# Patient Record
Sex: MALE | Race: WHITE | NOT HISPANIC OR LATINO | Employment: OTHER | URBAN - METROPOLITAN AREA
[De-identification: names, ages, dates, MRNs, and addresses within clinical notes are randomized per-mention and may not be internally consistent; named-entity substitution may affect disease eponyms.]

---

## 2017-01-13 ENCOUNTER — ALLSCRIPTS OFFICE VISIT (OUTPATIENT)
Dept: OTHER | Facility: OTHER | Age: 77
End: 2017-01-13

## 2017-02-16 ENCOUNTER — ALLSCRIPTS OFFICE VISIT (OUTPATIENT)
Dept: OTHER | Facility: OTHER | Age: 77
End: 2017-02-16

## 2017-02-17 ENCOUNTER — GENERIC CONVERSION - ENCOUNTER (OUTPATIENT)
Dept: OTHER | Facility: OTHER | Age: 77
End: 2017-02-17

## 2017-02-17 LAB
CREATININE, URINE (HISTORICAL): 213.3 MG/DL
HBA1C MFR BLD HPLC: 6.6 % (ref 4.8–5.6)
MICROALBUM.,U,RANDOM (HISTORICAL): 7.2 UG/ML
MICROALBUMIN/CREATININE RATIO (HISTORICAL): 3.4 MG/G CREAT (ref 0–30)

## 2017-02-18 LAB
A/G RATIO (HISTORICAL): 1.6 (ref 1.1–2.5)
ALBUMIN SERPL BCP-MCNC: 4 G/DL (ref 3.5–4.8)
ALP SERPL-CCNC: 56 IU/L (ref 39–117)
ALT SERPL W P-5'-P-CCNC: 28 IU/L (ref 0–44)
AST SERPL W P-5'-P-CCNC: 22 IU/L (ref 0–40)
BILIRUB SERPL-MCNC: 0.4 MG/DL (ref 0–1.2)
BUN SERPL-MCNC: 20 MG/DL (ref 8–27)
BUN/CREA RATIO (HISTORICAL): 17 (ref 10–22)
CALCIUM SERPL-MCNC: 9 MG/DL (ref 8.6–10.2)
CHLORIDE SERPL-SCNC: 101 MMOL/L (ref 96–106)
CHOLEST SERPL-MCNC: 173 MG/DL (ref 100–199)
CO2 SERPL-SCNC: 25 MMOL/L (ref 18–29)
CREAT SERPL-MCNC: 1.15 MG/DL (ref 0.76–1.27)
EGFR AFRICAN AMERICAN (HISTORICAL): 72 ML/MIN/1.73
EGFR-AMERICAN CALC (HISTORICAL): 62 ML/MIN/1.73
GLUCOSE SERPL-MCNC: 125 MG/DL (ref 65–99)
HDLC SERPL-MCNC: 54 MG/DL
INTERPRETATION (HISTORICAL): NORMAL
LDLC SERPL CALC-MCNC: 100 MG/DL (ref 0–99)
POTASSIUM SERPL-SCNC: 4.6 MMOL/L (ref 3.5–5.2)
SODIUM SERPL-SCNC: 141 MMOL/L (ref 134–144)
TOT. GLOBULIN, SERUM (HISTORICAL): 2.5 G/DL (ref 1.5–4.5)
TOTAL PROTEIN (HISTORICAL): 6.5 G/DL (ref 6–8.5)
TRIGL SERPL-MCNC: 94 MG/DL (ref 0–149)

## 2017-02-21 ENCOUNTER — GENERIC CONVERSION - ENCOUNTER (OUTPATIENT)
Dept: OTHER | Facility: OTHER | Age: 77
End: 2017-02-21

## 2017-07-18 ENCOUNTER — ALLSCRIPTS OFFICE VISIT (OUTPATIENT)
Dept: OTHER | Facility: OTHER | Age: 77
End: 2017-07-18

## 2017-09-20 ENCOUNTER — GENERIC CONVERSION - ENCOUNTER (OUTPATIENT)
Dept: OTHER | Facility: OTHER | Age: 77
End: 2017-09-20

## 2017-09-25 ENCOUNTER — GENERIC CONVERSION - ENCOUNTER (OUTPATIENT)
Dept: OTHER | Facility: OTHER | Age: 77
End: 2017-09-25

## 2018-01-11 NOTE — RESULT NOTES
Verified Results  (1) COMPREHENSIVE METABOLIC PANEL [id number] 40:22TC [email or web address]     Test Name Result Flag Reference   Glucose, Serum 125 mg/dL H 65-99   BUN 20 mg/dL  8-27   Creatinine, Serum 1 15 mg/dL  0 76-1 27   eGFR If NonAfricn Am 62 mL/min/1 73  >59   eGFR If Africn Am 72 mL/min/1 73  >59   BUN/Creatinine Ratio 17  10-22   Sodium, Serum 141 mmol/L  134-144   Potassium, Serum 4 6 mmol/L  3 5-5 2   Chloride, Serum 101 mmol/L     Carbon Dioxide, Total 25 mmol/L  18-29   Calcium, Serum 9 0 mg/dL  8 6-10 2   Protein, Total, Serum 6 5 g/dL  6 0-8 5   Albumin, Serum 4 0 g/dL  3 5-4 8   Globulin, Total 2 5 g/dL  1 5-4 5   A/G Ratio 1 6  1 1-2 5   **Effective March 13, 2017 the reference interval**                   for A/G Ratio will be changing to:                               Age                Male          Female                           0 -  7 days       1 1 - 2 3       1 1 - 2 3                           8 - 30 days       1 2 - 2 8       1 2 - 2 8                           1 -  6 months     1 3 - 3 6       1 3 - 3 6                    7 months -  5 years      1 5 - 2 6       1 5 - 2 6                              > 5 years      1 2 - 2 2       1 2 - 2 2   Bilirubin, Total 0 4 mg/dL  0 0-1 2   Alkaline Phosphatase, S 56 IU/L     AST (SGOT) 22 IU/L  0-40   ALT (SGPT) 28 IU/L  0-44     (1) HEMOGLOBIN A1C [id number] 09:10AM [email or web address]     Test Name Result Flag Reference   Hemoglobin A1c 6 6 % H 4 8-5 6   Pre-diabetes: 5 7 - 6 4           Diabetes: >6 4           Glycemic control for adults with diabetes: <7 0     (1) LIPID PANEL FASTING W DIRECT LDL REFLEX [id number] 09:10AM [email or web address]     Test Name Result Flag Reference   Cholesterol, Total 173 mg/dL  100-199   Triglycerides 94 mg/dL  0-149   HDL Cholesterol 54 mg/dL  >39   LDL Cholesterol Calc 100 mg/dL H 0-99     (1) MICROALBUMIN CREATININE RATIO, RANDOM URINE [id number] 09:10AM [email or web address]     Test Name Result Flag Reference Creatinine, Urine 213 3 mg/dL  Not Estab  Microalbumin, Urine 7 2 ug/mL  Not Estab  Microalb/Creat Ratio 3 4 mg/g creat  0 0-30 0     [facility]) Cardiovascular Risk Assessment [id number] 09:10AM [facility]     Test Name Result Flag Reference   Interpretation Note     Supplement report is available  PDF Image   Discussion/Summary   Your labs are stable  Please follow up as we discussed at your last appointment    Dr Nikolai Marcelo

## 2018-01-12 VITALS
DIASTOLIC BLOOD PRESSURE: 64 MMHG | HEART RATE: 63 BPM | SYSTOLIC BLOOD PRESSURE: 118 MMHG | HEIGHT: 69 IN | OXYGEN SATURATION: 96 % | WEIGHT: 227.13 LBS | BODY MASS INDEX: 33.64 KG/M2

## 2018-01-12 NOTE — RESULT NOTES
Message   please inform throat cx negative     Verified Results  (1) THROAT CULTURE (CULTURE, UPPER RESPIRATORY) [id number] 09:12AM Sherice Iverson    Order Number: [id number]     Test Name Result Flag Reference   CLINICAL REPORT (Report)     Test:        Throat culture  Specimen Source:  Throat  Specimen Type:   Throat  Specimen Date:   12/6/2016 9:12 AM  Result Date:    12/8/2016 1:23 PM  Result Status:   Final result  Resulting Lab:   [place] [address]            Tel: [phone]      CULTURE                                       ------------------                                   Negative for beta-hemolytic Streptococcus

## 2018-01-13 NOTE — RESULT NOTES
Verified Results  (1) CBC/ PLT (NO DIFF) [id number] 09:28AM [facility]     Test Name Result Flag Reference   WBC 4 7 x10E3/uL  3 4-10 8   RBC 5 17 x10E6/uL  4 14-5 80   Hemoglobin 15 8 g/dL  12 6-17 7   Hematocrit 48 0 %  37 5-51 0   MCV 93 fL  79-97   MCH 30 6 pg  26 6-33 0   MCHC 32 9 g/dL  31 5-35 7   RDW 13 3 %  12 3-15 4   Platelets 837 A32J6/PH  150-379     (1) COMPREHENSIVE METABOLIC PANEL [id number] 23:88RM [facility]     Test Name Result Flag Reference   Glucose, Serum 137 mg/dL H 65-99   BUN 19 mg/dL  8-27   Creatinine, Serum 1 14 mg/dL  0 76-1 27   eGFR If NonAfricn Am 63 mL/min/1 73  >59   eGFR If Africn Am 73 mL/min/1 73  >59   BUN/Creatinine Ratio 17  10-22   Sodium, Serum 140 mmol/L  134-144   Potassium, Serum 4 6 mmol/L  3 5-5 2   Chloride, Serum 101 mmol/L     Carbon Dioxide, Total 22 mmol/L  18-29   Calcium, Serum 9 2 mg/dL  8 6-10 2   Protein, Total, Serum 6 3 g/dL  6 0-8 5   Albumin, Serum 3 9 g/dL  3 5-4 8   Globulin, Total 2 4 g/dL  1 5-4 5   A/G Ratio 1 6  1 1-2 5   Bilirubin, Total 0 6 mg/dL  0 0-1 2   Alkaline Phosphatase, S 63 IU/L     AST (SGOT) 24 IU/L  0-40   ALT (SGPT) 22 IU/L  0-44     (1) HEMOGLOBIN A1C 02Jun2016 09:28AM [facility]     Test Name Result Flag Reference   Hemoglobin A1c 6 9 % H 4 8-5 6   Pre-diabetes: 5 7 - 6 4           Diabetes: >6 4           Glycemic control for adults with diabetes: <7 0     (1) LIPID PANEL FASTING W DIRECT LDL REFLEX [id number] 09:28AM [facility]     Test Name Result Flag Reference   Cholesterol, Total 170 mg/dL  100-199   Triglycerides 104 mg/dL  0-149   HDL Cholesterol 49 mg/dL  >39   According to ATP-III Guidelines, HDL-C >59 mg/dL is considered a  negative risk factor for CHD     LDL Cholesterol Calc 100 mg/dL H 0-99     (1) TSH [id number] 09:28AM [facility]     Test Name Result Flag Reference   TSH 3 060 uIU/mL  0 450-4 500     (1) VITAMIN B12 [id number] 09:28AM      Test Name Result Flag Reference   Vitamin B12 699 pg/mL  211-946       Discussion/Summary   labs stable , continue current medications due for follow up

## 2018-01-14 VITALS
WEIGHT: 227 LBS | SYSTOLIC BLOOD PRESSURE: 124 MMHG | HEART RATE: 68 BPM | DIASTOLIC BLOOD PRESSURE: 80 MMHG | BODY MASS INDEX: 33.62 KG/M2 | HEIGHT: 69 IN

## 2018-01-15 VITALS
DIASTOLIC BLOOD PRESSURE: 70 MMHG | SYSTOLIC BLOOD PRESSURE: 118 MMHG | TEMPERATURE: 97.5 F | BODY MASS INDEX: 33.62 KG/M2 | WEIGHT: 227 LBS | RESPIRATION RATE: 16 BRPM | HEIGHT: 69 IN | HEART RATE: 68 BPM

## 2018-01-16 NOTE — RESULT NOTES
Verified Results  (1) COMPREHENSIVE METABOLIC PANEL [id number] 90:15YT [employer]     Test Name Result Flag Reference   Glucose, Serum 133 mg/dL H 65-99   BUN 20 mg/dL  8-27   Creatinine, Serum 1 17 mg/dL  0 76-1 27   eGFR If NonAfricn Am 61 mL/min/1 73  >59   eGFR If Africn Am 71 mL/min/1 73  >59   BUN/Creatinine Ratio 17  10-22   Sodium, Serum 142 mmol/L  136-144   Potassium, Serum 4 6 mmol/L  3 5-5 2   Chloride, Serum 100 mmol/L     Carbon Dioxide, Total 24 mmol/L  18-29   Calcium, Serum 9 3 mg/dL  8 6-10 2   Protein, Total, Serum 6 7 g/dL  6 0-8 5   Albumin, Serum 4 2 g/dL  3 5-4 8   Globulin, Total 2 5 g/dL  1 5-4 5   A/G Ratio 1 7  1 1-2 5   Bilirubin, Total 0 3 mg/dL  0 0-1 2   Alkaline Phosphatase, S 71 IU/L     AST (SGOT) 26 IU/L  0-40   ALT (SGPT) 23 IU/L  0-44     (1) HEMOGLOBIN A1C [id number] 11:54AM [facility]     Test Name Result Flag Reference   Hemoglobin A1c 7 0 % H 4 8-5 6   Pre-diabetes: 5 7 - 6 4           Diabetes: >6 4           Glycemic control for adults with diabetes: <7 0     (1) LIPID PANEL FASTING W DIRECT LDL REFLEX [id number] 11:54AM [facility]     Test Name Result Flag Reference   Cholesterol, Total 151 mg/dL  100-199   Triglycerides 78 mg/dL  0-149   HDL Cholesterol 46 mg/dL  >39   LDL Cholesterol Calc 89 mg/dL  0-99     (1) MICROALBUMIN CREATININE RATIO, RANDOM URINE 21Nov2016 11:54AM [facility]     Test Name Result Flag Reference   Creatinine, Urine 187 4 mg/dL  Not Estab  Microalbumin, Urine 6 0 ug/mL  Not Estab     Microalb/Creat Ratio 3 2 mg/g creat  0 0-30 0     (1) CBC/PLT/DIFF [id number] 11:54AM [facility]     Test Name Result Flag Reference   WBC 5 5 x10E3/uL  3 4-10 8   RBC 5 28 x10E6/uL  4 14-5 80   Hemoglobin 16 1 g/dL  12 6-17 7   Hematocrit 48 0 %  37 5-51 0   MCV 91 fL  79-97   MCH 30 5 pg  26 6-33 0   MCHC 33 5 g/dL  31 5-35 7   RDW 13 5 %  12 3-15 4   Platelets 334 W79S6/UV  150-379   Neutrophils 54 %     Lymphs 31 %     Monocytes 7 %     Eos 7 %     Basos 1 %     Neutrophils (Absolute) 3 0 x10E3/uL  1 4-7 0   Lymphs (Absolute) 1 7 x10E3/uL  0 7-3 1   Monocytes(Absolute) 0 4 x10E3/uL  0 1-0 9   Eos (Absolute) 0 4 x10E3/uL  0 0-0 4   Baso (Absolute) 0 0 x10E3/uL  0 0-0 2   Immature Granulocytes 0 %     Immature Grans (Abs) 0 0 x10E3/uL  0 0-0 1     (1) TSH [id number] 11:54AM [facility]     Test Name Result Flag Reference   TSH 3 030 uIU/mL  0 450-4 500     [facility]) Cardiovascular Risk Assessment 21Nov2016 11:54AM [facility]     Test Name Result Flag Reference   Interpretation Note     Supplement report is available  PDF Image   [facility] Center[facility] Ibar[facility] ITO58 Default [id number] 11:54AM [facility]     Test Name Result Flag Reference   [place] CMP14 Default Comment     A hand-written panel/profile was received from your office  In  accordance with the [facility] Ambiguous Test Code Policy dated July 6089, we have completed your order by using the closest currently  or formerly recognized AMA panel  We have assigned Comprehensive  Metabolic Panel (14), Test Code #681189 to this request   If this  is not the testing you wished to receive on this specimen, please  contact the [address] [place] [address] Client Inquiry/Technical Services Department  to clarify the test order  We appreciate your business  Discussion/Summary   Your labs are stable  Please follow up as we discussed at your last appointment    Dr Stacy Medrano

## 2018-01-18 LAB
LEFT EYE DIABETIC RETINOPATHY: NORMAL
RIGHT EYE DIABETIC RETINOPATHY: NORMAL
SEVERITY (EYE EXAM): NORMAL

## 2018-01-22 VITALS
HEART RATE: 76 BPM | HEIGHT: 69 IN | DIASTOLIC BLOOD PRESSURE: 80 MMHG | WEIGHT: 225 LBS | RESPIRATION RATE: 16 BRPM | SYSTOLIC BLOOD PRESSURE: 136 MMHG | BODY MASS INDEX: 33.33 KG/M2 | TEMPERATURE: 97.8 F

## 2018-01-22 VITALS
HEIGHT: 69 IN | SYSTOLIC BLOOD PRESSURE: 120 MMHG | BODY MASS INDEX: 33.18 KG/M2 | RESPIRATION RATE: 16 BRPM | TEMPERATURE: 96.9 F | WEIGHT: 224 LBS | DIASTOLIC BLOOD PRESSURE: 84 MMHG | HEART RATE: 76 BPM

## 2018-02-06 RX ORDER — OMEPRAZOLE 40 MG/1
1 CAPSULE, DELAYED RELEASE ORAL DAILY
COMMUNITY
Start: 2017-09-25 | End: 2018-06-27 | Stop reason: ALTCHOICE

## 2018-02-06 RX ORDER — TRANDOLAPRIL TABLETS 1 MG/1
1 TABLET ORAL DAILY
COMMUNITY
Start: 2017-01-13 | End: 2018-02-07 | Stop reason: SDUPTHER

## 2018-02-06 RX ORDER — SIMVASTATIN 20 MG
TABLET ORAL
COMMUNITY
Start: 2013-12-23 | End: 2018-02-12 | Stop reason: SDUPTHER

## 2018-02-07 DIAGNOSIS — I10 BENIGN ESSENTIAL HTN: Primary | ICD-10-CM

## 2018-02-07 RX ORDER — TRANDOLAPRIL TABLETS 1 MG/1
1 TABLET ORAL DAILY
Qty: 90 TABLET | Refills: 0 | Status: SHIPPED | OUTPATIENT
Start: 2018-02-07 | End: 2018-07-19 | Stop reason: SDUPTHER

## 2018-02-07 RX ORDER — TRANDOLAPRIL TABLETS 1 MG/1
TABLET ORAL
Qty: 8100 TABLET | Refills: 1 | OUTPATIENT
Start: 2018-02-07

## 2018-02-11 PROBLEM — I25.10 2-VESSEL CORONARY ARTERY DISEASE: Status: ACTIVE | Noted: 2017-01-13

## 2018-02-11 PROBLEM — E66.8 MODERATE OBESITY: Status: ACTIVE | Noted: 2017-01-13

## 2018-02-11 PROBLEM — E66.9 MODERATE OBESITY: Status: ACTIVE | Noted: 2017-01-13

## 2018-02-12 ENCOUNTER — OFFICE VISIT (OUTPATIENT)
Dept: CARDIOLOGY CLINIC | Facility: CLINIC | Age: 78
End: 2018-02-12
Payer: MEDICARE

## 2018-02-12 VITALS
SYSTOLIC BLOOD PRESSURE: 130 MMHG | WEIGHT: 228.4 LBS | HEIGHT: 69 IN | DIASTOLIC BLOOD PRESSURE: 72 MMHG | HEART RATE: 82 BPM | BODY MASS INDEX: 33.83 KG/M2 | OXYGEN SATURATION: 96 %

## 2018-02-12 DIAGNOSIS — E78.5 DYSLIPIDEMIA: ICD-10-CM

## 2018-02-12 DIAGNOSIS — I25.10 2-VESSEL CORONARY ARTERY DISEASE: Primary | ICD-10-CM

## 2018-02-12 DIAGNOSIS — E66.8 MODERATE OBESITY: ICD-10-CM

## 2018-02-12 DIAGNOSIS — I10 BENIGN HYPERTENSION: ICD-10-CM

## 2018-02-12 DIAGNOSIS — E11.9 TYPE 2 DIABETES MELLITUS WITHOUT COMPLICATION, WITHOUT LONG-TERM CURRENT USE OF INSULIN (HCC): ICD-10-CM

## 2018-02-12 PROCEDURE — 99214 OFFICE O/P EST MOD 30 MIN: CPT | Performed by: INTERNAL MEDICINE

## 2018-02-12 RX ORDER — ATORVASTATIN CALCIUM 40 MG/1
40 TABLET, FILM COATED ORAL DAILY
Qty: 90 TABLET | Refills: 2 | Status: SHIPPED | OUTPATIENT
Start: 2018-02-12 | End: 2018-06-27 | Stop reason: ALTCHOICE

## 2018-02-12 NOTE — PROGRESS NOTES
Progress Note - Cardiology Office  Gonzales Maldonado 70 y o  male MRN: [id number]   Encounter: [id number]    Assessment/Plan   1  Coronary artery disease with a remote history of angioplasty of LAD with a drug-eluting stent and known RPDA 100% with grade 3 collaterals  Continue medical Rx  No symptoms of angina  continue conservative Rx  2  Hypertension  Patient's blood pressure is very well controlled with current therapy  Check electrolytes  3  Dyslipidemia  Patient cholesterol has creeped up  His LDL is close to 100  He has history of diabetes mellitus coronary artery disease and known 100% occluded RCA  Need to be on high intensity statin  After extensive discussion patient was started on Lipitor 40 mg and will check repeat blood test   Check CMP fasting lipid TSH ordered  4  Diabetes mellitus  Patient is on metformin and Januvia follow-up by PMD   Last HbA1c 6 6  5  Obesity  He is advised to lose weightPatient was educated about pathophysiology of coronary artery disease  Currently he has no symptom concur with current Rx  Follow-up in 6 months    Counseling :  A description of the counseling  Spoke to patient but high intensity statins  Lipitor side effects discussed  He is willing to try it  Goals and Barriers  Patient want to lose weight which he gain during winter months  Little under stress due to wife's condition  Patient's ability to self care: Yes  Medication side effect reviewed with patient in detail and all their questions answered to their satisfaction  HPI :     John Michel is a 70y o  year old male who came for follow up  Patient has a past medical history significant for coronary artery disease status post angioplasty in 2008 off large size LAD with a known RPDA, hypertension, diabetes mellitus, dyslipidemia and moderate obesity who came for regular follow-up  Patient's last nuclear was in 2014 which was nonischemic   He denies any chest pain or any shortness of breath  No fever no chills no other significant complaint  7/18/2017Patient came for follow-up for coronary artery disease  A status post angioplasty of his large LAD and has known RPDA occlusion  His sugar is well controlled  He is trying to lose weight  Blood pressures acceptable  Denies any chest pain  No PND no orthopnea no nausea no vomiting no other significant complaint  02/11/2018  Above reviewed  Patient came for follow-up  He is under a little stress as wife is having lot of health issues  Denies any chest pain or any shortness of breath  His cholesterol has slowly gone up  He is only on Zocor 20 mg  He is trying his best to lose weight  No fever no chills no nausea no vomiting no PND no orthopnea no other significant complaint  Review of Systems   Constitutional: Negative for activity change, chills, diaphoresis, fever and unexpected weight change  HENT: Negative for congestion  Eyes: Negative for discharge and redness  Respiratory: Positive for shortness of breath  Negative for cough, chest tightness and wheezing  Chronic exertion shortness of breath not changed   Cardiovascular: Negative  Negative for chest pain, palpitations and leg swelling  Gastrointestinal: Negative for abdominal pain, diarrhea and nausea  Endocrine: Negative  Genitourinary: Negative for decreased urine volume and urgency  Musculoskeletal: Positive for arthralgias  Negative for back pain and gait problem  Skin: Negative for rash and wound  Allergic/Immunologic: Negative  Neurological: Negative for dizziness, seizures, syncope, weakness, light-headedness and headaches  Hematological: Negative  Psychiatric/Behavioral: Negative for agitation and confusion  The patient is not nervous/anxious          Historical Information   Past Medical History:   Diagnosis Date    CAD (coronary artery disease)     DM2 (diabetes mellitus, type 2) ([facility] [address] )     Dyslipidemia     HTN (hypertension)      Past Surgical History:   Procedure Laterality Date    CORONARY ANGIOPLASTY       History   Alcohol Use    Yes     History   Drug Use No     History   Smoking Status    Never Smoker   Smokeless Tobacco    Never Used     Family History:   Family History   Problem Relation Age of Onset    Cancer Mother     Hypertension Sister        Meds/Allergies     No Known Allergies    Current Outpatient Prescriptions:     metFORMIN (GLUCOPHAGE) 500 mg tablet, Take by mouth, Disp: , Rfl:     Multiple Vitamins-Minerals (CENTRUM SILVER PO), Take by mouth, Disp: , Rfl:     omeprazole (PriLOSEC) 40 MG capsule, Take 1 capsule by mouth daily, Disp: , Rfl:     sitaGLIPtin (JANUVIA) 50 mg tablet, Take by mouth, Disp: , Rfl:     trandolapril (MAVIK) 1 MG tablet, Take 1 tablet (1 mg total) by mouth daily, Disp: 90 tablet, Rfl: 0    atorvastatin (LIPITOR) 40 mg tablet, Take 1 tablet (40 mg total) by mouth daily, Disp: 90 tablet, Rfl: 2    Vitals: Blood pressure 130/72, pulse 82, height 5' 9" (1 753 m), weight 104 kg (228 lb 6 4 oz), SpO2 96 %  Body mass index is 33 73 kg/m²  BP Readings from Last 3 Encounters:   02/12/18 130/72   09/25/17 136/80   09/20/17 120/84       Physical Exam:  Physical Exam    Neurologic:  Alert & oriented x 3, no new focal deficits, Not in any acute distress,  Constitutional:  Well developed, well nourished, non-toxic appearance   Eyes:  Pupil equal and reacting to light, conjunctiva normal, No JVP, No LNP   HENT:  Atraumatic, oropharynx moist, Neck- normal range of motion, no tenderness,  Neck supple   Respiratory:  Bilateral air entry, mostly clear to auscultation  Cardiovascular: S1-S2 regular with a 2/6 ejection systolic murmur and S4 is present  GI:  Soft, nondistended, normal bowel sounds, nontender, no hepatosplenomegaly appreciated  Musculoskeletal:  No edema, no tenderness, no deformities     Skin:  Well hydrated, no rash   Lymphatic:  No lymphadenopathy noted   Extremities:  No edema and distal pulses are present      Patient Active Problem List    Diagnosis Date Noted    2-vessel coronary artery disease 01/13/2017    Moderate obesity 01/13/2017    Benign hypertension 11/09/2016    Dyslipidemia 12/23/2013    DMII (diabetes mellitus, type 2) ([facility] [address] ) 11/13/2013     Diagnostic Studies Review Cardio:  Stress Test: Nuclear stress test in October 2014 shows no ischemia EF 70%  Echocardiogram/ROCK: Echo Doppler done in March 2015 shows borderline concentric left hypertrophy EF 55-60%, after reminding dilated, trace MR, trace TR PA pressure 35 mmHg  Catheterization: His cardiac catheter patient done in 2008 shows critical stenosis of LAD which was successfully stented with a drug-eluting stent  He had nonobstructive disease in the circumflex and right coronary artery and RPDA is 100% occluded with grade 2 collaterals  Vascular imaging: In October of 2014 abdominal aortogram study shows no aneurysm  ECG Report:   Comparison to prior ECGs: no interval change  7/18/2017  Normal sinus rhythm 1  with no significant ST changes  Heart rate was 68 bpm     Blood test from February 2017 reviewed with the patient    Lab Review     Lab Results   Component Value Date     02/17/2017    K 4 6 02/17/2017     02/17/2017    CO2 25 02/17/2017    BUN 20 02/17/2017    CREATININE 1 15 02/17/2017    GLUCOSE 125 (H) 02/17/2017    CALCIUM 9 0 02/17/2017    AST 22 02/17/2017    ALT 28 02/17/2017    ALKPHOS 56 02/17/2017    PROT 6 5 02/17/2017    BILITOT 0 4 02/17/2017     Lab Results   Component Value Date    GLUCOSE 125 (H) 02/17/2017    CALCIUM 9 0 02/17/2017     02/17/2017    K 4 6 02/17/2017    CO2 25 02/17/2017     02/17/2017    BUN 20 02/17/2017    CREATININE 1 15 02/17/2017       Lab Results   Component Value Date    HGBA1C 6 6 (H) 02/17/2017     Lipid Profile:   Lab Results   Component Value Date    CHOL 173 02/17/2017    HDL 54 02/17/2017    LDLCALC 100 (H) 02/17/2017    TRIG 94 02/17/2017 Lab Results   Component Value Date    CHOL 173 02/17/2017    CHOL 170 06/02/2016       Dr Jayshree Phelps MD [facility]      "This note has been constructed using a voice recognition system  Therefore there may be syntax, spelling, and/or grammatical errors   Please call if you have any questions  "

## 2018-02-19 ENCOUNTER — TRANSCRIBE ORDERS (OUTPATIENT)
Dept: LAB | Facility: CLINIC | Age: 78
End: 2018-02-19

## 2018-02-19 ENCOUNTER — APPOINTMENT (OUTPATIENT)
Dept: LAB | Facility: CLINIC | Age: 78
End: 2018-02-19
Payer: MEDICARE

## 2018-02-19 DIAGNOSIS — E78.5 DYSLIPIDEMIA: ICD-10-CM

## 2018-02-19 DIAGNOSIS — I10 BENIGN HYPERTENSION: ICD-10-CM

## 2018-02-19 DIAGNOSIS — I25.10 2-VESSEL CORONARY ARTERY DISEASE: ICD-10-CM

## 2018-02-19 LAB
ALBUMIN SERPL BCP-MCNC: 3.7 G/DL (ref 3.5–5)
ALP SERPL-CCNC: 58 U/L (ref 46–116)
ALT SERPL W P-5'-P-CCNC: 35 U/L (ref 12–78)
ANION GAP SERPL CALCULATED.3IONS-SCNC: 5 MMOL/L (ref 4–13)
AST SERPL W P-5'-P-CCNC: 22 U/L (ref 5–45)
BASOPHILS # BLD AUTO: 0.06 THOUSANDS/ΜL (ref 0–0.1)
BASOPHILS NFR BLD AUTO: 1 % (ref 0–1)
BILIRUB SERPL-MCNC: 0.54 MG/DL (ref 0.2–1)
BUN SERPL-MCNC: 19 MG/DL (ref 5–25)
CALCIUM SERPL-MCNC: 9 MG/DL (ref 8.3–10.1)
CHLORIDE SERPL-SCNC: 105 MMOL/L (ref 100–108)
CHOLEST SERPL-MCNC: 172 MG/DL (ref 50–200)
CO2 SERPL-SCNC: 29 MMOL/L (ref 21–32)
CREAT SERPL-MCNC: 1.18 MG/DL (ref 0.6–1.3)
EOSINOPHIL # BLD AUTO: 0.63 THOUSAND/ΜL (ref 0–0.61)
EOSINOPHIL NFR BLD AUTO: 9 % (ref 0–6)
ERYTHROCYTE [DISTWIDTH] IN BLOOD BY AUTOMATED COUNT: 13.9 % (ref 11.6–15.1)
GFR SERPL CREATININE-BSD FRML MDRD: 60 ML/MIN/1.73SQ M
GLUCOSE P FAST SERPL-MCNC: 114 MG/DL (ref 65–99)
HCT VFR BLD AUTO: 47.6 % (ref 36.5–49.3)
HDLC SERPL-MCNC: 54 MG/DL (ref 40–60)
HGB BLD-MCNC: 15.6 G/DL (ref 12–17)
LDLC SERPL CALC-MCNC: 95 MG/DL (ref 0–100)
LYMPHOCYTES # BLD AUTO: 1.59 THOUSANDS/ΜL (ref 0.6–4.47)
LYMPHOCYTES NFR BLD AUTO: 22 % (ref 14–44)
MCH RBC QN AUTO: 30.9 PG (ref 26.8–34.3)
MCHC RBC AUTO-ENTMCNC: 32.8 G/DL (ref 31.4–37.4)
MCV RBC AUTO: 94 FL (ref 82–98)
MONOCYTES # BLD AUTO: 0.74 THOUSAND/ΜL (ref 0.17–1.22)
MONOCYTES NFR BLD AUTO: 10 % (ref 4–12)
NEUTROPHILS # BLD AUTO: 4.12 THOUSANDS/ΜL (ref 1.85–7.62)
NEUTS SEG NFR BLD AUTO: 58 % (ref 43–75)
NRBC BLD AUTO-RTO: 0 /100 WBCS
PLATELET # BLD AUTO: 198 THOUSANDS/UL (ref 149–390)
PMV BLD AUTO: 11.1 FL (ref 8.9–12.7)
POTASSIUM SERPL-SCNC: 4.4 MMOL/L (ref 3.5–5.3)
PROT SERPL-MCNC: 7.5 G/DL (ref 6.4–8.2)
RBC # BLD AUTO: 5.05 MILLION/UL (ref 3.88–5.62)
SODIUM SERPL-SCNC: 139 MMOL/L (ref 136–145)
TRIGL SERPL-MCNC: 115 MG/DL
TSH SERPL DL<=0.05 MIU/L-ACNC: 3 UIU/ML (ref 0.36–3.74)
WBC # BLD AUTO: 7.15 THOUSAND/UL (ref 4.31–10.16)

## 2018-02-19 PROCEDURE — 84443 ASSAY THYROID STIM HORMONE: CPT

## 2018-02-19 PROCEDURE — 85025 COMPLETE CBC W/AUTO DIFF WBC: CPT

## 2018-02-19 PROCEDURE — 80053 COMPREHEN METABOLIC PANEL: CPT

## 2018-02-19 PROCEDURE — 80061 LIPID PANEL: CPT

## 2018-02-19 PROCEDURE — 36415 COLL VENOUS BLD VENIPUNCTURE: CPT

## 2018-03-05 ENCOUNTER — OFFICE VISIT (OUTPATIENT)
Dept: FAMILY MEDICINE CLINIC | Facility: CLINIC | Age: 78
End: 2018-03-05
Payer: MEDICARE

## 2018-03-05 VITALS
DIASTOLIC BLOOD PRESSURE: 82 MMHG | WEIGHT: 232 LBS | TEMPERATURE: 97.7 F | SYSTOLIC BLOOD PRESSURE: 128 MMHG | HEART RATE: 76 BPM | BODY MASS INDEX: 34.36 KG/M2 | HEIGHT: 69 IN

## 2018-03-05 DIAGNOSIS — R13.12 OROPHARYNGEAL DYSPHAGIA: ICD-10-CM

## 2018-03-05 DIAGNOSIS — R05.8 POST-VIRAL COUGH SYNDROME: ICD-10-CM

## 2018-03-05 DIAGNOSIS — R09.82 POST-NASAL DRIP: Primary | ICD-10-CM

## 2018-03-05 PROCEDURE — 99214 OFFICE O/P EST MOD 30 MIN: CPT | Performed by: NURSE PRACTITIONER

## 2018-03-05 RX ORDER — SIMVASTATIN 40 MG
40 TABLET ORAL
COMMUNITY
End: 2018-08-14

## 2018-03-05 RX ORDER — DEXTROMETHORPHAN HYDROBROMIDE AND PROMETHAZINE HYDROCHLORIDE 15; 6.25 MG/5ML; MG/5ML
5 SYRUP ORAL
Qty: 140 ML | Refills: 0 | Status: SHIPPED | OUTPATIENT
Start: 2018-03-05 | End: 2018-06-27 | Stop reason: ALTCHOICE

## 2018-03-05 RX ORDER — FLUTICASONE PROPIONATE 50 MCG
2 SPRAY, SUSPENSION (ML) NASAL DAILY
Qty: 16 G | Refills: 0 | Status: SHIPPED | OUTPATIENT
Start: 2018-03-05 | End: 2018-06-27 | Stop reason: ALTCHOICE

## 2018-03-05 RX ORDER — BENZONATATE 200 MG/1
200 CAPSULE ORAL 3 TIMES DAILY PRN
Qty: 20 CAPSULE | Refills: 0 | Status: SHIPPED | OUTPATIENT
Start: 2018-03-05 | End: 2018-08-14

## 2018-03-05 NOTE — PATIENT INSTRUCTIONS
Acute Cough   AMBULATORY CARE:   An acute cough  can last up to 3 weeks  Common causes of an acute cough include a cold, allergies, or a lung infection  Seek care immediately if:   · You have trouble breathing or feel short of breath  · You cough up blood, or you see blood in your mucus  · You faint or feel weak or dizzy  · You have chest pain when you cough or take a deep breath  · You have new wheezing  Contact your healthcare provider if:   · You have a fever  · Your cough lasts longer than 4 weeks  · Your symptoms do not improve with treatment  · You have questions or concerns about your condition or care  Treatment:  An acute cough usually goes away on its own  Ask your healthcare provider about medicines you can take to decrease your cough  You may need medicine to stop the cough, decrease swelling in your airways, or help open your airways  Medicine may also be given to help you cough up mucus  If you have an infection caused by bacteria, you may need antibiotics  Manage your symptoms:   · Do not smoke and stay away from others who smoke  Nicotine and other chemicals in cigarettes and cigars can cause lung damage and make your cough worse  Ask your healthcare provider for information if you currently smoke and need help to quit  E-cigarettes or smokeless tobacco still contain nicotine  Talk to your healthcare provider before you use these products  · Drink extra liquids as directed  Liquids will help thin and loosen mucus so you can cough it up  Liquids will also help prevent dehydration  Examples of good liquids to drink include water, fruit juice, and broth  Do not drink liquids that contain caffeine  Caffeine can increase your risk for dehydration  Ask your healthcare provider how much liquid to drink each day  · Rest as directed  Do not do activities that make your cough worse, such as exercise  · Use a humidifier or vaporizer    Use a cool mist humidifier or a vaporizer to increase air moisture in your home  This may make it easier for you to breathe and help decrease your cough  · Eat 2 to 5 mL of honey 2 times each day  Honey can help thin mucus and decrease your cough  · Use cough drops or lozenges  These can help decrease throat irritation and your cough  Follow up with your healthcare provider as directed:  Write down your questions so you remember to ask them during your visits  © 2017 [facility] Information is for End User's use only and may not be sold, redistributed or otherwise used for commercial purposes  All illustrations and images included in CareNotes® are the copyrighted property of A D A M [employer] Inc  or Armando Victor  The above information is an  only  It is not intended as medical advice for individual conditions or treatments  Talk to your doctor, nurse or pharmacist before following any medical regimen to see if it is safe and effective for you

## 2018-03-05 NOTE — PROGRESS NOTES
Assessment:      Cough    No evidence of bacterial infection on exam  Plan:      Antitussives per medication orders  Avoid exposure to tobacco smoke and fumes  Call if shortness of breath worsens, blood in sputum, change in character of cough, development of fever or chills, inability to maintain nutrition and hydration  Avoid exposure to tobacco smoke and fumes  GI consult  Trial of proton pump inhibitor  Trial of steroid nasal spray  F/U if sxs do not progress as discussed     Subjective:       Gonzales Hagen is a 70 y o  male here for evaluation of a cough  The cough is non-productive, without wheezing, chest pain, orthopnea, dyspnea or hemoptysis and is aggravated by nothing  Onset of symptoms was 2 days ago, unchanged since that time  Associated symptoms include postnasal drip  Reports good sxs relief with tessalon pearles  Patient does not have a history of asthma  Patient has not had recent travel  Patient does not have a history of smoking  Patient  has had a previous chest x-ray  Was seen for similar problem last year  At that time, his sxs improved with PPI, flonase  He is not currently taking him omeprazole  The following portions of the patient's history were reviewed and updated as appropriate: allergies, current medications, past family history, past medical history, past social history, past surgical history and problem list     Review of Systems  Pertinent items are noted in HPI         Objective:      Oxygen saturation 99% on room air  /82 (BP Location: Left arm, Patient Position: Sitting, Cuff Size: Adult)   Pulse 76   Temp 97 7 °F (36 5 °C) (Tympanic)   Ht 5' 9" (1 753 m)   Wt 105 kg (232 lb)   BMI 34 26 kg/m²   General appearance: alert and oriented, in no acute distress  Head: Normocephalic, without obvious abnormality, sinuses nontender to percussion  Ears: normal TM's and external ear canals both ears  Nose: no discharge, turbinates pale  Throat: abnormal findings: mild oropharyngeal erythema  Lungs: clear to auscultation bilaterally  Heart: regular rate and rhythm, S1, S2 normal, no murmur, click, rub or gallop  Abdomen: soft, non-tender; bowel sounds normal; no masses,  no organomegaly  Pulses: 2+ and symmetric  Lymph nodes: Cervical, supraclavicular, and axillary nodes normal   Neurologic: Grossly normal

## 2018-03-27 DIAGNOSIS — E11.9 TYPE 2 DIABETES MELLITUS WITHOUT COMPLICATION, WITHOUT LONG-TERM CURRENT USE OF INSULIN (HCC): Primary | ICD-10-CM

## 2018-03-27 RX ORDER — SITAGLIPTIN 50 MG/1
TABLET, FILM COATED ORAL
Qty: 90 TABLET | Refills: 0 | Status: SHIPPED | OUTPATIENT
Start: 2018-03-27 | End: 2018-04-13 | Stop reason: SDUPTHER

## 2018-04-13 DIAGNOSIS — E11.9 TYPE 2 DIABETES MELLITUS WITHOUT COMPLICATION, WITHOUT LONG-TERM CURRENT USE OF INSULIN (HCC): ICD-10-CM

## 2018-06-06 DIAGNOSIS — E11.9 TYPE 2 DIABETES MELLITUS WITHOUT COMPLICATION, WITHOUT LONG-TERM CURRENT USE OF INSULIN (HCC): ICD-10-CM

## 2018-06-06 RX ORDER — SITAGLIPTIN 50 MG/1
TABLET, FILM COATED ORAL
Qty: 90 TABLET | Refills: 0 | Status: SHIPPED | OUTPATIENT
Start: 2018-06-06 | End: 2018-06-27 | Stop reason: SDUPTHER

## 2018-06-27 ENCOUNTER — OFFICE VISIT (OUTPATIENT)
Dept: FAMILY MEDICINE CLINIC | Facility: CLINIC | Age: 78
End: 2018-06-27
Payer: MEDICARE

## 2018-06-27 VITALS
RESPIRATION RATE: 14 BRPM | BODY MASS INDEX: 35.94 KG/M2 | TEMPERATURE: 97.1 F | DIASTOLIC BLOOD PRESSURE: 80 MMHG | SYSTOLIC BLOOD PRESSURE: 140 MMHG | WEIGHT: 229 LBS | HEART RATE: 72 BPM | HEIGHT: 67 IN

## 2018-06-27 DIAGNOSIS — I10 BENIGN HYPERTENSION: ICD-10-CM

## 2018-06-27 DIAGNOSIS — R13.10 DYSPHAGIA, UNSPECIFIED TYPE: Primary | ICD-10-CM

## 2018-06-27 DIAGNOSIS — E78.5 DYSLIPIDEMIA: ICD-10-CM

## 2018-06-27 DIAGNOSIS — E11.9 TYPE 2 DIABETES MELLITUS WITHOUT COMPLICATION, WITHOUT LONG-TERM CURRENT USE OF INSULIN (HCC): ICD-10-CM

## 2018-06-27 PROCEDURE — 99214 OFFICE O/P EST MOD 30 MIN: CPT | Performed by: NURSE PRACTITIONER

## 2018-06-27 RX ORDER — ASPIRIN 81 MG/1
81 TABLET, CHEWABLE ORAL DAILY
COMMUNITY

## 2018-06-27 NOTE — PATIENT INSTRUCTIONS
Swallow study as discussed  Will check labs  Continue current meds  Follow up with cardiology as scheduled

## 2018-06-27 NOTE — PROGRESS NOTES
Assessment/Plan:  1  Dysphagia, unspecified type  - FL barium swallow video w speech; Future    2  Type 2 diabetes mellitus without complication, without long-term current use of insulin (HCC)  Stable  Continue current meds  Check labs  - CBC and differential; Future  - Comprehensive metabolic panel; Future  - HEMOGLOBIN A1C W/ EAG ESTIMATION; Future  - sitaGLIPtin (JANUVIA) 50 mg tablet; Take 1 tablet (50 mg total) by mouth daily  Dispense: 90 tablet; Refill: 1  3  Benign hypertension  Stable  - CBC and differential; Future  - Comprehensive metabolic panel; Future  - 4  Dyslipidemia  Follow up with Dr Cassie Hamilton regarding statin as discussed  - Lipid panel; Future    Subjective:      Patient ID: Caro Abarca is a 70 y o  male who presents with difficulty swallowing    Difficulty swallowing for over a year  Getting worse  Chokes on some foods  Happens intermittently  ENT looked in past and said everything was fine  Never saw gastro  No cough except when having swallowing issue  Pt not currently taking statin  Was on simvastatin but Dr Cassie Hamilton wanted him to try lipitor  Had bad side effects, muscle pain  Stopped lipitor  Not currently taking either but has appt with Dr Cassie Hamilton this week  The following portions of the patient's history were reviewed and updated as appropriate: allergies, current medications, past family history, past medical history, past social history, past surgical history and problem list     Review of Systems   Constitutional: Negative for fatigue and fever  HENT: Positive for trouble swallowing  Respiratory: Negative for cough, shortness of breath and wheezing  Cardiovascular: Negative for chest pain, palpitations and leg swelling  Gastrointestinal: Negative for diarrhea, nausea and vomiting  Musculoskeletal: Positive for arthralgias  Left shoulder/left arm pain intermittently  History of rtc repair   Skin: Negative for rash     Neurological: Negative for dizziness and headaches  Objective:      /80 (BP Location: Left arm, Patient Position: Sitting, Cuff Size: Adult)   Pulse 72   Temp (!) 97 1 °F (36 2 °C) (Temporal)   Resp 14   Ht 5' 7" (1 702 m)   Wt 104 kg (229 lb)   BMI 35 87 kg/m²          Physical Exam   Constitutional: He appears well-developed and well-nourished  No distress  Neck: No JVD present  No audible bruit   Cardiovascular: Normal rate, regular rhythm and normal heart sounds  No murmur heard  Pulmonary/Chest: Effort normal and breath sounds normal  No respiratory distress  He has no wheezes  Skin: Skin is warm and dry  No rash noted  No erythema  Psychiatric: He has a normal mood and affect  His behavior is normal  Judgment and thought content normal    Vitals reviewed

## 2018-06-28 LAB
ALBUMIN SERPL-MCNC: 4.2 G/DL (ref 3.5–4.8)
ALBUMIN/GLOB SERPL: 1.6 {RATIO} (ref 1.2–2.2)
ALP SERPL-CCNC: 63 IU/L (ref 39–117)
ALT SERPL-CCNC: 27 IU/L (ref 0–44)
AST SERPL-CCNC: 24 IU/L (ref 0–40)
BASOPHILS # BLD AUTO: 0 X10E3/UL (ref 0–0.2)
BASOPHILS NFR BLD AUTO: 1 %
BILIRUB SERPL-MCNC: 0.5 MG/DL (ref 0–1.2)
BUN SERPL-MCNC: 17 MG/DL (ref 8–27)
BUN/CREAT SERPL: 15 (ref 10–24)
CALCIUM SERPL-MCNC: 9.5 MG/DL (ref 8.6–10.2)
CHLORIDE SERPL-SCNC: 100 MMOL/L (ref 96–106)
CHOLEST SERPL-MCNC: 229 MG/DL (ref 100–199)
CHOLEST/HDLC SERPL: 4.3 RATIO (ref 0–5)
CO2 SERPL-SCNC: 22 MMOL/L (ref 20–29)
CREAT SERPL-MCNC: 1.13 MG/DL (ref 0.76–1.27)
EOSINOPHIL # BLD AUTO: 0.3 X10E3/UL (ref 0–0.4)
EOSINOPHIL NFR BLD AUTO: 6 %
ERYTHROCYTE [DISTWIDTH] IN BLOOD BY AUTOMATED COUNT: 13.8 % (ref 12.3–15.4)
EST. AVERAGE GLUCOSE BLD GHB EST-MCNC: 154 MG/DL
GLOBULIN SER-MCNC: 2.6 G/DL (ref 1.5–4.5)
GLUCOSE SERPL-MCNC: 131 MG/DL (ref 65–99)
HBA1C MFR BLD: 7 % (ref 4.8–5.6)
HCT VFR BLD AUTO: 45.8 % (ref 37.5–51)
HDLC SERPL-MCNC: 53 MG/DL
HGB BLD-MCNC: 15.4 G/DL (ref 13–17.7)
IMM GRANULOCYTES # BLD: 0 X10E3/UL (ref 0–0.1)
IMM GRANULOCYTES NFR BLD: 0 %
LDLC SERPL CALC-MCNC: 154 MG/DL (ref 0–99)
LYMPHOCYTES # BLD AUTO: 1.3 X10E3/UL (ref 0.7–3.1)
LYMPHOCYTES NFR BLD AUTO: 27 %
MCH RBC QN AUTO: 30.6 PG (ref 26.6–33)
MCHC RBC AUTO-ENTMCNC: 33.6 G/DL (ref 31.5–35.7)
MCV RBC AUTO: 91 FL (ref 79–97)
MICRODELETION SYND BLD/T FISH: NORMAL
MONOCYTES # BLD AUTO: 0.5 X10E3/UL (ref 0.1–0.9)
MONOCYTES NFR BLD AUTO: 10 %
NEUTROPHILS # BLD AUTO: 2.7 X10E3/UL (ref 1.4–7)
NEUTROPHILS NFR BLD AUTO: 56 %
PLATELET # BLD AUTO: 204 X10E3/UL (ref 150–379)
POTASSIUM SERPL-SCNC: 5 MMOL/L (ref 3.5–5.2)
PROT SERPL-MCNC: 6.8 G/DL (ref 6–8.5)
RBC # BLD AUTO: 5.03 X10E6/UL (ref 4.14–5.8)
SL AMB EGFR AFRICAN AMERICAN: 73 ML/MIN/1.73
SL AMB EGFR NON AFRICAN AMERICAN: 63 ML/MIN/1.73
SL AMB VLDL CHOLESTEROL CALC: 22 MG/DL (ref 5–40)
SODIUM SERPL-SCNC: 139 MMOL/L (ref 134–144)
TRIGL SERPL-MCNC: 108 MG/DL (ref 0–149)
WBC # BLD AUTO: 4.8 X10E3/UL (ref 3.4–10.8)

## 2018-06-29 ENCOUNTER — TELEPHONE (OUTPATIENT)
Dept: FAMILY MEDICINE CLINIC | Facility: CLINIC | Age: 78
End: 2018-06-29

## 2018-06-29 NOTE — TELEPHONE ENCOUNTER
----- Message from [employer] sent at 6/29/2018  8:31 AM EDT -----  Please call pt to advise that labs were reviewed  HgbA1c 7 0 (about where has been running)  Chol 229, ldl 154- he was supposed to discuss statin with Dr Jaun Acosta at appt this week  So please see what he was told to do  Otherwise labs okay

## 2018-07-03 ENCOUNTER — TELEPHONE (OUTPATIENT)
Dept: FAMILY MEDICINE CLINIC | Facility: CLINIC | Age: 78
End: 2018-07-03

## 2018-07-03 NOTE — TELEPHONE ENCOUNTER
Patient called advising that his appointment with Dr Pa Zuniga isn't until August 14th  Please call back to discuss further

## 2018-07-03 NOTE — TELEPHONE ENCOUNTER
Left message for patient to return call- not sure if patient was advised of message when he called back- phone room- when patient returns call- please advise of message and document the same    Also, Jhonny Castaneda, patient has pending appt 7 12 with Dr Virgilio Albert

## 2018-07-03 NOTE — TELEPHONE ENCOUNTER
----- Message from [employer] sent at 6/29/2018  8:31 AM EDT -----  Please call pt to advise that labs were reviewed  HgbA1c 7 0 (about where has been running)  Chol 229, ldl 154- he was supposed to discuss statin with Dr Sims Senior at appt this week  So please see what he was told to do  Otherwise labs okay

## 2018-07-19 DIAGNOSIS — I10 BENIGN ESSENTIAL HTN: ICD-10-CM

## 2018-07-19 RX ORDER — TRANDOLAPRIL TABLETS 1 MG/1
1 TABLET ORAL DAILY
Qty: 90 TABLET | Refills: 0 | Status: SHIPPED | OUTPATIENT
Start: 2018-07-19 | End: 2018-09-11 | Stop reason: SDUPTHER

## 2018-08-14 ENCOUNTER — OFFICE VISIT (OUTPATIENT)
Dept: CARDIOLOGY CLINIC | Facility: CLINIC | Age: 78
End: 2018-08-14
Payer: MEDICARE

## 2018-08-14 VITALS
HEART RATE: 73 BPM | HEIGHT: 69 IN | DIASTOLIC BLOOD PRESSURE: 70 MMHG | WEIGHT: 231 LBS | BODY MASS INDEX: 34.21 KG/M2 | SYSTOLIC BLOOD PRESSURE: 138 MMHG

## 2018-08-14 DIAGNOSIS — E78.5 DYSLIPIDEMIA: ICD-10-CM

## 2018-08-14 DIAGNOSIS — E66.8 MODERATE OBESITY: ICD-10-CM

## 2018-08-14 DIAGNOSIS — I10 ESSENTIAL HYPERTENSION: ICD-10-CM

## 2018-08-14 DIAGNOSIS — I25.10 2-VESSEL CORONARY ARTERY DISEASE: ICD-10-CM

## 2018-08-14 DIAGNOSIS — E11.9 TYPE 2 DIABETES MELLITUS WITHOUT COMPLICATION, WITHOUT LONG-TERM CURRENT USE OF INSULIN (HCC): ICD-10-CM

## 2018-08-14 PROBLEM — R13.10 DYSPHAGIA: Status: ACTIVE | Noted: 2017-09-25

## 2018-08-14 PROCEDURE — 99214 OFFICE O/P EST MOD 30 MIN: CPT | Performed by: INTERNAL MEDICINE

## 2018-08-14 PROCEDURE — 93000 ELECTROCARDIOGRAM COMPLETE: CPT | Performed by: INTERNAL MEDICINE

## 2018-08-14 RX ORDER — SIMVASTATIN 40 MG
40 TABLET ORAL
Qty: 30 TABLET | Refills: 5 | Status: SHIPPED | OUTPATIENT
Start: 2018-08-14 | End: 2019-02-14 | Stop reason: SDUPTHER

## 2018-08-14 NOTE — PROGRESS NOTES
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% with grade 3 collaterals  Continue medical Rx  No symptoms of angina  continue conservative Rx    2  Hypertension  Patient's blood pressure is very well controlled with current therapy  Repeat labs reviewed with the patient  3  Dyslipidemia  Patient could not tolerate Lipitor  He would prefer to go back on Zocor  Will start the patient back on simvastatin 40 mg q h s     Will check labs in 4-6 weeks  4  Diabetes mellitus  Patient is on metformin and Januvia follow-up by PMD   Last HbA1c 6 6    5  Obesity  He is advised to lose weightPatient was educated about pathophysiology of coronary artery disease  Currently he has no symptom concur with current Rx  Follow-up in 6 months    Counseling :  A description of the counseling  Spoke to patient but high intensity statins  Lipitor side effects discussed  He is willing to try it  Goals and Barriers  Patient want to lose weight which he gain during winter months  Little under stress due to wife's condition  Patient's ability to self care: Yes  Medication side effect reviewed with patient in detail and all their questions answered to their satisfaction  Hypertension    Pertinent negatives include no chest pain, headaches or palpitations     :     Gonzales Robbins is a 70y o  year old male who came for follow up   Patient has a past medical history significant for coronary artery disease status post angioplasty in 2008 off large size LAD with a known RPDA, hypertension, diabetes mellitus, dyslipidemia and moderate obesity who came for regular follow-up  Patient's last nuclear was in 2014 which was nonischemic  He denies any chest pain or any shortness of breath  No fever no chills no other significant complaint  7/18/2017Patient came for follow-up for coronary artery disease  A status post angioplasty of his large LAD and has known RPDA occlusion  His sugar is well controlled  He is trying to lose weight  Blood pressures acceptable  Denies any chest pain  No PND no orthopnea no nausea no vomiting no other significant complaint  02/11/2018  Above reviewed  Patient came for follow-up  He is under a little stress as wife is having lot of health issues  Denies any chest pain or any shortness of breath  His cholesterol has slowly gone up  He is only on Zocor 20 mg  He is trying his best to lose weight  No fever no chills no nausea no vomiting no PND no orthopnea no other significant complaint  08/14/2018  Above reviewed  Patient is under little stress as wife had stroke  He could not tolerate Lipitor  He was taking Zocor 20 mg  He felt muscle pain and soreness  No fever no chills no nausea no vomiting  He got injection for dupuytren  Injections  No nausea no vomiting  Patient has history of diabetes mellitus, previous history of coronary artery disease  He need to be on statins  EKG shows no significant change  No other significant complaint  Review of Systems   Constitutional: Negative for activity change, chills, diaphoresis, fever and unexpected weight change  HENT: Negative for congestion  Eyes: Negative for discharge and redness  Respiratory: Negative for cough, chest tightness and wheezing  Chronic exertion shortness of breath not changed   Cardiovascular: Negative  Negative for chest pain, palpitations and leg swelling  Gastrointestinal: Negative for abdominal pain, diarrhea and nausea  Endocrine: Negative      Genitourinary: Negative for decreased urine volume and urgency  Musculoskeletal: Positive for arthralgias  Negative for back pain and gait problem  Right hand pain  Skin: Negative for rash and wound  Allergic/Immunologic: Negative  Neurological: Negative for dizziness, seizures, syncope, weakness, light-headedness and headaches  Hematological: Negative  Psychiatric/Behavioral: Negative for agitation and confusion  The patient is nervous/anxious          Historical Information   Past Medical History:   Diagnosis Date    Abnormal blood chemistry     resolved: 11/9/16    Abnormal glucose     last assessed: 9/24/14    CAD (coronary artery disease)     Coronary atherosclerosis     DM2 (diabetes mellitus, type 2) (HCC)     Dyslipidemia     Facial nerve disorder     HTN (hypertension)     Hyperlipidemia     last assessed: 1/13/17    Kidney stones     Lumbosacral radiculopathy     Nerve root and plexus disorder     Obesity     Osteoarthritis     Prostate asymmetry     Pure hypercholesterolemia      Past Surgical History:   Procedure Laterality Date    CORONARY ANGIOPLASTY       History   Alcohol Use    Yes     Comment: occasional     History   Drug Use No     History   Smoking Status    Never Smoker   Smokeless Tobacco    Never Used     Family History:   Family History   Problem Relation Age of Onset    Cancer Mother     Ovarian cancer Mother     Hypertension Sister     Lung disease Father         black       Meds/Allergies     No Known Allergies    Current Outpatient Prescriptions:     aspirin 81 mg chewable tablet, Chew 81 mg daily, Disp: , Rfl:     metFORMIN (GLUCOPHAGE) 500 mg tablet, Take by mouth, Disp: , Rfl:     Multiple Vitamins-Minerals (CENTRUM SILVER PO), Take by mouth, Disp: , Rfl:     sitaGLIPtin (JANUVIA) 50 mg tablet, Take 1 tablet (50 mg total) by mouth daily, Disp: 90 tablet, Rfl: 1    trandolapril (MAVIK) 1 MG tablet, Take 1 tablet (1 mg total) by mouth daily, Disp: 90 tablet, Rfl: 0    Vitals: Blood pressure 138/70, pulse 73, height 5' 9" (1 753 m), weight 105 kg (231 lb)  Body mass index is 34 11 kg/m²  BP Readings from Last 3 Encounters:   08/14/18 138/70   06/27/18 140/80   03/05/18 128/82       Physical Exam:  Physical Exam    Neurologic:  Alert & oriented x 3, no new focal deficits, Not in any acute distress,  Constitutional:  Well developed, well nourished, non-toxic appearance   Eyes:  Pupil equal and reacting to light, conjunctiva normal, No JVP, No LNP   HENT:  Atraumatic, oropharynx moist, Neck- normal range of motion, no tenderness,  Neck supple   Respiratory:  Bilateral air entry, mostly clear to auscultation  Cardiovascular: S1-S2 regular with a 2/6 ejection systolic murmur and S4 is present  GI:  Soft, nondistended, normal bowel sounds, nontender, no hepatosplenomegaly appreciated  Musculoskeletal:  No edema, no tenderness, no deformities  Skin:  Well hydrated, no rash   Lymphatic:  No lymphadenopathy noted   Extremities:  No edema and distal pulses are present      Diagnostic Studies Review Cardio:  Stress Test: Nuclear stress test in October 2014 shows no ischemia EF 70%  Echocardiogram/ROCK: Echo Doppler done in March 2015 shows borderline concentric left hypertrophy EF 55-60%, after reminding dilated, trace MR, trace TR PA pressure 35 mmHg  Catheterization: His cardiac catheter patient done in 2008 shows critical stenosis of LAD which was successfully stented with a drug-eluting stent  He had nonobstructive disease in the circumflex and right coronary artery and RPDA is 100% occluded with grade 2 collaterals  Vascular imaging: In October of 2014 abdominal aortogram study shows no aneurysm  ECG Report:   Comparison to prior ECGs: no interval change  7/18/2017  Normal sinus rhythm 1  with no significant ST changes  Heart rate was 68 bpm     Repeat EKG done 08/14/2018 shows normal sinus rhythm heart rate 73 beats per minute  No change from old EKG      Blood test from February 2018 reviewed with the patient  Lab Review     Lab Results   Component Value Date     02/19/2018    K 4 4 02/19/2018     02/19/2018    CO2 29 02/19/2018    ANIONGAP 5 02/19/2018    BUN 17 06/28/2018    CREATININE 1 13 06/28/2018    GLUCOSE 125 (H) 02/17/2017    GLUF 114 (H) 02/19/2018    CALCIUM 9 0 02/19/2018    AST 22 02/19/2018    ALT 35 02/19/2018    ALKPHOS 58 02/19/2018    PROT 7 5 02/19/2018    BILITOT 0 54 02/19/2018    EGFR 60 02/19/2018     Lab Results   Component Value Date    GLUCOSE 125 (H) 02/17/2017    CALCIUM 9 0 02/19/2018     02/19/2018    K 4 4 02/19/2018    CO2 29 02/19/2018     02/19/2018    BUN 17 06/28/2018    CREATININE 1 13 06/28/2018       Lab Results   Component Value Date    HGBA1C 7 0 (H) 06/28/2018     Lipid Profile:   Lab Results   Component Value Date    CHOL 172 02/19/2018    HDL 53 06/28/2018    LDLCALC 95 02/19/2018    TRIG 108 06/28/2018     Lab Results   Component Value Date    CHOL 172 02/19/2018    CHOL 173 02/17/2017       Dr Charlotte Leigh MD [facility]      "This note has been constructed using a voice recognition system  Therefore there may be syntax, spelling, and/or grammatical errors   Please call if you have any questions  "

## 2018-09-11 DIAGNOSIS — I10 BENIGN ESSENTIAL HTN: ICD-10-CM

## 2018-09-11 RX ORDER — TRANDOLAPRIL TABLETS 1 MG/1
TABLET ORAL
Qty: 90 TABLET | Refills: 1 | Status: SHIPPED | OUTPATIENT
Start: 2018-09-11 | End: 2019-02-18 | Stop reason: SDUPTHER

## 2018-09-12 ENCOUNTER — OFFICE VISIT (OUTPATIENT)
Dept: CARDIOLOGY CLINIC | Facility: CLINIC | Age: 78
End: 2018-09-12
Payer: MEDICARE

## 2018-09-12 VITALS
SYSTOLIC BLOOD PRESSURE: 130 MMHG | DIASTOLIC BLOOD PRESSURE: 92 MMHG | OXYGEN SATURATION: 98 % | BODY MASS INDEX: 34.14 KG/M2 | WEIGHT: 231.2 LBS | HEART RATE: 71 BPM

## 2018-09-12 DIAGNOSIS — R06.02 SHORTNESS OF BREATH: ICD-10-CM

## 2018-09-12 DIAGNOSIS — R05.9 COUGH: ICD-10-CM

## 2018-09-12 DIAGNOSIS — I10 BENIGN HYPERTENSION: ICD-10-CM

## 2018-09-12 DIAGNOSIS — E78.5 DYSLIPIDEMIA: ICD-10-CM

## 2018-09-12 DIAGNOSIS — R13.12 OROPHARYNGEAL DYSPHAGIA: ICD-10-CM

## 2018-09-12 DIAGNOSIS — I25.10 2-VESSEL CORONARY ARTERY DISEASE: ICD-10-CM

## 2018-09-12 DIAGNOSIS — E66.8 MODERATE OBESITY: ICD-10-CM

## 2018-09-12 PROCEDURE — 99215 OFFICE O/P EST HI 40 MIN: CPT | Performed by: INTERNAL MEDICINE

## 2018-09-12 NOTE — PROGRESS NOTES
Progress Note - Cardiology Office  Gonzales Estrada 70 y o  male MRN: [id number]   Encounter: [id number]     1  2-vessel coronary artery disease    2  Benign hypertension    3  Dyslipidemia    4  Moderate obesity    5  Oropharyngeal dysphagia    6  Cough    7  Shortness of breath  With exertion        Assessment/Plan      1  Coronary artery disease with a remote history of angioplasty of LAD with a drug-eluting stent and known RPDA 100% with grade 3 collaterals  Patient now complaining about exertional shortness of breath and chronic cough  Will check echo Doppler and Cardiolite stress test     2  Hypertension  Patient's blood pressure is very well controlled with current therapy  Repeat labs reviewed with the patient  3  Dyslipidemia  Patient is back on simvastatin 40 milligram daily  4  Diabetes mellitus  Patient is on metformin and Januvia follow-up by PMD   Last HbA1c 6 6    5  Obesity  He is advised to lose weightPatient was educated about pathophysiology of coronary artery disease  Currently he has no symptom concur with current Rx  6   Dyspnea on exertion  Etiology may be multifactorial   Patient also recently have upper respiratory tract infection  Echo and nuclear stress test ordered as he has multiple cardiac risk factors  7   Oropharyngeal dysphagia with chronic cough  Patient is scheduled to see GI had an EGD done  No clear etiology  May need further GI workup  Will also check chest x-ray due to persistent cough  Follow-up in 6 months  All these issues discussed with patient's wife at length  Counseling :  A description of the counseling  Spoke to patient but high intensity statins  Lipitor side effects discussed  He is willing to try it  Goals and Barriers  Patient want to lose weight which he gain during winter months  Little under stress due to wife's condition    Patient's ability to self care: Yes  Medication side effect reviewed with patient in detail and all their questions answered to their satisfaction  Hypertension    Associated symptoms include shortness of breath  Pertinent negatives include no chest pain, headaches or palpitations     :     Gonzales Rose is a 70y o  year old male who came for follow up  Patient has a past medical history significant for coronary artery disease status post angioplasty in 2008 off large size LAD with a known RPDA, hypertension, diabetes mellitus, dyslipidemia and moderate obesity who came for regular follow-up  Patient's last nuclear was in 2014 which was nonischemic  He denies any chest pain or any shortness of breath  No fever no chills no other significant complaint  7/18/2017Patient came for follow-up for coronary artery disease  A status post angioplasty of his large LAD and has known RPDA occlusion  His sugar is well controlled  He is trying to lose weight  Blood pressures acceptable  Denies any chest pain  No PND no orthopnea no nausea no vomiting no other significant complaint  02/11/2018  Above reviewed  Patient came for follow-up  He is under a little stress as wife is having lot of health issues  Denies any chest pain or any shortness of breath  His cholesterol has slowly gone up  He is only on Zocor 20 mg  He is trying his best to lose weight  No fever no chills no nausea no vomiting no PND no orthopnea no other significant complaint  08/14/2018  Above reviewed  Patient is under little stress as wife had stroke  He could not tolerate Lipitor  He was taking Zocor 20 mg  He felt muscle pain and soreness  No fever no chills no nausea no vomiting  He got injection for dupuytren  Injections  No nausea no vomiting  Patient has history of diabetes mellitus, previous history of coronary artery disease  He need to be on statins  EKG shows no significant change  No other significant complaint  09/12/2018  Above reviewed    Patient came for follow-up  He had an episode of upper respiratory tract infection  Since then he has been coughing a lot  He feels cough is worse when he lies down  He also find difficulty to swallow and sometime can taste same food which he ate in the morning in the evening  He is following up with stomach doctor  He has some exertional shortness of breath  He does snore a lot at night  Denies any fever or any chills  No nausea no vomiting no leg edema  He does have history of coronary artery disease with residual stenosis he had a stenting done of his large LAD in 2008 and had a known RPDA 100% occlusion with collaterals  No fever no chills at this time  Review of Systems   Constitutional: Negative for activity change, chills, diaphoresis, fever and unexpected weight change  HENT: Negative for congestion  Eyes: Negative for discharge and redness  Respiratory: Positive for cough and shortness of breath  Negative for chest tightness and wheezing  Exertional   Cardiovascular: Negative  Negative for chest pain, palpitations and leg swelling  Gastrointestinal: Negative for abdominal pain, diarrhea and nausea  GERD like since symptoms and difficulty in swallowing   Endocrine: Negative  Genitourinary: Negative for decreased urine volume and urgency  Musculoskeletal: Positive for arthralgias  Negative for back pain and gait problem  Right hand pain  Skin: Negative for rash and wound  Allergic/Immunologic: Negative  Neurological: Negative for dizziness, seizures, syncope, weakness, light-headedness and headaches  Hematological: Negative  Psychiatric/Behavioral: Positive for sleep disturbance  Negative for agitation and confusion  The patient is nervous/anxious          Historical Information   Past Medical History:   Diagnosis Date    Abnormal blood chemistry     resolved: 11/9/16    Abnormal glucose     last assessed: 9/24/14    CAD (coronary artery disease)     Coronary atherosclerosis     DM2 (diabetes mellitus, type 2) ([facility] [address] )     Dyslipidemia     Facial nerve disorder     HTN (hypertension)     Hyperlipidemia     last assessed: 1/13/17    Kidney stones     Lumbosacral radiculopathy     Nerve root and plexus disorder     Obesity     Osteoarthritis     Prostate asymmetry     Pure hypercholesterolemia      Past Surgical History:   Procedure Laterality Date    CORONARY ANGIOPLASTY       History   Alcohol Use    Yes     Comment: occasional     History   Drug Use No     History   Smoking Status    Never Smoker   Smokeless Tobacco    Never Used     Family History:   Family History   Problem Relation Age of Onset    Cancer Mother     Ovarian cancer Mother     Hypertension Sister     Lung disease Father         black       Meds/Allergies     No Known Allergies    Current Outpatient Prescriptions:     aspirin 81 mg chewable tablet, Chew 81 mg daily, Disp: , Rfl:     metFORMIN (GLUCOPHAGE) 500 mg tablet, Take by mouth, Disp: , Rfl:     Multiple Vitamins-Minerals (CENTRUM SILVER PO), Take by mouth, Disp: , Rfl:     simvastatin (ZOCOR) 40 mg tablet, Take 1 tablet (40 mg total) by mouth daily at bedtime, Disp: 30 tablet, Rfl: 5    sitaGLIPtin (JANUVIA) 50 mg tablet, Take 1 tablet (50 mg total) by mouth daily, Disp: 90 tablet, Rfl: 1    trandolapril (MAVIK) 1 MG tablet, TAKE 1 TABLET BY MOUTH  DAILY, Disp: 90 tablet, Rfl: 1    Vitals: Blood pressure 130/92, pulse 71, weight 105 kg (231 lb 3 2 oz), SpO2 98 %  Body mass index is 34 14 kg/m²  BP Readings from Last 3 Encounters:   09/12/18 130/92   08/14/18 138/70   06/27/18 140/80       Physical Exam:  Physical Exam   Constitutional: He is oriented to person, place, and time  He appears well-developed  No distress  HENT:   Head: Normocephalic and atraumatic  Eyes: Pupils are equal, round, and reactive to light  Neck: Normal range of motion  Neck supple  No JVD present  No thyromegaly present     Cardiovascular: Normal rate and intact distal pulses  Murmur heard  S1-S2 regular with 3/6 ejection systolic murmur   Pulmonary/Chest: Effort normal  No respiratory distress  He has no wheezes  He has no rales  Bilateral air entry mostly clear decreased at bases rare rhonchi   Abdominal: Soft  Bowel sounds are normal  He exhibits no distension  There is no tenderness  There is no rebound  Musculoskeletal: Normal range of motion  He exhibits no edema or tenderness  Neurological: He is alert and oriented to person, place, and time  Skin: Skin is warm and dry  He is not diaphoretic  Psychiatric: He has a normal mood and affect  His behavior is normal  Judgment normal            Diagnostic Studies Review Cardio:  Stress Test: Nuclear stress test in October 2014 shows no ischemia EF 70%  Echocardiogram/ROCK: Echo Doppler done in March 2015 shows borderline concentric left hypertrophy EF 55-60%, after reminding dilated, trace MR, trace TR PA pressure 35 mmHg  Catheterization: His cardiac catheter patient done in 2008 shows critical stenosis of LAD which was successfully stented with a drug-eluting stent  He had nonobstructive disease in the circumflex and right coronary artery and RPDA is 100% occluded with grade 2 collaterals  Vascular imaging: In October of 2014 abdominal aortogram study shows no aneurysm  ECG Report:   Comparison to prior ECGs: no interval change  7/18/2017  Normal sinus rhythm 1  with no significant ST changes  Heart rate was 68 bpm     Repeat EKG done 08/14/2018 shows normal sinus rhythm heart rate 73 beats per minute  No change from old EKG  Blood test from February 2018 reviewed with the patient    Lab Review     Lab Results   Component Value Date     02/19/2018    K 4 4 02/19/2018     06/28/2018    CO2 22 06/28/2018    BUN 17 06/28/2018    CREATININE 1 13 06/28/2018    GLUCOSE 125 (H) 02/17/2017    GLUF 114 (H) 02/19/2018    CALCIUM 9 0 02/19/2018    AST 22 02/19/2018 ALT 35 02/19/2018    ALKPHOS 58 02/19/2018    PROT 6 5 02/17/2017    BILITOT 0 4 02/17/2017    EGFR 60 02/19/2018     Lab Results   Component Value Date    GLUCOSE 125 (H) 02/17/2017    CALCIUM 9 0 02/19/2018     02/19/2018    K 4 4 02/19/2018    CO2 22 06/28/2018     06/28/2018    BUN 17 06/28/2018    CREATININE 1 13 06/28/2018       Lab Results   Component Value Date    HGBA1C 7 0 (H) 06/28/2018     Lipid Profile:   Lab Results   Component Value Date    CHOL 173 02/17/2017    HDL 53 06/28/2018    LDLCALC 95 02/19/2018    TRIG 108 06/28/2018     Lab Results   Component Value Date    CHOL 173 02/17/2017    CHOL 151 11/21/2016       Dr Aparna Jackson MD [facility]      "This note has been constructed using a voice recognition system  Therefore there may be syntax, spelling, and/or grammatical errors   Please call if you have any questions  "

## 2018-09-26 ENCOUNTER — HOSPITAL ENCOUNTER (OUTPATIENT)
Dept: RADIOLOGY | Facility: HOSPITAL | Age: 78
Discharge: HOME | End: 2018-09-26
Attending: INTERNAL MEDICINE
Payer: MEDICARE

## 2018-09-26 ENCOUNTER — HOSPITAL ENCOUNTER (OUTPATIENT)
Dept: NON INVASIVE DIAGNOSTICS | Facility: HOSPITAL | Age: 78
Discharge: HOME | End: 2018-09-26
Attending: INTERNAL MEDICINE
Payer: MEDICARE

## 2018-09-26 ENCOUNTER — TRANSCRIBE ORDERS (OUTPATIENT)
Dept: ADMINISTRATIVE | Facility: HOSPITAL | Age: 78
End: 2018-09-26

## 2018-09-26 ENCOUNTER — TELEPHONE (OUTPATIENT)
Dept: CARDIOLOGY CLINIC | Facility: CLINIC | Age: 78
End: 2018-09-26

## 2018-09-26 DIAGNOSIS — R05.9 COUGH: ICD-10-CM

## 2018-09-26 DIAGNOSIS — R06.02 SHORTNESS OF BREATH: ICD-10-CM

## 2018-09-26 DIAGNOSIS — I10 BENIGN HYPERTENSION: ICD-10-CM

## 2018-09-26 DIAGNOSIS — I25.10 2-VESSEL CORONARY ARTERY DISEASE: ICD-10-CM

## 2018-09-26 LAB
CHEST PAIN STATEMENT: NORMAL
MAX DIASTOLIC BP: 80 MMHG
MAX HEART RATE: 146 BPM
MAX PREDICTED HEART RATE: 144 BPM
MAX. SYSTOLIC BP: 174 MMHG
PROTOCOL NAME: NORMAL
TARGET HR FORMULA: NORMAL
TEST INDICATION: NORMAL
TIME IN EXERCISE PHASE: NORMAL

## 2018-09-26 PROCEDURE — 93306 TTE W/DOPPLER COMPLETE: CPT

## 2018-09-26 PROCEDURE — 93306 TTE W/DOPPLER COMPLETE: CPT | Performed by: INTERNAL MEDICINE

## 2018-09-26 PROCEDURE — A9502 TC99M TETROFOSMIN: HCPCS

## 2018-09-26 PROCEDURE — 71046 X-RAY EXAM CHEST 2 VIEWS: CPT

## 2018-09-26 PROCEDURE — 78452 HT MUSCLE IMAGE SPECT MULT: CPT

## 2018-09-26 PROCEDURE — 78452 HT MUSCLE IMAGE SPECT MULT: CPT | Performed by: INTERNAL MEDICINE

## 2018-09-26 PROCEDURE — 93016 CV STRESS TEST SUPVJ ONLY: CPT | Performed by: INTERNAL MEDICINE

## 2018-09-26 PROCEDURE — 93017 CV STRESS TEST TRACING ONLY: CPT

## 2018-09-26 PROCEDURE — 93018 CV STRESS TEST I&R ONLY: CPT | Performed by: INTERNAL MEDICINE

## 2018-09-26 NOTE — PROGRESS NOTES
Pt's Patient's stress test is normal    Patient can keep regular appointment  Please call patient with the result

## 2018-09-26 NOTE — TELEPHONE ENCOUNTER
----- Message from Tucker Burgess MD sent at 9/26/2018  2:42 PM EDT -----  Patient's echo shows normal LV systolic function  No significant valvular disease  Patient can keep an appointment  Please call patient about echo report

## 2018-09-27 ENCOUNTER — TELEPHONE (OUTPATIENT)
Dept: CARDIOLOGY CLINIC | Facility: CLINIC | Age: 78
End: 2018-09-27

## 2018-09-27 NOTE — TELEPHONE ENCOUNTER
----- Message from Hardik Esparza MD sent at 9/26/2018  5:27 PM EDT -----  Pt's Patient's stress test is normal    Patient can keep regular appointment  Please call patient with the result

## 2018-09-28 ENCOUNTER — TELEPHONE (OUTPATIENT)
Dept: CARDIOLOGY CLINIC | Facility: CLINIC | Age: 78
End: 2018-09-28

## 2018-10-05 ENCOUNTER — CLINICAL SUPPORT (OUTPATIENT)
Dept: FAMILY MEDICINE CLINIC | Facility: CLINIC | Age: 78
End: 2018-10-05
Payer: MEDICARE

## 2018-10-05 DIAGNOSIS — Z23 NEED FOR INFLUENZA VACCINATION: Primary | ICD-10-CM

## 2018-10-05 PROCEDURE — 90662 IIV NO PRSV INCREASED AG IM: CPT

## 2018-10-05 PROCEDURE — G0008 ADMIN INFLUENZA VIRUS VAC: HCPCS

## 2018-10-08 ENCOUNTER — TRANSCRIBE ORDERS (OUTPATIENT)
Dept: ADMINISTRATIVE | Facility: HOSPITAL | Age: 78
End: 2018-10-08

## 2018-10-08 DIAGNOSIS — K31.7 GASTRIC POLYPS: ICD-10-CM

## 2018-10-08 DIAGNOSIS — H61.893: Primary | ICD-10-CM

## 2018-10-09 ENCOUNTER — TRANSCRIBE ORDERS (OUTPATIENT)
Dept: RADIOLOGY | Facility: CLINIC | Age: 78
End: 2018-10-09

## 2018-10-09 ENCOUNTER — APPOINTMENT (OUTPATIENT)
Dept: RADIOLOGY | Facility: CLINIC | Age: 78
End: 2018-10-09
Payer: MEDICARE

## 2018-10-09 DIAGNOSIS — R31.0 GROSS HEMATURIA: ICD-10-CM

## 2018-10-09 DIAGNOSIS — Z87.442 PERSONAL HISTORY OF URINARY CALCULI: Primary | ICD-10-CM

## 2018-10-09 DIAGNOSIS — Z87.442 PERSONAL HISTORY OF URINARY CALCULI: ICD-10-CM

## 2018-10-09 PROCEDURE — 74018 RADEX ABDOMEN 1 VIEW: CPT

## 2018-10-10 ENCOUNTER — TRANSCRIBE ORDERS (OUTPATIENT)
Dept: LAB | Facility: CLINIC | Age: 78
End: 2018-10-10

## 2018-10-10 ENCOUNTER — HOSPITAL ENCOUNTER (OUTPATIENT)
Dept: RADIOLOGY | Facility: HOSPITAL | Age: 78
Discharge: HOME | End: 2018-10-10
Attending: UROLOGY
Payer: MEDICARE

## 2018-10-10 ENCOUNTER — APPOINTMENT (OUTPATIENT)
Dept: LAB | Facility: CLINIC | Age: 78
End: 2018-10-10
Payer: MEDICARE

## 2018-10-10 DIAGNOSIS — Z87.442 PERSONAL HISTORY OF URINARY CALCULI: ICD-10-CM

## 2018-10-10 DIAGNOSIS — R31.0 GROSS HEMATURIA: Primary | ICD-10-CM

## 2018-10-10 DIAGNOSIS — K31.7 GASTRIC POLYPS: ICD-10-CM

## 2018-10-10 DIAGNOSIS — R31.0 GROSS HEMATURIA: ICD-10-CM

## 2018-10-10 PROCEDURE — 76770 US EXAM ABDO BACK WALL COMP: CPT

## 2018-10-10 PROCEDURE — 88112 CYTOPATH CELL ENHANCE TECH: CPT | Performed by: PATHOLOGY

## 2018-10-11 ENCOUNTER — APPOINTMENT (OUTPATIENT)
Dept: LAB | Facility: CLINIC | Age: 78
End: 2018-10-11
Payer: MEDICARE

## 2018-10-11 ENCOUNTER — TRANSCRIBE ORDERS (OUTPATIENT)
Dept: LAB | Facility: CLINIC | Age: 78
End: 2018-10-11

## 2018-10-11 DIAGNOSIS — E11.65 UNCONTROLLED TYPE 2 DIABETES MELLITUS WITH HYPERGLYCEMIA (HCC): Primary | ICD-10-CM

## 2018-10-11 DIAGNOSIS — Z87.442 PERSONAL HISTORY OF URINARY CALCULI: ICD-10-CM

## 2018-10-11 DIAGNOSIS — E78.5 HYPERLIPIDEMIA, UNSPECIFIED HYPERLIPIDEMIA TYPE: ICD-10-CM

## 2018-10-11 DIAGNOSIS — R31.0 GROSS HEMATURIA: Primary | ICD-10-CM

## 2018-10-11 DIAGNOSIS — R31.0 GROSS HEMATURIA: ICD-10-CM

## 2018-10-11 LAB
ANION GAP SERPL CALCULATED.3IONS-SCNC: 5 MMOL/L (ref 4–13)
BUN SERPL-MCNC: 24 MG/DL (ref 5–25)
CALCIUM SERPL-MCNC: 9.3 MG/DL (ref 8.3–10.1)
CHLORIDE SERPL-SCNC: 102 MMOL/L (ref 100–108)
CO2 SERPL-SCNC: 28 MMOL/L (ref 21–32)
CREAT SERPL-MCNC: 1.35 MG/DL (ref 0.6–1.3)
GFR SERPL CREATININE-BSD FRML MDRD: 51 ML/MIN/1.73SQ M
GLUCOSE P FAST SERPL-MCNC: 136 MG/DL (ref 65–99)
POTASSIUM SERPL-SCNC: 4.5 MMOL/L (ref 3.5–5.3)
SODIUM SERPL-SCNC: 135 MMOL/L (ref 136–145)

## 2018-10-11 PROCEDURE — 80048 BASIC METABOLIC PNL TOTAL CA: CPT | Performed by: INTERNAL MEDICINE

## 2018-10-11 PROCEDURE — 36415 COLL VENOUS BLD VENIPUNCTURE: CPT | Performed by: INTERNAL MEDICINE

## 2018-10-11 PROCEDURE — 88112 CYTOPATH CELL ENHANCE TECH: CPT | Performed by: PATHOLOGY

## 2018-10-12 ENCOUNTER — TRANSCRIBE ORDERS (OUTPATIENT)
Dept: LAB | Facility: CLINIC | Age: 78
End: 2018-10-12

## 2018-10-12 ENCOUNTER — APPOINTMENT (OUTPATIENT)
Dept: LAB | Facility: CLINIC | Age: 78
End: 2018-10-12
Payer: MEDICARE

## 2018-10-12 DIAGNOSIS — R31.0 GROSS HEMATURIA: Primary | ICD-10-CM

## 2018-10-12 DIAGNOSIS — Z87.442 PERSONAL HISTORY OF URINARY CALCULI: ICD-10-CM

## 2018-10-12 DIAGNOSIS — R31.0 GROSS HEMATURIA: ICD-10-CM

## 2018-10-12 PROCEDURE — 88112 CYTOPATH CELL ENHANCE TECH: CPT | Performed by: PATHOLOGY

## 2018-10-16 ENCOUNTER — APPOINTMENT (OUTPATIENT)
Dept: LAB | Facility: CLINIC | Age: 78
End: 2018-10-16
Payer: MEDICARE

## 2018-10-16 ENCOUNTER — TRANSCRIBE ORDERS (OUTPATIENT)
Dept: LAB | Facility: CLINIC | Age: 78
End: 2018-10-16

## 2018-10-16 DIAGNOSIS — Z12.5 SPECIAL SCREENING FOR MALIGNANT NEOPLASM OF PROSTATE: Primary | ICD-10-CM

## 2018-10-16 DIAGNOSIS — Z12.5 SPECIAL SCREENING FOR MALIGNANT NEOPLASM OF PROSTATE: ICD-10-CM

## 2018-10-16 LAB — PSA SERPL-MCNC: 3.1 NG/ML (ref 0–4)

## 2018-10-16 PROCEDURE — 36415 COLL VENOUS BLD VENIPUNCTURE: CPT

## 2018-10-16 PROCEDURE — G0103 PSA SCREENING: HCPCS

## 2018-12-18 ENCOUNTER — OFFICE VISIT (OUTPATIENT)
Dept: FAMILY MEDICINE CLINIC | Facility: CLINIC | Age: 78
End: 2018-12-18
Payer: MEDICARE

## 2018-12-18 VITALS
TEMPERATURE: 97.7 F | SYSTOLIC BLOOD PRESSURE: 124 MMHG | DIASTOLIC BLOOD PRESSURE: 64 MMHG | RESPIRATION RATE: 12 BRPM | BODY MASS INDEX: 33.52 KG/M2 | WEIGHT: 227 LBS | OXYGEN SATURATION: 98 % | HEART RATE: 76 BPM

## 2018-12-18 DIAGNOSIS — E11.9 TYPE 2 DIABETES MELLITUS WITHOUT COMPLICATION, WITHOUT LONG-TERM CURRENT USE OF INSULIN (HCC): ICD-10-CM

## 2018-12-18 DIAGNOSIS — E55.9 VITAMIN D DEFICIENCY: ICD-10-CM

## 2018-12-18 DIAGNOSIS — Z00.00 MEDICARE ANNUAL WELLNESS VISIT, SUBSEQUENT: Primary | ICD-10-CM

## 2018-12-18 DIAGNOSIS — I10 ESSENTIAL HYPERTENSION: Primary | ICD-10-CM

## 2018-12-18 DIAGNOSIS — E78.2 MIXED HYPERLIPIDEMIA: ICD-10-CM

## 2018-12-18 PROCEDURE — G0439 PPPS, SUBSEQ VISIT: HCPCS | Performed by: NURSE PRACTITIONER

## 2018-12-18 NOTE — PROGRESS NOTES
Assessment and Plan:    Problem List Items Addressed This Visit     None        Health Maintenance Due   Topic Date Due    Medicare Annual Wellness Visit (AWV)  1940    DTaP,Tdap,and Td Vaccines (1 - Tdap) 01/24/1963    DM Eye Exam  01/01/2012    Diabetic Foot Exam  02/16/2018    URINE MICROALBUMIN  02/17/2018         HPI:  Oniel Gifford is a 70 y o  male here for his Subsequent Wellness Visit      Patient Active Problem List   Diagnosis    2-vessel coronary artery disease    Benign hypertension    DMII (diabetes mellitus, type 2) ([address] )    Dyslipidemia    Moderate obesity    Dysphagia    Cough    Shortness of breath     Past Medical History:   Diagnosis Date    Abnormal blood chemistry     resolved: 11/9/16    Abnormal glucose     last assessed: 9/24/14    CAD (coronary artery disease)     Coronary atherosclerosis     DM2 [facility]diabetes mellitus, type 2) ([facility])     Dyslipidemia     Facial nerve disorder     HTN (hypertension)     Hyperlipidemia     last assessed: 1/13/17    Kidney stones     Lumbosacral radiculopathy     Nerve root and plexus disorder     Obesity     Osteoarthritis     Prostate asymmetry     Pure hypercholesterolemia      Past Surgical History:   Procedure Laterality Date    CORONARY ANGIOPLASTY       Family History   Problem Relation Age of Onset    Cancer Mother     Ovarian cancer Mother     Hypertension Sister     Lung disease Father         black    Mental illness Neg Hx     Substance Abuse Neg Hx      History   Smoking Status    Never Smoker   Smokeless Tobacco    Never Used     History   Alcohol Use    Yes     Comment: occasional      History   Drug Use No       Current Outpatient Prescriptions   Medication Sig Dispense Refill    aspirin 81 mg chewable tablet Chew 81 mg daily      metFORMIN (GLUCOPHAGE) 500 mg tablet Take by mouth      Multiple Vitamins-Minerals (CENTRUM SILVER PO) Take by mouth      simvastatin (ZOCOR) 40 mg tablet Take 1 tablet (40 mg total) by mouth daily at bedtime 30 tablet 5    sitaGLIPtin (JANUVIA) 50 mg tablet Take 1 tablet (50 mg total) by mouth daily 90 tablet 1    trandolapril (MAVIK) 1 MG tablet TAKE 1 TABLET BY MOUTH  DAILY 90 tablet 1     No current facility-administered medications for this visit  No Known Allergies  Immunization History   Administered Date(s) Administered    Influenza Split High Dose Preservative Free IM 10/10/2013, 09/24/2014, 09/14/2015, 10/25/2016, 10/18/2017    Influenza TIV (IM) 10/05/2011, 10/12/2012, 11/01/2016    Influenza, high dose seasonal 0 5 mL 10/05/2018    Pneumococcal Conjugate 13-Valent 09/14/2015    Pneumococcal Polysaccharide PPV23 10/05/2011       Patient Care Team:  Zebedee Bumpers as PCP - General (Nurse Practitioner)  Valerie Canchola MD    Medicare Screening Tests and Risk Assessments:  Gonzales is here for his Subsequent Wellness visit  Health Risk Assessment:  Patient rates overall health as very good  Patient feels that their physical health rating is Same  Eyesight was rated as Same  Hearing was rated as Same  Patient feels that their emotional and mental health rating is Same  Pain experienced by patient in the last 7 days has been None  Patient states that he has experienced no weight loss or gain in last 6 months  Emotional/Mental Health:  Patient has been feeling nervous/anxious  PHQ-9 Depression Screening:    Frequency of the following problems over the past two weeks:      1  Little interest or pleasure in doing things: 0 - not at all      2  Feeling down, depressed, or hopeless: 0 - not at all  PHQ-2 Score: 0          Broken Bones/Falls: Fall Risk Assessment:    In the past year, patient has experienced: No history of falling in past year          Bladder/Bowel:  Patient has not leaked urine accidently in the last six months  Patient reports no loss of bowel control      Immunizations:  Patient has had a flu vaccination within the last year  Patient has received a pneumonia shot  Patient has not received a shingles shot  Home Safety:  Patient does not have trouble with stairs inside or outside of their home  Patient currently reports that there are no safety hazards present in home, working smoke alarms, working carbon monoxide detectors  Preventative Screenings:   prostate cancer screen performed, no colon cancer screen completed, cholesterol screen completed, glaucoma eye exam completed,     Nutrition:  Current diet: Regular and Limited junk food with servings of the following:    Medications:  Patient is currently taking over-the-counter supplements  List of OTC medications includes: Multi vitamin  Patient is able to manage medications  Lifestyle Choices:  Patient reports no tobacco use  Patient has not smoked or used tobacco in the past   Patient reports no alcohol use  Patient drives a vehicle  Patient wears seat belt  Activities of Daily Living:  Can get out of bed by his or her self, able to dress self, able to make own meals, able to do own shopping, able to bathe self, can do own laundry/housekeeping, can manage own money, pay bills and track expenses    Previous Hospitalizations:  No hospitalization or ED visit in past 12 months        Advanced Directives:  Patient has decided on a power of   Patient has spoken to designated power of   Patient has completed advanced directive  Preventative Screening/Counseling:      Cardiovascular:      General: Risks and Benefits Discussed and Screening Not Indicated     Due for Labs/Analytes/Optional EKG: Lipid Panel          Diabetes:      General: Risks and Benefits Discussed and Screening Current      Due for labs: Blood Glucose          Colorectal Cancer:      General: Risks and Benefits Discussed and Patient Declines      Comments: Counseled   Refuses colon screening        Prostate Cancer:      General: Risks and Benefits Discussed and Screening Current          Osteoporosis:      General: Risks and Benefits Discussed and Patient Declines          AAA:  Male patient with age over [de-identified] years  General: Risks and Benefits Discussed and Screening Current          Glaucoma:      General: Risks and Benefits Discussed and Screening Current          HIV:      General: Screening Not Indicated          Hepatitis C:      General: Screening Not Indicated        Advanced Directives:   Patient has living will for healthcare, has durable POA for healthcare, patient has an advanced directive  Information on ACP and/or AD provided  End of life assessment reviewed with patient  Provider agrees with end of life decisions   Additional Comments: Has living will and will bring copy to office to scan in to chart       Immunizations:      Influenza: Risks & Benefits Discussed and Influenza UTD This Year      Pneumococcal: Risks & Benefits Discussed and Lifetime Vaccine Completed      Shingrix: Risks & Benefits Discussed

## 2019-01-02 ENCOUNTER — APPOINTMENT (OUTPATIENT)
Dept: LAB | Facility: CLINIC | Age: 79
End: 2019-01-02
Payer: COMMERCIAL

## 2019-01-02 ENCOUNTER — TRANSCRIBE ORDERS (OUTPATIENT)
Dept: LAB | Facility: CLINIC | Age: 79
End: 2019-01-02

## 2019-01-02 DIAGNOSIS — E55.9 VITAMIN D DEFICIENCY: ICD-10-CM

## 2019-01-02 DIAGNOSIS — E78.2 MIXED HYPERLIPIDEMIA: ICD-10-CM

## 2019-01-02 DIAGNOSIS — E11.9 TYPE 2 DIABETES MELLITUS WITHOUT COMPLICATION, WITHOUT LONG-TERM CURRENT USE OF INSULIN (HCC): ICD-10-CM

## 2019-01-02 DIAGNOSIS — I10 ESSENTIAL HYPERTENSION: ICD-10-CM

## 2019-01-02 LAB
25(OH)D3 SERPL-MCNC: 29.8 NG/ML (ref 30–100)
ALBUMIN SERPL BCP-MCNC: 3.8 G/DL (ref 3.5–5)
ALP SERPL-CCNC: 67 U/L (ref 46–116)
ALT SERPL W P-5'-P-CCNC: 36 U/L (ref 12–78)
ANION GAP SERPL CALCULATED.3IONS-SCNC: 7 MMOL/L (ref 4–13)
AST SERPL W P-5'-P-CCNC: 18 U/L (ref 5–45)
BASOPHILS # BLD AUTO: 0.08 THOUSANDS/ΜL (ref 0–0.1)
BASOPHILS NFR BLD AUTO: 1 % (ref 0–1)
BILIRUB SERPL-MCNC: 0.67 MG/DL (ref 0.2–1)
BUN SERPL-MCNC: 22 MG/DL (ref 5–25)
CALCIUM SERPL-MCNC: 9.3 MG/DL (ref 8.3–10.1)
CHLORIDE SERPL-SCNC: 104 MMOL/L (ref 100–108)
CHOLEST SERPL-MCNC: 189 MG/DL (ref 50–200)
CO2 SERPL-SCNC: 28 MMOL/L (ref 21–32)
CREAT SERPL-MCNC: 1.27 MG/DL (ref 0.6–1.3)
EOSINOPHIL # BLD AUTO: 0.4 THOUSAND/ΜL (ref 0–0.61)
EOSINOPHIL NFR BLD AUTO: 5 % (ref 0–6)
ERYTHROCYTE [DISTWIDTH] IN BLOOD BY AUTOMATED COUNT: 13.1 % (ref 11.6–15.1)
EST. AVERAGE GLUCOSE BLD GHB EST-MCNC: 157 MG/DL
GFR SERPL CREATININE-BSD FRML MDRD: 55 ML/MIN/1.73SQ M
GLUCOSE P FAST SERPL-MCNC: 120 MG/DL (ref 65–99)
HBA1C MFR BLD: 7.1 % (ref 4.2–6.3)
HCT VFR BLD AUTO: 51.1 % (ref 36.5–49.3)
HDLC SERPL-MCNC: 53 MG/DL (ref 40–60)
HGB BLD-MCNC: 16.5 G/DL (ref 12–17)
IMM GRANULOCYTES # BLD AUTO: 0.01 THOUSAND/UL (ref 0–0.2)
IMM GRANULOCYTES NFR BLD AUTO: 0 % (ref 0–2)
LDLC SERPL CALC-MCNC: 108 MG/DL (ref 0–100)
LYMPHOCYTES # BLD AUTO: 1.3 THOUSANDS/ΜL (ref 0.6–4.47)
LYMPHOCYTES NFR BLD AUTO: 17 % (ref 14–44)
MCH RBC QN AUTO: 30.7 PG (ref 26.8–34.3)
MCHC RBC AUTO-ENTMCNC: 32.3 G/DL (ref 31.4–37.4)
MCV RBC AUTO: 95 FL (ref 82–98)
MONOCYTES # BLD AUTO: 0.66 THOUSAND/ΜL (ref 0.17–1.22)
MONOCYTES NFR BLD AUTO: 9 % (ref 4–12)
NEUTROPHILS # BLD AUTO: 5.22 THOUSANDS/ΜL (ref 1.85–7.62)
NEUTS SEG NFR BLD AUTO: 68 % (ref 43–75)
NONHDLC SERPL-MCNC: 136 MG/DL
NRBC BLD AUTO-RTO: 0 /100 WBCS
PLATELET # BLD AUTO: 208 THOUSANDS/UL (ref 149–390)
PMV BLD AUTO: 11.6 FL (ref 8.9–12.7)
POTASSIUM SERPL-SCNC: 4.5 MMOL/L (ref 3.5–5.3)
PROT SERPL-MCNC: 7.5 G/DL (ref 6.4–8.2)
RBC # BLD AUTO: 5.38 MILLION/UL (ref 3.88–5.62)
SODIUM SERPL-SCNC: 139 MMOL/L (ref 136–145)
TRIGL SERPL-MCNC: 139 MG/DL
WBC # BLD AUTO: 7.67 THOUSAND/UL (ref 4.31–10.16)

## 2019-01-02 PROCEDURE — 80053 COMPREHEN METABOLIC PANEL: CPT

## 2019-01-02 PROCEDURE — 82306 VITAMIN D 25 HYDROXY: CPT

## 2019-01-02 PROCEDURE — 83036 HEMOGLOBIN GLYCOSYLATED A1C: CPT

## 2019-01-02 PROCEDURE — 36415 COLL VENOUS BLD VENIPUNCTURE: CPT

## 2019-01-02 PROCEDURE — 80061 LIPID PANEL: CPT

## 2019-01-02 PROCEDURE — 85025 COMPLETE CBC W/AUTO DIFF WBC: CPT

## 2019-02-04 ENCOUNTER — CLINICAL SUPPORT (OUTPATIENT)
Dept: FAMILY MEDICINE CLINIC | Facility: CLINIC | Age: 79
End: 2019-02-04
Payer: COMMERCIAL

## 2019-02-04 DIAGNOSIS — Z23 NEED FOR ZOSTER VACCINATION: Primary | ICD-10-CM

## 2019-02-04 PROCEDURE — 90750 HZV VACC RECOMBINANT IM: CPT

## 2019-02-04 PROCEDURE — 90471 IMMUNIZATION ADMIN: CPT

## 2019-02-12 NOTE — PROGRESS NOTES
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  No symptoms  Patient has a nonischemic nuclear in September of 2018  He is under lipid stress as wife has lot of issues  2  Hypertension  Patient blood pressure is pretty well controlled  Repeat labs reviewed  3  Dyslipidemia  Patient is back on simvastatin 40 milligram daily  4  Diabetes mellitus  Patient is on metformin and Januvia follow-up by PMD   Last HbA1c 6 6  Follow-up with primary care doctor    5  Obesity  He is advised to lose weight  Patient is trying hard  Find it hard to lose as he has been overweight for prolonged period of time  6  Premature atrial contractions  Newly noted premature atrial contractions  Will check Holter monitor to rule out any occult atrial fibrillation  Discussed with patient at length about pathophysiology of coronary artery disease, irregular heartbeat, need to compliant with medications and losing weight at length  Follow-up in 6 months  All these issues discussed with patient's wife at length  Counseling :  A description of the counseling  Spoke to patient but high intensity statins  Lipitor side effects discussed  He is willing to try it  Goals and Barriers  Patient want to lose weight which he gain during winter months  Little under stress due to wife's condition    Patient's ability to self care: Yes  Medication side effect reviewed with patient in detail and all their questions answered to their satisfaction  Odalis Dasilva is a 70y o  year old male who came for follow up  Patient has a past medical history significant for coronary artery disease status post angioplasty in 2008 off large size LAD with a known RPDA, hypertension, diabetes mellitus, dyslipidemia and moderate obesity who came for regular follow-up  Patient's last nuclear was in 2014 which was nonischemic  He denies any chest pain or any shortness of breath  No fever no chills no other significant complaint  7/18/2017Patient came for follow-up for coronary artery disease  A status post angioplasty of his large LAD and has known RPDA occlusion  His sugar is well controlled  He is trying to lose weight  Blood pressures acceptable  Denies any chest pain  No PND no orthopnea no nausea no vomiting no other significant complaint  02/14/2019  Above reviewed  Patient came for follow-up  He is little bit under stress as wife is not doing very well  She is having lot of back issue and arthritis problem along with vision issues  He had a stress test and echo done in September 2018 of which was normal   He has history of coronary artery disease with stenting of his large LAD in 2008 and known to have 100% RPDA occlusion with collaterals  Nuclear now shows no ischemia  He is pretty active no chest pain no shortness of breath no dizziness no lightheadedness no palpitations no other significant complaint  Review of Systems   Constitutional: Negative for activity change, chills, diaphoresis, fever and unexpected weight change  HENT: Negative for congestion  Eyes: Negative for discharge and redness  Respiratory: Negative for cough, chest tightness, shortness of breath and wheezing  Cardiovascular: Negative  Negative for chest pain, palpitations and leg swelling  Gastrointestinal: Negative for abdominal pain, diarrhea and nausea  Endocrine: Negative      Genitourinary: Negative for decreased urine volume and urgency  Musculoskeletal: Negative  Negative for arthralgias, back pain and gait problem  Skin: Negative for rash and wound  Allergic/Immunologic: Negative  Neurological: Negative for dizziness, seizures, syncope, weakness, light-headedness and headaches  Hematological: Negative  Psychiatric/Behavioral: Negative for agitation and confusion  The patient is nervous/anxious          Historical Information   Past Medical History:   Diagnosis Date    Abnormal blood chemistry     resolved: 11/9/16    Abnormal glucose     last assessed: 9/24/14    CAD (coronary artery disease)     Coronary atherosclerosis     DM2 (diabetes mellitus, type 2) ([facility])     Dyslipidemia     Facial nerve disorder     HTN (hypertension)     Hyperlipidemia     last assessed: 1/13/17    Kidney stones     Lumbosacral radiculopathy     Nerve root and plexus disorder     Obesity     Osteoarthritis     Prostate asymmetry     Pure hypercholesterolemia      Past Surgical History:   Procedure Laterality Date    CORONARY ANGIOPLASTY       Social History     Substance and Sexual Activity   Alcohol Use Yes    Comment: occasional     Social History     Substance and Sexual Activity   Drug Use No     Social History     Tobacco Use   Smoking Status Never Smoker   Smokeless Tobacco Never Used     Family History:   Family History   Problem Relation Age of Onset    Cancer Mother     Ovarian cancer Mother     Hypertension Sister     Lung disease Father         black    Mental illness Neg Hx     Substance Abuse Neg Hx        Meds/Allergies     Allergies   Allergen Reactions    Ciprofloxacin Hives       Current Outpatient Medications:     aspirin 81 mg chewable tablet, Chew 81 mg daily, Disp: , Rfl:     cholecalciferol (VITAMIN D3) 1,000 units tablet, Take 1,000 Units by mouth daily, Disp: , Rfl:     metFORMIN (GLUCOPHAGE) 500 mg tablet, Take 500 mg by mouth 2 (two) times a day with meals , Disp: , Rfl:     Multiple Vitamins-Minerals (CENTRUM SILVER PO), Take by mouth, Disp: , Rfl:     simvastatin (ZOCOR) 40 mg tablet, Take 1 tablet (40 mg total) by mouth daily at bedtime, Disp: 30 tablet, Rfl: 5    sitaGLIPtin (JANUVIA) 50 mg tablet, Take 1 tablet (50 mg total) by mouth daily, Disp: 90 tablet, Rfl: 1    trandolapril (MAVIK) 1 MG tablet, TAKE 1 TABLET BY MOUTH  DAILY, Disp: 90 tablet, Rfl: 1    Vitals: Blood pressure 130/86, pulse 74, height 5' 9" (1 753 m), weight 103 kg (227 lb), SpO2 96 %  Body mass index is 33 52 kg/m²  BP Readings from Last 3 Encounters:   02/14/19 130/86   12/18/18 124/64   09/12/18 130/92       Physical Exam:  Physical Exam   Constitutional: He is oriented to person, place, and time  He appears well-developed  No distress  HENT:   Head: Normocephalic and atraumatic  Eyes: Pupils are equal, round, and reactive to light  Neck: Normal range of motion  Neck supple  No JVD present  No thyromegaly present  No carotid bruit   Cardiovascular: Normal rate and intact distal pulses  Murmur heard  Pulmonary/Chest: Effort normal and breath sounds normal  No respiratory distress  He has no wheezes  He has no rales  Abdominal: Soft  Bowel sounds are normal  He exhibits no distension  There is no tenderness  There is no rebound  Musculoskeletal: Normal range of motion  He exhibits no edema or tenderness  Neurological: He is alert and oriented to person, place, and time  Skin: Skin is warm and dry  He is not diaphoretic  Psychiatric: He has a normal mood and affect  His behavior is normal  Judgment normal        Diagnostic Studies Review Cardio:  Stress Test: Nuclear stress test in October 2014 shows no ischemia EF 70%  Nuclear stress test done on 09/26/2018 was normal with EF around 70%  Echocardiogram/ROCK: Echo Doppler done in March 2015 shows borderline concentric left hypertrophy EF 55-60%, after reminding dilated, trace MR, trace TR PA pressure 35 mmHg       Echo Doppler done 09/26/2018  Echo Doppler shows normal LV systolic function grade 1 diastolic dysfunction, right ventricle mildly dilated, left atrium mild-to-moderate dilated, mild MR, mild TR PA pressure 30 mm of mercury  No change from old echo  Catheterization: His cardiac catheter patient done in 2008 shows critical stenosis of LAD which was successfully stented with a drug-eluting stent  He had nonobstructive disease in the circumflex and right coronary artery and RPDA is 100% occluded with grade 2 collaterals  Vascular imaging: In October of 2014 abdominal aortogram study shows no aneurysm  ECG Report:   Comparison to prior ECGs: no interval change  7/18/2017  Normal sinus rhythm 1  with no significant ST changes  Heart rate was 68 bpm     Repeat EKG done 08/14/2018 shows normal sinus rhythm heart rate 73 beats per minute  No change from old EKG  Twelve lead EKG in our office done on 02/14/2018 shows normal sinus rhythm heart rate 72 beats per minute few PACs were noted  As compared to old EKG PACs are newly reported      Blood test from January 2019 reviewed  Lab Review     Lab Results   Component Value Date     02/17/2017    K 4 5 01/02/2019     01/02/2019    CO2 28 01/02/2019    BUN 22 01/02/2019    CREATININE 1 27 01/02/2019    GLUCOSE 125 (H) 02/17/2017    GLUF 120 (H) 01/02/2019    CALCIUM 9 3 01/02/2019    AST 18 01/02/2019    ALT 36 01/02/2019    ALKPHOS 67 01/02/2019    PROT 6 5 02/17/2017    BILITOT 0 4 02/17/2017    EGFR 55 01/02/2019     Lab Results   Component Value Date    GLUCOSE 125 (H) 02/17/2017    CALCIUM 9 3 01/02/2019     02/17/2017    K 4 5 01/02/2019    CO2 28 01/02/2019     01/02/2019    BUN 22 01/02/2019    CREATININE 1 27 01/02/2019       Lab Results   Component Value Date    HGBA1C 7 1 (H) 01/02/2019     Lipid Profile:   Lab Results   Component Value Date    CHOL 173 02/17/2017    HDL 53 01/02/2019    LDLCALC 108 (H) 01/02/2019    TRIG 139 01/02/2019     Lab Results   Component Value Date    CHOL 173 02/17/2017    CHOL 151 11/21/2016       Dr Bong Knowles MD [facility] [place]      "This note has been constructed using a voice recognition system  Therefore there may be syntax, spelling, and/or grammatical errors   Please call if you have any questions  "

## 2019-02-14 ENCOUNTER — OFFICE VISIT (OUTPATIENT)
Dept: CARDIOLOGY CLINIC | Facility: CLINIC | Age: 79
End: 2019-02-14
Payer: COMMERCIAL

## 2019-02-14 VITALS
WEIGHT: 227 LBS | OXYGEN SATURATION: 96 % | HEART RATE: 74 BPM | SYSTOLIC BLOOD PRESSURE: 130 MMHG | HEIGHT: 69 IN | BODY MASS INDEX: 33.62 KG/M2 | DIASTOLIC BLOOD PRESSURE: 86 MMHG

## 2019-02-14 DIAGNOSIS — I25.10 2-VESSEL CORONARY ARTERY DISEASE: ICD-10-CM

## 2019-02-14 DIAGNOSIS — E66.8 MODERATE OBESITY: ICD-10-CM

## 2019-02-14 DIAGNOSIS — E78.5 DYSLIPIDEMIA: ICD-10-CM

## 2019-02-14 DIAGNOSIS — I49.1 PREMATURE ATRIAL CONTRACTION: ICD-10-CM

## 2019-02-14 DIAGNOSIS — I10 BENIGN HYPERTENSION: ICD-10-CM

## 2019-02-14 DIAGNOSIS — E11.9 TYPE 2 DIABETES MELLITUS WITHOUT COMPLICATION, WITHOUT LONG-TERM CURRENT USE OF INSULIN (HCC): ICD-10-CM

## 2019-02-14 PROCEDURE — 99214 OFFICE O/P EST MOD 30 MIN: CPT | Performed by: INTERNAL MEDICINE

## 2019-02-14 PROCEDURE — 93000 ELECTROCARDIOGRAM COMPLETE: CPT | Performed by: INTERNAL MEDICINE

## 2019-02-14 RX ORDER — SIMVASTATIN 40 MG
40 TABLET ORAL
Qty: 30 TABLET | Refills: 11 | Status: SHIPPED | OUTPATIENT
Start: 2019-02-14 | End: 2019-10-14 | Stop reason: SDUPTHER

## 2019-02-14 RX ORDER — MELATONIN
1000 DAILY
COMMUNITY
End: 2021-10-01 | Stop reason: HOSPADM

## 2019-02-18 DIAGNOSIS — I10 BENIGN ESSENTIAL HTN: ICD-10-CM

## 2019-02-18 RX ORDER — TRANDOLAPRIL TABLETS 1 MG/1
TABLET ORAL
Qty: 90 TABLET | Refills: 1 | Status: SHIPPED | OUTPATIENT
Start: 2019-02-18 | End: 2019-05-10 | Stop reason: SDUPTHER

## 2019-03-07 DIAGNOSIS — E11.9 TYPE 2 DIABETES MELLITUS WITHOUT COMPLICATION, WITHOUT LONG-TERM CURRENT USE OF INSULIN (HCC): ICD-10-CM

## 2019-03-19 ENCOUNTER — TRANSCRIBE ORDERS (OUTPATIENT)
Dept: ADMINISTRATIVE | Facility: HOSPITAL | Age: 79
End: 2019-03-19

## 2019-03-19 ENCOUNTER — HOSPITAL ENCOUNTER (OUTPATIENT)
Dept: NON INVASIVE DIAGNOSTICS | Facility: HOSPITAL | Age: 79
Discharge: HOME | End: 2019-03-19
Attending: INTERNAL MEDICINE
Payer: COMMERCIAL

## 2019-03-19 DIAGNOSIS — I49.1 PREMATURE ATRIAL CONTRACTION: ICD-10-CM

## 2019-03-19 DIAGNOSIS — I25.10 2-VESSEL CORONARY ARTERY DISEASE: ICD-10-CM

## 2019-03-19 PROCEDURE — 93225 XTRNL ECG REC<48 HRS REC: CPT

## 2019-03-19 PROCEDURE — 93226 XTRNL ECG REC<48 HR SCAN A/R: CPT

## 2019-03-29 ENCOUNTER — TELEPHONE (OUTPATIENT)
Dept: CARDIOLOGY CLINIC | Facility: CLINIC | Age: 79
End: 2019-03-29

## 2019-03-29 PROCEDURE — 93227 XTRNL ECG REC<48 HR R&I: CPT | Performed by: INTERNAL MEDICINE

## 2019-03-29 NOTE — TELEPHONE ENCOUNTER
----- Message from Saulo Gardner MD sent at 3/29/2019 10:26 AM EDT -----  Patient's Holter monitor shows extra beats  We need to start him on Toprol XL 25 mg daily and then have him see me in the next 6-8 weeks

## 2019-05-10 DIAGNOSIS — I10 BENIGN ESSENTIAL HTN: ICD-10-CM

## 2019-05-10 RX ORDER — TRANDOLAPRIL TABLETS 1 MG/1
1 TABLET ORAL DAILY
Qty: 90 TABLET | Refills: 1 | Status: SHIPPED | OUTPATIENT
Start: 2019-05-10 | End: 2019-11-29 | Stop reason: SDUPTHER

## 2019-05-16 ENCOUNTER — TRANSCRIBE ORDERS (OUTPATIENT)
Dept: LAB | Facility: CLINIC | Age: 79
End: 2019-05-16

## 2019-05-16 ENCOUNTER — APPOINTMENT (OUTPATIENT)
Dept: LAB | Facility: CLINIC | Age: 79
End: 2019-05-16
Payer: COMMERCIAL

## 2019-05-16 DIAGNOSIS — E78.5 HYPERLIPIDEMIA, UNSPECIFIED HYPERLIPIDEMIA TYPE: ICD-10-CM

## 2019-05-16 DIAGNOSIS — E11.65 TYPE II DIABETES MELLITUS WITH HYPEROSMOLARITY, UNCONTROLLED (HCC): ICD-10-CM

## 2019-05-16 DIAGNOSIS — E11.65 TYPE II DIABETES MELLITUS WITH HYPEROSMOLARITY, UNCONTROLLED (HCC): Primary | ICD-10-CM

## 2019-05-16 DIAGNOSIS — E11.00 TYPE II DIABETES MELLITUS WITH HYPEROSMOLARITY, UNCONTROLLED (HCC): ICD-10-CM

## 2019-05-16 DIAGNOSIS — E11.00 TYPE II DIABETES MELLITUS WITH HYPEROSMOLARITY, UNCONTROLLED (HCC): Primary | ICD-10-CM

## 2019-05-16 LAB
ANION GAP SERPL CALCULATED.3IONS-SCNC: 5 MMOL/L (ref 4–13)
BUN SERPL-MCNC: 22 MG/DL (ref 5–25)
CALCIUM SERPL-MCNC: 8.7 MG/DL (ref 8.3–10.1)
CHLORIDE SERPL-SCNC: 106 MMOL/L (ref 100–108)
CHOLEST SERPL-MCNC: 161 MG/DL (ref 50–200)
CO2 SERPL-SCNC: 26 MMOL/L (ref 21–32)
CREAT SERPL-MCNC: 1.29 MG/DL (ref 0.6–1.3)
CREAT UR-MCNC: 235 MG/DL
EST. AVERAGE GLUCOSE BLD GHB EST-MCNC: 148 MG/DL
GFR SERPL CREATININE-BSD FRML MDRD: 53 ML/MIN/1.73SQ M
GLUCOSE P FAST SERPL-MCNC: 129 MG/DL (ref 65–99)
HBA1C MFR BLD: 6.8 % (ref 4.2–6.3)
HDLC SERPL-MCNC: 51 MG/DL (ref 40–60)
LDLC SERPL CALC-MCNC: 92 MG/DL (ref 0–100)
MICROALBUMIN UR-MCNC: 37 MG/L (ref 0–20)
MICROALBUMIN/CREAT 24H UR: 16 MG/G CREATININE (ref 0–30)
NONHDLC SERPL-MCNC: 110 MG/DL
POTASSIUM SERPL-SCNC: 4.6 MMOL/L (ref 3.5–5.3)
SODIUM SERPL-SCNC: 137 MMOL/L (ref 136–145)
T4 FREE SERPL-MCNC: 1.14 NG/DL (ref 0.76–1.46)
TRIGL SERPL-MCNC: 88 MG/DL
TSH SERPL DL<=0.05 MIU/L-ACNC: 3.84 UIU/ML (ref 0.36–3.74)

## 2019-05-16 PROCEDURE — 36415 COLL VENOUS BLD VENIPUNCTURE: CPT | Performed by: INTERNAL MEDICINE

## 2019-05-16 PROCEDURE — 82570 ASSAY OF URINE CREATININE: CPT | Performed by: INTERNAL MEDICINE

## 2019-05-16 PROCEDURE — 82043 UR ALBUMIN QUANTITATIVE: CPT | Performed by: INTERNAL MEDICINE

## 2019-05-16 PROCEDURE — 84439 ASSAY OF FREE THYROXINE: CPT | Performed by: INTERNAL MEDICINE

## 2019-05-16 PROCEDURE — 80048 BASIC METABOLIC PNL TOTAL CA: CPT | Performed by: INTERNAL MEDICINE

## 2019-05-16 PROCEDURE — 80061 LIPID PANEL: CPT

## 2019-05-16 PROCEDURE — 84443 ASSAY THYROID STIM HORMONE: CPT | Performed by: INTERNAL MEDICINE

## 2019-05-16 PROCEDURE — 83036 HEMOGLOBIN GLYCOSYLATED A1C: CPT | Performed by: INTERNAL MEDICINE

## 2019-05-17 ENCOUNTER — TELEPHONE (OUTPATIENT)
Dept: FAMILY MEDICINE CLINIC | Facility: CLINIC | Age: 79
End: 2019-05-17

## 2019-06-05 ENCOUNTER — OFFICE VISIT (OUTPATIENT)
Dept: FAMILY MEDICINE CLINIC | Facility: CLINIC | Age: 79
End: 2019-06-05
Payer: COMMERCIAL

## 2019-06-05 VITALS
RESPIRATION RATE: 16 BRPM | DIASTOLIC BLOOD PRESSURE: 80 MMHG | HEIGHT: 69 IN | SYSTOLIC BLOOD PRESSURE: 126 MMHG | HEART RATE: 76 BPM | WEIGHT: 227 LBS | TEMPERATURE: 98.8 F | BODY MASS INDEX: 33.62 KG/M2
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
## 2019-06-05 DIAGNOSIS — M25.552 LEFT HIP PAIN: ICD-10-CM

## 2019-06-05 DIAGNOSIS — E11.9 TYPE 2 DIABETES MELLITUS WITHOUT COMPLICATION, WITHOUT LONG-TERM CURRENT USE OF INSULIN (HCC): ICD-10-CM

## 2019-06-05 DIAGNOSIS — S70.212A ABRASION OF LEFT HIP, INITIAL ENCOUNTER: ICD-10-CM

## 2019-06-05 DIAGNOSIS — L03.116 CELLULITIS OF LEFT LOWER EXTREMITY: ICD-10-CM

## 2019-06-05 PROCEDURE — 99213 OFFICE O/P EST LOW 20 MIN: CPT | Performed by: FAMILY MEDICINE

## 2019-06-05 RX ORDER — SULFAMETHOXAZOLE AND TRIMETHOPRIM 800; 160 MG/1; MG/1
1 TABLET ORAL EVERY 12 HOURS SCHEDULED
Qty: 14 TABLET | Refills: 0 | Status: SHIPPED | OUTPATIENT
Start: 2019-06-05 | End: 2019-06-05

## 2019-06-05 RX ORDER — DOXYCYCLINE HYCLATE 100 MG/1
100 CAPSULE ORAL EVERY 12 HOURS SCHEDULED
Qty: 14 CAPSULE | Refills: 0 | Status: SHIPPED | OUTPATIENT
Start: 2019-06-05 | End: 2019-06-12 | Stop reason: SDUPTHER

## 2019-06-12 ENCOUNTER — TRANSCRIBE ORDERS (OUTPATIENT)
Dept: RADIOLOGY | Facility: CLINIC | Age: 79
End: 2019-06-12

## 2019-06-12 ENCOUNTER — OFFICE VISIT (OUTPATIENT)
Dept: FAMILY MEDICINE CLINIC | Facility: CLINIC | Age: 79
End: 2019-06-12
Payer: COMMERCIAL

## 2019-06-12 ENCOUNTER — APPOINTMENT (OUTPATIENT)
Dept: RADIOLOGY | Facility: CLINIC | Age: 79
End: 2019-06-12
Payer: COMMERCIAL

## 2019-06-12 VITALS
HEIGHT: 69 IN | RESPIRATION RATE: 16 BRPM | SYSTOLIC BLOOD PRESSURE: 130 MMHG | TEMPERATURE: 98.4 F | WEIGHT: 227 LBS | DIASTOLIC BLOOD PRESSURE: 70 MMHG | BODY MASS INDEX: 33.62 KG/M2 | HEART RATE: 78 BPM

## 2019-06-12 DIAGNOSIS — L03.116 CELLULITIS OF LEFT LOWER EXTREMITY: Primary | ICD-10-CM

## 2019-06-12 DIAGNOSIS — T14.90XA TRAUMA: ICD-10-CM

## 2019-06-12 PROCEDURE — 73610 X-RAY EXAM OF ANKLE: CPT

## 2019-06-12 PROCEDURE — 99213 OFFICE O/P EST LOW 20 MIN: CPT | Performed by: FAMILY MEDICINE

## 2019-06-12 PROCEDURE — 73080 X-RAY EXAM OF ELBOW: CPT

## 2019-06-12 RX ORDER — DOXYCYCLINE HYCLATE 100 MG/1
100 CAPSULE ORAL EVERY 12 HOURS SCHEDULED
Qty: 14 CAPSULE | Refills: 0 | Status: SHIPPED | OUTPATIENT
Start: 2019-06-12 | End: 2019-06-19

## 2019-06-17 ENCOUNTER — OFFICE VISIT (OUTPATIENT)
Dept: FAMILY MEDICINE CLINIC | Facility: CLINIC | Age: 79
End: 2019-06-17
Payer: COMMERCIAL

## 2019-06-17 VITALS
DIASTOLIC BLOOD PRESSURE: 78 MMHG | BODY MASS INDEX: 33.15 KG/M2 | WEIGHT: 223.8 LBS | HEART RATE: 80 BPM | RESPIRATION RATE: 16 BRPM | HEIGHT: 69 IN | TEMPERATURE: 98.7 F | SYSTOLIC BLOOD PRESSURE: 130 MMHG

## 2019-06-17 DIAGNOSIS — E11.9 TYPE 2 DIABETES MELLITUS WITHOUT COMPLICATION, WITHOUT LONG-TERM CURRENT USE OF INSULIN (HCC): ICD-10-CM

## 2019-06-17 DIAGNOSIS — L03.90 CELLULITIS, UNSPECIFIED CELLULITIS SITE: Primary | ICD-10-CM

## 2019-06-17 PROCEDURE — 99213 OFFICE O/P EST LOW 20 MIN: CPT | Performed by: FAMILY MEDICINE

## 2019-06-17 PROCEDURE — 1036F TOBACCO NON-USER: CPT | Performed by: FAMILY MEDICINE

## 2019-06-17 PROCEDURE — 1160F RVW MEDS BY RX/DR IN RCRD: CPT | Performed by: FAMILY MEDICINE

## 2019-08-08 ENCOUNTER — OFFICE VISIT (OUTPATIENT)
Dept: FAMILY MEDICINE CLINIC | Facility: CLINIC | Age: 79
End: 2019-08-08
Payer: COMMERCIAL

## 2019-08-08 VITALS
WEIGHT: 224 LBS | HEART RATE: 74 BPM | DIASTOLIC BLOOD PRESSURE: 60 MMHG | RESPIRATION RATE: 20 BRPM | BODY MASS INDEX: 35.16 KG/M2 | OXYGEN SATURATION: 94 % | HEIGHT: 67 IN | TEMPERATURE: 97 F | SYSTOLIC BLOOD PRESSURE: 128 MMHG

## 2019-08-08 DIAGNOSIS — R13.10 DYSPHAGIA, UNSPECIFIED TYPE: Primary | ICD-10-CM

## 2019-08-08 DIAGNOSIS — K21.9 GASTROESOPHAGEAL REFLUX DISEASE WITHOUT ESOPHAGITIS: ICD-10-CM

## 2019-08-08 DIAGNOSIS — R05.9 COUGH: ICD-10-CM

## 2019-08-08 PROCEDURE — 1036F TOBACCO NON-USER: CPT | Performed by: NURSE PRACTITIONER

## 2019-08-08 PROCEDURE — 99214 OFFICE O/P EST MOD 30 MIN: CPT | Performed by: NURSE PRACTITIONER

## 2019-08-08 PROCEDURE — 1160F RVW MEDS BY RX/DR IN RCRD: CPT | Performed by: NURSE PRACTITIONER

## 2019-08-08 RX ORDER — OMEPRAZOLE 20 MG/1
20 CAPSULE, DELAYED RELEASE ORAL DAILY
Qty: 30 CAPSULE | Refills: 0 | Status: SHIPPED | OUTPATIENT
Start: 2019-08-08 | End: 2020-06-26

## 2019-08-08 RX ORDER — FOLIC ACID/MULTIVIT,IRON,MINER .4-18-35
1 TABLET,CHEWABLE ORAL DAILY
COMMUNITY
End: 2021-10-01 | Stop reason: HOSPADM

## 2019-08-08 NOTE — PROGRESS NOTES
Assessment/Plan:  1  Dysphagia, unspecified type  - FL barium swallow; Future  - omeprazole (PriLOSEC) 20 mg delayed release capsule; Take 1 capsule (20 mg total) by mouth daily  Dispense: 30 capsule; Refill: 0  2  Cough  - FL barium swallow; Future  - omeprazole (PriLOSEC) 20 mg delayed release capsule; Take 1 capsule (20 mg total) by mouth daily  Dispense: 30 capsule; Refill: 0  3  Gastroesophageal reflux disease without esophagitis  - omeprazole (PriLOSEC) 20 mg delayed release capsule; Take 1 capsule (20 mg total) by mouth daily  Dispense: 30 capsule; Refill: 0  Take daily and monitor symptoms  Subjective:      Patient ID: Farrukh Zamarripa is a 70 y o  male who presents with cough and swallowing issue    Here for cough and issues with swallowing  Has acid reflux  Worse at night  Especially if eats late  Last night, had milk and chocolate donut and developed increased heartburn and cough  Also has had increased "noise" and nasal congestion  Nose was runny for quite a while last night  Took zegerid and it helped  History of mild seasonal allergies, intermittent  Not on any allergy meds  Feels like sometimes food gets stuck and "pockets" and makes him cough more  Does not take daily reflux meds  Does not have nasal congestion  Certain foods are very hard to swallow and feels like cause increased cough  Has to clear throat frequently  Has never had swallow study  The following portions of the patient's history were reviewed and updated as appropriate: allergies, current medications, past family history, past medical history, past social history, past surgical history and problem list     Review of Systems   Constitutional: Negative for fever  HENT: Positive for rhinorrhea (intermittent) and trouble swallowing  Negative for congestion and sore throat  Respiratory: Positive for cough  Negative for choking and shortness of breath      Gastrointestinal: Negative for abdominal pain, diarrhea, nausea and vomiting  Allergic/Immunologic: Positive for environmental allergies (mild)  Neurological: Negative for dizziness and headaches  Objective:      /60   Pulse 74   Temp (!) 97 °F (36 1 °C) (Temporal)   Resp 20   Ht 5' 7" (1 702 m)   Wt 102 kg (224 lb)   SpO2 94%   BMI 35 08 kg/m²          Physical Exam   Constitutional: He is oriented to person, place, and time  He appears well-developed and well-nourished  No distress  HENT:   Nose: Nose normal    Mouth/Throat: Oropharynx is clear and moist  No oropharyngeal exudate  Cardiovascular: Normal rate and regular rhythm  No JVD  No audible carotid bruit  No peripheral edema  Pulmonary/Chest: Effort normal and breath sounds normal  No respiratory distress  He has no wheezes  Abdominal: Soft  Bowel sounds are normal    Lymphadenopathy:     He has no cervical adenopathy  Neurological: He is alert and oriented to person, place, and time  No cranial nerve deficit  Coordination normal    Skin: Skin is warm and dry  No rash noted  He is not diaphoretic  No erythema  No pallor  Psychiatric: He has a normal mood and affect  His behavior is normal  Judgment and thought content normal    Vitals reviewed

## 2019-08-12 ENCOUNTER — HOSPITAL ENCOUNTER (OUTPATIENT)
Dept: RADIOLOGY | Facility: HOSPITAL | Age: 79
Discharge: HOME | End: 2019-08-12
Payer: COMMERCIAL

## 2019-08-12 DIAGNOSIS — R05.9 COUGH: ICD-10-CM

## 2019-08-12 DIAGNOSIS — R13.14 PHARYNGOESOPHAGEAL DYSPHAGIA: Primary | ICD-10-CM

## 2019-08-12 DIAGNOSIS — R13.10 DYSPHAGIA, UNSPECIFIED TYPE: ICD-10-CM

## 2019-08-12 PROCEDURE — 74220 X-RAY XM ESOPHAGUS 1CNTRST: CPT

## 2019-08-20 ENCOUNTER — APPOINTMENT (OUTPATIENT)
Dept: LAB | Facility: CLINIC | Age: 79
End: 2019-08-20
Payer: COMMERCIAL

## 2019-08-20 ENCOUNTER — TRANSCRIBE ORDERS (OUTPATIENT)
Dept: LAB | Facility: CLINIC | Age: 79
End: 2019-08-20

## 2019-08-20 DIAGNOSIS — E78.5 HYPERLIPIDEMIA, UNSPECIFIED HYPERLIPIDEMIA TYPE: ICD-10-CM

## 2019-08-20 DIAGNOSIS — E55.9 AVITAMINOSIS D: ICD-10-CM

## 2019-08-20 DIAGNOSIS — E13.649 UNCONTROLLED OTHER SPECIFIED DIABETES MELLITUS WITH HYPOGLYCEMIA WITHOUT COMA (HCC): Primary | ICD-10-CM

## 2019-08-20 DIAGNOSIS — E13.649 UNCONTROLLED OTHER SPECIFIED DIABETES MELLITUS WITH HYPOGLYCEMIA WITHOUT COMA (HCC): ICD-10-CM

## 2019-08-20 LAB
25(OH)D3 SERPL-MCNC: 43.9 NG/ML (ref 30–100)
ANION GAP SERPL CALCULATED.3IONS-SCNC: 8 MMOL/L (ref 4–13)
BUN SERPL-MCNC: 29 MG/DL (ref 5–25)
CALCIUM SERPL-MCNC: 9.2 MG/DL (ref 8.3–10.1)
CHLORIDE SERPL-SCNC: 110 MMOL/L (ref 100–108)
CHOLEST SERPL-MCNC: 152 MG/DL (ref 50–200)
CO2 SERPL-SCNC: 25 MMOL/L (ref 21–32)
CREAT SERPL-MCNC: 1.34 MG/DL (ref 0.6–1.3)
EST. AVERAGE GLUCOSE BLD GHB EST-MCNC: 140 MG/DL
GFR SERPL CREATININE-BSD FRML MDRD: 51 ML/MIN/1.73SQ M
GLUCOSE P FAST SERPL-MCNC: 123 MG/DL (ref 65–99)
HBA1C MFR BLD: 6.5 % (ref 4.2–6.3)
HDLC SERPL-MCNC: 50 MG/DL (ref 40–60)
LDLC SERPL CALC-MCNC: 86 MG/DL (ref 0–100)
NONHDLC SERPL-MCNC: 102 MG/DL
POTASSIUM SERPL-SCNC: 4.5 MMOL/L (ref 3.5–5.3)
SODIUM SERPL-SCNC: 143 MMOL/L (ref 136–145)
T3FREE SERPL-MCNC: 2.37 PG/ML (ref 2.3–4.2)
TRIGL SERPL-MCNC: 81 MG/DL
TSH SERPL DL<=0.05 MIU/L-ACNC: 2.65 UIU/ML (ref 0.36–3.74)
VIT B12 SERPL-MCNC: 688 PG/ML (ref 100–900)

## 2019-08-20 PROCEDURE — 82306 VITAMIN D 25 HYDROXY: CPT | Performed by: INTERNAL MEDICINE

## 2019-08-20 PROCEDURE — 36415 COLL VENOUS BLD VENIPUNCTURE: CPT | Performed by: INTERNAL MEDICINE

## 2019-08-20 PROCEDURE — 84481 FREE ASSAY (FT-3): CPT

## 2019-08-20 PROCEDURE — 83036 HEMOGLOBIN GLYCOSYLATED A1C: CPT | Performed by: INTERNAL MEDICINE

## 2019-08-20 PROCEDURE — 84443 ASSAY THYROID STIM HORMONE: CPT

## 2019-08-20 PROCEDURE — 82607 VITAMIN B-12: CPT

## 2019-08-20 PROCEDURE — 80061 LIPID PANEL: CPT | Performed by: INTERNAL MEDICINE

## 2019-08-20 PROCEDURE — 80048 BASIC METABOLIC PNL TOTAL CA: CPT | Performed by: INTERNAL MEDICINE

## 2019-09-18 ENCOUNTER — OFFICE VISIT (OUTPATIENT)
Dept: CARDIOLOGY CLINIC | Facility: CLINIC | Age: 79
End: 2019-09-18
Payer: COMMERCIAL

## 2019-09-18 VITALS
OXYGEN SATURATION: 98 % | WEIGHT: 226 LBS | DIASTOLIC BLOOD PRESSURE: 80 MMHG | SYSTOLIC BLOOD PRESSURE: 130 MMHG | BODY MASS INDEX: 35.47 KG/M2 | HEIGHT: 67 IN | HEART RATE: 61 BPM

## 2019-09-18 DIAGNOSIS — E11.9 TYPE 2 DIABETES MELLITUS WITHOUT COMPLICATION, WITHOUT LONG-TERM CURRENT USE OF INSULIN (HCC): ICD-10-CM

## 2019-09-18 DIAGNOSIS — I10 BENIGN HYPERTENSION: ICD-10-CM

## 2019-09-18 DIAGNOSIS — E78.5 DYSLIPIDEMIA: ICD-10-CM

## 2019-09-18 DIAGNOSIS — E66.8 MODERATE OBESITY: ICD-10-CM

## 2019-09-18 DIAGNOSIS — I25.10 2-VESSEL CORONARY ARTERY DISEASE: ICD-10-CM

## 2019-09-18 PROCEDURE — 3079F DIAST BP 80-89 MM HG: CPT | Performed by: INTERNAL MEDICINE

## 2019-09-18 PROCEDURE — 99214 OFFICE O/P EST MOD 30 MIN: CPT | Performed by: INTERNAL MEDICINE

## 2019-09-18 PROCEDURE — 93000 ELECTROCARDIOGRAM COMPLETE: CPT | Performed by: INTERNAL MEDICINE

## 2019-09-18 PROCEDURE — 3075F SYST BP GE 130 - 139MM HG: CPT | Performed by: INTERNAL MEDICINE

## 2019-09-20 ENCOUNTER — IMMUNIZATIONS (OUTPATIENT)
Dept: FAMILY MEDICINE CLINIC | Facility: CLINIC | Age: 79
End: 2019-09-20
Payer: COMMERCIAL

## 2019-09-20 DIAGNOSIS — Z23 NEED FOR IMMUNIZATION AGAINST INFLUENZA: Primary | ICD-10-CM

## 2019-09-20 PROCEDURE — 90662 IIV NO PRSV INCREASED AG IM: CPT

## 2019-09-20 PROCEDURE — G0008 ADMIN INFLUENZA VIRUS VAC: HCPCS

## 2019-10-14 DIAGNOSIS — E11.9 TYPE 2 DIABETES MELLITUS WITHOUT COMPLICATION, WITHOUT LONG-TERM CURRENT USE OF INSULIN (HCC): ICD-10-CM

## 2019-10-14 DIAGNOSIS — I25.10 2-VESSEL CORONARY ARTERY DISEASE: ICD-10-CM

## 2019-10-14 DIAGNOSIS — E78.5 DYSLIPIDEMIA: ICD-10-CM

## 2019-10-14 RX ORDER — SIMVASTATIN 40 MG
40 TABLET ORAL
Qty: 90 TABLET | Refills: 3 | Status: SHIPPED | OUTPATIENT
Start: 2019-10-14 | End: 2020-09-23

## 2019-11-19 ENCOUNTER — TRANSCRIBE ORDERS (OUTPATIENT)
Dept: LAB | Facility: CLINIC | Age: 79
End: 2019-11-19

## 2019-11-19 ENCOUNTER — APPOINTMENT (OUTPATIENT)
Dept: LAB | Facility: CLINIC | Age: 79
End: 2019-11-19
Payer: COMMERCIAL

## 2019-11-19 DIAGNOSIS — E11.649 UNCONTROLLED TYPE 2 DIABETES MELLITUS WITH HYPOGLYCEMIA WITHOUT COMA (HCC): Primary | ICD-10-CM

## 2019-11-19 DIAGNOSIS — E78.5 HYPERLIPIDEMIA, UNSPECIFIED HYPERLIPIDEMIA TYPE: ICD-10-CM

## 2019-11-19 DIAGNOSIS — E55.9 AVITAMINOSIS D: ICD-10-CM

## 2019-11-19 DIAGNOSIS — E11.649 UNCONTROLLED TYPE 2 DIABETES MELLITUS WITH HYPOGLYCEMIA WITHOUT COMA (HCC): ICD-10-CM

## 2019-11-19 LAB
25(OH)D3 SERPL-MCNC: 56.6 NG/ML (ref 30–100)
ANION GAP SERPL CALCULATED.3IONS-SCNC: 9 MMOL/L (ref 4–13)
BUN SERPL-MCNC: 21 MG/DL (ref 5–25)
CALCIUM SERPL-MCNC: 9.2 MG/DL (ref 8.3–10.1)
CHLORIDE SERPL-SCNC: 108 MMOL/L (ref 100–108)
CO2 SERPL-SCNC: 24 MMOL/L (ref 21–32)
CREAT SERPL-MCNC: 1.27 MG/DL (ref 0.6–1.3)
EST. AVERAGE GLUCOSE BLD GHB EST-MCNC: 146 MG/DL
GFR SERPL CREATININE-BSD FRML MDRD: 54 ML/MIN/1.73SQ M
GLUCOSE P FAST SERPL-MCNC: 132 MG/DL (ref 65–99)
HBA1C MFR BLD: 6.7 % (ref 4.2–6.3)
POTASSIUM SERPL-SCNC: 4.4 MMOL/L (ref 3.5–5.3)
SODIUM SERPL-SCNC: 141 MMOL/L (ref 136–145)
VIT B12 SERPL-MCNC: 706 PG/ML (ref 100–900)

## 2019-11-19 PROCEDURE — 82306 VITAMIN D 25 HYDROXY: CPT | Performed by: INTERNAL MEDICINE

## 2019-11-19 PROCEDURE — 36415 COLL VENOUS BLD VENIPUNCTURE: CPT | Performed by: INTERNAL MEDICINE

## 2019-11-19 PROCEDURE — 83036 HEMOGLOBIN GLYCOSYLATED A1C: CPT | Performed by: INTERNAL MEDICINE

## 2019-11-19 PROCEDURE — 82607 VITAMIN B-12: CPT

## 2019-11-19 PROCEDURE — 80048 BASIC METABOLIC PNL TOTAL CA: CPT | Performed by: INTERNAL MEDICINE

## 2019-11-29 DIAGNOSIS — I10 BENIGN ESSENTIAL HTN: ICD-10-CM

## 2019-11-29 RX ORDER — TRANDOLAPRIL TABLETS 1 MG/1
1 TABLET ORAL DAILY
Qty: 90 TABLET | Refills: 1 | Status: SHIPPED | OUTPATIENT
Start: 2019-11-29 | End: 2019-12-07 | Stop reason: SDUPTHER

## 2019-12-07 DIAGNOSIS — I10 BENIGN ESSENTIAL HTN: ICD-10-CM

## 2019-12-07 RX ORDER — TRANDOLAPRIL TABLETS 1 MG/1
1 TABLET ORAL DAILY
Qty: 90 TABLET | Refills: 1 | Status: SHIPPED | OUTPATIENT
Start: 2019-12-07 | End: 2020-05-05 | Stop reason: SDUPTHER

## 2019-12-07 NOTE — TELEPHONE ENCOUNTER
Dr Rolando Couch:    Patient needs a refill of trandolapril sent to [facility] in [place]    Patient has enough medication for 1 more week,

## 2020-01-09 ENCOUNTER — OFFICE VISIT (OUTPATIENT)
Dept: FAMILY MEDICINE CLINIC | Facility: CLINIC | Age: 80
End: 2020-01-09
Payer: COMMERCIAL

## 2020-01-09 VITALS
HEART RATE: 72 BPM | WEIGHT: 221 LBS | BODY MASS INDEX: 34.69 KG/M2 | HEIGHT: 67 IN | DIASTOLIC BLOOD PRESSURE: 78 MMHG | SYSTOLIC BLOOD PRESSURE: 110 MMHG | RESPIRATION RATE: 14 BRPM | TEMPERATURE: 97.5 F

## 2020-01-09 DIAGNOSIS — I10 BENIGN HYPERTENSION: ICD-10-CM

## 2020-01-09 DIAGNOSIS — J02.9 PHARYNGITIS, UNSPECIFIED ETIOLOGY: Primary | ICD-10-CM

## 2020-01-09 DIAGNOSIS — E11.9 TYPE 2 DIABETES MELLITUS WITHOUT COMPLICATION, WITHOUT LONG-TERM CURRENT USE OF INSULIN (HCC): ICD-10-CM

## 2020-01-09 DIAGNOSIS — M25.512 ACUTE PAIN OF LEFT SHOULDER: ICD-10-CM

## 2020-01-09 PROCEDURE — 3288F FALL RISK ASSESSMENT DOCD: CPT | Performed by: FAMILY MEDICINE

## 2020-01-09 PROCEDURE — 3074F SYST BP LT 130 MM HG: CPT | Performed by: FAMILY MEDICINE

## 2020-01-09 PROCEDURE — 3078F DIAST BP <80 MM HG: CPT | Performed by: FAMILY MEDICINE

## 2020-01-09 PROCEDURE — 3725F SCREEN DEPRESSION PERFORMED: CPT | Performed by: FAMILY MEDICINE

## 2020-01-09 PROCEDURE — 99213 OFFICE O/P EST LOW 20 MIN: CPT | Performed by: FAMILY MEDICINE

## 2020-01-09 PROCEDURE — 1160F RVW MEDS BY RX/DR IN RCRD: CPT | Performed by: FAMILY MEDICINE

## 2020-01-09 PROCEDURE — 1036F TOBACCO NON-USER: CPT | Performed by: FAMILY MEDICINE

## 2020-01-09 RX ORDER — DEXTROMETHORPHAN HYDROBROMIDE AND PROMETHAZINE HYDROCHLORIDE 15; 6.25 MG/5ML; MG/5ML
5 SOLUTION ORAL
Qty: 180 ML | Refills: 0 | Status: SHIPPED | OUTPATIENT
Start: 2020-01-09 | End: 2021-09-21

## 2020-01-09 RX ORDER — AZITHROMYCIN 250 MG/1
TABLET, FILM COATED ORAL
Qty: 6 TABLET | Refills: 0 | Status: SHIPPED | OUTPATIENT
Start: 2020-01-09 | End: 2020-01-13

## 2020-01-09 NOTE — PROGRESS NOTES
Raj Bales 1940 male MRN: [id number]    FAMILY PRACTICE OFFICE VISIT  [facility] - [address]      ASSESSMENT/PLAN  Raj Bales is a 70 y o  male presents to the office for    Diagnoses and all orders for this visit:    Pharyngitis, unspecified etiology  -     Promethazine-DM (PHENERGAN-DM) 6 25-15 mg/5 mL oral syrup; Take 5 mL by mouth daily at bedtime  -     azithromycin (ZITHROMAX) 250 mg tablet; Take 2 tablets today then 1 tablet daily x 4 days    Type 2 diabetes mellitus without complication, without long-term current use of insulin (HCC)    Benign hypertension    Acute pain of left shoulder       Patient states that his A1c is very well controlled with his endocrinologist   Just recently had a foot exam 3 months ago  Due for his eye exam and scheduled in the future  Will obtain all records for records  Hypertension very well controlled on medications  Acute pharyngitis at this time will place the patient on supportive care with promethazine DM  And prescribed the patient a pocket prescription if his symptoms do not improve  I did advise him that this is likely viral and will resolve within 7-14 days  However given his history of diabetes if patient's symptoms worsen within the next 2 days to please start taking prescription  Patient is to report back after being evaluated by the Orthopedic  Return to the office for annual wellness visit    BMI Counseling: Body mass index is 34 61 kg/m²  The BMI is above normal  Nutrition recommendations include consuming healthier snacks  No future appointments  SUBJECTIVE  CC: Sore Throat (pt  states he woke up this morning and his throat was dry and he couldn't talk)      HPI:  Raj Bales is a 70 y o  male who presents for an acute appointment  Trouble swallowing, and couldn't talk this morning  Two throat lozengers which help bring his voice back   Last 2 days of worsening symptoms  " I can't get enough air"      Of note patient will be seeing his doctor [facility] and discuss with us further once established with them  Of note made on chart  Patient states that he has multiple orthopedic concerns  Concerned that he has a right knee meniscus tear  Has no orthopedic primary  States that he has been using a brace and ice pack which takes away 99% of hisPain  Patient also expressing right lower back pain worse with excessive movement  Patient states the back hurts when he is lying down on his right hand side  Right shoulder Dr Arielle Bhakta and [place] repair   Worse with rest, only movement relieves pain of the left shoulder  Concerned he might knee repair but does not want to go back to his [address]  Review of Systems   Constitutional: Positive for chills and fatigue  Negative for activity change, appetite change and fever  HENT: Positive for congestion and sore throat  Respiratory: Positive for cough  Negative for chest tightness and shortness of breath  Cardiovascular: Negative for chest pain and leg swelling  Gastrointestinal: Negative for abdominal distention, abdominal pain, constipation, diarrhea, nausea and vomiting  Musculoskeletal: Positive for arthralgias ( please see HPI)  Back pain: Please see HPI  All other systems reviewed and are negative        Historical Information   The patient history was reviewed as follows:  Past Medical History:   Diagnosis Date    Abnormal blood chemistry     resolved: 11/9/16    Abnormal glucose     last assessed: 9/24/14    CAD (coronary artery disease)     Coronary atherosclerosis     DM2 (diabetes mellitus, type 2) (HCC)     Dyslipidemia     Facial nerve disorder     HTN (hypertension)     Hyperlipidemia     last assessed: 1/13/17    Kidney stones     Lumbosacral radiculopathy     Nerve root and plexus disorder     Obesity     Osteoarthritis     Prostate asymmetry     Pure hypercholesterolemia Medications:     Current Outpatient Medications:     aspirin 81 mg chewable tablet, Chew 81 mg daily, Disp: , Rfl:     cholecalciferol (VITAMIN D3) 1,000 units tablet, Take 1,000 Units by mouth daily, Disp: , Rfl:     metFORMIN (GLUCOPHAGE) 500 mg tablet, Take 500 mg by mouth 2 (two) times a day with meals , Disp: , Rfl:     multivitamin-iron-minerals-folic acid (CENTRUM) chewable tablet, Chew 1 tablet daily, Disp: , Rfl:     omeprazole (PriLOSEC) 20 mg delayed release capsule, Take 1 capsule (20 mg total) by mouth daily, Disp: 30 capsule, Rfl: 0    simvastatin (ZOCOR) 40 mg tablet, Take 1 tablet (40 mg total) by mouth daily at bedtime, Disp: 90 tablet, Rfl: 3    sitaGLIPtin (JANUVIA) 50 mg tablet, Take 1 tablet (50 mg total) by mouth daily, Disp: 90 tablet, Rfl: 1    trandolapril (MAVIK) 1 MG tablet, Take 1 tablet (1 mg total) by mouth daily, Disp: 90 tablet, Rfl: 1    azithromycin (ZITHROMAX) 250 mg tablet, Take 2 tablets today then 1 tablet daily x 4 days, Disp: 6 tablet, Rfl: 0    Promethazine-DM (PHENERGAN-DM) 6 25-15 mg/5 mL oral syrup, Take 5 mL by mouth daily at bedtime, Disp: 180 mL, Rfl: 0    Allergies   Allergen Reactions    Ciprofloxacin Hives       OBJECTIVE  Vitals:   Vitals:    01/09/20 1345   BP: 110/78   BP Location: Left arm   Patient Position: Sitting   Cuff Size: Adult   Pulse: 72   Resp: 14   Temp: 97 5 °F (36 4 °C)   TempSrc: Tympanic   Weight: 100 kg (221 lb)   Height: 5' 7" (1 702 m)         Physical Exam   Constitutional: He is oriented to person, place, and time  Vital signs are normal  He appears well-developed and well-nourished  HENT:   Head: Normocephalic and atraumatic  Right Ear: Tympanic membrane and ear canal normal  No middle ear effusion  Left Ear: Tympanic membrane and ear canal normal   No middle ear effusion  Mouth/Throat: Posterior oropharyngeal erythema present  Tonsils are 2+ on the right  Tonsils are 2+ on the left  No tonsillar exudate     Eyes: Pupils are equal, round, and reactive to light  Conjunctivae and EOM are normal    Neck: Normal range of motion  Neck supple  Cardiovascular: Normal rate, regular rhythm, S1 normal, S2 normal, normal heart sounds and intact distal pulses  No murmur heard  Pulmonary/Chest: Effort normal and breath sounds normal  No respiratory distress  He has no wheezes  Musculoskeletal: Normal range of motion  He exhibits no edema  Arms:  Neurological: He is alert and oriented to person, place, and time  He has normal strength  Skin: Skin is warm  Psychiatric: He has a normal mood and affect  His speech is normal and behavior is normal  Judgment and thought content normal    Vitals reviewed                   Nacho Logan MD,   [facility]  1/9/2020

## 2020-01-15 ENCOUNTER — VBI (OUTPATIENT)
Dept: FAMILY MEDICINE CLINIC | Facility: CLINIC | Age: 80
End: 2020-01-15

## 2020-01-15 LAB — HBA1C MFR BLD HPLC: 6.7 %

## 2020-01-16 ENCOUNTER — TELEPHONE (OUTPATIENT)
Dept: FAMILY MEDICINE CLINIC | Facility: CLINIC | Age: 80
End: 2020-01-16

## 2020-01-16 NOTE — TELEPHONE ENCOUNTER
CALLED AND SPK W/ WIFE PT WAS @ THE STORE, ADVISED HER TO TELL HIM NO ANTIBX WILL DO ANY GOOD, HE NEEDS TO REST AND PUSH FLUIDS

## 2020-01-16 NOTE — TELEPHONE ENCOUNTER
Dr Sofia Turner    Patient still has cough clear phleghm  He finished antibiotic, and cough med is almost gone  Please call in new rx to [facility]  I advised that Dr Sofia Turner is out of the office

## 2020-03-17 NOTE — PROGRESS NOTES
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  He has no symptoms of angina  He has decently active  His blood test from August 2019 reviewed  He had a nonischemic nuclear in September of 2018 no symptoms of angina  2  Hypertension  Longstanding history of essential hypertension  Continue same medication    3  Dyslipidemia  Cholesterol profile reviewed from August 2019 acceptable  Continue same medication    4  Diabetes mellitus  Patient is on metformin and Januvia follow-up by PMD   Last HbA1c 6 5  He [facility] with [facility]ocr[facility]  Currently stable    5  Obesity  Patient's BMI is around 35  He is trying hard to lose weight    6  Premature atrial contractions  Holter monitor shows no evidence of occult atrial fibrillation  Few PACs noted  Episode of probably SVT with aberration noted he has normal LV systolic function  No symptoms currently    Discussed with patient at length about pathophysiology of coronary artery disease, irregular heartbeat, need to compliant with medications and losing weight at length  Follow-up in 6 months  All these issues discussed with patient's wife at length  Counseling :  A description of the counseling  Spoke to patient but high intensity statins  Lipitor side effects discussed  He is willing to try it  Goals and Barriers  Patient want to lose weight which he gain during winter months    Little under stress due to wife's condition  Patient's ability to self care: Yes  Medication side effect reviewed with patient in detail and all their questions answered to their satisfaction  Mukesh Garcia is a 68y o  year old male who came for follow up  Patient has a past medical history significant for coronary artery disease status post angioplasty in 2008 off large size LAD with a known RPDA, hypertension, diabetes mellitus, dyslipidemia and moderate obesity who came for regular follow-up  Patient's last nuclear was in 2014 which was nonischemic  He denies any chest pain or any shortness of breath  No fever no chills no other significant complaint  7/18/2017Patient came for follow-up for coronary artery disease  A status post angioplasty of his large LAD and has known RPDA occlusion  His sugar is well controlled  He is trying to lose weight  Blood pressures acceptable  Denies any chest pain  No PND no orthopnea no nausea no vomiting no other significant complaint  03/18/2020  Above reviewed  Patient came for follow-up  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  He has been busy with his wife hence on conservative Rx  He is doing well from cardiac point of view  He had history of coronary artery disease status post angioplasty of LAD in 2008 and known to have occluded RPDA with good collaterals  He is also under lot of stress because of his wife's back problem  His Holter monitor last time showed few PACs and short atrial runs  He has no other significant complaint today heart rate 67 beats per minute  No chest pain no shortness of breath no dizziness no nausea no vomiting no other issues  Holter monitor shows few PACs and short atrial run could be SVT with abrreation versus NSVT but he was asymptomatic          Review of Systems   Constitutional: Negative for activity change, chills, diaphoresis, fever and unexpected weight change     HENT: Negative for congestion  Eyes: Negative for discharge and redness  Respiratory: Negative for cough, chest tightness, shortness of breath and wheezing  Cardiovascular: Negative  Negative for chest pain, palpitations and leg swelling  Gastrointestinal: Negative for abdominal pain, diarrhea and nausea  Endocrine: Negative  Genitourinary: Negative for decreased urine volume and urgency  Musculoskeletal: Positive for arthralgias and back pain  Negative for gait problem  Skin: Negative for rash and wound  Allergic/Immunologic: Negative  Neurological: Negative for dizziness, seizures, syncope, weakness, light-headedness and headaches  Hematological: Negative  Psychiatric/Behavioral: Positive for sleep disturbance  Negative for agitation and confusion  The patient is nervous/anxious          Historical Information   Past Medical History:   Diagnosis Date    Abnormal blood chemistry     resolved: 11/9/16    Abnormal glucose     last assessed: 9/24/14    CAD (coronary artery disease)     Coronary atherosclerosis     DM2 (diabetes mellitus, type 2) (HCC)     Dyslipidemia     Facial nerve disorder     HTN (hypertension)     Hyperlipidemia     last assessed: 1/13/17    Kidney stones     Lumbosacral radiculopathy     Nerve root and plexus disorder     Obesity     Osteoarthritis     Prostate asymmetry     Pure hypercholesterolemia      Past Surgical History:   Procedure Laterality Date    CORONARY ANGIOPLASTY       Social History     Substance and Sexual Activity   Alcohol Use Yes    Comment: occasional     Social History     Substance and Sexual Activity   Drug Use No     Social History     Tobacco Use   Smoking Status Never Smoker   Smokeless Tobacco Never Used     Family History:   Family History   Problem Relation Age of Onset    Cancer Mother     Ovarian cancer Mother     Hypertension Sister     Lung disease Father         black    Mental illness Neg Hx     Substance Abuse Neg Hx        Meds/Allergies     Allergies   Allergen Reactions    Ciprofloxacin Hives       Current Outpatient Medications:     aspirin 81 mg chewable tablet, Chew 81 mg daily, Disp: , Rfl:     cholecalciferol (VITAMIN D3) 1,000 units tablet, Take 1,000 Units by mouth daily, Disp: , Rfl:     metFORMIN (GLUCOPHAGE) 500 mg tablet, Take 500 mg by mouth 2 (two) times a day with meals , Disp: , Rfl:     multivitamin-iron-minerals-folic acid (CENTRUM) chewable tablet, Chew 1 tablet daily, Disp: , Rfl:     omeprazole (PriLOSEC) 20 mg delayed release capsule, Take 1 capsule (20 mg total) by mouth daily, Disp: 30 capsule, Rfl: 0    simvastatin (ZOCOR) 40 mg tablet, Take 1 tablet (40 mg total) by mouth daily at bedtime, Disp: 90 tablet, Rfl: 3    sitaGLIPtin (JANUVIA) 50 mg tablet, Take 1 tablet (50 mg total) by mouth daily, Disp: 90 tablet, Rfl: 1    trandolapril (MAVIK) 1 MG tablet, Take 1 tablet (1 mg total) by mouth daily, Disp: 90 tablet, Rfl: 1    Promethazine-DM (PHENERGAN-DM) 6 25-15 mg/5 mL oral syrup, Take 5 mL by mouth daily at bedtime (Patient not taking: Reported on 3/18/2020), Disp: 180 mL, Rfl: 0    Vitals: Blood pressure 120/78, pulse 64, height 5' 7" (1 702 m), weight 100 kg (221 lb), SpO2 97 %  Body mass index is 34 61 kg/m²  BP Readings from Last 3 Encounters:   03/18/20 120/78   01/09/20 110/78   09/18/19 130/80       Physical Exam:  Physical Exam       Diagnostic Studies Review Cardio:  Stress Test: Nuclear stress test in October 2014 shows no ischemia EF 70%  Nuclear stress test done on 09/26/2018 was normal with EF around 70%  Echocardiogram/ROCK: Echo Doppler done in March 2015 shows borderline concentric left hypertrophy EF 55-60%, after reminding dilated, trace MR, trace TR PA pressure 35 mmHg  Echo Doppler done 09/26/2018    Echo Doppler shows normal LV systolic function grade 1 diastolic dysfunction, right ventricle mildly dilated, left atrium mild-to-moderate dilated, mild MR, mild TR PA pressure 30 mm of mercury  No change from old echo  Catheterization: His cardiac catheter patient done in 2008 shows critical stenosis of LAD which was successfully stented with a drug-eluting stent  He had nonobstructive disease in the circumflex and right coronary artery and RPDA is 100% occluded with grade 2 collaterals  Vascular imaging: In October of 2014 abdominal aortogram study shows no aneurysm  Holter monitor  Holter monitor done in March 2019 shows few PACs and PVCs  Most of the PVCs were single  There were 12 beat NSVT though it was irregular so we could not rule out SVT with aberration    ECG Report:   Comparison to prior ECGs: no interval change  7/18/2017  Normal sinus rhythm 1  with no significant ST changes  Heart rate was 68 bpm     Repeat EKG done 08/14/2018 shows normal sinus rhythm heart rate 73 beats per minute  No change from old EKG  Twelve lead EKG in our office done on 02/14/2018 shows normal sinus rhythm heart rate 72 beats per minute few PACs were noted  As compared to old EKG PACs are newly reported  Twelve lead EKG 09/18/2019 shows normal sinus rhythm rare PACs heart rate 71 beats per minute no other significant ST changes  Twelve lead EKG 03/18/2020 shows normal sinus rhythm rare PACs heart rate 67 beats per minute no change from old EKG  Blood test from   November 2019 reviewed    Lab Review     Lab Results   Component Value Date     02/17/2017    K 4 4 11/19/2019     11/19/2019    CO2 24 11/19/2019    BUN 21 11/19/2019    CREATININE 1 27 11/19/2019    GLUCOSE 125 (H) 02/17/2017    GLUF 132 (H) 11/19/2019    CALCIUM 9 2 11/19/2019    AST 18 01/02/2019    ALT 36 01/02/2019    ALKPHOS 67 01/02/2019    PROT 6 5 02/17/2017    BILITOT 0 4 02/17/2017    EGFR 54 11/19/2019     Lab Results   Component Value Date    GLUCOSE 125 (H) 02/17/2017    CALCIUM 9 2 11/19/2019     02/17/2017    K 4 4 11/19/2019    CO2 24 11/19/2019  11/19/2019    BUN 21 11/19/2019    CREATININE 1 27 11/19/2019       Lab Results   Component Value Date    HGBA1C 6 7 (H) 11/19/2019     Lipid Profile:   Lab Results   Component Value Date    CHOL 173 02/17/2017    HDL 50 08/20/2019    LDLCALC 86 08/20/2019    TRIG 81 08/20/2019     Lab Results   Component Value Date    CHOL 173 02/17/2017    CHOL 151 11/21/2016       Dr Diann Bauer MD [facility]      "This note has been constructed using a voice recognition system  Therefore there may be syntax, spelling, and/or grammatical errors   Please call if you have any questions  "

## 2020-03-18 ENCOUNTER — OFFICE VISIT (OUTPATIENT)
Dept: CARDIOLOGY CLINIC | Facility: CLINIC | Age: 80
End: 2020-03-18
Payer: COMMERCIAL

## 2020-03-18 VITALS
HEART RATE: 64 BPM | HEIGHT: 67 IN | BODY MASS INDEX: 34.69 KG/M2 | DIASTOLIC BLOOD PRESSURE: 78 MMHG | SYSTOLIC BLOOD PRESSURE: 120 MMHG | OXYGEN SATURATION: 97 % | WEIGHT: 221 LBS

## 2020-03-18 DIAGNOSIS — E11.9 TYPE 2 DIABETES MELLITUS WITHOUT COMPLICATION, WITHOUT LONG-TERM CURRENT USE OF INSULIN (HCC): ICD-10-CM

## 2020-03-18 DIAGNOSIS — E66.8 MODERATE OBESITY: ICD-10-CM

## 2020-03-18 DIAGNOSIS — E78.5 DYSLIPIDEMIA: ICD-10-CM

## 2020-03-18 DIAGNOSIS — I10 BENIGN HYPERTENSION: ICD-10-CM

## 2020-03-18 DIAGNOSIS — R06.02 SHORTNESS OF BREATH: ICD-10-CM

## 2020-03-18 DIAGNOSIS — I25.10 2-VESSEL CORONARY ARTERY DISEASE: ICD-10-CM

## 2020-03-18 PROCEDURE — 93000 ELECTROCARDIOGRAM COMPLETE: CPT | Performed by: INTERNAL MEDICINE

## 2020-03-18 PROCEDURE — 3074F SYST BP LT 130 MM HG: CPT | Performed by: INTERNAL MEDICINE

## 2020-03-18 PROCEDURE — 3078F DIAST BP <80 MM HG: CPT | Performed by: INTERNAL MEDICINE

## 2020-03-18 PROCEDURE — 99214 OFFICE O/P EST MOD 30 MIN: CPT | Performed by: INTERNAL MEDICINE

## 2020-03-18 PROCEDURE — 1160F RVW MEDS BY RX/DR IN RCRD: CPT | Performed by: INTERNAL MEDICINE

## 2020-03-18 PROCEDURE — 3008F BODY MASS INDEX DOCD: CPT | Performed by: INTERNAL MEDICINE

## 2020-03-18 PROCEDURE — 1036F TOBACCO NON-USER: CPT | Performed by: INTERNAL MEDICINE

## 2020-03-18 PROCEDURE — 4040F PNEUMOC VAC/ADMIN/RCVD: CPT | Performed by: INTERNAL MEDICINE

## 2020-05-05 DIAGNOSIS — I10 BENIGN ESSENTIAL HTN: ICD-10-CM

## 2020-05-05 DIAGNOSIS — E11.9 TYPE 2 DIABETES MELLITUS WITHOUT COMPLICATION, WITHOUT LONG-TERM CURRENT USE OF INSULIN (HCC): ICD-10-CM

## 2020-05-05 RX ORDER — TRANDOLAPRIL TABLETS 1 MG/1
1 TABLET ORAL DAILY
Qty: 90 TABLET | Refills: 1 | Status: SHIPPED | OUTPATIENT
Start: 2020-05-05 | End: 2020-11-17

## 2020-06-15 ENCOUNTER — TELEPHONE (OUTPATIENT)
Dept: FAMILY MEDICINE CLINIC | Facility: CLINIC | Age: 80
End: 2020-06-15

## 2020-06-26 ENCOUNTER — OFFICE VISIT (OUTPATIENT)
Dept: FAMILY MEDICINE CLINIC | Facility: CLINIC | Age: 80
End: 2020-06-26
Payer: COMMERCIAL

## 2020-06-26 VITALS
RESPIRATION RATE: 16 BRPM | SYSTOLIC BLOOD PRESSURE: 138 MMHG | HEIGHT: 67 IN | DIASTOLIC BLOOD PRESSURE: 78 MMHG | HEART RATE: 76 BPM | WEIGHT: 221.4 LBS | BODY MASS INDEX: 34.75 KG/M2 | TEMPERATURE: 97.2 F | OXYGEN SATURATION: 97 %

## 2020-06-26 DIAGNOSIS — Z12.11 SCREENING FOR MALIGNANT NEOPLASM OF COLON: ICD-10-CM

## 2020-06-26 DIAGNOSIS — H91.93 DECREASED HEARING OF BOTH EARS: ICD-10-CM

## 2020-06-26 DIAGNOSIS — R13.10 DYSPHAGIA, UNSPECIFIED TYPE: ICD-10-CM

## 2020-06-26 DIAGNOSIS — E78.5 DYSLIPIDEMIA: ICD-10-CM

## 2020-06-26 DIAGNOSIS — M25.512 ACUTE PAIN OF LEFT SHOULDER: Primary | ICD-10-CM

## 2020-06-26 DIAGNOSIS — Z12.5 SCREENING PSA (PROSTATE SPECIFIC ANTIGEN): ICD-10-CM

## 2020-06-26 DIAGNOSIS — E11.9 TYPE 2 DIABETES MELLITUS WITHOUT COMPLICATION, WITHOUT LONG-TERM CURRENT USE OF INSULIN (HCC): ICD-10-CM

## 2020-06-26 LAB — SL AMB POCT HEMOGLOBIN AIC: 6.6 (ref ?–6.5)

## 2020-06-26 PROCEDURE — 83036 HEMOGLOBIN GLYCOSYLATED A1C: CPT | Performed by: FAMILY MEDICINE

## 2020-06-26 PROCEDURE — 1036F TOBACCO NON-USER: CPT | Performed by: FAMILY MEDICINE

## 2020-06-26 PROCEDURE — 3078F DIAST BP <80 MM HG: CPT | Performed by: FAMILY MEDICINE

## 2020-06-26 PROCEDURE — 1170F FXNL STATUS ASSESSED: CPT | Performed by: FAMILY MEDICINE

## 2020-06-26 PROCEDURE — 1125F AMNT PAIN NOTED PAIN PRSNT: CPT | Performed by: FAMILY MEDICINE

## 2020-06-26 PROCEDURE — 4040F PNEUMOC VAC/ADMIN/RCVD: CPT | Performed by: FAMILY MEDICINE

## 2020-06-26 PROCEDURE — 99214 OFFICE O/P EST MOD 30 MIN: CPT | Performed by: FAMILY MEDICINE

## 2020-06-26 PROCEDURE — G0439 PPPS, SUBSEQ VISIT: HCPCS | Performed by: FAMILY MEDICINE

## 2020-06-26 PROCEDURE — 1160F RVW MEDS BY RX/DR IN RCRD: CPT | Performed by: FAMILY MEDICINE

## 2020-06-26 PROCEDURE — 3075F SYST BP GE 130 - 139MM HG: CPT | Performed by: FAMILY MEDICINE

## 2020-06-26 PROCEDURE — 3044F HG A1C LEVEL LT 7.0%: CPT | Performed by: FAMILY MEDICINE

## 2020-06-26 RX ORDER — PANTOPRAZOLE SODIUM 40 MG/1
40 TABLET, DELAYED RELEASE ORAL
Qty: 90 TABLET | Refills: 3 | Status: SHIPPED | OUTPATIENT
Start: 2020-06-26 | End: 2020-07-14 | Stop reason: SDUPTHER

## 2020-06-29 ENCOUNTER — TELEPHONE (OUTPATIENT)
Dept: FAMILY MEDICINE CLINIC | Facility: CLINIC | Age: 80
End: 2020-06-29

## 2020-06-30 LAB
ALBUMIN SERPL-MCNC: 4.2 G/DL (ref 3.7–4.7)
ALBUMIN/CREAT UR: 9 MG/G CREAT (ref 0–29)
ALBUMIN/GLOB SERPL: 1.9 {RATIO} (ref 1.2–2.2)
ALP SERPL-CCNC: 62 IU/L (ref 39–117)
ALT SERPL-CCNC: 23 IU/L (ref 0–44)
AST SERPL-CCNC: 23 IU/L (ref 0–40)
BASOPHILS # BLD AUTO: 0.1 X10E3/UL (ref 0–0.2)
BASOPHILS NFR BLD AUTO: 2 %
BILIRUB SERPL-MCNC: 0.5 MG/DL (ref 0–1.2)
BUN SERPL-MCNC: 22 MG/DL (ref 8–27)
BUN/CREAT SERPL: 16 (ref 10–24)
CALCIUM SERPL-MCNC: 9.3 MG/DL (ref 8.6–10.2)
CHLORIDE SERPL-SCNC: 101 MMOL/L (ref 96–106)
CHOLEST SERPL-MCNC: 169 MG/DL (ref 100–199)
CO2 SERPL-SCNC: 26 MMOL/L (ref 20–29)
CREAT SERPL-MCNC: 1.34 MG/DL (ref 0.76–1.27)
CREAT UR-MCNC: 170.9 MG/DL
EOSINOPHIL # BLD AUTO: 0.6 X10E3/UL (ref 0–0.4)
EOSINOPHIL NFR BLD AUTO: 10 %
ERYTHROCYTE [DISTWIDTH] IN BLOOD BY AUTOMATED COUNT: 12.6 % (ref 11.6–15.4)
GLOBULIN SER-MCNC: 2.2 G/DL (ref 1.5–4.5)
GLUCOSE SERPL-MCNC: 123 MG/DL (ref 65–99)
HCT VFR BLD AUTO: 46.6 % (ref 37.5–51)
HDLC SERPL-MCNC: 53 MG/DL
HGB BLD-MCNC: 15.6 G/DL (ref 13–17.7)
IMM GRANULOCYTES # BLD: 0 X10E3/UL (ref 0–0.1)
IMM GRANULOCYTES NFR BLD: 0 %
LDLC SERPL CALC-MCNC: 93 MG/DL (ref 0–99)
LYMPHOCYTES # BLD AUTO: 1.5 X10E3/UL (ref 0.7–3.1)
LYMPHOCYTES NFR BLD AUTO: 24 %
MCH RBC QN AUTO: 30.8 PG (ref 26.6–33)
MCHC RBC AUTO-ENTMCNC: 33.5 G/DL (ref 31.5–35.7)
MCV RBC AUTO: 92 FL (ref 79–97)
MICROALBUMIN UR-MCNC: 15.9 UG/ML
MICRODELETION SYND BLD/T FISH: NORMAL
MICRODELETION SYND BLD/T FISH: NORMAL
MONOCYTES # BLD AUTO: 0.6 X10E3/UL (ref 0.1–0.9)
MONOCYTES NFR BLD AUTO: 11 %
NEUTROPHILS # BLD AUTO: 3.2 X10E3/UL (ref 1.4–7)
NEUTROPHILS NFR BLD AUTO: 53 %
PLATELET # BLD AUTO: 215 X10E3/UL (ref 150–450)
POTASSIUM SERPL-SCNC: 4.6 MMOL/L (ref 3.5–5.2)
PROT SERPL-MCNC: 6.4 G/DL (ref 6–8.5)
PSA SERPL-MCNC: 3.3 NG/ML (ref 0–4)
RBC # BLD AUTO: 5.07 X10E6/UL (ref 4.14–5.8)
SL AMB EGFR AFRICAN AMERICAN: 58 ML/MIN/1.73
SL AMB EGFR NON AFRICAN AMERICAN: 50 ML/MIN/1.73
SL AMB PDF IMAGE: NORMAL
SL AMB VLDL CHOLESTEROL CALC: 23 MG/DL (ref 5–40)
SODIUM SERPL-SCNC: 140 MMOL/L (ref 134–144)
TRIGL SERPL-MCNC: 113 MG/DL (ref 0–149)
WBC # BLD AUTO: 6.1 X10E3/UL (ref 3.4–10.8)

## 2020-07-01 ENCOUNTER — TELEPHONE (OUTPATIENT)
Dept: FAMILY MEDICINE CLINIC | Facility: CLINIC | Age: 80
End: 2020-07-01

## 2020-07-01 NOTE — TELEPHONE ENCOUNTER
----- Message from Awais Saenz MD sent at 6/30/2020  8:18 PM EDT -----  Please advise the patient that his BW looks stable  We will continue to monitor every 6 months  Will see him in 2 weeks for a f/u as well

## 2020-07-14 ENCOUNTER — OFFICE VISIT (OUTPATIENT)
Dept: FAMILY MEDICINE CLINIC | Facility: CLINIC | Age: 80
End: 2020-07-14
Payer: COMMERCIAL

## 2020-07-14 VITALS
SYSTOLIC BLOOD PRESSURE: 137 MMHG | RESPIRATION RATE: 16 BRPM | HEART RATE: 75 BPM | TEMPERATURE: 97.1 F | DIASTOLIC BLOOD PRESSURE: 79 MMHG | HEIGHT: 67 IN | BODY MASS INDEX: 35.6 KG/M2 | WEIGHT: 226.8 LBS | OXYGEN SATURATION: 96 %

## 2020-07-14 DIAGNOSIS — E78.5 DYSLIPIDEMIA: ICD-10-CM

## 2020-07-14 DIAGNOSIS — E11.9 TYPE 2 DIABETES MELLITUS WITHOUT COMPLICATION, WITHOUT LONG-TERM CURRENT USE OF INSULIN (HCC): Primary | ICD-10-CM

## 2020-07-14 DIAGNOSIS — M25.512 ACUTE PAIN OF LEFT SHOULDER: ICD-10-CM

## 2020-07-14 DIAGNOSIS — R13.10 DYSPHAGIA, UNSPECIFIED TYPE: ICD-10-CM

## 2020-07-14 DIAGNOSIS — I10 BENIGN HYPERTENSION: ICD-10-CM

## 2020-07-14 PROCEDURE — 1036F TOBACCO NON-USER: CPT | Performed by: FAMILY MEDICINE

## 2020-07-14 PROCEDURE — 4040F PNEUMOC VAC/ADMIN/RCVD: CPT | Performed by: FAMILY MEDICINE

## 2020-07-14 PROCEDURE — 3044F HG A1C LEVEL LT 7.0%: CPT | Performed by: FAMILY MEDICINE

## 2020-07-14 PROCEDURE — 3075F SYST BP GE 130 - 139MM HG: CPT | Performed by: FAMILY MEDICINE

## 2020-07-14 PROCEDURE — 99214 OFFICE O/P EST MOD 30 MIN: CPT | Performed by: FAMILY MEDICINE

## 2020-07-14 PROCEDURE — 3078F DIAST BP <80 MM HG: CPT | Performed by: FAMILY MEDICINE

## 2020-07-14 PROCEDURE — 1170F FXNL STATUS ASSESSED: CPT | Performed by: FAMILY MEDICINE

## 2020-07-14 PROCEDURE — 1160F RVW MEDS BY RX/DR IN RCRD: CPT | Performed by: FAMILY MEDICINE

## 2020-07-14 RX ORDER — PANTOPRAZOLE SODIUM 40 MG/1
40 TABLET, DELAYED RELEASE ORAL
Qty: 90 TABLET | Refills: 3 | Status: SHIPPED | OUTPATIENT
Start: 2020-07-14 | End: 2021-02-05 | Stop reason: SDUPTHER

## 2020-07-14 NOTE — PROGRESS NOTES
Ramona Macario 1940 male MRN: [id number]    [address] [place] [address]  Follow-up Visit    ASSESSMENT/PLAN  Ramona Macario is a 68 y o  male presents to the office for     1  Type 2 diabetes mellitus without complication, without long-term current use of insulin (HCC)  A1c stabled continue medications as prescribed  Encouraged to see diabetic eye doctor    2  Benign hypertension  Blood pressure slightly elevated today encouraged to take a blood pressure log at home  If it continues to be elevated would increase Nayana  Patient does follow Cardiology    3  Dysphagia, unspecified type  Encouraged to continue Protonix requesting an endoscopy performed by his gastroenterologist patient states he does not need a referral and will contact them today  - pantoprazole (PROTONIX) 40 mg tablet; Take 1 tablet (40 mg total) by mouth daily before breakfast  Dispense: 90 tablet; Refill: 3    4  Dyslipidemia  Very well controlled on his medications  Continue as prescribed    5  Acute pain of left shoulder  Discussed at her last appointment  Patient has an upcoming appointment with Orthopedic  Disposition: Return to the office in 6 months    Health Maintence:  BMI Counseling: Body mass index is 35 52 kg/m²  The BMI is above normal  Nutrition recommendations include decreasing portion sizes, decreasing fast food intake, consuming healthier snacks and limiting drinks that contain sugar  Exercise recommendations include exercising 3-5 times per week  Future Appointments   Date Time Provider Camryn Elmorei   7/22/2020  2:15 PM Wood Bermeo MD [facility]TH[facility]          SUBJECTIVE  CC: Follow-up (pt here f/u cough and meds, cough is better since taking protonix)      HPI:  Ramona Macario is a 68 y o  male presenting to the office for a follow up on multiple concerns    Patient has had a cough for over a year and was not taking any PPI we started him on Protonix and he states that his cough is about 99% better  Patient states that he sometimes still coughs up little bit of food and he still clears a little bit of mucus but nothing like he was doing before  Patient states that he will contact his GI specialist for an endoscopy/colonoscopy  Patient's hypertension has been controlled on med have aches he states that he is likely elevated today because he is upset that his wife lost her favor bracelet  Patient states that he has been trying to make an effort of changing his diet so make his diabetes more stable  Takes all medications as prescribed without any difficulties  Patient has an upcoming appointment with Dr Darcy irvin for evaluation of his left shoulder  Review of Systems   Constitutional: Negative for activity change, appetite change, chills, fatigue and fever  HENT: Positive for sore throat  Negative for congestion  Respiratory: Positive for cough  Negative for chest tightness and shortness of breath  Cardiovascular: Negative for chest pain and leg swelling  Gastrointestinal: Negative for abdominal distention, abdominal pain, constipation, diarrhea, nausea and vomiting  Musculoskeletal: Positive for arthralgias  All other systems reviewed and are negative        Historical Information   The patient history was reviewed as follows:    Past Medical History:   Diagnosis Date    Abnormal blood chemistry     resolved: 11/9/16    Abnormal glucose     last assessed: 9/24/14    CAD (coronary artery disease)     Coronary atherosclerosis     DM2 (diabetes mellitus, type 2) (HCC)     Dyslipidemia     Facial nerve disorder     HTN (hypertension)     Hyperlipidemia     last assessed: 1/13/17    Kidney stones     Lumbosacral radiculopathy     Nerve root and plexus disorder     Obesity     Osteoarthritis     Prostate asymmetry     Pure hypercholesterolemia      Past Surgical History:   Procedure Laterality Date    CORONARY ANGIOPLASTY       Family History   Problem Relation Age of Onset    Cancer Mother     Ovarian cancer Mother     Hypertension Sister     Lung disease Father         black    Mental illness Neg Hx     Substance Abuse Neg Hx       Social History   Social History     Substance and Sexual Activity   Alcohol Use Yes    Comment: occasional     Social History     Substance and Sexual Activity   Drug Use No     Social History     Tobacco Use   Smoking Status Never Smoker   Smokeless Tobacco Never Used       Medications:     Current Outpatient Medications:     aspirin 81 mg chewable tablet, Chew 81 mg daily, Disp: , Rfl:     cholecalciferol (VITAMIN D3) 1,000 units tablet, Take 1,000 Units by mouth daily, Disp: , Rfl:     metFORMIN (GLUCOPHAGE) 500 mg tablet, Take 500 mg by mouth 2 (two) times a day with meals , Disp: , Rfl:     multivitamin-iron-minerals-folic acid (CENTRUM) chewable tablet, Chew 1 tablet daily, Disp: , Rfl:     pantoprazole (PROTONIX) 40 mg tablet, Take 1 tablet (40 mg total) by mouth daily before breakfast, Disp: 90 tablet, Rfl: 3    Promethazine-DM (PHENERGAN-DM) 6 25-15 mg/5 mL oral syrup, Take 5 mL by mouth daily at bedtime, Disp: 180 mL, Rfl: 0    simvastatin (ZOCOR) 40 mg tablet, Take 1 tablet (40 mg total) by mouth daily at bedtime, Disp: 90 tablet, Rfl: 3    sitaGLIPtin (Januvia) 50 mg tablet, Take 1 tablet (50 mg total) by mouth daily, Disp: 90 tablet, Rfl: 1    trandolapril (MAVIK) 1 MG tablet, Take 1 tablet (1 mg total) by mouth daily, Disp: 90 tablet, Rfl: 1  Allergies   Allergen Reactions    Ciprofloxacin Hives       OBJECTIVE    Vitals:   Vitals:    07/14/20 1409   BP: 137/79   BP Location: Left arm   Patient Position: Sitting   Cuff Size: Standard   Pulse: 75   Resp: 16   Temp: (!) 97 1 °F (36 2 °C)   TempSrc: Tympanic   SpO2: 96%   Weight: 103 kg (226 lb 12 8 oz)   Height: 5' 7" (1 702 m)           Physical Exam   Constitutional: He is oriented to person, place, and time  Vital signs are normal  He appears well-developed and well-nourished  HENT:   Head: Normocephalic and atraumatic  Eyes: Pupils are equal, round, and reactive to light  Conjunctivae and EOM are normal    Neck: Normal range of motion  Neck supple  Cardiovascular: Normal rate, regular rhythm, S1 normal, S2 normal, normal heart sounds and intact distal pulses  No murmur heard  Pulmonary/Chest: Effort normal and breath sounds normal  No respiratory distress  He has no wheezes  Musculoskeletal: Normal range of motion  He exhibits no edema  Neurological: He is alert and oriented to person, place, and time  He has normal strength  Skin: Skin is warm  Psychiatric: He has a normal mood and affect  His speech is normal and behavior is normal  Judgment and thought content normal    Vitals reviewed           Reymundo Bernabe MD  [facility] [address]

## 2020-07-22 ENCOUNTER — OFFICE VISIT (OUTPATIENT)
Dept: OBGYN CLINIC | Facility: CLINIC | Age: 80
End: 2020-07-22
Payer: COMMERCIAL

## 2020-07-22 ENCOUNTER — APPOINTMENT (OUTPATIENT)
Dept: RADIOLOGY | Facility: CLINIC | Age: 80
End: 2020-07-22
Payer: COMMERCIAL

## 2020-07-22 VITALS — TEMPERATURE: 97.1 F | HEIGHT: 67 IN | WEIGHT: 227 LBS | BODY MASS INDEX: 35.63 KG/M2

## 2020-07-22 DIAGNOSIS — M25.512 LEFT SHOULDER PAIN, UNSPECIFIED CHRONICITY: ICD-10-CM

## 2020-07-22 DIAGNOSIS — M75.82 TENDINITIS OF LEFT ROTATOR CUFF: Primary | ICD-10-CM

## 2020-07-22 PROCEDURE — 99203 OFFICE O/P NEW LOW 30 MIN: CPT | Performed by: ORTHOPAEDIC SURGERY

## 2020-07-22 PROCEDURE — 1036F TOBACCO NON-USER: CPT | Performed by: ORTHOPAEDIC SURGERY

## 2020-07-22 PROCEDURE — 3044F HG A1C LEVEL LT 7.0%: CPT | Performed by: ORTHOPAEDIC SURGERY

## 2020-07-22 PROCEDURE — 4040F PNEUMOC VAC/ADMIN/RCVD: CPT | Performed by: ORTHOPAEDIC SURGERY

## 2020-07-22 PROCEDURE — 3078F DIAST BP <80 MM HG: CPT | Performed by: ORTHOPAEDIC SURGERY

## 2020-07-22 PROCEDURE — 3008F BODY MASS INDEX DOCD: CPT | Performed by: ORTHOPAEDIC SURGERY

## 2020-07-22 PROCEDURE — 3075F SYST BP GE 130 - 139MM HG: CPT | Performed by: ORTHOPAEDIC SURGERY

## 2020-07-22 PROCEDURE — 1170F FXNL STATUS ASSESSED: CPT | Performed by: ORTHOPAEDIC SURGERY

## 2020-07-22 PROCEDURE — 73030 X-RAY EXAM OF SHOULDER: CPT

## 2020-07-22 PROCEDURE — 1160F RVW MEDS BY RX/DR IN RCRD: CPT | Performed by: ORTHOPAEDIC SURGERY

## 2020-07-22 NOTE — PATIENT INSTRUCTIONS
Exercises for Shoulder Abduction and Adduction   WHAT YOU NEED TO KNOW:   Shoulder abduction and adduction exercises work the muscles at the back of your shoulder and your upper back  DISCHARGE INSTRUCTIONS:   Contact your healthcare provider if:   · You have sharp or worsening pain during exercise or at rest     · You have questions or concerns about your shoulder exercises  Before you exercise:  Warm up and stretch before you exercise  Walk or ride a stationary bike for 5 to 10 minutes to help you warm up  Stretching helps increase range of motion  It may also decrease muscle soreness and help prevent another injury  Your healthcare provider will tell you which of the following stretches to do:  · Crossover arm stretch:  Relax your shoulders  Hold your upper arm with the opposite hand  Pull your arm across your chest until you feel a stretch  Hold the stretch for 30 seconds  Return to the starting position  · Shoulder flexion stretch:  Stand facing a wall  Slowly walk your fingers up the wall until you feel a stretch  Hold the stretch for 30 seconds  Return to the starting position  · Sleeper stretch:  Lie on your injured side on a firm, flat surface  Bend the elbow of your injured arm 90° with your hand facing up  Use your arm that is not injured to slowly push your injured arm down  Stop when you feel a stretch at the back of your injured shoulder  Hold the stretch for 30 seconds  Slowly return to the starting position  How to exercise with a weight:  Your healthcare provider will tell you how much weight to use  · Shoulder abduction:  Stand and hold a weight in your hand with your palm facing your body  Slowly raise your arm to the side with your thumb pointing up  Then raise your arm over your head as far as you can without pain  Hold this position for as long as directed  Do not raise your arm over your head unless your healthcare provider says it is okay        · Shoulder adduction:  Lie on your back on a firm surface  Extend your arm out to a "T " Bend your elbow so your forearm in the air  Hold a weight in your hand  Slowly raise your arm toward the ceiling and straighten your elbow  Hold this position for as long as directed  Slowly return to the starting position  How to exercise with an exercise band:   · Shoulder abduction:  Wrap the exercise band around a heavy, stable object near your foot  Grab the band with the hand of your injured shoulder  Keep your arm straight  Slowly raise your arm to the side with your thumb pointing up  Then, slowly pull the band over your head as far as you can without pain  Do not raise your arm over your head unless your healthcare provider says it is okay  Do not let your shoulder shrug  Hold this position for as long as directed  Slowly return to the starting position  · Shoulder adduction:  Wrap the exercise band around a heavy, stable object  Stand and face away from where the band is anchored  Hold each end of the band in both hands with your elbows bent  Your elbows should not be behind your body  Keep your arms parallel to the floor and slowly straighten your elbows  Hold this position for as long as directed  Slowly return to the starting position  Follow up with your physical therapist as directed:  Write down your questions so you remember to ask them at your visits  © 2017 [employer]  Information is for End User's use only and may not be sold, redistributed or otherwise used for commercial purposes  All illustrations and images included in CareNotes® are the copyrighted property of [employer]  or [employer]  The above information is an  only  It is not intended as medical advice for individual conditions or treatments  Talk to your doctor, nurse or pharmacist before following any medical regimen to see if it is safe and effective for you        Exercises for Internal and External Shoulder Rotation   WHAT YOU NEED TO KNOW:   Exercises for internal shoulder rotation work the muscles in your chest and front of your shoulder  Exercises for external shoulder rotation work the muscles in the back of your shoulder and upper back  DISCHARGE INSTRUCTIONS:   Contact your healthcare provider if:   · You have sharp or worsening pain during exercise or at rest     · You have questions or concerns about your shoulder exercises  Before you exercise:  Warm up and stretch before you exercise  Walk or ride a stationary bike for 5 to 10 minutes to help you warm up  Stretching helps increase range of motion  It may also decrease muscle soreness and help prevent another injury  Your healthcare provider will tell you which of the following stretches to do:  · Crossover arm stretch:  Relax your shoulders  Hold your upper arm with the opposite hand  Pull your arm across your chest until you feel a stretch  Hold the stretch for 30 seconds  Return to the starting position  · Shoulder flexion stretch:  Stand facing a wall  Slowly walk your fingers up the wall until you feel a stretch  Hold the stretch for 30 seconds  Return to the starting position  · Sleeper stretch:  Lie on your injured side on a firm, flat surface  Bend the elbow of your injured arm 90° with your hand facing up  Use your arm that is not injured to slowly push your injured arm down  Stop when you feel a stretch at the back of your injured shoulder  Hold the stretch for 30 seconds  Slowly return to the starting position  How to exercise with a weight:  Your healthcare provider will tell you how much weight to exercise with  · Shoulder internal rotation:  Sit in a chair  Place a rolled up towel between your elbow and your side  Bend your elbow to 90°  Gently squeeze the towel with your elbow to prevent it from falling out  Hold the weight with your thumb pointing up  Slowly move the weight across your chest  Stop when your hand reaches your opposite arm   Hold this position for as many seconds as directed  Slowly return to the starting position  · Shoulder external rotation:  Lie on your side with your injured shoulder facing up  Bend your elbow 90°  Place a rolled up towel between your elbow and your side  Hold a weight in your hand  Gently squeeze the towel with your elbow to prevent it from falling out  Slowly rotate your arm outward, but keep your elbow bent  Stop when you feel a stretch  Hold this position for 30 seconds or as directed  Slowly return to the starting position  How to exercise with an exercise band:   · Shoulder internal rotation:  Tie one end of the exercise band to a heavy, secure object  Sit in a chair  Place a rolled up towel between your elbow and your side  Bend your elbow to 90°  Gently squeeze the towel with your elbow to prevent it from falling out  Slowly pull the band across your chest  Stop when your hand reaches your opposite arm  Hold this position for as many seconds as directed  Slowly return to the starting position  · Shoulder external rotation:  Hold one end of the exercise band on the side that is not injured  Place a rolled up towel between your elbow and your side  Bend your elbow 90°  Squeeze the towel with your elbow  Grab the end of the band and slowly turn your arm outward, but keep your elbow bent  Stop when you feel a stretch  Hold this position for 30 seconds or as directed  Slowly return to the starting position  Follow up with your physical therapist as directed:  Write down your questions so you remember to ask them at your visits  © 2017 [employer] Information is for End User's use only and may not be sold, redistributed or otherwise used for commercial purposes  All illustrations and images included in CareNotes® are the copyrighted property of [employer]  or [employer]  The above information is an  only   It is not intended as medical advice for individual conditions or treatments  Talk to your doctor, nurse or pharmacist before following any medical regimen to see if it is safe and effective for you  Exercises for Shoulder Flexion and Extension   WHAT YOU NEED TO KNOW:   Shoulder flexion and extension exercises work the muscles in your upper back  DISCHARGE INSTRUCTIONS:   Contact your healthcare provider if:   · You have sharp or worsening pain during exercise or at rest     · You have questions or concerns about your shoulder exercises  Before you exercise:  Warm up and stretch before you exercise  Walk or ride a stationary bike for 5 to 10 minutes to help you warm up  Stretching helps increase range of motion  It may also decrease muscle soreness and help prevent another injury  Your healthcare provider will tell you which of the following stretches to do:  · Crossover arm stretch:  Relax your shoulders  Hold your upper arm with the opposite hand  Pull your arm across your chest until you feel a stretch  Hold the stretch for 30 seconds  Return to the starting position  · Shoulder flexion stretch:  Stand facing a wall  Slowly walk your fingers up the wall until you feel a stretch  Hold for 30 seconds  Return to the starting position  · Sleeper stretch:  Lie on your side on a firm, flat surface with your injured arm tucked under you  Place your head on a pillow for comfort  Bend the elbow of your injured arm 90°  Use your arm that is not injured to slowly push your injured arm down  Stop when you feel a stretch at the back of your injured shoulder  Hold for 30 seconds  Slowly return to the starting position  How to exercise with a weight:  Your healthcare provider will tell you how much weight to exercise with  · Shoulder extension:  Lie on a hard table on your stomach  Let your arms hang off the side  Hold a weight in both hands with your palms facing toward your body   Keep your arms straight and slowly raise your arms parallel to the floor in a "Y" shape  Stop when your arms are level with your body  Hold for as long as directed  Slowly return to the starting position  · Shoulder flexion:  Stand and hold a weight in the hand of your injured shoulder  Keep your arm straight and slowly raise your arm over your head as far as you can without pain  Do not raise your arm over your head unless your healthcare provider says it is okay  Do not let your shoulder shrug  Hold this position for as many seconds as directed  Slowly return to the starting position  How to exercise with an exercise band:   · Shoulder extension:  Wrap the exercise band around a heavy, stable object  The band should be level with your chest  Stand and hold each end of the band in both hands  Step back and extend your arms straight  Squeeze your shoulder blades together and pull your arms back and down  Hold for as long as directed  Slowly return to the starting position  · Shoulder flexion:  Wrap the exercise band around a heavy, stable object near your foot  Grab the band with the hand of your injured shoulder  Keep your arm straight  Slowly pull the band up and past your head as far as you can without pain  Do not raise your arm over your head unless your healthcare provider says it is okay  Do not let your shoulder shrug  Hold this position for as many seconds as directed  Slowly return to the starting position  Follow up with your physical therapist as directed:  Write down your questions so you remember to ask them during your visits  © 2017 [employer] Information is for End User's use only and may not be sold, redistributed or otherwise used for commercial purposes  All illustrations and images included in CareNotes® are the copyrighted property of [employer]  or [employer]  The above information is an  only  It is not intended as medical advice for individual conditions or treatments   Talk to your doctor, nurse or pharmacist before following any medical regimen to see if it is safe and effective for you

## 2020-07-22 NOTE — PROGRESS NOTES
Assessment/Plan:  1  Tendinitis of left rotator cuff     2  Left shoulder pain, unspecified chronicity  XR shoulder 2+ vw left       Scribe Attestation    I,:   Deloris Pereira am acting as a scribe while in the presence of the attending physician :        I,:   Marta Luevano MD personally performed the services described in this documentation    as scribed in my presence :          X-rays of the left shoulder demonstrate mild glenohumeral joint osteoarthritis with no acute fracture or dislocation  Upon physical examination, he demonstrates with decent range of motion and great strength globally about his shoulder  However, he does have some discomfort with abduction strength testing  I do believe he is demonstrating signs and symptoms consistent with rotator cuff tendinitis  I do believe we can begin treating him non operatively in the form of a physician directed home exercise program   I provided him with this program and a Thera-Band today in the office  He has declined any steroid injection to his shoulder at this time  I advised that he may heat his shoulder in the morning and ice his shoulder at night for pain control  At this time, we will see him back on an as-needed basis  I did advise him to take at least 6 weeks to perform his exercises to see results  He verbally stated he understood all information put forth today in the office  Subjective:   Haven Damon is a 68 y o  male who presents to the office today with her wife for initial evaluation of left shoulder pain, with no mechanism of injury  He states he has been experiencing pain for months that have been gradually getting worse  He states he continues to be as active as possible around the house, however notes increased pain towards the end of the day  He states he does have some difficulty with sleeping due to the pain  At today's visit, he localizes majority of the pain globally about his shoulder    He describes his pain as intermittent, achy, sore and mild to moderate in intensity  He states he has been taking Aleve as needed for pain relief  He notes attempts at overhead activities exacerbate his symptoms  He denies any radicular symptoms  He denies any numbness and tingling  Pertinent history includes a right shoulder arthroscopy and rotator cuff repair several years ago  Review of Systems   Constitutional: Positive for activity change  Negative for chills, fever and unexpected weight change  HENT: Negative for hearing loss, nosebleeds and sore throat  Eyes: Negative for pain, redness and visual disturbance  Respiratory: Negative for cough, shortness of breath and wheezing  Cardiovascular: Negative for chest pain, palpitations and leg swelling  Gastrointestinal: Negative for abdominal pain, nausea and vomiting  Endocrine: Negative for polyphagia and polyuria  Genitourinary: Negative for dysuria and hematuria  Musculoskeletal:        See HPI   Skin: Negative for rash and wound  Neurological: Negative for dizziness, numbness and headaches  Psychiatric/Behavioral: Negative for decreased concentration and suicidal ideas  The patient is not nervous/anxious            Past Medical History:   Diagnosis Date    Abnormal blood chemistry     resolved: 11/9/16    Abnormal glucose     last assessed: 9/24/14    CAD (coronary artery disease)     Coronary atherosclerosis     DM2 (diabetes mellitus, type 2) (HCC)     Dyslipidemia     Facial nerve disorder     HTN (hypertension)     Hyperlipidemia     last assessed: 1/13/17    Kidney stones     Lumbosacral radiculopathy     Nerve root and plexus disorder     Obesity     Osteoarthritis     Prostate asymmetry     Pure hypercholesterolemia        Past Surgical History:   Procedure Laterality Date    CORONARY ANGIOPLASTY         Family History   Problem Relation Age of Onset    Cancer Mother     Ovarian cancer Mother     Hypertension Sister     Lung disease Father         black    Mental illness Neg Hx     Substance Abuse Neg Hx        Social History     Occupational History    Not on file   Tobacco Use    Smoking status: Never Smoker    Smokeless tobacco: Never Used   Substance and Sexual Activity    Alcohol use: Yes     Comment: occasional    Drug use: No    Sexual activity: Not on file         Current Outpatient Medications:     aspirin 81 mg chewable tablet, Chew 81 mg daily, Disp: , Rfl:     cholecalciferol (VITAMIN D3) 1,000 units tablet, Take 1,000 Units by mouth daily, Disp: , Rfl:     metFORMIN (GLUCOPHAGE) 500 mg tablet, Take 500 mg by mouth 2 (two) times a day with meals , Disp: , Rfl:     multivitamin-iron-minerals-folic acid (CENTRUM) chewable tablet, Chew 1 tablet daily, Disp: , Rfl:     pantoprazole (PROTONIX) 40 mg tablet, Take 1 tablet (40 mg total) by mouth daily before breakfast, Disp: 90 tablet, Rfl: 3    Promethazine-DM (PHENERGAN-DM) 6 25-15 mg/5 mL oral syrup, Take 5 mL by mouth daily at bedtime, Disp: 180 mL, Rfl: 0    simvastatin (ZOCOR) 40 mg tablet, Take 1 tablet (40 mg total) by mouth daily at bedtime, Disp: 90 tablet, Rfl: 3    sitaGLIPtin (Januvia) 50 mg tablet, Take 1 tablet (50 mg total) by mouth daily, Disp: 90 tablet, Rfl: 1    trandolapril (MAVIK) 1 MG tablet, Take 1 tablet (1 mg total) by mouth daily, Disp: 90 tablet, Rfl: 1    Allergies   Allergen Reactions    Ciprofloxacin Hives       Objective:  Vitals:    07/22/20 1405   Temp: (!) 97 1 °F (36 2 °C)       Left Shoulder Exam     Tenderness   The patient is experiencing no tenderness  Range of Motion   Active abduction: 150   External rotation: 50   Forward flexion: 150   Internal rotation 0 degrees: L2     Muscle Strength   Left shoulder normal muscle strength: Sore with abduction    Abduction: 5/5   Internal rotation: 5/5   External rotation: 5/5     Tests   Cervantes test: negative  Impingement: negative  Drop arm: negative    Other   Erythema: absent  Sensation: normal  Pulse: present (2+ radial)     Comments:    Empty Can (-)  Speeds (-)            Physical Exam   Constitutional: He is oriented to person, place, and time  He appears well-developed and well-nourished  HENT:   Head: Normocephalic and atraumatic  Eyes: Pupils are equal, round, and reactive to light  Conjunctivae are normal    Neck: Normal range of motion  Neck supple  Cardiovascular: Normal rate and intact distal pulses  Pulmonary/Chest: Effort normal  No respiratory distress  Musculoskeletal:   As noted in HPI   Neurological: He is alert and oriented to person, place, and time  Skin: Skin is warm and dry  Psychiatric: He has a normal mood and affect  His behavior is normal    Nursing note and vitals reviewed  I have personally reviewed pertinent films in PACS and my interpretation is as follows:  X-rays of the left shoulder obtained on 07/22/2020 demonstrate mild glenohumeral joint osteoarthritis with no acute fracture or dislocation

## 2020-09-22 DIAGNOSIS — E78.5 DYSLIPIDEMIA: ICD-10-CM

## 2020-09-22 DIAGNOSIS — I25.10 2-VESSEL CORONARY ARTERY DISEASE: ICD-10-CM

## 2020-09-22 DIAGNOSIS — E11.9 TYPE 2 DIABETES MELLITUS WITHOUT COMPLICATION, WITHOUT LONG-TERM CURRENT USE OF INSULIN (HCC): ICD-10-CM

## 2020-09-23 RX ORDER — SIMVASTATIN 40 MG
TABLET ORAL
Qty: 90 TABLET | Refills: 3 | Status: SHIPPED | OUTPATIENT
Start: 2020-09-23 | End: 2021-12-07

## 2020-10-06 ENCOUNTER — CLINICAL SUPPORT (OUTPATIENT)
Dept: FAMILY MEDICINE CLINIC | Facility: CLINIC | Age: 80
End: 2020-10-06
Payer: COMMERCIAL

## 2020-10-06 DIAGNOSIS — Z23 NEED FOR INFLUENZA VACCINATION: Primary | ICD-10-CM

## 2020-10-06 PROCEDURE — 90662 IIV NO PRSV INCREASED AG IM: CPT

## 2020-10-06 PROCEDURE — G0008 ADMIN INFLUENZA VIRUS VAC: HCPCS

## 2020-11-17 DIAGNOSIS — I10 BENIGN ESSENTIAL HTN: ICD-10-CM

## 2020-11-17 DIAGNOSIS — E11.9 TYPE 2 DIABETES MELLITUS WITHOUT COMPLICATION, WITHOUT LONG-TERM CURRENT USE OF INSULIN (HCC): ICD-10-CM

## 2020-11-17 RX ORDER — TRANDOLAPRIL TABLETS 1 MG/1
TABLET ORAL
Qty: 90 TABLET | Refills: 1 | Status: SHIPPED | OUTPATIENT
Start: 2020-11-17 | End: 2021-04-10

## 2020-11-17 RX ORDER — SITAGLIPTIN 50 MG/1
TABLET, FILM COATED ORAL
Qty: 90 TABLET | Refills: 1 | Status: SHIPPED | OUTPATIENT
Start: 2020-11-17 | End: 2021-08-18 | Stop reason: SDUPTHER

## 2021-02-05 DIAGNOSIS — R13.10 DYSPHAGIA, UNSPECIFIED TYPE: ICD-10-CM

## 2021-02-05 RX ORDER — PANTOPRAZOLE SODIUM 40 MG/1
40 TABLET, DELAYED RELEASE ORAL
Qty: 90 TABLET | Refills: 3 | Status: SHIPPED | OUTPATIENT
Start: 2021-02-05

## 2021-02-09 ENCOUNTER — OFFICE VISIT (OUTPATIENT)
Dept: FAMILY MEDICINE CLINIC | Facility: CLINIC | Age: 81
End: 2021-02-09
Payer: COMMERCIAL

## 2021-02-09 VITALS
HEIGHT: 67 IN | OXYGEN SATURATION: 96 % | SYSTOLIC BLOOD PRESSURE: 144 MMHG | HEART RATE: 70 BPM | RESPIRATION RATE: 16 BRPM | WEIGHT: 216.4 LBS | TEMPERATURE: 97.8 F | DIASTOLIC BLOOD PRESSURE: 88 MMHG | BODY MASS INDEX: 33.97 KG/M2

## 2021-02-09 DIAGNOSIS — E11.9 TYPE 2 DIABETES MELLITUS WITHOUT COMPLICATION, WITHOUT LONG-TERM CURRENT USE OF INSULIN (HCC): ICD-10-CM

## 2021-02-09 DIAGNOSIS — I10 BENIGN HYPERTENSION: ICD-10-CM

## 2021-02-09 DIAGNOSIS — M79.644 THUMB PAIN, RIGHT: Primary | ICD-10-CM

## 2021-02-09 DIAGNOSIS — S09.90XA TRAUMATIC INJURY OF HEAD, INITIAL ENCOUNTER: ICD-10-CM

## 2021-02-09 LAB — SL AMB POCT HEMOGLOBIN AIC: 6.5 (ref ?–6.5)

## 2021-02-09 PROCEDURE — 3079F DIAST BP 80-89 MM HG: CPT | Performed by: FAMILY MEDICINE

## 2021-02-09 PROCEDURE — 99214 OFFICE O/P EST MOD 30 MIN: CPT | Performed by: FAMILY MEDICINE

## 2021-02-09 PROCEDURE — 83036 HEMOGLOBIN GLYCOSYLATED A1C: CPT | Performed by: FAMILY MEDICINE

## 2021-02-09 PROCEDURE — 3077F SYST BP >= 140 MM HG: CPT | Performed by: FAMILY MEDICINE

## 2021-02-09 NOTE — PROGRESS NOTES
Miracle Caceres 1940 male MRN: [id number]    FAMILY PRACTICE OFFICE VISIT  [facility] - [address]      ASSESSMENT/PLAN  Miracle Caceres is a 80 y o  male presents to the office for    Diagnoses and all orders for this visit:    Thumb pain, right  -     Ambulatory referral to Orthopedic Surgery; Future  -     XR thumb right first digit-min 2v; Future  -     Uric acid; Future  -     Comprehensive metabolic panel; Future    Type 2 diabetes mellitus without complication, without long-term current use of insulin (HCC)  -     POCT hemoglobin A1c  -     Comprehensive metabolic panel; Future    Benign hypertension  -     Comprehensive metabolic panel; Future    Traumatic injury of head, initial encounter         Right thumb pain tendinitis versus degenerative disease worsening  Recommend that he speak to his orthopedic surgeon if there is any abnormalities on x-ray  Will obtain and uric acid/ CMP to see if the patient is having any concerns with gout  Recommend continuing ibuprofen as needed until x-rays report comes back  A1c recommend to continue on same dose of metformin  If it increases at her next appointment will increase dose however at this time would recommend watching diet and start exercising  Hypertension well controlled at baseline however today slightly elevated likely secondary to pain  Traumatic injury of the head denied any loss of consciousness  Has been over a month with no symptoms at this time  In the future advised the patient that I want him to report directly to the emergency room  Return to the office in 6 months         No future appointments  SUBJECTIVE  CC: Hand Pain (pt here w/ right thumb pain x 1 week, the hand looks larger than the left, pt did mention he had a fall a month ago did hit his head but was ok)      HPI:  Miracle Caceres is a 80 y o  male who presents for  An acute appointment    Patient states that he has had right thumb pain that has been worsening over the last week  He states that he believes that his right hand is larger than his left  He did fall about a month ago and hit his head states that he never lost consciousness  States that he started walking  And regained his strength  Patient states that he has not had any dizziness or lightheadedness or confusion  Did not advise his wife with the fall  Patient is taking his blood pressure/ diabetic medications as prescribed  Figured his blood sugars were going to be slightly elevated given  He has not been compliant with his diet  Review of Systems   Constitutional: Negative for activity change, appetite change, chills, fatigue and fever  HENT: Negative for congestion  Respiratory: Negative for cough, chest tightness and shortness of breath  Cardiovascular: Negative for chest pain and leg swelling  Gastrointestinal: Negative for abdominal distention, abdominal pain, constipation, diarrhea, nausea and vomiting  Musculoskeletal: Positive for arthralgias  All other systems reviewed and are negative        Historical Information   The patient history was reviewed as follows:  Past Medical History:   Diagnosis Date    Abnormal blood chemistry     resolved: 11/9/16    Abnormal glucose     last assessed: 9/24/14    CAD (coronary artery disease)     Coronary atherosclerosis     DM2 (diabetes mellitus, type 2) (HCC)     Dyslipidemia     Facial nerve disorder     HTN (hypertension)     Hyperlipidemia     last assessed: 1/13/17    Kidney stones     Lumbosacral radiculopathy     Nerve root and plexus disorder     Obesity     Osteoarthritis     Prostate asymmetry     Pure hypercholesterolemia          Medications:     Current Outpatient Medications:     aspirin 81 mg chewable tablet, Chew 81 mg daily, Disp: , Rfl:     cholecalciferol (VITAMIN D3) 1,000 units tablet, Take 1,000 Units by mouth daily, Disp: , Rfl:     Januvia 50 MG tablet, TAKE ONE TABLET BY MOUTH EVERY DAY, Disp: 90 tablet, Rfl: 1    metFORMIN (GLUCOPHAGE) 500 mg tablet, Take 500 mg by mouth 2 (two) times a day with meals , Disp: , Rfl:     multivitamin-iron-minerals-folic acid (CENTRUM) chewable tablet, Chew 1 tablet daily, Disp: , Rfl:     pantoprazole (PROTONIX) 40 mg tablet, Take 1 tablet (40 mg total) by mouth daily before breakfast, Disp: 90 tablet, Rfl: 3    Promethazine-DM (PHENERGAN-DM) 6 25-15 mg/5 mL oral syrup, Take 5 mL by mouth daily at bedtime, Disp: 180 mL, Rfl: 0    simvastatin (ZOCOR) 40 mg tablet, TAKE 1 TABLET BY MOUTH  DAILY AT BEDTIME, Disp: 90 tablet, Rfl: 3    trandolapril (MAVIK) 1 MG tablet, TAKE ONE TABLET BY MOUTH EVERY DAY, Disp: 90 tablet, Rfl: 1    Allergies   Allergen Reactions    Ciprofloxacin Hives       OBJECTIVE  Vitals:   Vitals:    02/09/21 1209   BP: 144/88   BP Location: Left arm   Patient Position: Sitting   Cuff Size: Standard   Pulse: 70   Resp: 16   Temp: 97 8 °F (36 6 °C)   TempSrc: Temporal   SpO2: 96%   Weight: 98 2 kg (216 lb 6 4 oz)   Height: 5' 7" (1 702 m)         Physical Exam  Vitals signs reviewed  Constitutional:       Appearance: He is well-developed  HENT:      Head: Normocephalic and atraumatic  Eyes:      Conjunctiva/sclera: Conjunctivae normal       Pupils: Pupils are equal, round, and reactive to light  Neck:      Musculoskeletal: Normal range of motion and neck supple  Cardiovascular:      Rate and Rhythm: Normal rate and regular rhythm  Heart sounds: Normal heart sounds, S1 normal and S2 normal  No murmur  Pulmonary:      Effort: Pulmonary effort is normal  No respiratory distress  Breath sounds: Normal breath sounds  No wheezing  Musculoskeletal: Normal range of motion  General: Tenderness (right thumb with range of motion, abnormal DIP jjoint) present  Skin:     General: Skin is warm  Neurological:      Mental Status: He is alert and oriented to person, place, and time     Psychiatric: Mood and Affect: Mood normal          Speech: Speech normal          Behavior: Behavior normal          Thought Content:  Thought content normal          Judgment: Judgment normal                     Luis Zeng MD,   [facility]  2/9/2021

## 2021-02-10 ENCOUNTER — APPOINTMENT (OUTPATIENT)
Dept: LAB | Facility: CLINIC | Age: 81
End: 2021-02-10
Payer: COMMERCIAL

## 2021-02-10 ENCOUNTER — APPOINTMENT (OUTPATIENT)
Dept: RADIOLOGY | Facility: CLINIC | Age: 81
End: 2021-02-10
Payer: COMMERCIAL

## 2021-02-10 DIAGNOSIS — M79.644 THUMB PAIN, RIGHT: ICD-10-CM

## 2021-02-10 PROCEDURE — 73140 X-RAY EXAM OF FINGER(S): CPT

## 2021-02-11 ENCOUNTER — TELEPHONE (OUTPATIENT)
Dept: FAMILY MEDICINE CLINIC | Facility: CLINIC | Age: 81
End: 2021-02-11

## 2021-02-11 NOTE — TELEPHONE ENCOUNTER
CALLED PT REVIEWED LAB RESULTS ADVISED HIM TO WATCH HIS SWEETS AND CARBS, ALSO REVIEWED XRAYS AND HE SEE'S DR Sonali RIOS AT [facility] AND WILL CALL FOR AN APPT

## 2021-02-11 NOTE — TELEPHONE ENCOUNTER
----- Message from Mohsen Estrada MD sent at 2/11/2021 12:28 PM EST -----   Please advise the patient that blood work is stable from previous exams  Except for elevation in sugars  Still pending x-ray reading        Please call the reading room and see if they are able to read his x-ray

## 2021-02-11 NOTE — TELEPHONE ENCOUNTER
----- Message from Scott Hill MD sent at 2/11/2021  3:18 PM EST -----    Please advise him that there is osteoarthritis in that joint and no swelling therefore I need him to see his hand specialist   If he wants I can't absolutely call the hand doctor to give  him an update on why I am referring him    Please get the inf  ormation of his hand

## 2021-02-11 NOTE — TELEPHONE ENCOUNTER
----- Message from Ada Hwang MD sent at 2/11/2021 12:28 PM EST -----   Please advise the patient that blood work is stable from previous exams  Except for elevation in sugars  Still pending x-ray reading        Please call the reading room and see if they are able to read his x-ray

## 2021-02-12 ENCOUNTER — IMMUNIZATIONS (OUTPATIENT)
Dept: FAMILY MEDICINE CLINIC | Facility: HOSPITAL | Age: 81
End: 2021-02-12

## 2021-02-12 DIAGNOSIS — Z23 ENCOUNTER FOR IMMUNIZATION: Primary | ICD-10-CM

## 2021-02-12 PROCEDURE — 91301 SARS-COV-2 / COVID-19 MRNA VACCINE (MODERNA) 100 MCG: CPT

## 2021-02-12 PROCEDURE — 0011A SARS-COV-2 / COVID-19 MRNA VACCINE (MODERNA) 100 MCG: CPT

## 2021-02-17 ENCOUNTER — OFFICE VISIT (OUTPATIENT)
Dept: OBGYN CLINIC | Facility: CLINIC | Age: 81
End: 2021-02-17
Payer: COMMERCIAL

## 2021-02-17 DIAGNOSIS — M65.311 TRIGGER FINGER OF RIGHT THUMB: Primary | ICD-10-CM

## 2021-02-17 PROCEDURE — 1160F RVW MEDS BY RX/DR IN RCRD: CPT | Performed by: ORTHOPAEDIC SURGERY

## 2021-02-17 PROCEDURE — 1036F TOBACCO NON-USER: CPT | Performed by: ORTHOPAEDIC SURGERY

## 2021-02-17 PROCEDURE — 99214 OFFICE O/P EST MOD 30 MIN: CPT | Performed by: ORTHOPAEDIC SURGERY

## 2021-02-17 PROCEDURE — 20550 NJX 1 TENDON SHEATH/LIGAMENT: CPT | Performed by: ORTHOPAEDIC SURGERY

## 2021-02-17 RX ORDER — LIDOCAINE HYDROCHLORIDE 5 MG/ML
0.5 INJECTION, SOLUTION INFILTRATION; PERINEURAL
Status: COMPLETED | OUTPATIENT
Start: 2021-02-17 | End: 2021-02-17

## 2021-02-17 RX ORDER — TRIAMCINOLONE ACETONIDE 40 MG/ML
20 INJECTION, SUSPENSION INTRA-ARTICULAR; INTRAMUSCULAR
Status: COMPLETED | OUTPATIENT
Start: 2021-02-17 | End: 2021-02-17

## 2021-02-17 RX ADMIN — LIDOCAINE HYDROCHLORIDE 0.5 ML: 5 INJECTION, SOLUTION INFILTRATION; PERINEURAL at 14:29

## 2021-02-17 RX ADMIN — TRIAMCINOLONE ACETONIDE 20 MG: 40 INJECTION, SUSPENSION INTRA-ARTICULAR; INTRAMUSCULAR at 14:29

## 2021-02-17 NOTE — PROGRESS NOTES
Assessment/Plan:  1  Trigger finger of right thumb  Hand/upper extremity injection: R thumb A1       Scribe Attestation    I,:  Stephy Avery MA am acting as a scribe while in the presence of the attending physician :       I,:  Tabby Cancino MD personally performed the services described in this documentation    as scribed in my presence :             I discussed with Elizabeth Esposito that his signs and symptoms are consistent with right thumb trigger finger  He does have obvious triggering on exam  He is tender to palpation over the A1 pulley  X-rays were reviewed which does demonstrate significant arthritis throughout the IP, MCP, and CMC joints  Treatment options were discussed in the form of a right thumb trigger finger steroid injection  He was agreeable to this and this was performed in the office today without any complications  I did explain to him this will only help with the trigger finger and not the arthritis  He verbalized understanding of this  Patient was advised to allow the injection 3 weeks to take effect  Patient is a diabetic and his last A1 C was 6  5  he may follow up with me as needed  Subjective:   SEAN Ruiz 89 is a 80 y o  male who presents to the office today as a referral from his PCP for evaluation of right thumb pain  Patient states this has been ongoing for the past week  He denies any known injury or trauma  Patient notes a clicking with motion  He notes pain to the area of the A1 pulley  He notes increased pain with   He has been using and OTC thumb brace to avoid motion  He denies any numbness or tingling  Patient was evaluated by his PCP for this and did undergo an outpatient x-ray  Patient states he has known arthritis about his thumb  Review of Systems   Constitutional: Negative for chills and fever  HENT: Negative for drooling and sneezing  Eyes: Negative for redness  Respiratory: Negative for cough and wheezing      Gastrointestinal: Negative for nausea and vomiting  Musculoskeletal: Negative for arthralgias, joint swelling and myalgias  Neurological: Negative for weakness and numbness  Psychiatric/Behavioral: Negative for behavioral problems  The patient is not nervous/anxious            Past Medical History:   Diagnosis Date    Abnormal blood chemistry     resolved: 11/9/16    Abnormal glucose     last assessed: 9/24/14    CAD (coronary artery disease)     Coronary atherosclerosis     DM2 (diabetes mellitus, type 2) (HCC)     Dyslipidemia     Facial nerve disorder     HTN (hypertension)     Hyperlipidemia     last assessed: 1/13/17    Kidney stones     Lumbosacral radiculopathy     Nerve root and plexus disorder     Obesity     Osteoarthritis     Prostate asymmetry     Pure hypercholesterolemia        Past Surgical History:   Procedure Laterality Date    CORONARY ANGIOPLASTY         Family History   Problem Relation Age of Onset    Cancer Mother     Ovarian cancer Mother     Hypertension Sister     Lung disease Father         black    Mental illness Neg Hx     Substance Abuse Neg Hx        Social History     Occupational History    Not on file   Tobacco Use    Smoking status: Never Smoker    Smokeless tobacco: Never Used   Substance and Sexual Activity    Alcohol use: Yes     Comment: occasional    Drug use: No    Sexual activity: Not on file         Current Outpatient Medications:     aspirin 81 mg chewable tablet, Chew 81 mg daily, Disp: , Rfl:     cholecalciferol (VITAMIN D3) 1,000 units tablet, Take 1,000 Units by mouth daily, Disp: , Rfl:     Januvia 50 MG tablet, TAKE ONE TABLET BY MOUTH EVERY DAY, Disp: 90 tablet, Rfl: 1    metFORMIN (GLUCOPHAGE) 500 mg tablet, Take 500 mg by mouth 2 (two) times a day with meals , Disp: , Rfl:     multivitamin-iron-minerals-folic acid (CENTRUM) chewable tablet, Chew 1 tablet daily, Disp: , Rfl:     pantoprazole (PROTONIX) 40 mg tablet, Take 1 tablet (40 mg total) by mouth daily before breakfast, Disp: 90 tablet, Rfl: 3    Promethazine-DM (PHENERGAN-DM) 6 25-15 mg/5 mL oral syrup, Take 5 mL by mouth daily at bedtime, Disp: 180 mL, Rfl: 0    simvastatin (ZOCOR) 40 mg tablet, TAKE 1 TABLET BY MOUTH  DAILY AT BEDTIME, Disp: 90 tablet, Rfl: 3    trandolapril (MAVIK) 1 MG tablet, TAKE ONE TABLET BY MOUTH EVERY DAY, Disp: 90 tablet, Rfl: 1    Allergies   Allergen Reactions    Ciprofloxacin Hives       Objective: There were no vitals filed for this visit  Right Hand Exam     Tenderness   Right hand tenderness location: A1 pulley right thumb  Range of Motion   The patient has normal right wrist ROM  Other   Erythema: absent  Sensation: normal  Pulse: present    Comments:  Obvious triggering right thumb   Sensation intact radial and ulnar aspect  Brisk capillary refill            Physical Exam  Constitutional:       Appearance: He is well-developed  HENT:      Head: Normocephalic and atraumatic  Eyes:      General:         Right eye: No discharge  Left eye: No discharge  Conjunctiva/sclera: Conjunctivae normal    Neck:      Musculoskeletal: Normal range of motion and neck supple  Cardiovascular:      Rate and Rhythm: Normal rate  Pulmonary:      Effort: Pulmonary effort is normal  No respiratory distress  Musculoskeletal:      Comments: As noted in HPI   Skin:     General: Skin is warm and dry  Neurological:      Mental Status: He is alert and oriented to person, place, and time  Psychiatric:         Behavior: Behavior normal          Thought Content: Thought content normal          Judgment: Judgment normal      Hand/upper extremity injection: R thumb A1  Universal Protocol:  Consent: Verbal consent obtained    Consent given by: patient  Patient identity confirmed: verbally with patient    Supporting Documentation  Indications: pain   Procedure Details  Condition:trigger finger Location: thumb - R thumb A1   Preparation: Patient was prepped and draped in the usual sterile fashion  Needle size: 25 G  Ultrasound guidance: no  Medications administered: 0 5 mL lidocaine 0 5 %; 20 mg triamcinolone acetonide 40 mg/mL    Patient tolerance: patient tolerated the procedure well with no immediate complications  Dressing:  Sterile dressing applied       I have personally reviewed pertinent films in PACS and my interpretation is as follows:x-ray right thumb performed on 2/10/21 demonstrates significant arthritis IP, MCP, and CMC joints

## 2021-03-12 ENCOUNTER — IMMUNIZATIONS (OUTPATIENT)
Dept: FAMILY MEDICINE CLINIC | Facility: HOSPITAL | Age: 81
End: 2021-03-12

## 2021-03-12 DIAGNOSIS — Z23 ENCOUNTER FOR IMMUNIZATION: Primary | ICD-10-CM

## 2021-03-12 PROCEDURE — 91301 SARS-COV-2 / COVID-19 MRNA VACCINE (MODERNA) 100 MCG: CPT

## 2021-03-12 PROCEDURE — 0012A SARS-COV-2 / COVID-19 MRNA VACCINE (MODERNA) 100 MCG: CPT

## 2021-04-10 DIAGNOSIS — I10 BENIGN ESSENTIAL HTN: ICD-10-CM

## 2021-04-10 RX ORDER — TRANDOLAPRIL TABLETS 1 MG/1
TABLET ORAL
Qty: 90 TABLET | Refills: 0 | Status: SHIPPED | OUTPATIENT
Start: 2021-04-10 | End: 2021-06-18

## 2021-06-18 DIAGNOSIS — I10 BENIGN ESSENTIAL HTN: ICD-10-CM

## 2021-06-18 RX ORDER — TRANDOLAPRIL TABLETS 1 MG/1
TABLET ORAL
Qty: 90 TABLET | Refills: 0 | Status: SHIPPED | OUTPATIENT
Start: 2021-06-18 | End: 2021-06-27

## 2021-06-23 ENCOUNTER — APPOINTMENT (OUTPATIENT)
Dept: LAB | Facility: CLINIC | Age: 81
End: 2021-06-23
Payer: COMMERCIAL

## 2021-06-23 ENCOUNTER — RA CDI HCC (OUTPATIENT)
Dept: OTHER | Facility: HOSPITAL | Age: 81
End: 2021-06-23

## 2021-06-23 DIAGNOSIS — E11.649 UNCONTROLLED TYPE 2 DIABETES MELLITUS WITH HYPOGLYCEMIA WITHOUT COMA (HCC): ICD-10-CM

## 2021-06-23 DIAGNOSIS — E55.9 AVITAMINOSIS D: ICD-10-CM

## 2021-06-23 LAB
T4 FREE SERPL-MCNC: 1 NG/DL (ref 0.76–1.46)
VIT B12 SERPL-MCNC: 761 PG/ML (ref 100–900)

## 2021-06-23 PROCEDURE — 84439 ASSAY OF FREE THYROXINE: CPT

## 2021-06-23 PROCEDURE — 82607 VITAMIN B-12: CPT

## 2021-06-23 NOTE — PROGRESS NOTES
[facility] 77  coding opportunities          Chart reviewed, no opportunity found: CHART REVIEWED, NO OPPORTUNITY FOUND                     Patients insurance company: [employer] (Medicare Advantage and Commercial)

## 2021-06-27 DIAGNOSIS — I10 BENIGN ESSENTIAL HTN: ICD-10-CM

## 2021-06-27 RX ORDER — TRANDOLAPRIL TABLETS 1 MG/1
TABLET ORAL
Qty: 90 TABLET | Refills: 0 | Status: SHIPPED | OUTPATIENT
Start: 2021-06-27 | End: 2021-11-18

## 2021-07-08 ENCOUNTER — APPOINTMENT (OUTPATIENT)
Dept: LAB | Facility: CLINIC | Age: 81
End: 2021-07-08
Payer: COMMERCIAL

## 2021-07-08 ENCOUNTER — OFFICE VISIT (OUTPATIENT)
Dept: FAMILY MEDICINE CLINIC | Facility: CLINIC | Age: 81
End: 2021-07-08
Payer: COMMERCIAL

## 2021-07-08 VITALS
TEMPERATURE: 98 F | BODY MASS INDEX: 35.09 KG/M2 | HEART RATE: 62 BPM | RESPIRATION RATE: 18 BRPM | SYSTOLIC BLOOD PRESSURE: 140 MMHG | WEIGHT: 223.6 LBS | HEIGHT: 67 IN | OXYGEN SATURATION: 98 % | DIASTOLIC BLOOD PRESSURE: 100 MMHG

## 2021-07-08 DIAGNOSIS — N18.31 CKD STAGE G3A/A3, GFR 45-59 AND ALBUMIN CREATININE RATIO >300 MG/G (HCC): ICD-10-CM

## 2021-07-08 DIAGNOSIS — R13.10 DYSPHAGIA, UNSPECIFIED TYPE: ICD-10-CM

## 2021-07-08 DIAGNOSIS — N39.43 POST-VOID DRIBBLING: ICD-10-CM

## 2021-07-08 DIAGNOSIS — Z00.00 MEDICARE ANNUAL WELLNESS VISIT, SUBSEQUENT: Primary | ICD-10-CM

## 2021-07-08 DIAGNOSIS — E11.9 TYPE 2 DIABETES MELLITUS WITHOUT COMPLICATION, WITHOUT LONG-TERM CURRENT USE OF INSULIN (HCC): ICD-10-CM

## 2021-07-08 LAB
ANION GAP SERPL CALCULATED.3IONS-SCNC: 2 MMOL/L (ref 4–13)
BUN SERPL-MCNC: 19 MG/DL (ref 5–25)
CALCIUM SERPL-MCNC: 9.1 MG/DL (ref 8.3–10.1)
CHLORIDE SERPL-SCNC: 105 MMOL/L (ref 100–108)
CO2 SERPL-SCNC: 30 MMOL/L (ref 21–32)
CREAT SERPL-MCNC: 1.29 MG/DL (ref 0.6–1.3)
GFR SERPL CREATININE-BSD FRML MDRD: 52 ML/MIN/1.73SQ M
GLUCOSE P FAST SERPL-MCNC: 125 MG/DL (ref 65–99)
POTASSIUM SERPL-SCNC: 4.8 MMOL/L (ref 3.5–5.3)
SL AMB POCT HEMOGLOBIN AIC: 6.3 (ref ?–6.5)
SODIUM SERPL-SCNC: 137 MMOL/L (ref 136–145)

## 2021-07-08 PROCEDURE — 1160F RVW MEDS BY RX/DR IN RCRD: CPT | Performed by: FAMILY MEDICINE

## 2021-07-08 PROCEDURE — 3288F FALL RISK ASSESSMENT DOCD: CPT | Performed by: FAMILY MEDICINE

## 2021-07-08 PROCEDURE — G0439 PPPS, SUBSEQ VISIT: HCPCS | Performed by: FAMILY MEDICINE

## 2021-07-08 PROCEDURE — 3725F SCREEN DEPRESSION PERFORMED: CPT | Performed by: FAMILY MEDICINE

## 2021-07-08 PROCEDURE — 1125F AMNT PAIN NOTED PAIN PRSNT: CPT | Performed by: FAMILY MEDICINE

## 2021-07-08 PROCEDURE — 83036 HEMOGLOBIN GLYCOSYLATED A1C: CPT | Performed by: FAMILY MEDICINE

## 2021-07-08 PROCEDURE — 80048 BASIC METABOLIC PNL TOTAL CA: CPT

## 2021-07-08 PROCEDURE — 3077F SYST BP >= 140 MM HG: CPT | Performed by: FAMILY MEDICINE

## 2021-07-08 PROCEDURE — 36415 COLL VENOUS BLD VENIPUNCTURE: CPT

## 2021-07-08 PROCEDURE — 1036F TOBACCO NON-USER: CPT | Performed by: FAMILY MEDICINE

## 2021-07-08 PROCEDURE — 3080F DIAST BP >= 90 MM HG: CPT | Performed by: FAMILY MEDICINE

## 2021-07-08 PROCEDURE — 1170F FXNL STATUS ASSESSED: CPT | Performed by: FAMILY MEDICINE

## 2021-07-08 NOTE — PROGRESS NOTES
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/A3, GFR 45-59 and albumin creatinine ratio >300 mg/g (HCC)        Relevant Orders    Ambulatory referral to Nephrology    Basic metabolic panel (Completed)    Post-void dribbling        Relevant Orders    Ambulatory referral to Urology        A1c 6 3% today  Continue on current management  CKD stage 3 recommend nephrology referral  Post void dribbling recommend urology evaluation  Dysphagia recommend that the patient see the GI specialist to see if he is a candidate for stretching of his esophagus  Unfortunately with his large diverticuli we  Will need to have   Another EGD for full evaluation  BMI Counseling: Body mass index is 35 02 kg/m²  The BMI is above normal  Nutrition recommendations include decreasing portion sizes, decreasing fast food intake and consuming healthier snacks  Preventive health issues were discussed with patient, and age appropriate screening tests were ordered as noted in patient's After Visit Summary  Personalized health advice and appropriate referrals for health education or preventive services given if needed, as noted in patient's After Visit Summary  History of Present Illness:     Patient presents for Medicare Annual Wellness visit    Patient starting to have worsening dysphagia  States he was diagnosed with   Large diverticula but his previous gastroenterologist  Patient states it is getting to the point where he is choking during his meals  Patient  Never been evaluated by nephrologist  Patient states that he has  Had post void dribbling  Has not seen a neurologist in some time    Patient Care Team:  Reynaldo Mccabe MD as PCP - General (Family Medicine)  MD Keren Gonzáles MD (Urology)  Angely Samayoa MD (Ophthalmology)     Problem List:     Patient Active Problem List   Diagnosis    2-vessel coronary artery disease    Benign hypertension    DMII (diabetes mellitus, type 2) ([place] [address] )    Dyslipidemia    Moderate obesity    Dysphagia    Cough    Shortness of breath      Past Medical and Surgical History:     Past Medical History:   Diagnosis Date    Abnormal blood chemistry     resolved: 11/9/16    Abnormal glucose     last assessed: 9/24/14    CAD (coronary artery disease)     Coronary atherosclerosis     DM2 (diabetes mellitus, type 2) ([place] [address] )     Dyslipidemia     Facial nerve disorder     HTN (hypertension)     Hyperlipidemia     last assessed: 1/13/17    Kidney stones     Lumbosacral radiculopathy     Nerve root and plexus disorder     Obesity     Osteoarthritis     Prostate asymmetry     Pure hypercholesterolemia      Past Surgical History:   Procedure Laterality Date    CORONARY ANGIOPLASTY        Family History:     Family History   Problem Relation Age of Onset    Cancer Mother     Ovarian cancer Mother     Hypertension Sister     Lung disease Father         black    Mental illness Neg Hx     Substance Abuse Neg Hx       Social History:     Social History     Socioeconomic History    Marital status: /Civil Union     Spouse name: None    Number of children: None    Years of education: None    Highest education level: None   Occupational History    None   Tobacco Use    Smoking status: Never Smoker    Smokeless tobacco: Never Used   Substance and Sexual Activity    Alcohol use: Yes     Comment: occasional    Drug use: No    Sexual activity: None   Other Topics Concern    None   Social History Narrative    None     Social Determinants of Health     Financial Resource Strain:     Difficulty of Paying Living Expenses:    Food Insecurity:  Worried About [facility] in the Last Year:    [address] in the Last Year:    Transportation Needs:     Lack of Transportation (Medical):  Lack of Transportation (Non-Medical):    Physical Activity:     Days of Exercise per Week:     Minutes of Exercise per Session:    Stress:     Feeling of Stress :    Social Connections:     Frequency of Communication with Friends and Family:     Frequency of Social Gatherings with Friends and Family:     Attends [employer] Services:     Active Member of Clubs or Organizations:     Attends Club or Organization Meetings:     Marital Status:    Intimate Partner Violence:     Fear of Current or Ex-Partner:     Emotionally Abused:     Physically Abused:     Sexually Abused:       Medications and Allergies:     Current Outpatient Medications   Medication Sig Dispense Refill    aspirin 81 mg chewable tablet Chew 81 mg daily      cholecalciferol (VITAMIN D3) 1,000 units tablet Take 1,000 Units by mouth daily      Januvia 50 MG tablet TAKE ONE TABLET BY MOUTH EVERY DAY 90 tablet 1    metFORMIN (GLUCOPHAGE) 500 mg tablet Take 500 mg by mouth 2 (two) times a day with meals       multivitamin-iron-minerals-folic acid (CENTRUM) chewable tablet Chew 1 tablet daily      pantoprazole (PROTONIX) 40 mg tablet Take 1 tablet (40 mg total) by mouth daily before breakfast 90 tablet 3    simvastatin (ZOCOR) 40 mg tablet TAKE 1 TABLET BY MOUTH  DAILY AT BEDTIME 90 tablet 3    trandolapril (MAVIK) 1 MG tablet TAKE ONE TABLET BY MOUTH EVERY DAY 90 tablet 0    Promethazine-DM (PHENERGAN-DM) 6 25-15 mg/5 mL oral syrup Take 5 mL by mouth daily at bedtime (Patient not taking: Reported on 7/8/2021) 180 mL 0     No current facility-administered medications for this visit       Allergies   Allergen Reactions    Ciprofloxacin Hives      Immunizations:     Immunization History   Administered Date(s) Administered    Influenza Split High Dose Preservative Free IM 10/10/2013, 09/24/2014, 09/14/2015, 10/25/2016, 10/18/2017    Influenza, high dose seasonal 0 7 mL 10/05/2018, 09/20/2019, 10/06/2020    Influenza, seasonal, injectable 10/05/2011, 10/12/2012, 11/01/2016    Pneumococcal Conjugate 13-Valent 09/14/2015    Pneumococcal Polysaccharide PPV23 10/05/2011    SARS-CoV-2 / COVID-19 mRNA IM (Moderna) 02/12/2021, 03/12/2021    Zoster Vaccine Recombinant 02/04/2019      Health Maintenance:         Topic Date Due    Hepatitis C Screening  Never done         Topic Date Due    DTaP,Tdap,and Td Vaccines (1 - Tdap) Never done    Influenza Vaccine (1) 09/01/2021      Medicare Health Risk Assessment:     /100 (BP Location: Left arm, Patient Position: Sitting, Cuff Size: Standard)   Pulse 62   Temp 98 °F (36 7 °C) (Temporal)   Resp 18   Ht 5' 7" (1 702 m)   Wt 101 kg (223 lb 9 6 oz)   SpO2 98%   BMI 35 02 kg/m²      Gonzales is here for his Subsequent Wellness visit  Health Risk Assessment:   Patient rates overall health as very good  Patient feels that their physical health rating is same  Patient is satisfied with their life  Eyesight was rated as same  Hearing was rated as same  Patient feels that their emotional and mental health rating is same  Patients states they are sometimes angry  Patient states they are often unusually tired/fatigued  Pain experienced in the last 7 days has been some  Patient's pain rating has been 1/10  Patient states that he has experienced no weight loss or gain in last 6 months  Depression Screening:   PHQ-2 Score: 0      Fall Risk Screening: In the past year, patient has experienced: no history of falling in past year      Home Safety:  Patient does not have trouble with stairs inside or outside of their home  Patient has working smoke alarms and has working carbon monoxide detector  Home safety hazards include: none  Nutrition:   Current diet is Regular       Medications:   Patient is currently taking over-the-counter supplements  OTC medications include: see medication list  Patient is able to manage medications  Activities of Daily Living (ADLs)/Instrumental Activities of Daily Living (IADLs):   Walk and transfer into and out of bed and chair?: Yes  Dress and groom yourself?: Yes    Bathe or shower yourself?: Yes    Feed yourself? Yes  Do your laundry/housekeeping?: Yes  Manage your money, pay your bills and track your expenses?: Yes  Make your own meals?: Yes    Do your own shopping?: Yes    Previous Hospitalizations:   Any hospitalizations or ED visits within the last 12 months?: No      Advance Care Planning:   Living will: Yes    Advanced directive: Yes      Cognitive Screening:   Provider or family/friend/caregiver concerned regarding cognition?: No    PREVENTIVE SCREENINGS      Cardiovascular Screening:    General: Screening Current      Diabetes Screening:     General: Screening Not Indicated and History Diabetes      Colorectal Cancer Screening:     General: Screening Current      Prostate Cancer Screening:    General: Screening Not Indicated      Osteoporosis Screening:    General: Screening Not Indicated      Abdominal Aortic Aneurysm (AAA) Screening:        General: Risks and Benefits Discussed      Lung Cancer Screening:     General: Screening Not Indicated      Hepatitis C Screening:    General: Screening Not Indicated    Screening, Brief Intervention, and Referral to Treatment (SBIRT)    Screening  Typical number of drinks in a day: 0  Typical number of drinks in a week: 0  Interpretation: Low risk drinking behavior  Single Item Drug Screening:  How often have you used an illegal drug (including marijuana) or a prescription medication for non-medical reasons in the past year? never    Single Item Drug Screen Score: 0  Interpretation: Negative screen for possible drug use disorder    Physical Exam  Constitutional:       Appearance: He is well-developed  HENT:      Head: Normocephalic and atraumatic  Right Ear: External ear normal       Left Ear: External ear normal       Nose: Nose normal       Mouth/Throat:      Pharynx: No oropharyngeal exudate  Eyes:      Conjunctiva/sclera: Conjunctivae normal       Pupils: Pupils are equal, round, and reactive to light  Cardiovascular:      Rate and Rhythm: Normal rate and regular rhythm  Pulses: Intact distal pulses  Heart sounds: Normal heart sounds  Pulmonary:      Effort: Pulmonary effort is normal       Breath sounds: Normal breath sounds  Abdominal:      General: Bowel sounds are normal       Palpations: Abdomen is soft  Tenderness: There is no abdominal tenderness  Musculoskeletal:         General: Normal range of motion  Cervical back: Normal range of motion and neck supple  Skin:     General: Skin is warm and dry  Neurological:      Mental Status: He is alert and oriented to person, place, and time  Cranial Nerves: No cranial nerve deficit  Motor: No abnormal muscle tone  Coordination: Coordination normal       Deep Tendon Reflexes: Reflexes normal    Psychiatric:         Behavior: Behavior normal          Thought Content:  Thought content normal          Judgment: Judgment normal          Catherine Perales MD

## 2021-07-08 NOTE — PATIENT INSTRUCTIONS
Diet for Stomach Ulcers and Gastritis   WHAT YOU NEED TO KNOW:   A diet for stomach ulcers and gastritis is a meal plan that limits foods that irritate your stomach  Certain foods may worsen symptoms such as stomach pain, bloating, heartburn, or indigestion  DISCHARGE INSTRUCTIONS:   Foods to limit or avoid:  You may need to avoid acidic, spicy, or high-fat foods  Not all foods affect everyone the same way  You will need to learn which foods worsen your symptoms and limit those foods  The following are some foods that may worsen ulcer or gastritis symptoms:  · Beverages:      ? Whole milk and chocolate milk    ? Hot cocoa and cola    ? Any beverage with caffeine    ? Regular and decaffeinated coffee    ? Peppermint and spearmint tea    ? Green and black tea, with or without caffeine    ? Orange and grapefruit juices    ? Drinks that contain alcohol    · Spices and seasonings:      ? Black and red pepper    ? Chili powder    ? Mustard seed and nutmeg    · Other foods:      ? Dairy foods made from whole milk or cream    ? Chocolate    ? Spicy or strongly flavored cheeses, such as jalapeno or black pepper    ? Highly seasoned, high-fat meats, such as sausage, salami, king, ham, and cold cuts    ? Hot chiles and peppers    ? Tomato products, such as tomato paste, tomato sauce, or tomato juice    Foods to include:  Eat a variety of healthy foods from all the food groups  Eat fruits, vegetables, whole grains, and fat-free or low-fat dairy foods  Whole grains include whole-wheat breads, cereals, pasta, and brown rice  Choose lean meats, poultry (chicken and turkey), fish, beans, eggs, and nuts  A healthy meal plan is low in unhealthy fats, salt, and added sugar  Healthy fats include olive oil and canola oil  Ask your dietitian for more information about a healthy diet  Other helpful guidelines:   · Do not eat right before bedtime  Stop eating at least 2 hours before bedtime  · Eat small, frequent meals    Your stomach may tolerate small, frequent meals better than large meals  © Copyright [address] Information is for End User's use only and may not be sold, redistributed or otherwise used for commercial purposes  All illustrations and images included in CareNotes® are the copyrighted property of [employer] M [employer]  or Leah Gonzales  The above information is an  only  It is not intended as medical advice for individual conditions or treatments  Talk to your doctor, nurse or pharmacist before following any medical regimen to see if it is safe and effective for you  Medicare Preventive Visit Patient Instructions  Thank you for completing your Welcome to Medicare Visit or Medicare Annual Wellness Visit today  Your next wellness visit will be due in one year (7/9/2022)  The screening/preventive services that you may require over the next 5-10 years are detailed below  Some tests may not apply to you based off risk factors and/or age  Screening tests ordered at today's visit but not completed yet may show as past due  Also, please note that scanned in results may not display below  Preventive Screenings:  Service Recommendations Previous Testing/Comments   Colorectal Cancer Screening  · Colonoscopy    · Fecal Occult Blood Test (FOBT)/Fecal Immunochemical Test (FIT)  · Fecal DNA/Cologuard Test  · Flexible Sigmoidoscopy Age: 54-65 years old   Colonoscopy: every 10 years (May be performed more frequently if at higher risk)  OR  FOBT/FIT: every 1 year  OR  Cologuard: every 3 years  OR  Sigmoidoscopy: every 5 years  Screening may be recommended earlier than age 48 if at higher risk for colorectal cancer  Also, an individualized decision between you and your healthcare provider will decide whether screening between the ages of 74-80 would be appropriate   Colonoscopy: Not on file  FOBT/FIT: Not on file  Cologuard: 07/08/2020  Sigmoidoscopy: Not on file          Prostate Cancer Screening Individualized decision between patient and health care provider in men between ages of 53-78   Medicare will cover every 12 months beginning on the day after your 50th birthday PSA: 3 3 ng/mL     Screening Not Indicated     Hepatitis C Screening Once for adults born between 1945 and 1965  More frequently in patients at high risk for Hepatitis C Hep C Antibody: Not on file        Diabetes Screening 1-2 times per year if you're at risk for diabetes or have pre-diabetes Fasting glucose: 109 mg/dL   A1C: 6 5    Screening Not Indicated  History Diabetes   Cholesterol Screening Once every 5 years if you don't have a lipid disorder  May order more often based on risk factors  Lipid panel: 06/23/2021    Screening Current      Other Preventive Screenings Covered by Medicare:  1  Abdominal Aortic Aneurysm (AAA) Screening: covered once if your at risk  You're considered to be at risk if you have a family history of AAA or a male between the age of 73-68 who smoking at least 100 cigarettes in your lifetime  2  Lung Cancer Screening: covers low dose CT scan once per year if you meet all of the following conditions: (1) Age 50-69; (2) No signs or symptoms of lung cancer; (3) Current smoker or have quit smoking within the last 15 years; (4) You have a tobacco smoking history of at least 30 pack years (packs per day x number of years you smoked); (5) You get a written order from a healthcare provider  3  Glaucoma Screening: covered annually if you're considered high risk: (1) You have diabetes OR (2) Family history of glaucoma OR (3)  aged 48 and older OR (3)  American aged 72 and older  3  Osteoporosis Screening: covered every 2 years if you meet one of the following conditions: (1) Have a vertebral abnormality; (2) On glucocorticoid therapy for more than 3 months; (3) Have primary hyperparathyroidism; (4) On osteoporosis medications and need to assess response to drug therapy    5  HIV Screening: covered annually if you're between the age of 12-76  Also covered annually if you are younger than 13 and older than 72 with risk factors for HIV infection  For pregnant patients, it is covered up to 3 times per pregnancy  Immunizations:  Immunization Recommendations   Influenza Vaccine Annual influenza vaccination during flu season is recommended for all persons aged >= 6 months who do not have contraindications   Pneumococcal Vaccine (Prevnar and Pneumovax)  * Prevnar = PCV13  * Pneumovax = PPSV23 Adults 25-60 years old: 1-3 doses may be recommended based on certain risk factors  Adults 72 years old: Prevnar (PCV13) vaccine recommended followed by Pneumovax (PPSV23) vaccine  If already received PPSV23 since turning 65, then PCV13 recommended at least one year after PPSV23 dose  Hepatitis B Vaccine 3 dose series if at intermediate or high risk (ex: diabetes, end stage renal disease, liver disease)   Tetanus (Td) Vaccine - COST NOT COVERED BY MEDICARE PART B Following completion of primary series, a booster dose should be given every 10 years to maintain immunity against tetanus  Td may also be given as tetanus wound prophylaxis  Tdap Vaccine - COST NOT COVERED BY MEDICARE PART B Recommended at least once for all adults  For pregnant patients, recommended with each pregnancy  Shingles Vaccine (Shingrix) - COST NOT COVERED BY MEDICARE PART B  2 shot series recommended in those aged 48 and above     Health Maintenance Due:      Topic Date Due    Hepatitis C Screening  Never done     Immunizations Due:      Topic Date Due    DTaP,Tdap,and Td Vaccines (1 - Tdap) Never done    Influenza Vaccine (1) 09/01/2021     Advance Directives   What are advance directives? Advance directives are legal documents that state your wishes and plans for medical care  These plans are made ahead of time in case you lose your ability to make decisions for yourself   Advance directives can apply to any medical decision, such as the treatments you want, and if you want to donate organs  What are the types of advance directives? There are many types of advance directives, and each state has rules about how to use them  You may choose a combination of any of the following:  · Living will: This is a written record of the treatment you want  You can also choose which treatments you do not want, which to limit, and which to stop at a certain time  This includes surgery, medicine, IV fluid, and tube feedings  · Durable power of  for healthcare [facility] [place]): This is a written record that states who you want to make healthcare choices for you when you are unable to make them for yourself  This person, called a proxy, is usually a family member or a friend  You may choose more than 1 proxy  · Do not resuscitate (DNR) order:  A DNR order is used in case your heart stops beating or you stop breathing  It is a request not to have certain forms of treatment, such as CPR  A DNR order may be included in other types of advance directives  · Medical directive: This covers the care that you want if you are in a coma, near death, or unable to make decisions for yourself  You can list the treatments you want for each condition  Treatment may include pain medicine, surgery, blood transfusions, dialysis, IV or tube feedings, and a ventilator (breathing machine)  · Values history: This document has questions about your views, beliefs, and how you feel and think about life  This information can help others choose the care that you would choose  Why are advance directives important? An advance directive helps you control your care  Although spoken wishes may be used, it is better to have your wishes written down  Spoken wishes can be misunderstood, or not followed  Treatments may be given even if you do not want them  An advance directive may make it easier for your family to make difficult choices about your care     Weight Management   Why it is important to manage your weight:  Being overweight increases your risk of health conditions such as heart disease, high blood pressure, type 2 diabetes, and certain types of cancer  It can also increase your risk for osteoarthritis, sleep apnea, and other respiratory problems  Aim for a slow, steady weight loss  Even a small amount of weight loss can lower your risk of health problems  How to lose weight safely:  A safe and healthy way to lose weight is to eat fewer calories and get regular exercise  You can lose up about 1 pound a week by decreasing the number of calories you eat by 500 calories each day  Healthy meal plan for weight management:  A healthy meal plan includes a variety of foods, contains fewer calories, and helps you stay healthy  A healthy meal plan includes the following:  · Eat whole-grain foods more often  A healthy meal plan should contain fiber  Fiber is the part of grains, fruits, and vegetables that is not broken down by your body  Whole-grain foods are healthy and provide extra fiber in your diet  Some examples of whole-grain foods are whole-wheat breads and pastas, oatmeal, brown rice, and bulgur  · Eat a variety of vegetables every day  Include dark, leafy greens such as spinach, kale, nino greens, and mustard greens  Eat yellow and orange vegetables such as carrots, sweet potatoes, and winter squash  · Eat a variety of fruits every day  Choose fresh or canned fruit (canned in its own juice or light syrup) instead of juice  Fruit juice has very little or no fiber  · Eat low-fat dairy foods  Drink fat-free (skim) milk or 1% milk  Eat fat-free yogurt and low-fat cottage cheese  Try low-fat cheeses such as mozzarella and other reduced-fat cheeses  · Choose meat and other protein foods that are low in fat  Choose beans or other legumes such as split peas or lentils  Choose fish, skinless poultry (chicken or turkey), or lean cuts of red meat (beef or pork)   Before you cook meat or poultry, cut off any visible fat  · Use less fat and oil  Try baking foods instead of frying them  Add less fat, such as margarine, sour cream, regular salad dressing and mayonnaise to foods  Eat fewer high-fat foods  Some examples of high-fat foods include french fries, doughnuts, ice cream, and cakes  · Eat fewer sweets  Limit foods and drinks that are high in sugar  This includes candy, cookies, regular soda, and sweetened drinks  Exercise:  Exercise at least 30 minutes per day on most days of the week  Some examples of exercise include walking, biking, dancing, and swimming  You can also fit in more physical activity by taking the stairs instead of the elevator or parking farther away from stores  Ask your healthcare provider about the best exercise plan for you  © Copyright [employer] 2018 Information is for End User's use only and may not be sold, redistributed or otherwise used for commercial purposes   All illustrations and images included in CareNotes® are the copyrighted property of [employer]  or [address] [place] [address]

## 2021-07-09 ENCOUNTER — TELEPHONE (OUTPATIENT)
Dept: FAMILY MEDICINE CLINIC | Facility: CLINIC | Age: 81
End: 2021-07-09

## 2021-07-09 NOTE — TELEPHONE ENCOUNTER
----- Message from Alistair Stallworth MD sent at 7/8/2021  7:02 PM EDT -----  Kidney function is normal range   However GFR is 52, recommend seeing Nephrology and Urology as scheduled just incase

## 2021-07-26 ENCOUNTER — OFFICE VISIT (OUTPATIENT)
Dept: CARDIOLOGY CLINIC | Facility: CLINIC | Age: 81
End: 2021-07-26
Payer: COMMERCIAL

## 2021-07-26 VITALS
TEMPERATURE: 97.6 F | SYSTOLIC BLOOD PRESSURE: 120 MMHG | DIASTOLIC BLOOD PRESSURE: 80 MMHG | HEIGHT: 67 IN | WEIGHT: 220.6 LBS | OXYGEN SATURATION: 95 % | HEART RATE: 77 BPM | BODY MASS INDEX: 34.62 KG/M2

## 2021-07-26 DIAGNOSIS — E78.5 DYSLIPIDEMIA: ICD-10-CM

## 2021-07-26 DIAGNOSIS — R06.02 SHORTNESS OF BREATH: ICD-10-CM

## 2021-07-26 DIAGNOSIS — E11.9 TYPE 2 DIABETES MELLITUS WITHOUT COMPLICATION, WITHOUT LONG-TERM CURRENT USE OF INSULIN (HCC): ICD-10-CM

## 2021-07-26 DIAGNOSIS — I25.10 2-VESSEL CORONARY ARTERY DISEASE: ICD-10-CM

## 2021-07-26 DIAGNOSIS — I10 BENIGN HYPERTENSION: ICD-10-CM

## 2021-07-26 DIAGNOSIS — E66.9 OBESITY (BMI 30-39.9): ICD-10-CM

## 2021-07-26 PROCEDURE — 93000 ELECTROCARDIOGRAM COMPLETE: CPT | Performed by: INTERNAL MEDICINE

## 2021-07-26 PROCEDURE — 99214 OFFICE O/P EST MOD 30 MIN: CPT | Performed by: INTERNAL MEDICINE

## 2021-07-26 NOTE — PROGRESS NOTES
Progress Note - Cardiology Office  Gonzales Sears 80 y o  male MRN: [id number]   Encounter: [id number]     1  2-vessel coronary artery disease    2  Benign hypertension    3  Dyslipidemia    4  Shortness of breath    5  Type 2 diabetes mellitus without complication, without long-term current use of insulin (HCC)    6  Obesity (BMI 30-39  9)        Assessment/Plan      1  Coronary artery disease with a remote history of angioplasty of LAD with a drug-eluting stent and known RPDA 100% with grade 3 collaterals  He has no symptoms of angina  He has decently active  His blood test from August 2019 reviewed  He has nonischemic nuclear in September of 2018 he has no symptoms of chest pain or shortness of breath  He would of water to avoid adjusted at this time as he is busy with his wife  2  Hypertension  Longstanding history of essential hypertension  Continue same medication   Blood pressures exceptable  3  Dyslipidemia  Continue statin cholesterol profile is acceptable    4  Diabetes mellitus  Patient is on metformin and Januvia follow-up by PMD   Last HbA1c 6 5  He follows up with endocrinology [facility]  Currently stable  5  Obesity  Patient's BMI is around 35  He is trying hard to lose weight    6  Premature atrial contractions  Holter monitor shows no evidence of occult atrial fibrillation  Few PACs noted  Episode of probably SVT with aberration noted he has normal LV systolic function  No symptoms currently    Discussed with patient at length about pathophysiology of coronary artery disease, irregular heartbeat, need to compliant with medications and losing weight at length  Follow-up in 6 months  All these issues discussed with patient's wife at length  Counseling :  A description of the counseling  Spoke to patient but high intensity statins  Lipitor side effects discussed  He is willing to try it  Goals and Barriers  Patient want to lose weight which he gain during winter months  Little under stress due to wife's condition  Patient's ability to self care: Yes  Medication side effect reviewed with patient in detail and all their questions answered to their satisfaction  Neela Ramirez is a 80y o  year old male who came for follow up  Patient has a past medical history significant for coronary artery disease status post angioplasty in 2008 off large size LAD with a known RPDA, hypertension, diabetes mellitus, dyslipidemia and moderate obesity who came for regular follow-up  Patient's last nuclear was in 2014 which was nonischemic  He denies any chest pain or any shortness of breath  No fever no chills no other significant complaint  7/18/2017Patient came for follow-up for coronary artery disease  A status post angioplasty of his large LAD and has known RPDA occlusion  His sugar is well controlled  He is trying to lose weight  Blood pressures acceptable  Denies any chest pain  No PND no orthopnea no nausea no vomiting no other significant complaint  03/18/2020  Above reviewed  Patient came for follow-up  Dustin Gregory He is doing well from cardiac point of view  He had history of coronary artery disease status post angioplasty of LAD in 2008 and known to have occluded RPDA with good collaterals  He is also under lot of stress because of his wife's back problem  His Holter monitor last time showed few PACs and short atrial runs  He has no other significant complaint today heart rate 67 beats per minute  No chest pain no shortness of breath no dizziness no nausea no vomiting no other issues  Holter monitor shows few PACs and short atrial run could be SVT with abrreation versus NSVT but he was asymptomatic    07/26/2021  Above reviewed  Patient came for follow-up he is doing well  He denies any chest pain any shortness of breath  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  He has been busy with his wife hence on conservative Rx  His difficult to get food down is getting worse he is scheduled to follow up with Gastroenterology  No nausea no vomiting no other issues he does have short atrial runs  He has recently blood test done in July 2021 there were acceptable  Review of Systems   Constitutional: Negative for activity change, chills, diaphoresis, fever and unexpected weight change  HENT: Negative for congestion  Eyes: Negative for discharge and redness  Respiratory: Negative for cough, chest tightness, shortness of breath and wheezing  Cardiovascular: Negative  Negative for chest pain, palpitations and leg swelling  Gastrointestinal: Negative for abdominal pain, diarrhea and nausea  Difficulty in swallowing due to diverticulum   Endocrine: Negative  Genitourinary: Negative for decreased urine volume and urgency  Musculoskeletal: Positive for arthralgias and back pain  Negative for gait problem  Skin: Negative for rash and wound  Allergic/Immunologic: Negative  Neurological: Negative for dizziness, seizures, syncope, weakness, light-headedness and headaches  Hematological: Negative  Psychiatric/Behavioral: Negative for agitation and confusion  The patient is nervous/anxious          Historical Information   Past Medical History:   Diagnosis Date    Abnormal blood chemistry     resolved: 11/9/16    Abnormal glucose     last assessed: 9/24/14    CAD (coronary artery disease)     Coronary atherosclerosis     DM2 (diabetes mellitus, type 2) (HCC)     Dyslipidemia     Facial nerve disorder     HTN (hypertension)     Hyperlipidemia     last assessed: 1/13/17    Kidney stones     Lumbosacral radiculopathy     Nerve root and plexus disorder     Obesity     Osteoarthritis     Prostate asymmetry     Pure hypercholesterolemia      Past Surgical History:   Procedure Laterality Date    CORONARY ANGIOPLASTY       Social History     Substance and Sexual Activity   Alcohol Use Yes    Comment: occasional     Social History     Substance and Sexual Activity   Drug Use No     Social History     Tobacco Use   Smoking Status Never Smoker   Smokeless Tobacco Never Used     Family History:   Family History   Problem Relation Age of Onset    Cancer Mother     Ovarian cancer Mother     Hypertension Sister     Lung disease Father         black    Mental illness Neg Hx     Substance Abuse Neg Hx        Meds/Allergies     Allergies   Allergen Reactions    Ciprofloxacin Hives       Current Outpatient Medications:     aspirin 81 mg chewable tablet, Chew 81 mg daily, Disp: , Rfl:     cholecalciferol (VITAMIN D3) 1,000 units tablet, Take 1,000 Units by mouth daily, Disp: , Rfl:     Januvia 50 MG tablet, TAKE ONE TABLET BY MOUTH EVERY DAY, Disp: 90 tablet, Rfl: 1    metFORMIN (GLUCOPHAGE) 500 mg tablet, Take 500 mg by mouth 2 (two) times a day with meals , Disp: , Rfl:     multivitamin-iron-minerals-folic acid (CENTRUM) chewable tablet, Chew 1 tablet daily, Disp: , Rfl:     pantoprazole (PROTONIX) 40 mg tablet, Take 1 tablet (40 mg total) by mouth daily before breakfast, Disp: 90 tablet, Rfl: 3    simvastatin (ZOCOR) 40 mg tablet, TAKE 1 TABLET BY MOUTH  DAILY AT BEDTIME, Disp: 90 tablet, Rfl: 3    trandolapril (MAVIK) 1 MG tablet, TAKE ONE TABLET BY MOUTH EVERY DAY, Disp: 90 tablet, Rfl: 0    Promethazine-DM (PHENERGAN-DM) 6 25-15 mg/5 mL oral syrup, Take 5 mL by mouth daily at bedtime (Patient not taking: Reported on 7/8/2021), Disp: 180 mL, Rfl: 0    Vitals: Blood pressure 120/80, pulse 77, temperature 97 6 °F (36 4 °C), height 5' 7" (1 702 m), weight 100 kg (220 lb 9 6 oz), SpO2 95 %  Body mass index is 34 55 kg/m²      BP Readings from Last 3 Encounters:   07/26/21 120/80   07/08/21 140/100   02/09/21 144/88       Physical Exam:  Physical Exam  Constitutional:       General: He is not in acute distress  Appearance: He is well-developed  He is not diaphoretic  Neck:      Thyroid: No thyromegaly  Vascular: No JVD  Trachea: No tracheal deviation  Cardiovascular:      Rate and Rhythm: Normal rate and regular rhythm  Heart sounds: S1 normal and S2 normal  Heart sounds not distant  Murmur heard  Systolic (ejection) murmur is present with a grade of 2/6  No friction rub  No gallop  No S3 or S4 sounds  Pulmonary:      Effort: Pulmonary effort is normal  No respiratory distress  Breath sounds: Normal breath sounds  No wheezing or rales  Chest:      Chest wall: No tenderness  Abdominal:      General: Bowel sounds are normal  There is no distension  Palpations: Abdomen is soft  Tenderness: There is no abdominal tenderness  Musculoskeletal:         General: No deformity  Cervical back: Neck supple  Skin:     General: Skin is warm and dry  Coloration: Skin is not pale  Findings: No rash  Neurological:      Mental Status: He is alert and oriented to person, place, and time  Psychiatric:         Behavior: Behavior normal          Judgment: Judgment normal          Diagnostic Studies Review Cardio:  Stress Test: Nuclear stress test in October 2014 shows no ischemia EF 70%  Nuclear stress test done on 09/26/2018 was normal with EF around 70%  Echocardiogram/ROCK: Echo Doppler done in March 2015 shows borderline concentric left hypertrophy EF 55-60%, after reminding dilated, trace MR, trace TR PA pressure 35 mmHg  Echo Doppler done 09/26/2018  Echo Doppler shows normal LV systolic function grade 1 diastolic dysfunction, right ventricle mildly dilated, left atrium mild-to-moderate dilated, mild MR, mild TR PA pressure 30 mm of mercury  No change from old echo  Catheterization: His cardiac catheter patient done in 2008 shows critical stenosis of LAD which was successfully stented with a drug-eluting stent   He had nonobstructive disease in the circumflex and right coronary artery and RPDA is 100% occluded with grade 2 collaterals  Vascular imaging: In October of 2014 abdominal aortogram study shows no aneurysm  Holter monitor  Holter monitor done in March 2019 shows few PACs and PVCs  Most of the PVCs were single  There were 12 beat NSVT though it was irregular so we could not rule out SVT with aberration    ECG Report:   Comparison to prior ECGs: no interval change  7/18/2017  Normal sinus rhythm 1  with no significant ST changes  Heart rate was 68 bpm     Repeat EKG done 08/14/2018 shows normal sinus rhythm heart rate 73 beats per minute  No change from old EKG  Twelve lead EKG in our office done on 02/14/2018 shows normal sinus rhythm heart rate 72 beats per minute few PACs were noted  As compared to old EKG PACs are newly reported  Twelve lead EKG 09/18/2019 shows normal sinus rhythm rare PACs heart rate 71 beats per minute no other significant ST changes  Twelve lead EKG 03/18/2020 shows normal sinus rhythm rare PACs heart rate 67 beats per minute no change from old EKG      Twelve lead EKG 07/26/2021 shows normal sinus rhythm heart rate 77 beats per minute no change from previous EKG    Blood test from July 2021 reviewed  Lab Review     Lab Results   Component Value Date     02/17/2017    K 4 8 07/08/2021     07/08/2021    CO2 30 07/08/2021    BUN 19 07/08/2021    CREATININE 1 29 07/08/2021    GLUCOSE 125 (H) 02/17/2017    GLUF 125 (H) 07/08/2021    CALCIUM 9 1 07/08/2021    AST 28 06/23/2021    ALT 38 06/23/2021    ALKPHOS 61 06/23/2021    PROT 6 5 02/17/2017    BILITOT 0 4 02/17/2017    EGFR 52 07/08/2021     Lab Results   Component Value Date    GLUCOSE 125 (H) 02/17/2017    CALCIUM 9 1 07/08/2021     02/17/2017    K 4 8 07/08/2021    CO2 30 07/08/2021     07/08/2021    BUN 19 07/08/2021    CREATININE 1 29 07/08/2021       Lab Results   Component Value Date    HGBA1C 6 3 07/08/2021     Lipid Profile:   Lab Results   Component Value Date    CHOL 173 02/17/2017    HDL 52 06/23/2021    LDLCALC 87 06/23/2021    TRIG 85 06/23/2021     Lab Results   Component Value Date    CHOL 173 02/17/2017    CHOL 151 11/21/2016       Dr Gaurav Sagastume MD [facility]      "This note has been constructed using a voice recognition system  Therefore there may be syntax, spelling, and/or grammatical errors   Please call if you have any questions  "

## 2021-07-30 ENCOUNTER — RA CDI HCC (OUTPATIENT)
Dept: OTHER | Facility: HOSPITAL | Age: 81
End: 2021-07-30

## 2021-07-30 NOTE — PROGRESS NOTES
[facility] [address]  coding opportunities             Chart Reviewed * (Number of) Inbasket suggestions sent to Provider: 1     Problem listed updated  Provider Accepted, (number of) suggestions accepted: 1               Patients insurance company: [address]  (Medicare Advantage and [employer])     Visit status: Patient canceled the appointment        [address]  coding opportunities             Chart reviewed, (number of) suggestions sent to provider: 1                  Patients insurance company: [address]  (Medicare Advantage and [employer])             [facility] [address]  coding opportunities             Chart reviewed, (number of) suggestions sent to provider: 1     Problem listed updated   Provider Accepted, (number of) suggestions accepted: 1               Patients insurance company: [address]  (Medicare Advantage and Commercial)           [facility] [address]  coding opportunities             Chart reviewed, (number of) suggestions sent to provider: 1      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

## 2021-08-06 ENCOUNTER — OFFICE VISIT (OUTPATIENT)
Dept: GASTROENTEROLOGY | Facility: CLINIC | Age: 81
End: 2021-08-06
Payer: COMMERCIAL

## 2021-08-06 VITALS
HEIGHT: 67 IN | HEART RATE: 70 BPM | WEIGHT: 221.2 LBS | SYSTOLIC BLOOD PRESSURE: 164 MMHG | BODY MASS INDEX: 34.72 KG/M2 | TEMPERATURE: 96.1 F | DIASTOLIC BLOOD PRESSURE: 113 MMHG

## 2021-08-06 DIAGNOSIS — K22.5 ZENKER'S DIVERTICULUM: Primary | ICD-10-CM

## 2021-08-06 DIAGNOSIS — R13.10 DYSPHAGIA, UNSPECIFIED TYPE: ICD-10-CM

## 2021-08-06 PROCEDURE — 1160F RVW MEDS BY RX/DR IN RCRD: CPT | Performed by: INTERNAL MEDICINE

## 2021-08-06 PROCEDURE — 3080F DIAST BP >= 90 MM HG: CPT | Performed by: INTERNAL MEDICINE

## 2021-08-06 PROCEDURE — 3077F SYST BP >= 140 MM HG: CPT | Performed by: INTERNAL MEDICINE

## 2021-08-06 PROCEDURE — 1036F TOBACCO NON-USER: CPT | Performed by: INTERNAL MEDICINE

## 2021-08-06 PROCEDURE — 99204 OFFICE O/P NEW MOD 45 MIN: CPT | Performed by: INTERNAL MEDICINE

## 2021-08-06 NOTE — PROGRESS NOTES
Consultation - [facility]) [facility]  Gonzales Sears 1940 male         Chief Complaint:   dysphagia    HPI:   59-year-old male with history of coronary disease status post stent placement, hypertension, hyperlipidemia, diabetes mellitus has difficulty with swallowing for few years  He was seen by Dr Danielle Sims at [facility] and had couple of upper endoscopies  He had a barium swallow which showed large paraesophageal pocket  Patient reports having difficulty with swallowing and also bringing up the food after meal   Denies abdominal pain, nausea or vomiting  Denies any heartburn acid reflux  Good appetite, no recent weight loss  Regular bowel movements and denies any blood or mucus in the stool  He never had colonoscopy in the past but had negative cologuard testing last year  REVIEW OF SYSTEMS: Review of Systems   Constitutional: Negative for activity change, appetite change, chills, diaphoresis, fatigue, fever and unexpected weight change  HENT: Negative for ear discharge, ear pain, facial swelling, hearing loss, nosebleeds, sore throat, tinnitus and voice change  Eyes: Negative for pain, discharge, redness, itching and visual disturbance  Respiratory: Negative for apnea, cough, chest tightness, shortness of breath and wheezing  Cardiovascular: Negative for chest pain and palpitations  Gastrointestinal:        As noted in HPI   Endocrine: Negative for cold intolerance, heat intolerance and polyuria  Genitourinary: Negative for difficulty urinating, dysuria, flank pain, hematuria and urgency  Musculoskeletal: Negative for arthralgias, back pain, gait problem, joint swelling and myalgias  Skin: Negative for rash and wound  Neurological: Negative for dizziness, tremors, seizures, speech difficulty, light-headedness, numbness and headaches  Hematological: Negative for adenopathy  Does not bruise/bleed easily     Psychiatric/Behavioral: Negative for agitation, behavioral problems and confusion  The patient is not nervous/anxious  Past Medical History:   Diagnosis Date    Abnormal blood chemistry     resolved: 11/9/16    Abnormal glucose     last assessed: 9/24/14    CAD (coronary artery disease)     Coronary atherosclerosis     DM2 (diabetes mellitus, type 2) (HCC)     Dyslipidemia     Facial nerve disorder     HTN (hypertension)     Hyperlipidemia     last assessed: 1/13/17    Kidney stones     Lumbosacral radiculopathy     Nerve root and plexus disorder     Obesity     Osteoarthritis     Prostate asymmetry     Pure hypercholesterolemia       Past Surgical History:   Procedure Laterality Date    CORONARY ANGIOPLASTY      UPPER GASTROINTESTINAL ENDOSCOPY       Social History     Socioeconomic History    Marital status: /Civil Union     Spouse name: Not on file    Number of children: Not on file    Years of education: Not on file    Highest education level: Not on file   Occupational History    Not on file   Tobacco Use    Smoking status: Never Smoker    Smokeless tobacco: Never Used   Substance and Sexual Activity    Alcohol use: Yes     Comment: occasional    Drug use: No    Sexual activity: Not on file   Other Topics Concern    Not on file   Social History Narrative    Not on file     Social Determinants of Health     Financial Resource Strain:     Difficulty of Paying Living Expenses:    Food Insecurity:     Worried About Running Out of Food in the Last Year:     Ran Out of Food in the Last Year:    Transportation Needs:     Lack of Transportation (Medical):      Lack of Transportation (Non-Medical):    Physical Activity:     Days of Exercise per Week:     Minutes of Exercise per Session:    Stress:     Feeling of Stress :    Social Connections:     Frequency of Communication with Friends and Family:     Frequency of Social Gatherings with Friends and Family:     Attends Jew Services:     Active Member of Clubs or Organizations:     Attends Club or Organization Meetings:     Marital Status:    Intimate Partner Violence:     Fear of Current or Ex-Partner:     Emotionally Abused:     Physically Abused:     Sexually Abused:      Family History   Problem Relation Age of Onset    Cancer Mother     Ovarian cancer Mother     Hypertension Sister     Lung disease Father         black    Mental illness Neg Hx     Substance Abuse Neg Hx      Ciprofloxacin  Current Outpatient Medications   Medication Sig Dispense Refill    aspirin 81 mg chewable tablet Chew 81 mg daily      cholecalciferol (VITAMIN D3) 1,000 units tablet Take 1,000 Units by mouth daily      Januvia 50 MG tablet TAKE ONE TABLET BY MOUTH EVERY DAY 90 tablet 1    metFORMIN (GLUCOPHAGE) 500 mg tablet Take 500 mg by mouth 2 (two) times a day with meals       multivitamin-iron-minerals-folic acid (CENTRUM) chewable tablet Chew 1 tablet daily      pantoprazole (PROTONIX) 40 mg tablet Take 1 tablet (40 mg total) by mouth daily before breakfast 90 tablet 3    simvastatin (ZOCOR) 40 mg tablet TAKE 1 TABLET BY MOUTH  DAILY AT BEDTIME 90 tablet 3    trandolapril (MAVIK) 1 MG tablet TAKE ONE TABLET BY MOUTH EVERY DAY 90 tablet 0    Promethazine-DM (PHENERGAN-DM) 6 25-15 mg/5 mL oral syrup Take 5 mL by mouth daily at bedtime (Patient not taking: Reported on 7/8/2021) 180 mL 0     No current facility-administered medications for this visit  Blood pressure (!) 164/113, pulse 70, temperature (!) 96 1 °F (35 6 °C), temperature source Temporal, height 5' 7" (1 702 m), weight 100 kg (221 lb 3 2 oz)  PHYSICAL EXAM: Physical Exam  Constitutional:       Appearance: He is well-developed  HENT:      Head: Normocephalic and atraumatic  Eyes:      General: No scleral icterus  Right eye: No discharge  Left eye: No discharge  Conjunctiva/sclera: Conjunctivae normal       Pupils: Pupils are equal, round, and reactive to light  Neck:      Thyroid: No thyromegaly  Vascular: No JVD  Trachea: No tracheal deviation  Cardiovascular:      Rate and Rhythm: Normal rate and regular rhythm  Heart sounds: Normal heart sounds  No murmur heard  No friction rub  No gallop  Pulmonary:      Effort: Pulmonary effort is normal  No respiratory distress  Breath sounds: Normal breath sounds  No wheezing or rales  Chest:      Chest wall: No tenderness  Abdominal:      General: Bowel sounds are normal  There is no distension  Palpations: Abdomen is soft  There is no mass  Tenderness: There is no abdominal tenderness  There is no guarding or rebound  Hernia: No hernia is present  Musculoskeletal:      Cervical back: Neck supple  Lymphadenopathy:      Cervical: No cervical adenopathy  Skin:     General: Skin is warm and dry  Findings: No erythema or rash  Neurological:      Mental Status: He is alert and oriented to person, place, and time  Psychiatric:         Behavior: Behavior normal          Thought Content: Thought content normal           Lab Results   Component Value Date    WBC 6 1 06/29/2020    HGB 15 6 06/29/2020    HCT 46 6 06/29/2020    MCV 92 06/29/2020     06/29/2020     Lab Results   Component Value Date    GLUCOSE 125 (H) 02/17/2017    CALCIUM 9 1 07/08/2021     02/17/2017    K 4 8 07/08/2021    CO2 30 07/08/2021     07/08/2021    BUN 19 07/08/2021    CREATININE 1 29 07/08/2021     Lab Results   Component Value Date    ALT 38 06/23/2021    AST 28 06/23/2021    ALKPHOS 61 06/23/2021    BILITOT 0 4 02/17/2017     No results found for: INR, PROTIME    FL barium swallow    Result Date: 8/12/2019  Impression: 1  Large diverticulum arising from the left lateral aspect of the pharyngoesophageal junction, as discussed above  2   Small sliding hiatal hernia with mild gastroesophageal reflux   3   Otherwise normal barium esophagram  Workstation performed: [id number] ASSESSMENT & PLAN:    Dysphagia    Oropharyngeal dysphagia with food regurgitation secondary to large Zenker's diverticulum      - advised patient to eat slowly at his own pace    -  Discussed with patient in detail about the surgical intervention    - refer to thoracic surgery

## 2021-08-06 NOTE — ASSESSMENT & PLAN NOTE
Oropharyngeal dysphagia with food regurgitation secondary to large Zenker's diverticulum      - advised patient to eat slowly at his own pace    -  Discussed with patient in detail about the surgical intervention    - refer to thoracic surgery

## 2021-08-10 ENCOUNTER — TELEPHONE (OUTPATIENT)
Dept: SURGICAL ONCOLOGY | Facility: CLINIC | Age: 81
End: 2021-08-10

## 2021-08-10 NOTE — TELEPHONE ENCOUNTER
NEW PATIENT INTAKE   REFERRED TO WHICH SURGEON? Dr Alexandria Camacho himself   Mattie Gilmore OFFICE PHONE #   [phone]   REASON FOR REFERRAL/CONSULT     Dysphagia, Zenker's diverticulum   DID PATIENT HAVE TESTING COMPLETED? X-Ray    FACILITY TESTING PERFORMED  (EX  [facility], [facility], [facility])   [facility]    IS INSURANCE REFERRAL NEEDED?    No    BEST NUMBER TO REACH PATIENT   [phone] Cell: [phone]   COMMENTS:

## 2021-08-18 DIAGNOSIS — E11.9 TYPE 2 DIABETES MELLITUS WITHOUT COMPLICATION, WITHOUT LONG-TERM CURRENT USE OF INSULIN (HCC): ICD-10-CM

## 2021-08-19 LAB
LEFT EYE DIABETIC RETINOPATHY: NORMAL
RIGHT EYE DIABETIC RETINOPATHY: NORMAL

## 2021-08-31 ENCOUNTER — OFFICE VISIT (OUTPATIENT)
Dept: CARDIAC SURGERY | Facility: CLINIC | Age: 81
End: 2021-08-31
Payer: COMMERCIAL

## 2021-08-31 VITALS
SYSTOLIC BLOOD PRESSURE: 187 MMHG | BODY MASS INDEX: 34.36 KG/M2 | OXYGEN SATURATION: 99 % | RESPIRATION RATE: 16 BRPM | TEMPERATURE: 97.5 F | HEART RATE: 62 BPM | WEIGHT: 218.92 LBS | DIASTOLIC BLOOD PRESSURE: 95 MMHG | HEIGHT: 67 IN

## 2021-08-31 DIAGNOSIS — K22.5 ZENKER'S DIVERTICULUM: Primary | ICD-10-CM

## 2021-08-31 PROCEDURE — 99205 OFFICE O/P NEW HI 60 MIN: CPT | Performed by: PHYSICIAN ASSISTANT

## 2021-08-31 PROCEDURE — 1036F TOBACCO NON-USER: CPT | Performed by: PHYSICIAN ASSISTANT

## 2021-08-31 PROCEDURE — 3077F SYST BP >= 140 MM HG: CPT | Performed by: PHYSICIAN ASSISTANT

## 2021-08-31 PROCEDURE — 3080F DIAST BP >= 90 MM HG: CPT | Performed by: PHYSICIAN ASSISTANT

## 2021-08-31 PROCEDURE — 1160F RVW MEDS BY RX/DR IN RCRD: CPT | Performed by: PHYSICIAN ASSISTANT

## 2021-08-31 RX ORDER — CEFAZOLIN SODIUM 2 G/50ML
2000 SOLUTION INTRAVENOUS ONCE
Status: CANCELLED | OUTPATIENT
Start: 2021-08-31 | End: 2021-08-31

## 2021-08-31 NOTE — PROGRESS NOTES
Thoracic Consult  Assessment/Plan:    Zenker's diverticulum  Mr Zander Thomas has evidence of a zenker's diverticulum on imaging, for which he is symptomatic  His symptoms seem to be progressing, with his most "severe" episode occurring this morning  We explained the treatment options, including a transoral zenker's repair vs open repair of diverticulum  Both procedures were explained, along with possible risks and post operative course  All questions were answered  He would like to proceed with surgery  Diagnoses and all orders for this visit:    Zenker's diverticulum  -     Type and screen; Future  -     APTT; Future  -     Protime-INR; Future  -     CBC and Platelet; Future  -     Case request operating room: DIVERTICULECTOMY ZENKERS ENDOSCOPIC, DIVERTICULECTOMY ZENKERS; Standing  -     Case request operating room: DIVERTICULECTOMY ZENKERS ENDOSCOPIC, DIVERTICULECTOMY ZENKERS    Other orders  -     Diet NPO; Sips with meds; Standing  -     Void on call to OR; Standing  -     Insert peripheral IV; Standing  -     Place sequential compression device; Standing  -     ceFAZolin (ANCEF) IVPB (premix in dextrose) 2,000 mg 50 mL             Thoracic History   Diagnosis: Zenker's diverticulum   Procedures/Surgeries:    Pathology:    Adjuvant Therapy:     b Patient ID: Heidi Everett is a 80 y o  male  HPI     Mr Zander Thomas is a 79 yo gentleman with a history of HTN, dyslipidemia, CAD s/p stent 2013, Type 2 DM, and stage 3 CKD who was referred by Dr Tamara Gould for a Zenker's diverticulum  A barium swallow from 8/12/19 revealed a large diverticulum arising from left lateral aspect of pharyngoesophageal junction and a small sliding hiatal hernia with mild GERD   Barium swallow from 7/23/20 revealed an outpouching at proximal cervical esophagus on the left, measuring 3 6 x 2 3 x 2 4 cm, along with a small sliding hiatal hernia, large amount of reflux from stomach into proximal esophagus and hypopharynx, and weak primary and secondary esophageal peristaltic waves  There are also non-propulsive tertiary waves seen  He also had an EGD in 2018, but we do not have that report  He was seen by Dr Kadeem Iraheta at [facility] and underwent some endoscopies  He was seen by Dr Tamara Gould on 8/6/21 at which point he recommended a thoracic surgery consult for repair  On discussion, he has dysphagia and solid regurgitation for the past 3 years, gradually becoming worse over time  He does try to eat small pieces slowly  He denies nausea, vomiting, abdominal pain, weight losss cough, shortness of breath, or fever  He has no history of carcinoma, except basal cell skin cancer  He takes aspirin 81 mg daily  He was started on Protonix and since then has not had any heartburn or reflux  He is a lifetime non smoker  No previous neck, chest or abdominal surgery  He has been COVID vaccinated in March         The following portions of the patient's history were reviewed and updated as appropriate: allergies, current medications, past family history, past medical history, past social history, past surgical history and problem list     Past Medical History:   Diagnosis Date    Abnormal blood chemistry     resolved: 11/9/16    Abnormal glucose     last assessed: 9/24/14    CAD (coronary artery disease)     Coronary atherosclerosis     DM2 (diabetes mellitus, type 2) ([facility] [address] )     Dyslipidemia     Facial nerve disorder     HTN (hypertension)     Hyperlipidemia     last assessed: 1/13/17    Kidney stones     Lumbosacral radiculopathy     Nerve root and plexus disorder     Obesity     Osteoarthritis     Prostate asymmetry     Pure hypercholesterolemia       Past Surgical History:   Procedure Laterality Date    CORONARY ANGIOPLASTY      UPPER GASTROINTESTINAL ENDOSCOPY        Family History   Problem Relation Age of Onset    Cancer Mother     Ovarian cancer Mother     Hypertension Sister     Lung disease Father         black    Mental illness Neg Hx     Substance Abuse Neg Hx       Social History     Socioeconomic History    Marital status: /Civil Union     Spouse name: Not on file    Number of children: Not on file    Years of education: Not on file    Highest education level: Not on file   Occupational History    Not on file   Tobacco Use    Smoking status: Never Smoker    Smokeless tobacco: Never Used   Substance and Sexual Activity    Alcohol use: Yes     Comment: occasional    Drug use: No    Sexual activity: Not on file   Other Topics Concern    Not on file   Social History Narrative    Not on file     Social Determinants of Health     Financial Resource Strain:     Difficulty of Paying Living Expenses:    Food Insecurity:     Worried About Running Out of Food in the Last Year:     [address] in the Last Year:    Transportation Needs:     Lack of Transportation (Medical):  Lack of Transportation (Non-Medical):    Physical Activity:     Days of Exercise per Week:     Minutes of Exercise per Session:    Stress:     Feeling of Stress :    Social Connections:     Frequency of Communication with Friends and Family:     Frequency of Social Gatherings with Friends and Family:     Attends Mosque Services:     Active Member of Clubs or Organizations:     Attends Club or Organization Meetings:     Marital Status:    Intimate Partner Violence:     Fear of Current or Ex-Partner:     Emotionally Abused:     Physically Abused:     Sexually Abused:          Allergies   Allergen Reactions    Ciprofloxacin Hives     Current Outpatient Medications on File Prior to Visit   Medication Sig Dispense Refill    aspirin 81 mg chewable tablet Chew 81 mg daily      cholecalciferol (VITAMIN D3) 1,000 units tablet Take 1,000 Units by mouth daily      metFORMIN (GLUCOPHAGE) 500 mg tablet Take 500 mg by mouth 2 (two) times a day with meals       multivitamin-iron-minerals-folic acid (CENTRUM) chewable tablet Chew 1 tablet daily      pantoprazole (PROTONIX) 40 mg tablet Take 1 tablet (40 mg total) by mouth daily before breakfast 90 tablet 3    simvastatin (ZOCOR) 40 mg tablet TAKE 1 TABLET BY MOUTH  DAILY AT BEDTIME 90 tablet 3    sitaGLIPtin (Januvia) 50 mg tablet Take 1 tablet (50 mg total) by mouth daily 90 tablet 1    trandolapril (MAVIK) 1 MG tablet TAKE ONE TABLET BY MOUTH EVERY DAY 90 tablet 0    Promethazine-DM (PHENERGAN-DM) 6 25-15 mg/5 mL oral syrup Take 5 mL by mouth daily at bedtime (Patient not taking: Reported on 7/8/2021) 180 mL 0     No current facility-administered medications on file prior to visit  Review of Systems   Constitutional: Positive for unexpected weight change (3 lb weight loss in past 2 months through diet)  Negative for chills and fever  HENT: Positive for trouble swallowing  Negative for sore throat and voice change  Respiratory: Positive for cough  Negative for shortness of breath  Gastrointestinal: Negative for abdominal pain, nausea and vomiting         + solid regurgitation    Musculoskeletal: Positive for back pain  Negative for neck pain  Allergic/Immunologic: Negative for environmental allergies  Neurological: Negative for dizziness, light-headedness and headaches  Psychiatric/Behavioral: Negative for agitation, behavioral problems and confusion  All other systems reviewed and are negative  Objective:   Physical Exam  Vitals reviewed  Constitutional:       General: He is not in acute distress  Appearance: Normal appearance  He is well-developed and normal weight  He is not diaphoretic  Comments: BMI of 34 29   HENT:      Head: Normocephalic and atraumatic  Mouth/Throat:      Comments: Masked   Eyes:      General: No scleral icterus  Extraocular Movements: Extraocular movements intact  Neck:      Trachea: No tracheal deviation  Cardiovascular:      Rate and Rhythm: Normal rate and regular rhythm        Heart sounds: Normal heart sounds  No murmur heard  Pulmonary:      Effort: Pulmonary effort is normal  No respiratory distress  Breath sounds: Normal breath sounds  No wheezing  Abdominal:      General: Bowel sounds are normal  There is distension  Palpations: Abdomen is soft  Tenderness: There is no abdominal tenderness  Musculoskeletal:         General: Normal range of motion  Cervical back: Normal range of motion and neck supple  Right lower leg: No edema  Left lower leg: No edema  Lymphadenopathy:      Cervical: No cervical adenopathy  Skin:     General: Skin is warm and dry  Findings: No erythema  Neurological:      Mental Status: He is alert and oriented to person, place, and time  Cranial Nerves: No cranial nerve deficit  Psychiatric:         Mood and Affect: Mood normal          Thought Content:  Thought content normal      BP (!) 187/95   Pulse 62   Temp 97 5 °F (36 4 °C) (Temporal)   Resp 16   Ht 5' 7" (1 702 m)   Wt 99 3 kg (218 lb 14 7 oz)   SpO2 99%   BMI 34 29 kg/m²

## 2021-08-31 NOTE — ASSESSMENT & PLAN NOTE
Mr Eder Sigala has evidence of a zenker's diverticulum on imaging, for which he is symptomatic  His symptoms seem to be progressing, with his most "severe" episode occurring this morning  We explained the treatment options, including a transoral zenker's repair vs open repair of diverticulum  Both procedures were explained, along with possible risks and post operative course  All questions were answered  He would like to proceed with surgery

## 2021-09-01 ENCOUNTER — ANESTHESIA EVENT (OUTPATIENT)
Dept: PERIOP | Facility: HOSPITAL | Age: 81
End: 2021-09-01
Payer: COMMERCIAL

## 2021-09-14 ENCOUNTER — APPOINTMENT (OUTPATIENT)
Dept: LAB | Facility: HOSPITAL | Age: 81
End: 2021-09-14
Attending: THORACIC SURGERY (CARDIOTHORACIC VASCULAR SURGERY)
Payer: COMMERCIAL

## 2021-09-14 ENCOUNTER — LAB REQUISITION (OUTPATIENT)
Dept: LAB | Facility: HOSPITAL | Age: 81
End: 2021-09-14
Payer: COMMERCIAL

## 2021-09-14 DIAGNOSIS — K22.5 DIVERTICULUM OF ESOPHAGUS, ACQUIRED: ICD-10-CM

## 2021-09-14 DIAGNOSIS — Z01.818 ENCOUNTER FOR PREADMISSION TESTING: ICD-10-CM

## 2021-09-14 DIAGNOSIS — Z01.818 ENCOUNTER FOR OTHER PREPROCEDURAL EXAMINATION: ICD-10-CM

## 2021-09-14 DIAGNOSIS — K22.5 ZENKER'S DIVERTICULUM: ICD-10-CM

## 2021-09-14 LAB
ABO GROUP BLD: NORMAL
BASOPHILS # BLD AUTO: 0.03 THOUSANDS/ΜL (ref 0–0.1)
BASOPHILS NFR BLD AUTO: 1 % (ref 0–1)
BLD GP AB SCN SERPL QL: NEGATIVE
EOSINOPHIL # BLD AUTO: 0.34 THOUSAND/ΜL (ref 0–0.61)
EOSINOPHIL NFR BLD AUTO: 7 % (ref 0–6)
ERYTHROCYTE [DISTWIDTH] IN BLOOD BY AUTOMATED COUNT: 12.9 % (ref 11.6–15.1)
HCT VFR BLD AUTO: 49.9 % (ref 36.5–49.3)
HGB BLD-MCNC: 15.8 G/DL (ref 12–17)
IMM GRANULOCYTES # BLD AUTO: 0.01 THOUSAND/UL (ref 0–0.2)
IMM GRANULOCYTES NFR BLD AUTO: 0 % (ref 0–2)
LYMPHOCYTES # BLD AUTO: 1.11 THOUSANDS/ΜL (ref 0.6–4.47)
LYMPHOCYTES NFR BLD AUTO: 23 % (ref 14–44)
MCH RBC QN AUTO: 30.6 PG (ref 26.8–34.3)
MCHC RBC AUTO-ENTMCNC: 31.7 G/DL (ref 31.4–37.4)
MCV RBC AUTO: 97 FL (ref 82–98)
MONOCYTES # BLD AUTO: 0.42 THOUSAND/ΜL (ref 0.17–1.22)
MONOCYTES NFR BLD AUTO: 9 % (ref 4–12)
NEUTROPHILS # BLD AUTO: 2.98 THOUSANDS/ΜL (ref 1.85–7.62)
NEUTS SEG NFR BLD AUTO: 60 % (ref 43–75)
NRBC BLD AUTO-RTO: 0 /100 WBCS
PLATELET # BLD AUTO: 189 THOUSANDS/UL (ref 149–390)
PMV BLD AUTO: 10.3 FL (ref 8.9–12.7)
RBC # BLD AUTO: 5.16 MILLION/UL (ref 3.88–5.62)
RH BLD: POSITIVE
SPECIMEN EXPIRATION DATE: NORMAL
WBC # BLD AUTO: 4.89 THOUSAND/UL (ref 4.31–10.16)

## 2021-09-14 PROCEDURE — 85025 COMPLETE CBC W/AUTO DIFF WBC: CPT

## 2021-09-14 PROCEDURE — 86900 BLOOD TYPING SEROLOGIC ABO: CPT | Performed by: THORACIC SURGERY (CARDIOTHORACIC VASCULAR SURGERY)

## 2021-09-14 PROCEDURE — 86901 BLOOD TYPING SEROLOGIC RH(D): CPT | Performed by: THORACIC SURGERY (CARDIOTHORACIC VASCULAR SURGERY)

## 2021-09-14 PROCEDURE — 86850 RBC ANTIBODY SCREEN: CPT | Performed by: THORACIC SURGERY (CARDIOTHORACIC VASCULAR SURGERY)

## 2021-09-15 ENCOUNTER — RA CDI HCC (OUTPATIENT)
Dept: OTHER | Facility: HOSPITAL | Age: 81
End: 2021-09-15

## 2021-09-15 NOTE — PROGRESS NOTES
[facility]  coding opportunities          Number of diagnosis code(s) already on the problem list added to FYI fla               Number of suggestions used: 1         Patients insurance company: [address]  (Medicare Advantage and [email or web address]     Visit status: Patient arrived for their scheduled appointment        [place] [id number]  coding opportunities          Number of diagnosis code(s) already on the problem list added to FYI fla      E11 22, N18 31 T2DM with diabetic stage 3a CKD [facility]    Next appointment 21                 Patients insurance company: [address]  (Medicare Advantage and [email or web address]

## 2021-09-17 ENCOUNTER — CONSULT (OUTPATIENT)
Dept: NEPHROLOGY | Facility: CLINIC | Age: 81
End: 2021-09-17
Payer: COMMERCIAL

## 2021-09-17 VITALS
WEIGHT: 216 LBS | SYSTOLIC BLOOD PRESSURE: 118 MMHG | BODY MASS INDEX: 33.9 KG/M2 | DIASTOLIC BLOOD PRESSURE: 72 MMHG | HEIGHT: 67 IN

## 2021-09-17 DIAGNOSIS — E11.22 TYPE 2 DIABETES MELLITUS WITH STAGE 3A CHRONIC KIDNEY DISEASE, WITHOUT LONG-TERM CURRENT USE OF INSULIN (HCC): Primary | ICD-10-CM

## 2021-09-17 DIAGNOSIS — N18.31 CKD STAGE G3A/A3, GFR 45-59 AND ALBUMIN CREATININE RATIO >300 MG/G (HCC): ICD-10-CM

## 2021-09-17 DIAGNOSIS — N18.31 TYPE 2 DIABETES MELLITUS WITH STAGE 3A CHRONIC KIDNEY DISEASE, WITHOUT LONG-TERM CURRENT USE OF INSULIN (HCC): Primary | ICD-10-CM

## 2021-09-17 PROCEDURE — 99204 OFFICE O/P NEW MOD 45 MIN: CPT | Performed by: INTERNAL MEDICINE

## 2021-09-17 NOTE — PATIENT INSTRUCTIONS
Chronic Kidney Disease   WHAT YOU NEED TO KNOW:   Chronic kidney disease (CKD) is the gradual and permanent loss of kidney function  It is also called chronic kidney failure, or chronic renal insufficiency  Normally, the kidneys remove fluid, chemicals, and waste from your blood  These wastes are turned into urine by your kidneys  CKD may worsen over time and lead to kidney failure  Your CKD team will help you and your family plan for your care at home  The team will help you create goals and find ways to meet your goals  Your care plan may change over time as your needs change  DISCHARGE INSTRUCTIONS:   Call your local emergency number (911 in the [address]) if:   · You have a seizure  · You have shortness of breath  Return to the emergency department if:   · You are confused and very drowsy  Call your doctor or nephrologist if:   · You suddenly gain or lose more weight than your healthcare provider has told you is okay  · You have itchy skin or a rash  · You urinate more or less than you normally do  · You have blood in your urine  · You have nausea and are vomiting  · You have fatigue or muscle weakness  · You have hiccups that will not stop  · You have questions or concerns about your condition or care  Medicines:   · Medicines  may be given to decrease blood pressure and get rid of extra fluid  You may also receive medicine to manage health conditions that may occur with CKD, such as anemia, diabetes, and heart disease  · Take your medicine as directed  Contact your healthcare provider if you think your medicine is not helping or if you have side effects  Tell him or her if you are allergic to any medicine  Keep a list of the medicines, vitamins, and herbs you take  Include the amounts, and when and why you take them  Bring the list or the pill bottles to follow-up visits  Carry your medicine list with you in case of an emergency      What you can do to manage CKD: Management may include making some lifestyle changes  Tell your healthcare provider if you have any concerns about being able to make changes  He or she can help you find solutions, including working with specialists  Ask for help creating a plan to break large goals into smaller steps  Your plan may include any of the following:  · Manage other health conditions  Your healthcare provider will work with you to make a care plan that meets your needs  You will be checked regularly for heart disease or other conditions that can make CKD worse, such as diabetes  Your blood pressure will be closely monitored  You will also get a target blood pressure and help making a plan to reach your target  This may include taking your blood pressure at home  · Maintain a healthy weight  Your weight and body mass index (BMI) will be checked regularly  BMI helps find if your weight is healthy for your height  Your healthcare provider will use other tests to check your muscle and protein levels  Extra weight can strain your kidneys  A low weight or low muscle mass can make you feel more tired  You may have trouble doing your daily activities  Ask your provider what a healthy weight is for you  He or she can help you create a plan to lose or gain weight safely, if needed  The plan may include keeping a food diary  This is a list of foods and liquids you have each day  Your provider will use the diary to help you make changes, if needed  Changes are based on your health and any other conditions you have, such as diabetes  · Create an exercise plan  Regular exercise can help you manage CKD, high blood pressure, and diabetes  Exercise also helps control weight  Your provider can help you create exercise goals and a plan to reach those goals  For example, your goal may be to exercise for 30 minutes in a day  Your plan can include breaking exercise into 10 minute sessions, 3 times during the day           · Create a healthy eating plan  Your provider may tell you to eat food low in potassium, phosphorus, or protein  Your provider may also recommend vitamin or mineral supplements  Do not take any supplements without talking to your provider  A dietitian can help you plan meals if needed  Ask how much liquid to drink each day and which liquids are best for you  · Limit sodium (salt) as directed  You may need to limit sodium to less than 2,300 milligrams (mg) each day  Ask your dietitian or healthcare provider how much sodium you can have each day  The amount depends on your stage of kidney disease  Table salt, canned foods, soups, salted snacks, and processed meats, like deli meats and sausage, are high in sodium  Your provider or a dietitian can show you how to read food labels for sodium  · Limit alcohol as directed  Alcohol can cause fluid retention and can affect your kidneys  Ask how much alcohol is safe for you  A drink of alcohol is 12 ounces of beer, 5 ounces of wine, or 1½ ounces of liquor  · Do not smoke  Nicotine and other chemicals in cigarettes and cigars can cause kidney damage  Ask your provider for information if you currently smoke and need help to quit  E-cigarettes or smokeless tobacco still contain nicotine  Talk to your provider before you use these products  · Ask about over-the-counter medicines  Medicines such as NSAIDs and laxatives may harm your kidneys  Some cough and cold medicines can raise your blood pressure  Always ask if a medicine is safe before you take it  · Ask about vaccines you may need  CKD can increase your risk for infections such as pneumonia, influenza, and hepatitis  Vaccines lower your risk for infection  Your healthcare provider will tell you which vaccines you need and when to get them  Follow up with your doctor or nephrologist as directed: You will need to return for tests to monitor your kidney and nerve function, and your parathyroid hormone level   Your medicines may be changed, based on certain test results  Write down your questions so you remember to ask them during your visits  © Copyright [employer] 2021 Information is for End User's use only and may not be sold, redistributed or otherwise used for commercial purposes  All illustrations and images included in CareNotes® are the copyrighted property of [employer]  or Leah Alejo   The above information is an  only  It is not intended as medical advice for individual conditions or treatments  Talk to your doctor, nurse or pharmacist before following any medical regimen to see if it is safe and effective for you     1 )  Low 2 g sodium diet    2 )  Monitor weights at home    3 )  Avoid NSAIDs (ibuprofen, Motrin, Advil, Aleve, naproxen)    4 )  Monitor blood pressure at home, call if blood pressure greater than 150/90 persistently    5 ) I will plan to discuss all results including blood work, and/or imaging at our next visit, unless there is an urgent indication, in which case I will call you earlier  If you have any questions or concerns about your results, please feel free to call our office      6 ) Hold your blood pressure pill (Mavik) on the day of Surgery, and avoid NSAIDS at that time as well

## 2021-09-17 NOTE — PROGRESS NOTES
NEPHROLOGY OUTPATIENT CONSULTATION   Gonzales Sears 80 y o  male MRN: [id number]  Date: 9/17/2021  Reason for consultation:   Chief Complaint   Patient presents with    Consult       ASSESSMENT and PLAN:    Thank you for the courtesy of this consultation  I had the pleasure of seeing Romario Walton today in the renal clinic for the initial management of chronic kidney disease  Chronic Kidney Disease Stage IIIA with microalbuminuria  --Imaging:  Renal ultrasound from 2018 showed no evidence of hydronephrosis and no renal mass  --Urinalysis:  Unable to provide specimen the office will repeat a urinalysis at his next blood work  --Proteinuria:  Will check a microalbumin/creatinine ratio at next visit  --Baseline creatinine: mid 1's, 1 2-1 4 mg/dL  --Etiology:  Presumed to be secondary to diabetic kidney disease along with hypertensive nephrosclerosis  --Biopsy Proven:  No  --Status:  Renal function currently appears to be stable and at baseline  --Management:  Continue ACE-inhibitor for blood pressure control and anti proteinuric affects  Avoid NSAIDs which he does use on occasion  Diabetic control  Consider the addition of SGLT2 inhibitor if appropriate   --Reducing Cardiovascular Risk Factors:  Simvastatin+ Ezetimibe reduced atherosclerotic events in CKD (SHARP trial); Low dose aspirin safe (if no contraindications exist); smoking is an independent risk factor for Chronic Kidney Disease and progression, strongly recommend smoking cessation    Hypertension  -- Stage I (American College of Cardiology/American Heart Association)  -- with underlying chronic kidney disease  -- Body mass index is 33 83 kg/m²    -- Volume status: Euvolemic  -- Etiology:  Essential hypertension and obesity  -- Secondary Work-Up:  Not clinically indicated  -- Target Goal: < 130/80 (ACC/AHA, CKD with proteinuria, CKD in diabetics) ; if tolerated can target SBP < 120 mmHg in high cardiovascular risk ( Age > 75, CKD, CVD, No Diabetics SPRINT)  -- Lifestyle Modifications: DASH Diet, Weight Loss for ideal body weight, 45 mins of cardiovascular exercise 3 times a week as tolerated, and if no contraindications exist, smoking cessation, limit alcohol use, avoid NSAIDS, monitor blood pressure at home  -- Status: Blood pressure at target  -- Current antihypertensive regiment: Trandolaprill 1 mg daily  -- Changes:  No changes    Zenker's diverticulum  --plan for surgical intervention at the end of this month  --I have asked him to hold his ACE-inhibitor the morning of surgery and avoid NSAIDs    Diabetes mellitus type 2  -hemoglobin A1c: 6 3 (A1C values may be falsely elevated or decreased in those with CKD, assay method should be certified by [employer])  Target A1C between 7-8 5 (will need to be individualized for each patient), and would utilize A1C  in conjunction with continuous glucose monitoring (CGM)  It should also be noted that the A1c with more advanced kidney disease would be inaccurate due to decreased red blood cell life along with anemia   -current medications:  Metformin  -proteinuria:  Evidence of microalbuminuria  -retinopathy:  Not reported  -neuropathy:  Not reported  -please follow with an ophthalmologist and podiatrist every year  -continued self monitoring of blood glucose at home  -Management in CKD Recommendations:    Metformin:  Avoid if creatinine clearance is less than 30 cc/min (concern for lactic acidosis)   Sodium-Glucose Cotransporter-2 (SGLT2) Inhibitors: May see an acute drop in the eGFR initially when starting the medication but then a stabilization of the renal function, with slower loss of renal function as compared to placebo  Relative risk of end-stage renal disease, doubling of serum creatinine or death from renal causes were also found to be lower as compared to placebo  ([facility])    DAPA-CKD study, showed that patients with chronic kidney disease regardless of the presence or absence of diabetes the risk of composite of a sustained decline in the estimated GFR of at least 50%, end-stage renal disease or death from renal or cardiovascular causes was significantly lower with Dapagliflozin than with placebo  EMPEROR-Reduced study also showed beneficial effects  If no contraindications exist I would recommend this medication added to the diabetic regiment, if further optimal diabetic control is required  If eGFR < 60 cc/min (avoid ertugliflozin); If eGFR < 45 cc/min (caution with or avoid dapagliflozin, emphagliflozin), if eGFR  < 30 cc/min (caution or avoid all including canagliflozin, more for efficacy)   Sulfonylureas:  Preferred short-acting (glipizide, glimepiride, repaglinide), relatively safe in patients with non dialysis CKD   Thiazolidinedones/Alpha-Gluosidase inhibitors/Dipeptidyl peptidase-4 inhibitors:  Generally not considered first-line agents in CKD (limited data in long-term safety and efficacy)   Insulin:  Starting dose may need to be lower than what would ordinarily be used, as there is a decrease metabolism of insulin (no dose adjustment if the GFR > 50 mL/min, dose should be reduced to 75% of baseline if GFR 10-50 mL/min, and 50% baseline if GFR < 10 mL/min)    Frequent urination  --no burning or fever  --planning to see Urology next week  --unable to provide a urine specimen the office  --I did offer to have an ultrasound done but he would like to wait was urology evaluation    PATIENT INSTRUCTIONS:    Patient Instructions     Chronic Kidney Disease   WHAT YOU NEED TO KNOW:   Chronic kidney disease (CKD) is the gradual and permanent loss of kidney function  It is also called chronic kidney failure, or chronic renal insufficiency  Normally, the kidneys remove fluid, chemicals, and waste from your blood  These wastes are turned into urine by your kidneys  CKD may worsen over time and lead to kidney failure   Your CKD team will help you and your family plan for your care at home  The team will help you create goals and find ways to meet your goals  Your care plan may change over time as your needs change  DISCHARGE INSTRUCTIONS:   Call your local emergency number (911 in the [address]) if:   · You have a seizure  · You have shortness of breath  Return to the emergency department if:   · You are confused and very drowsy  Call your doctor or nephrologist if:   · You suddenly gain or lose more weight than your healthcare provider has told you is okay  · You have itchy skin or a rash  · You urinate more or less than you normally do  · You have blood in your urine  · You have nausea and are vomiting  · You have fatigue or muscle weakness  · You have hiccups that will not stop  · You have questions or concerns about your condition or care  Medicines:   · Medicines  may be given to decrease blood pressure and get rid of extra fluid  You may also receive medicine to manage health conditions that may occur with CKD, such as anemia, diabetes, and heart disease  · Take your medicine as directed  Contact your healthcare provider if you think your medicine is not helping or if you have side effects  Tell him or her if you are allergic to any medicine  Keep a list of the medicines, vitamins, and herbs you take  Include the amounts, and when and why you take them  Bring the list or the pill bottles to follow-up visits  Carry your medicine list with you in case of an emergency  What you can do to manage CKD: Management may include making some lifestyle changes  Tell your healthcare provider if you have any concerns about being able to make changes  He or she can help you find solutions, including working with specialists  Ask for help creating a plan to break large goals into smaller steps  Your plan may include any of the following:  · Manage other health conditions    Your healthcare provider will work with you to make a care plan that meets your needs  You will be checked regularly for heart disease or other conditions that can make CKD worse, such as diabetes  Your blood pressure will be closely monitored  You will also get a target blood pressure and help making a plan to reach your target  This may include taking your blood pressure at home  · Maintain a healthy weight  Your weight and body mass index (BMI) will be checked regularly  BMI helps find if your weight is healthy for your height  Your healthcare provider will use other tests to check your muscle and protein levels  Extra weight can strain your kidneys  A low weight or low muscle mass can make you feel more tired  You may have trouble doing your daily activities  Ask your provider what a healthy weight is for you  He or she can help you create a plan to lose or gain weight safely, if needed  The plan may include keeping a food diary  This is a list of foods and liquids you have each day  Your provider will use the diary to help you make changes, if needed  Changes are based on your health and any other conditions you have, such as diabetes  · Create an exercise plan  Regular exercise can help you manage CKD, high blood pressure, and diabetes  Exercise also helps control weight  Your provider can help you create exercise goals and a plan to reach those goals  For example, your goal may be to exercise for 30 minutes in a day  Your plan can include breaking exercise into 10 minute sessions, 3 times during the day  · Create a healthy eating plan  Your provider may tell you to eat food low in potassium, phosphorus, or protein  Your provider may also recommend vitamin or mineral supplements  Do not take any supplements without talking to your provider  A dietitian can help you plan meals if needed  Ask how much liquid to drink each day and which liquids are best for you  · Limit sodium (salt) as directed    You may need to limit sodium to less than 2,300 milligrams (mg) each day  Ask your dietitian or healthcare provider how much sodium you can have each day  The amount depends on your stage of kidney disease  Table salt, canned foods, soups, salted snacks, and processed meats, like deli meats and sausage, are high in sodium  Your provider or a dietitian can show you how to read food labels for sodium  · Limit alcohol as directed  Alcohol can cause fluid retention and can affect your kidneys  Ask how much alcohol is safe for you  A drink of alcohol is 12 ounces of beer, 5 ounces of wine, or 1½ ounces of liquor  · Do not smoke  Nicotine and other chemicals in cigarettes and cigars can cause kidney damage  Ask your provider for information if you currently smoke and need help to quit  E-cigarettes or smokeless tobacco still contain nicotine  Talk to your provider before you use these products  · Ask about over-the-counter medicines  Medicines such as NSAIDs and laxatives may harm your kidneys  Some cough and cold medicines can raise your blood pressure  Always ask if a medicine is safe before you take it  · Ask about vaccines you may need  CKD can increase your risk for infections such as pneumonia, influenza, and hepatitis  Vaccines lower your risk for infection  Your healthcare provider will tell you which vaccines you need and when to get them  Follow up with your doctor or nephrologist as directed: You will need to return for tests to monitor your kidney and nerve function, and your parathyroid hormone level  Your medicines may be changed, based on certain test results  Write down your questions so you remember to ask them during your visits  © Copyright [employer] 2021 Information is for End User's use only and may not be sold, redistributed or otherwise used for commercial purposes  All illustrations and images included in CareNotes® are the copyrighted property of [employer]  or Leah Alejo   The above information is an  only  It is not intended as medical advice for individual conditions or treatments  Talk to your doctor, nurse or pharmacist before following any medical regimen to see if it is safe and effective for you     1 )  Low 2 g sodium diet    2 )  Monitor weights at home    3 )  Avoid NSAIDs (ibuprofen, Motrin, Advil, Aleve, naproxen)    4 )  Monitor blood pressure at home, call if blood pressure greater than 150/90 persistently    5 ) I will plan to discuss all results including blood work, and/or imaging at our next visit, unless there is an urgent indication, in which case I will call you earlier  If you have any questions or concerns about your results, please feel free to call our office  6 ) Hold your blood pressure pill (Mavik) on the day of Surgery, and avoid NSAIDS at that time as well        HISTORY OF PRESENT ILLNESS:  Requesting Physician: Rob Charlton MD    [address] is a 80 y o  male who has a history of hypertension and diabetes for over 10 years that has been both well controlled who presents for evaluation of chronic kidney disease  Chronic kidney disease has been persistent since 2016 with his creatinine ranging at around 1 3-1 4 mg/dL since 2018  He also has a history of Zenker's diverticulum and is planned for intervention at the end of the month  This anchors diverticulum has caused issues with swallowing and eating food with coughing of regurgitated food  He reports no issues with medications  Reports rare NSAID use but not excessive  He is nonsmoker  Reports no family history of kidney disease  He is maintained on an ACE-inhibitor feels blood pressure and his blood pressure is well controlled  His diabetes is also well controlled  His renal function has remained stable over the last few years  He does have frequent urination but no burning and no pain no dysuria no fevers  He went to see Urology next week    He reports no weak urinary stream     PAST MEDICAL HISTORY:  Past Medical History:   Diagnosis Date    Abnormal blood chemistry     resolved: 11/9/16    Abnormal glucose     last assessed: 9/24/14    CAD (coronary artery disease)     Coronary atherosclerosis     DM2 (diabetes mellitus, type 2) (HCC)     Dyslipidemia     Facial nerve disorder     HTN (hypertension)     Hyperlipidemia     last assessed: 1/13/17    Kidney stones     Lumbosacral radiculopathy     Nerve root and plexus disorder     Obesity     Osteoarthritis     Prostate asymmetry     Pure hypercholesterolemia        PAST SURGICAL HISTORY:  Past Surgical History:   Procedure Laterality Date    CORONARY ANGIOPLASTY      UPPER GASTROINTESTINAL ENDOSCOPY         ALLERGIES:  Allergies   Allergen Reactions    Ciprofloxacin Hives       SOCIAL HISTORY:  Social History     Substance and Sexual Activity   Alcohol Use Yes    Comment: occasional     Social History     Substance and Sexual Activity   Drug Use No     Social History     Tobacco Use   Smoking Status Never Smoker   Smokeless Tobacco Never Used       FAMILY HISTORY:  Family History   Problem Relation Age of Onset    Cancer Mother     Ovarian cancer Mother     Hypertension Sister     Lung disease Father         black    Mental illness Neg Hx     Substance Abuse Neg Hx        MEDICATIONS:    Current Outpatient Medications:     aspirin 81 mg chewable tablet, Chew 81 mg daily, Disp: , Rfl:     cholecalciferol (VITAMIN D3) 1,000 units tablet, Take 1,000 Units by mouth daily, Disp: , Rfl:     metFORMIN (GLUCOPHAGE) 500 mg tablet, Take 500 mg by mouth 2 (two) times a day with meals , Disp: , Rfl:     multivitamin-iron-minerals-folic acid (CENTRUM) chewable tablet, Chew 1 tablet daily, Disp: , Rfl:     pantoprazole (PROTONIX) 40 mg tablet, Take 1 tablet (40 mg total) by mouth daily before breakfast, Disp: 90 tablet, Rfl: 3    simvastatin (ZOCOR) 40 mg tablet, TAKE 1 TABLET BY MOUTH  DAILY AT BEDTIME, Disp: 90 tablet, Rfl: 3    sitaGLIPtin (Januvia) 50 mg tablet, Take 1 tablet (50 mg total) by mouth daily, Disp: 90 tablet, Rfl: 1    trandolapril (MAVIK) 1 MG tablet, TAKE ONE TABLET BY MOUTH EVERY DAY, Disp: 90 tablet, Rfl: 0    Promethazine-DM (PHENERGAN-DM) 6 25-15 mg/5 mL oral syrup, Take 5 mL by mouth daily at bedtime (Patient not taking: Reported on 7/8/2021), Disp: 180 mL, Rfl: 0    REVIEW OF SYSTEMS:  Review of Systems   Constitutional: Negative for activity change and fever  Respiratory: Negative for cough, chest tightness, shortness of breath and wheezing  Cardiovascular: Negative for chest pain and leg swelling  Gastrointestinal: Negative for abdominal pain, diarrhea, nausea and vomiting  Endocrine: Negative for polyuria  Genitourinary: Positive for frequency  Negative for difficulty urinating, dysuria, flank pain and urgency  Skin: Negative for rash  Neurological: Negative for dizziness, syncope, light-headedness and headaches  All the systems were reviewed and were negative except as documented on the HPI  PHYSICAL EXAM:  Current Weight: Body mass index is 33 83 kg/m²  Vitals:    09/17/21 1255   Weight: 98 kg (216 lb)   Height: 5' 7" (1 702 m)       Physical Exam  Vitals and nursing note reviewed  Constitutional:       General: He is not in acute distress  Appearance: He is well-developed  He is not diaphoretic  HENT:      Head: Normocephalic and atraumatic  Eyes:      General: No scleral icterus  Pupils: Pupils are equal, round, and reactive to light  Cardiovascular:      Rate and Rhythm: Normal rate and regular rhythm  Heart sounds: Normal heart sounds  No murmur heard  No friction rub  No gallop  Pulmonary:      Effort: Pulmonary effort is normal  No respiratory distress  Breath sounds: Normal breath sounds  No wheezing or rales  Chest:      Chest wall: No tenderness  Abdominal:      General: Bowel sounds are normal  There is no distension  Palpations: Abdomen is soft        Tenderness: There is no abdominal tenderness  There is no rebound  Musculoskeletal:         General: Normal range of motion  Cervical back: Normal range of motion and neck supple  Skin:     Findings: No rash  Neurological:      Mental Status: He is alert and oriented to person, place, and time           Laboratory results:   Results for orders placed or performed in visit on 09/14/21   Type and screen   Result Value Ref Range    ABO Grouping O     Rh Factor Positive     Antibody Screen Negative     Specimen Expiration Date [id number]

## 2021-09-21 ENCOUNTER — OFFICE VISIT (OUTPATIENT)
Dept: FAMILY MEDICINE CLINIC | Facility: CLINIC | Age: 81
End: 2021-09-21
Payer: COMMERCIAL

## 2021-09-21 VITALS
RESPIRATION RATE: 16 BRPM | WEIGHT: 217 LBS | HEART RATE: 113 BPM | DIASTOLIC BLOOD PRESSURE: 88 MMHG | SYSTOLIC BLOOD PRESSURE: 140 MMHG | OXYGEN SATURATION: 96 % | BODY MASS INDEX: 34.06 KG/M2 | HEIGHT: 67 IN | TEMPERATURE: 98.1 F

## 2021-09-21 DIAGNOSIS — N18.31 TYPE 2 DIABETES MELLITUS WITH STAGE 3A CHRONIC KIDNEY DISEASE, WITHOUT LONG-TERM CURRENT USE OF INSULIN (HCC): ICD-10-CM

## 2021-09-21 DIAGNOSIS — I25.10 2-VESSEL CORONARY ARTERY DISEASE: ICD-10-CM

## 2021-09-21 DIAGNOSIS — E11.22 TYPE 2 DIABETES MELLITUS WITH STAGE 3A CHRONIC KIDNEY DISEASE, WITHOUT LONG-TERM CURRENT USE OF INSULIN (HCC): ICD-10-CM

## 2021-09-21 DIAGNOSIS — Z01.818 PRE-OP EXAM: Primary | ICD-10-CM

## 2021-09-21 DIAGNOSIS — I10 BENIGN HYPERTENSION: ICD-10-CM

## 2021-09-21 PROCEDURE — 99214 OFFICE O/P EST MOD 30 MIN: CPT | Performed by: FAMILY MEDICINE

## 2021-09-21 PROCEDURE — 3079F DIAST BP 80-89 MM HG: CPT | Performed by: FAMILY MEDICINE

## 2021-09-21 PROCEDURE — 3077F SYST BP >= 140 MM HG: CPT | Performed by: FAMILY MEDICINE

## 2021-09-21 NOTE — Clinical Note
Hi : )    Patient came in for clearance, showed some PVCs patient states that this is his baseline  Asymptomatic  I just wanted to make you aware of new EKG in his chart, if you are able to review

## 2021-09-21 NOTE — PROGRESS NOTES
PRE-OPERATIVE EVALUATION  [facility] PHYSICIAN GROUP [facility]    NAME: Gonzales Sears  AGE: 80 y o  SEX: male  : 1940   DATE: 2021    Pre-Operative Evaluation      Chief Complaint: Pre-operative Evaluation     Surgery: [facility] ENDOSCOPIC   Anticipated Date of Surgery: 2021  Referring Provider: Dr Madeleine Buchanan    History of Present Illness:     Adina Jennings is a 80 y o  male who presents to the office today for a preoperative Planned anesthesia is general Patient  does not have a bleeding risk  Patient does not have objections to receiving blood products if needed  Patient just recently saw his cardiologist   Was advised that he was at his baseline and did not need any close follow-up  Was recommended follow-up in 6 months  Patient is currently asymptomatic  Patient is taking his cholesterol medication/diabetic medication without any difficulties  States his blood pressure slightly elevated his he is worried about his wife what she is going to do on the day of his surgery  Assessment of Cardiac Risk:  · Denies unstable or severe angina or MI in the last 6 weeks or history of stent placement in the last year   · Denies decompensated heart failure (e g  New onset heart failure, NYHA functional class IV heart failure, or worsening existing heart failure)  · Denies significant arrhythmias such as high grade AV block, symptomatic ventricular arrhythmia, newly recognized ventricular tachycardia, supraventricular tachycardia with resting heart rate >100, or symptomatic bradycardia  · Denies severe heart valve disease including aortic stenosis or symptomatic mitral stenosis     Exercise Capacity:  · Able to walk 4 blocks without symptoms?: Yes  · Able to walk 2 flights without symptoms?: Yes      Prior Anesthesia Reactions: No; comes out of anesthesia slowly  History of reaction with Reprol?  He will be showing to anesthesia " it killed by RBC" Personal history of venous thromboembolic disease? No    History of steroid use for >2 weeks within last year? No      Review of Systems:     Review of Systems   Constitutional: Negative for activity change, appetite change, chills, fatigue and fever  HENT: Negative for congestion  Respiratory: Negative for cough, chest tightness and shortness of breath  Cardiovascular: Negative for chest pain and leg swelling  Gastrointestinal: Negative for abdominal distention, abdominal pain, constipation, diarrhea, nausea and vomiting  All other systems reviewed and are negative  Chest pain: no   Shortness of breath: no  Shortness of breath with exertion: no  Orthopnea: no  Dizziness: no  Unexplained weight change: no    Current Problem List:     Patient Active Problem List   Diagnosis    2-vessel coronary artery disease    Benign hypertension    Type 2 diabetes mellitus with stage 3a chronic kidney disease (HCC)    Dyslipidemia    Moderate obesity    Dysphagia    Cough    Shortness of breath    Zenker's diverticulum       Allergies:      Allergies   Allergen Reactions    Ciprofloxacin Hives       Current Medications:       Current Outpatient Medications:     aspirin 81 mg chewable tablet, Chew 81 mg daily, Disp: , Rfl:     cholecalciferol (VITAMIN D3) 1,000 units tablet, Take 1,000 Units by mouth daily, Disp: , Rfl:     metFORMIN (GLUCOPHAGE) 500 mg tablet, Take 500 mg by mouth 2 (two) times a day with meals , Disp: , Rfl:     multivitamin-iron-minerals-folic acid (CENTRUM) chewable tablet, Chew 1 tablet daily, Disp: , Rfl:     pantoprazole (PROTONIX) 40 mg tablet, Take 1 tablet (40 mg total) by mouth daily before breakfast, Disp: 90 tablet, Rfl: 3    simvastatin (ZOCOR) 40 mg tablet, TAKE 1 TABLET BY MOUTH  DAILY AT BEDTIME, Disp: 90 tablet, Rfl: 3    sitaGLIPtin (Januvia) 50 mg tablet, Take 1 tablet (50 mg total) by mouth daily, Disp: 90 tablet, Rfl: 1    trandolapril (MAVIK) 1 MG tablet, TAKE ONE TABLET BY MOUTH EVERY DAY, Disp: 90 tablet, Rfl: 0    Promethazine-DM (PHENERGAN-DM) 6 25-15 mg/5 mL oral syrup, Take 5 mL by mouth daily at bedtime (Patient not taking: Reported on 7/8/2021), Disp: 180 mL, Rfl: 0    Past Medical History:       Past Medical History:   Diagnosis Date    Abnormal blood chemistry     resolved: 11/9/16    Abnormal glucose     last assessed: 9/24/14    CAD (coronary artery disease)     Coronary atherosclerosis     [facility]     Dyslipidemia     Facial nerve disorder     HTN (hypertension)     Hyperlipidemia     last assessed: 1/13/17    Kidney stones     Lumbosacral radiculopathy     Nerve root and plexus disorder     Obesity     Osteoarthritis     Prostate asymmetry     Pure hypercholesterolemia         Past Surgical History:   Procedure Laterality Date    CORONARY ANGIOPLASTY      UPPER GASTROINTESTINAL ENDOSCOPY          Family History   Problem Relation Age of Onset    Cancer Mother     Ovarian cancer Mother     Hypertension Sister     Lung disease Father         black    Mental illness Neg Hx     Substance Abuse Neg Hx         Social History     Socioeconomic History    Marital status: /Civil Union     Spouse name: Not on file    Number of children: Not on file    Years of education: Not on file    Highest education level: Not on file   Occupational History    Not on file   Tobacco Use    Smoking status: Never Smoker    Smokeless tobacco: Never Used   Substance and Sexual Activity    Alcohol use: Yes     Comment: occasional    Drug use: No    Sexual activity: Not on file   Other Topics Concern    Not on file   Social History Narrative    Not on file     Social Determinants of Health     Financial Resource Strain:     Difficulty of Paying Living Expenses:    Food Insecurity:     Worried About Running Out of Food in the Last Year:     [address] in the Last Year:    Transportation Needs:     Lack of Transportation (Medical):  Lack of Transportation (Non-Medical):    Physical Activity:     Days of Exercise per Week:     Minutes of Exercise per Session:    Stress:     Feeling of Stress :    Social Connections:     Frequency of Communication with Friends and Family:     Frequency of Social Gatherings with Friends and Family:     Attends Zoroastrian Services:     Active Member of Clubs or Organizations:     Attends Club or Organization Meetings:     Marital Status:    Intimate Partner Violence:     Fear of Current or Ex-Partner:     Emotionally Abused:     Physically Abused:     Sexually Abused:         Physical Exam:     Vitals:    09/21/21 1400   BP: 140/88   Pulse: (!) 113   Resp: 16   Temp: 98 1 °F (36 7 °C)   SpO2: 96%     Physical Exam  Constitutional:       Appearance: He is well-developed  HENT:      Head: Normocephalic and atraumatic  Right Ear: External ear normal       Left Ear: External ear normal       Nose: Nose normal       Mouth/Throat:      Pharynx: No oropharyngeal exudate  Eyes:      Conjunctiva/sclera: Conjunctivae normal       Pupils: Pupils are equal, round, and reactive to light  Cardiovascular:      Rate and Rhythm: Normal rate and regular rhythm  Pulses: Intact distal pulses  Heart sounds: Normal heart sounds  Pulmonary:      Effort: Pulmonary effort is normal       Breath sounds: Normal breath sounds  Abdominal:      General: Bowel sounds are normal       Palpations: Abdomen is soft  Tenderness: There is no abdominal tenderness  Musculoskeletal:         General: Normal range of motion  Cervical back: Normal range of motion and neck supple  Skin:     General: Skin is warm and dry  Neurological:      Mental Status: He is alert and oriented to person, place, and time  Cranial Nerves: No cranial nerve deficit  Motor: No abnormal muscle tone        Coordination: Coordination normal       Deep Tendon Reflexes: Reflexes normal  Psychiatric:         Behavior: Behavior normal          Thought Content: Thought content normal          Judgment: Judgment normal          Pre-Op Evaluation Assessment  80 y o  male with planned surgery:   DIVERTICULECTOMY ZENKERS ENDOSCOPIC   Known risk factors for perioperative complications: None  Cardiac Risk Estimation: per the Revised Cardiac Risk Index (Circ  100:1043, 1999), the patient's risk factors for cardiac complications include None', putting him in: RCI RISK CLASS I (0 risk factors, risk of major cardiac compl  appr  0 5%)  Recommendations:  Gonzales Sears is undergoing an elective Low Risk surgery  They are at [address] risk for major adverse cardiac event (MACE)  They may proceed with surgery as planned without further workup    EKG baseline-> will send to Cardiologist  DM controlled  HTN elevated but controlled

## 2021-09-22 ENCOUNTER — CONSULT (OUTPATIENT)
Dept: UROLOGY | Facility: CLINIC | Age: 81
End: 2021-09-22
Payer: COMMERCIAL

## 2021-09-22 VITALS
HEIGHT: 67 IN | DIASTOLIC BLOOD PRESSURE: 86 MMHG | HEART RATE: 69 BPM | SYSTOLIC BLOOD PRESSURE: 142 MMHG | BODY MASS INDEX: 33.9 KG/M2 | WEIGHT: 216 LBS

## 2021-09-22 DIAGNOSIS — N40.1 BPH WITH OBSTRUCTION/LOWER URINARY TRACT SYMPTOMS: Primary | ICD-10-CM

## 2021-09-22 DIAGNOSIS — N13.8 BPH WITH OBSTRUCTION/LOWER URINARY TRACT SYMPTOMS: Primary | ICD-10-CM

## 2021-09-22 DIAGNOSIS — N39.43 POST-VOID DRIBBLING: ICD-10-CM

## 2021-09-22 LAB — POST-VOID RESIDUAL VOLUME, ML POC: 15 ML

## 2021-09-22 PROCEDURE — 99203 OFFICE O/P NEW LOW 30 MIN: CPT | Performed by: PHYSICIAN ASSISTANT

## 2021-09-22 PROCEDURE — 1036F TOBACCO NON-USER: CPT | Performed by: PHYSICIAN ASSISTANT

## 2021-09-22 PROCEDURE — 1160F RVW MEDS BY RX/DR IN RCRD: CPT | Performed by: PHYSICIAN ASSISTANT

## 2021-09-22 PROCEDURE — 93000 ELECTROCARDIOGRAM COMPLETE: CPT | Performed by: FAMILY MEDICINE

## 2021-09-22 PROCEDURE — 51798 US URINE CAPACITY MEASURE: CPT | Performed by: PHYSICIAN ASSISTANT

## 2021-09-22 RX ORDER — TAMSULOSIN HYDROCHLORIDE 0.4 MG/1
0.4 CAPSULE ORAL
Qty: 90 CAPSULE | Refills: 3 | Status: SHIPPED | OUTPATIENT
Start: 2021-09-22

## 2021-09-27 NOTE — PROGRESS NOTES
UROLOGY CONSULTATION NOTE     Patient Identifiers: Catherine Kee (MRN: [id number])  Service Requesting Consultation: Jorge Alas MD  Service Providing Consultation:  Urology, Fuentes Watkins PA-C  Consults  Date of Service:  09/22/2021    Reason for Consultation:  Urinary frequency    History of Present Illness:     Catherine Kee is a 80 y o  male with a history kidney stones  Has previously seen Dr Josh Malone  He is referred for evaluation of urinary frequency  He has daytime frequency and some nocturia  He has not been on any medications  He empties his bladder well with a PVR 15 mL  His PSA last year was 3 3  Diabetes is controlled with A1c 6 3  There is no upper tract imaging  He denies any family history of prostate bladder or kidney diseases      Past Medical, Past Surgical History:     Past Medical History:   Diagnosis Date    Abnormal blood chemistry     resolved: 11/9/16    Abnormal glucose     last assessed: 9/24/14    CAD (coronary artery disease)     Coronary atherosclerosis     DM2 (diabetes mellitus, type 2) (HCC)     Dyslipidemia     Facial nerve disorder     HTN (hypertension)     Hyperlipidemia     last assessed: 1/13/17    Kidney stones     Lumbosacral radiculopathy     Nerve root and plexus disorder     Obesity     Osteoarthritis     Prostate asymmetry     Pure hypercholesterolemia    :    Past Surgical History:   Procedure Laterality Date    CORONARY ANGIOPLASTY      UPPER GASTROINTESTINAL ENDOSCOPY     :    Medications, Allergies:     Current Outpatient Medications:     aspirin 81 mg chewable tablet, Chew 81 mg daily, Disp: , Rfl:     cholecalciferol (VITAMIN D3) 1,000 units tablet, Take 1,000 Units by mouth daily, Disp: , Rfl:     metFORMIN (GLUCOPHAGE) 500 mg tablet, Take 500 mg by mouth 2 (two) times a day with meals , Disp: , Rfl:     multivitamin-iron-minerals-folic acid (CENTRUM) chewable tablet, Chew 1 tablet daily, Disp: , Rfl:     pantoprazole (PROTONIX) 40 mg tablet, Take 1 tablet (40 mg total) by mouth daily before breakfast, Disp: 90 tablet, Rfl: 3    simvastatin (ZOCOR) 40 mg tablet, TAKE 1 TABLET BY MOUTH  DAILY AT BEDTIME, Disp: 90 tablet, Rfl: 3    sitaGLIPtin (Januvia) 50 mg tablet, Take 1 tablet (50 mg total) by mouth daily, Disp: 90 tablet, Rfl: 1    trandolapril (MAVIK) 1 MG tablet, TAKE ONE TABLET BY MOUTH EVERY DAY, Disp: 90 tablet, Rfl: 0    tamsulosin (FLOMAX) 0 4 mg, Take 1 capsule (0 4 mg total) by mouth daily with dinner, Disp: 90 capsule, Rfl: 3    Allergies: Allergies   Allergen Reactions    Ciprofloxacin Hives   :    Social and Family History:   Social History:   Social History     Tobacco Use    Smoking status: Never Smoker    Smokeless tobacco: Never Used   Substance Use Topics    Alcohol use: Yes     Comment: occasional    Drug use: No        Social History     Tobacco Use   Smoking Status Never Smoker   Smokeless Tobacco Never Used       Family History:  Family History   Problem Relation Age of Onset    Cancer Mother     Ovarian cancer Mother     Hypertension Sister     Lung disease Father         black    Mental illness Neg Hx     Substance Abuse Neg Hx    :     Review of Systems:     General: Fever, chills, or night sweats: negative  Cardiac: Negative for chest pain  Pulmonary: Negative for shortness of breath  Gastrointestinal: Abdominal pain negative  Nausea, vomiting, or diarrhea negative,  Genitourinary: See HPI above  Patient does have hematuria  All other systems queried were negative  Physical Exam:   General: Patient is pleasant and in NAD   Awake and alert  /86 (BP Location: Left arm, Patient Position: Sitting, Cuff Size: Adult)   Pulse 69   Ht 5' 7" (1 702 m)   Wt 98 kg (216 lb)   BMI 33 83 kg/m²   HEENT:  Conjunctiva are clear  Constitutional:  pleasant and cooperative     no apparent distress  Cardiac: Peripheral edema: negative  Pulmonary: Non-labored breathing  Abdomen: Soft, non-tender, non-distended  No surgical scars  No masses, tenderness, hernias noted  Genitourinary: Negative CVA tenderness, negative suprapubic tenderness  Extremities:  Moves all extremities  Neurological:CNII-XII intact  No numbness or tingling  Essentially non focal neurologic exam  Psychiatric:mood affect and behavior normal    (Male): Penis circumcised, phallus normal, meatus patent  Testicles descended into scrotum bilaterally without masses nor tenderness  No inguinal hernias bilaterally  JESSICA: Prostate is enlarged at 35 grams  The prostate is not boggy  The prostate is not tender  No nodules noted  Labs:     Lab Results   Component Value Date    HGB 15 8 09/14/2021    HCT 49 9 (H) 09/14/2021    WBC 4 89 09/14/2021     09/14/2021   ]    Lab Results   Component Value Date     02/17/2017    K 4 6 09/14/2021     09/14/2021    CO2 30 09/14/2021    BUN 20 09/14/2021    CREATININE 1 39 (H) 09/14/2021    CALCIUM 8 6 09/14/2021    GLUCOSE 125 (H) 02/17/2017   ]    Imaging:   I personally reviewed the images and report of the following studies, and reviewed them with the patient:  None    ASSESSMENT:     1  BPH with lower urinary tract symptoms     PLAN:   -a give him a trial of tamsulosin  -follow-up in 3 months for re-evaluation of symptoms  -he can call me in the meantime with any further questions or concerns    Thank you for allowing me to participate in this patients care  Please do not hesitate to call with any additional questions    Yeyo Rodgers PA-C

## 2021-09-30 ENCOUNTER — ANESTHESIA (OUTPATIENT)
Dept: PERIOP | Facility: HOSPITAL | Age: 81
End: 2021-09-30
Payer: COMMERCIAL

## 2021-09-30 ENCOUNTER — HOSPITAL ENCOUNTER (OUTPATIENT)
Facility: HOSPITAL | Age: 81
Setting detail: OUTPATIENT SURGERY
Discharge: HOME | End: 2021-10-01
Attending: THORACIC SURGERY (CARDIOTHORACIC VASCULAR SURGERY) | Admitting: THORACIC SURGERY (CARDIOTHORACIC VASCULAR SURGERY)
Payer: COMMERCIAL

## 2021-09-30 DIAGNOSIS — Z01.818 PRE-OP TESTING: Primary | ICD-10-CM

## 2021-09-30 DIAGNOSIS — N28.9 RENAL INSUFFICIENCY: ICD-10-CM

## 2021-09-30 LAB
ABO GROUP BLD: NORMAL
BLD GP AB SCN SERPL QL: NEGATIVE
GLUCOSE SERPL-MCNC: 126 MG/DL (ref 65–140)
GLUCOSE SERPL-MCNC: 162 MG/DL (ref 65–140)
GLUCOSE SERPL-MCNC: 181 MG/DL (ref 65–140)
RH BLD: POSITIVE
SPECIMEN EXPIRATION DATE: NORMAL

## 2021-09-30 PROCEDURE — 94760 N-INVAS EAR/PLS OXIMETRY 1: CPT

## 2021-09-30 PROCEDURE — 86850 RBC ANTIBODY SCREEN: CPT | Performed by: THORACIC SURGERY (CARDIOTHORACIC VASCULAR SURGERY)

## 2021-09-30 PROCEDURE — C1769 GUIDE WIRE: HCPCS | Performed by: THORACIC SURGERY (CARDIOTHORACIC VASCULAR SURGERY)

## 2021-09-30 PROCEDURE — 86900 BLOOD TYPING SEROLOGIC ABO: CPT | Performed by: THORACIC SURGERY (CARDIOTHORACIC VASCULAR SURGERY)

## 2021-09-30 PROCEDURE — 86901 BLOOD TYPING SEROLOGIC RH(D): CPT | Performed by: THORACIC SURGERY (CARDIOTHORACIC VASCULAR SURGERY)

## 2021-09-30 PROCEDURE — 43180 ESOPHAGOSCOPY RIGID TRNSO: CPT | Performed by: THORACIC SURGERY (CARDIOTHORACIC VASCULAR SURGERY)

## 2021-09-30 PROCEDURE — 82948 REAGENT STRIP/BLOOD GLUCOSE: CPT

## 2021-09-30 PROCEDURE — 99214 OFFICE O/P EST MOD 30 MIN: CPT | Performed by: INTERNAL MEDICINE

## 2021-09-30 RX ORDER — SODIUM CHLORIDE, SODIUM LACTATE, POTASSIUM CHLORIDE, CALCIUM CHLORIDE 600; 310; 30; 20 MG/100ML; MG/100ML; MG/100ML; MG/100ML
110 INJECTION, SOLUTION INTRAVENOUS CONTINUOUS
Status: DISCONTINUED | OUTPATIENT
Start: 2021-09-30 | End: 2021-10-01

## 2021-09-30 RX ORDER — LIDOCAINE HYDROCHLORIDE 10 MG/ML
INJECTION, SOLUTION EPIDURAL; INFILTRATION; INTRACAUDAL; PERINEURAL AS NEEDED
Status: DISCONTINUED | OUTPATIENT
Start: 2021-09-30 | End: 2021-09-30

## 2021-09-30 RX ORDER — EPHEDRINE SULFATE 50 MG/ML
INJECTION INTRAVENOUS AS NEEDED
Status: DISCONTINUED | OUTPATIENT
Start: 2021-09-30 | End: 2021-09-30

## 2021-09-30 RX ORDER — OXYCODONE HCL 5 MG/5 ML
5 SOLUTION, ORAL ORAL EVERY 4 HOURS PRN
Status: DISCONTINUED | OUTPATIENT
Start: 2021-09-30 | End: 2021-10-01 | Stop reason: HOSPADM

## 2021-09-30 RX ORDER — PROPOFOL 10 MG/ML
INJECTION, EMULSION INTRAVENOUS CONTINUOUS PRN
Status: DISCONTINUED | OUTPATIENT
Start: 2021-09-30 | End: 2021-09-30

## 2021-09-30 RX ORDER — DEXAMETHASONE SODIUM PHOSPHATE 4 MG/ML
INJECTION, SOLUTION INTRA-ARTICULAR; INTRALESIONAL; INTRAMUSCULAR; INTRAVENOUS; SOFT TISSUE AS NEEDED
Status: DISCONTINUED | OUTPATIENT
Start: 2021-09-30 | End: 2021-09-30

## 2021-09-30 RX ORDER — SODIUM CHLORIDE, SODIUM LACTATE, POTASSIUM CHLORIDE, CALCIUM CHLORIDE 600; 310; 30; 20 MG/100ML; MG/100ML; MG/100ML; MG/100ML
INJECTION, SOLUTION INTRAVENOUS CONTINUOUS PRN
Status: DISCONTINUED | OUTPATIENT
Start: 2021-09-30 | End: 2021-09-30

## 2021-09-30 RX ORDER — PANTOPRAZOLE SODIUM 40 MG/1
40 INJECTION, POWDER, FOR SOLUTION INTRAVENOUS
Status: DISCONTINUED | OUTPATIENT
Start: 2021-10-02 | End: 2021-10-01 | Stop reason: HOSPADM

## 2021-09-30 RX ORDER — HYDROMORPHONE HCL IN WATER/PF 6 MG/30 ML
0.2 PATIENT CONTROLLED ANALGESIA SYRINGE INTRAVENOUS
Status: DISCONTINUED | OUTPATIENT
Start: 2021-09-30 | End: 2021-09-30 | Stop reason: HOSPADM

## 2021-09-30 RX ORDER — SUCCINYLCHOLINE/SOD CL,ISO/PF 100 MG/5ML
SYRINGE (ML) INTRAVENOUS AS NEEDED
Status: DISCONTINUED | OUTPATIENT
Start: 2021-09-30 | End: 2021-09-30

## 2021-09-30 RX ORDER — ONDANSETRON 2 MG/ML
INJECTION INTRAMUSCULAR; INTRAVENOUS AS NEEDED
Status: DISCONTINUED | OUTPATIENT
Start: 2021-09-30 | End: 2021-09-30

## 2021-09-30 RX ORDER — FENTANYL CITRATE/PF 50 MCG/ML
25 SYRINGE (ML) INJECTION
Status: DISCONTINUED | OUTPATIENT
Start: 2021-09-30 | End: 2021-09-30 | Stop reason: HOSPADM

## 2021-09-30 RX ORDER — ALBUTEROL SULFATE 2.5 MG/3ML
2.5 SOLUTION RESPIRATORY (INHALATION) ONCE AS NEEDED
Status: DISCONTINUED | OUTPATIENT
Start: 2021-09-30 | End: 2021-09-30 | Stop reason: HOSPADM

## 2021-09-30 RX ORDER — FENTANYL CITRATE 50 UG/ML
INJECTION, SOLUTION INTRAMUSCULAR; INTRAVENOUS AS NEEDED
Status: DISCONTINUED | OUTPATIENT
Start: 2021-09-30 | End: 2021-09-30

## 2021-09-30 RX ORDER — CEFAZOLIN SODIUM 2 G/50ML
SOLUTION INTRAVENOUS AS NEEDED
Status: DISCONTINUED | OUTPATIENT
Start: 2021-09-30 | End: 2021-09-30

## 2021-09-30 RX ORDER — ROCURONIUM BROMIDE 10 MG/ML
INJECTION, SOLUTION INTRAVENOUS AS NEEDED
Status: DISCONTINUED | OUTPATIENT
Start: 2021-09-30 | End: 2021-09-30

## 2021-09-30 RX ORDER — HYDROMORPHONE HCL/PF 1 MG/ML
0.2 SYRINGE (ML) INJECTION
Status: DISCONTINUED | OUTPATIENT
Start: 2021-09-30 | End: 2021-10-01 | Stop reason: HOSPADM

## 2021-09-30 RX ORDER — CEFAZOLIN SODIUM 2 G/50ML
2000 SOLUTION INTRAVENOUS ONCE
Status: DISCONTINUED | OUTPATIENT
Start: 2021-09-30 | End: 2021-09-30 | Stop reason: HOSPADM

## 2021-09-30 RX ORDER — DIPHENHYDRAMINE HYDROCHLORIDE 50 MG/ML
12.5 INJECTION INTRAMUSCULAR; INTRAVENOUS ONCE AS NEEDED
Status: DISCONTINUED | OUTPATIENT
Start: 2021-09-30 | End: 2021-09-30 | Stop reason: HOSPADM

## 2021-09-30 RX ORDER — ONDANSETRON 2 MG/ML
4 INJECTION INTRAMUSCULAR; INTRAVENOUS ONCE AS NEEDED
Status: DISCONTINUED | OUTPATIENT
Start: 2021-09-30 | End: 2021-09-30 | Stop reason: HOSPADM

## 2021-09-30 RX ORDER — SODIUM CHLORIDE 9 MG/ML
INJECTION, SOLUTION INTRAVENOUS CONTINUOUS PRN
Status: DISCONTINUED | OUTPATIENT
Start: 2021-09-30 | End: 2021-09-30

## 2021-09-30 RX ORDER — ACETAMINOPHEN 160 MG/5ML
650 SUSPENSION, ORAL (FINAL DOSE FORM) ORAL EVERY 4 HOURS PRN
Status: DISCONTINUED | OUTPATIENT
Start: 2021-09-30 | End: 2021-10-01 | Stop reason: HOSPADM

## 2021-09-30 RX ORDER — PROPOFOL 10 MG/ML
INJECTION, EMULSION INTRAVENOUS AS NEEDED
Status: DISCONTINUED | OUTPATIENT
Start: 2021-09-30 | End: 2021-09-30

## 2021-09-30 RX ADMIN — INSULIN LISPRO 1 UNITS: 100 INJECTION, SOLUTION INTRAVENOUS; SUBCUTANEOUS at 18:37

## 2021-09-30 RX ADMIN — LIDOCAINE HYDROCHLORIDE 50 MG: 10 INJECTION, SOLUTION EPIDURAL; INFILTRATION; INTRACAUDAL; PERINEURAL at 07:58

## 2021-09-30 RX ADMIN — PHENYLEPHRINE HYDROCHLORIDE 10 MCG/MIN: 10 INJECTION INTRAVENOUS at 08:26

## 2021-09-30 RX ADMIN — Medication 160 MG: at 07:58

## 2021-09-30 RX ADMIN — PROPOFOL 160 MG: 10 INJECTION, EMULSION INTRAVENOUS at 07:58

## 2021-09-30 RX ADMIN — INSULIN LISPRO 1 UNITS: 100 INJECTION, SOLUTION INTRAVENOUS; SUBCUTANEOUS at 11:08

## 2021-09-30 RX ADMIN — FENTANYL CITRATE 50 MCG: 50 INJECTION INTRAMUSCULAR; INTRAVENOUS at 08:46

## 2021-09-30 RX ADMIN — SODIUM CHLORIDE, SODIUM LACTATE, POTASSIUM CHLORIDE, AND CALCIUM CHLORIDE: .6; .31; .03; .02 INJECTION, SOLUTION INTRAVENOUS at 07:53

## 2021-09-30 RX ADMIN — CEFAZOLIN SODIUM 2000 MG: 2 SOLUTION INTRAVENOUS at 08:00

## 2021-09-30 RX ADMIN — SODIUM CHLORIDE: 0.9 INJECTION, SOLUTION INTRAVENOUS at 08:01

## 2021-09-30 RX ADMIN — DEXAMETHASONE SODIUM PHOSPHATE 10 MG: 4 INJECTION INTRA-ARTICULAR; INTRALESIONAL; INTRAMUSCULAR; INTRAVENOUS; SOFT TISSUE at 07:58

## 2021-09-30 RX ADMIN — FENTANYL CITRATE 50 MCG: 50 INJECTION INTRAMUSCULAR; INTRAVENOUS at 07:58

## 2021-09-30 RX ADMIN — SUGAMMADEX 200 MG: 100 INJECTION, SOLUTION INTRAVENOUS at 10:10

## 2021-09-30 RX ADMIN — PROPOFOL 50 MCG/KG/MIN: 10 INJECTION, EMULSION INTRAVENOUS at 08:01

## 2021-09-30 RX ADMIN — ONDANSETRON 4 MG: 2 INJECTION INTRAMUSCULAR; INTRAVENOUS at 10:11

## 2021-09-30 RX ADMIN — ROCURONIUM BROMIDE 40 MG: 50 INJECTION, SOLUTION INTRAVENOUS at 08:05

## 2021-09-30 RX ADMIN — ROCURONIUM BROMIDE 10 MG: 50 INJECTION, SOLUTION INTRAVENOUS at 09:31

## 2021-09-30 RX ADMIN — EPHEDRINE SULFATE 10 MG: 50 INJECTION, SOLUTION INTRAVENOUS at 08:32

## 2021-09-30 RX ADMIN — EPHEDRINE SULFATE 10 MG: 50 INJECTION, SOLUTION INTRAVENOUS at 08:16

## 2021-09-30 RX ADMIN — EPHEDRINE SULFATE 5 MG: 50 INJECTION INTRAVENOUS at 09:17

## 2021-09-30 RX ADMIN — FENTANYL CITRATE 50 MCG: 50 INJECTION INTRAMUSCULAR; INTRAVENOUS at 08:09

## 2021-09-30 RX ADMIN — REMIFENTANIL HYDROCHLORIDE 0.1 MCG/KG/MIN: 1 INJECTION, POWDER, LYOPHILIZED, FOR SOLUTION INTRAVENOUS at 09:18

## 2021-09-30 RX ADMIN — FENTANYL CITRATE 25 MCG: 50 INJECTION INTRAMUSCULAR; INTRAVENOUS at 09:22

## 2021-09-30 RX ADMIN — PROPOFOL 40 MG: 10 INJECTION, EMULSION INTRAVENOUS at 08:00

## 2021-09-30 RX ADMIN — ROCURONIUM BROMIDE 10 MG: 50 INJECTION, SOLUTION INTRAVENOUS at 09:02

## 2021-09-30 NOTE — ANESTHESIA POSTPROCEDURE EVALUATION
Post-Op Assessment Note    CV Status:  Stable  Pain Score: 0    Pain management: adequate     Mental Status:  Sleepy   Hydration Status:  Euvolemic   PONV Controlled:  Controlled   Airway Patency:  Patent      Post Op Vitals Reviewed: Yes      Staff: CRNA   Comments: Pt able to maintain own airway, VSS, report to recovery RN        No complications documented      BP  163/92   Temp  96   Pulse  91   Resp   18   SpO2   100%

## 2021-09-30 NOTE — ANESTHESIA PREPROCEDURE EVALUATION
Procedure:  DIVERTICULECTOMY ZENKERS ENDOSCOPIC (N/A Esophagus)  DIVERTICULECTOMY ZENKERS (N/A Esophagus)    Relevant Problems   CARDIO   (+) 2-vessel coronary artery disease (s/p PCI 2013)   (+) Benign hypertension      ENDO   (+) Type 2 diabetes mellitus with stage 3a chronic kidney disease (HCC) (not insulin dependent)      GI/HEPATIC   (+) Dysphagia (solids and liquids)      /RENAL   (+) Chronic kidney disease (stage 3, Cr 1 2-1 4)      PULMONARY   (+) Shortness of breath      Other   (+) Cough   (+) Dyslipidemia   (+) Moderate obesity (BMI 33)   (+) Zenker's diverticulum        Physical Exam    Airway    Mallampati score: III  TM Distance: >3 FB  Neck ROM: full     Dental       Cardiovascular      Pulmonary      Other Findings        Anesthesia Plan  ASA Score- 3     Anesthesia Type- general with ASA Monitors  Additional Monitors:   Airway Plan: ETT      Comment: Recent labs personally reviewed:  Lab Results       Component                Value               Date                       WBC                      4 89                09/14/2021                 HGB                      15 8                09/14/2021                 PLT                      189                 09/14/2021            Lab Results       Component                Value               Date                       NA                       141                 02/17/2017                 K                        4 6                 09/14/2021                 BUN                      20                  09/14/2021                 CREATININE               1 39 (H)            09/14/2021                 GLUCOSE                  125 (H)             02/17/2017            Lab Results       Component                Value               Date                       PTT                      26                  09/14/2021             Lab Results       Component                Value               Date                       INR                      0 91 09/14/2021              Blood type Rabia Arreola MD, have personally seen and evaluated the patient prior to anesthetic care  I have reviewed the pre-anesthetic record, medical history, allergies, medications and any other medical records if appropriate to the anesthetic care  If a CRNA is involved in the case, I have reviewed the CRNA assessment, if present, and agree  I consented the patient for general anesthesia with appropriate airway support as indicated  We reviewed the risks associated including PONV, sore throat, allergic reaction to anesthetics and management plan to address these issues  We discussed the indication and risks associated with any invasive monitors that would be placed  We discussed post op pain control and expectations  We discussed rare complications including hypoxia, perioperative cardiac and neurologic events, and death based on the patient's baseline risk  All questions and concerns were addressed          Plan Factors-Exercise tolerance (METS): >4 METS  Chart reviewed  EKG reviewed  Imaging results reviewed  Existing labs reviewed  Patient summary reviewed  Patient is not a current smoker  Patient did not smoke on day of surgery  Obstructive sleep apnea risk education given perioperatively  Induction- intravenous  Postoperative Plan- Plan for postoperative opioid use  Planned trial extubation    Informed Consent- Anesthetic plan and risks discussed with patient  I personally reviewed this patient with the CRNA  Discussed and agreed on the Anesthesia Plan with the CRNA  Cami Donaldson

## 2021-10-01 ENCOUNTER — APPOINTMENT (OUTPATIENT)
Dept: RADIOLOGY | Facility: HOSPITAL | Age: 81
End: 2021-10-01
Payer: COMMERCIAL

## 2021-10-01 VITALS
WEIGHT: 214.6 LBS | BODY MASS INDEX: 33.68 KG/M2 | RESPIRATION RATE: 18 BRPM | SYSTOLIC BLOOD PRESSURE: 130 MMHG | HEART RATE: 67 BPM | HEIGHT: 67 IN | DIASTOLIC BLOOD PRESSURE: 75 MMHG | TEMPERATURE: 98.4 F | OXYGEN SATURATION: 94 %

## 2021-10-01 LAB
ANION GAP SERPL CALCULATED.3IONS-SCNC: 3 MMOL/L (ref 4–13)
BUN SERPL-MCNC: 17 MG/DL (ref 5–25)
CALCIUM SERPL-MCNC: 8.7 MG/DL (ref 8.3–10.1)
CHLORIDE SERPL-SCNC: 110 MMOL/L (ref 100–108)
CO2 SERPL-SCNC: 27 MMOL/L (ref 21–32)
CREAT SERPL-MCNC: 1.06 MG/DL (ref 0.6–1.3)
GFR SERPL CREATININE-BSD FRML MDRD: 66 ML/MIN/1.73SQ M
GLUCOSE P FAST SERPL-MCNC: 125 MG/DL (ref 65–99)
GLUCOSE SERPL-MCNC: 113 MG/DL (ref 65–140)
GLUCOSE SERPL-MCNC: 125 MG/DL (ref 65–140)
GLUCOSE SERPL-MCNC: 126 MG/DL (ref 65–140)
POTASSIUM SERPL-SCNC: 4.2 MMOL/L (ref 3.5–5.3)
SODIUM SERPL-SCNC: 140 MMOL/L (ref 136–145)

## 2021-10-01 PROCEDURE — NC001 PR NO CHARGE: Performed by: THORACIC SURGERY (CARDIOTHORACIC VASCULAR SURGERY)

## 2021-10-01 PROCEDURE — 99213 OFFICE O/P EST LOW 20 MIN: CPT | Performed by: INTERNAL MEDICINE

## 2021-10-01 PROCEDURE — 99024 POSTOP FOLLOW-UP VISIT: CPT | Performed by: THORACIC SURGERY (CARDIOTHORACIC VASCULAR SURGERY)

## 2021-10-01 PROCEDURE — 82948 REAGENT STRIP/BLOOD GLUCOSE: CPT

## 2021-10-01 PROCEDURE — 74240 X-RAY XM UPR GI TRC 1CNTRST: CPT

## 2021-10-01 PROCEDURE — 80048 BASIC METABOLIC PNL TOTAL CA: CPT | Performed by: NURSE PRACTITIONER

## 2021-10-01 RX ADMIN — IOHEXOL 50 ML: 350 INJECTION, SOLUTION INTRAVENOUS at 08:21

## 2021-10-05 ENCOUNTER — OFFICE VISIT (OUTPATIENT)
Dept: FAMILY MEDICINE CLINIC | Facility: CLINIC | Age: 81
End: 2021-10-05
Payer: COMMERCIAL

## 2021-10-05 ENCOUNTER — TRANSITIONAL CARE MANAGEMENT (OUTPATIENT)
Dept: FAMILY MEDICINE CLINIC | Facility: CLINIC | Age: 81
End: 2021-10-05

## 2021-10-05 ENCOUNTER — APPOINTMENT (OUTPATIENT)
Dept: LAB | Facility: CLINIC | Age: 81
End: 2021-10-05
Payer: COMMERCIAL

## 2021-10-05 VITALS
BODY MASS INDEX: 33.27 KG/M2 | SYSTOLIC BLOOD PRESSURE: 118 MMHG | WEIGHT: 212 LBS | HEIGHT: 67 IN | TEMPERATURE: 97.3 F | RESPIRATION RATE: 16 BRPM | DIASTOLIC BLOOD PRESSURE: 68 MMHG | HEART RATE: 72 BPM

## 2021-10-05 DIAGNOSIS — N18.31 STAGE 3A CHRONIC KIDNEY DISEASE (HCC): ICD-10-CM

## 2021-10-05 DIAGNOSIS — Z23 NEED FOR INFLUENZA VACCINATION: ICD-10-CM

## 2021-10-05 DIAGNOSIS — I10 BENIGN HYPERTENSION: ICD-10-CM

## 2021-10-05 DIAGNOSIS — N18.31 TYPE 2 DIABETES MELLITUS WITH STAGE 3A CHRONIC KIDNEY DISEASE, WITHOUT LONG-TERM CURRENT USE OF INSULIN (HCC): ICD-10-CM

## 2021-10-05 DIAGNOSIS — F32.A DEPRESSION, UNSPECIFIED DEPRESSION TYPE: ICD-10-CM

## 2021-10-05 DIAGNOSIS — K22.5 ZENKER DIVERTICULUM: Primary | ICD-10-CM

## 2021-10-05 DIAGNOSIS — E11.22 TYPE 2 DIABETES MELLITUS WITH STAGE 3A CHRONIC KIDNEY DISEASE, WITHOUT LONG-TERM CURRENT USE OF INSULIN (HCC): ICD-10-CM

## 2021-10-05 DIAGNOSIS — N18.32 STAGE 3B CHRONIC KIDNEY DISEASE (HCC): ICD-10-CM

## 2021-10-05 LAB
ANION GAP SERPL CALCULATED.3IONS-SCNC: 3 MMOL/L (ref 4–13)
BILIRUB UR QL STRIP: NEGATIVE
BUN SERPL-MCNC: 24 MG/DL (ref 5–25)
CALCIUM SERPL-MCNC: 9.7 MG/DL (ref 8.3–10.1)
CHLORIDE SERPL-SCNC: 107 MMOL/L (ref 100–108)
CLARITY UR: NORMAL
CO2 SERPL-SCNC: 26 MMOL/L (ref 21–32)
COLOR UR: YELLOW
CREAT SERPL-MCNC: 1.42 MG/DL (ref 0.6–1.3)
GFR SERPL CREATININE-BSD FRML MDRD: 47 ML/MIN/1.73SQ M
GLUCOSE SERPL-MCNC: 104 MG/DL (ref 65–140)
GLUCOSE UR STRIP-MCNC: NEGATIVE MG/DL
HGB UR QL STRIP.AUTO: NEGATIVE
KETONES UR STRIP-MCNC: NEGATIVE MG/DL
LEUKOCYTE ESTERASE UR QL STRIP: NEGATIVE
NITRITE UR QL STRIP: NEGATIVE
PH UR STRIP.AUTO: 5.5 [PH]
POTASSIUM SERPL-SCNC: 4.4 MMOL/L (ref 3.5–5.3)
PROT UR STRIP-MCNC: NEGATIVE MG/DL
SODIUM SERPL-SCNC: 136 MMOL/L (ref 136–145)
SP GR UR STRIP.AUTO: 1.03 (ref 1–1.03)
UROBILINOGEN UR QL STRIP.AUTO: 0.2 E.U./DL

## 2021-10-05 PROCEDURE — 90662 IIV NO PRSV INCREASED AG IM: CPT | Performed by: FAMILY MEDICINE

## 2021-10-05 PROCEDURE — 80048 BASIC METABOLIC PNL TOTAL CA: CPT

## 2021-10-05 PROCEDURE — G0008 ADMIN INFLUENZA VIRUS VAC: HCPCS | Performed by: FAMILY MEDICINE

## 2021-10-05 PROCEDURE — 99495 TRANSJ CARE MGMT MOD F2F 14D: CPT | Performed by: FAMILY MEDICINE

## 2021-10-05 PROCEDURE — 81003 URINALYSIS AUTO W/O SCOPE: CPT

## 2021-10-05 PROCEDURE — 36415 COLL VENOUS BLD VENIPUNCTURE: CPT

## 2021-10-06 ENCOUNTER — TELEPHONE (OUTPATIENT)
Dept: ADMINISTRATIVE | Facility: OTHER | Age: 81
End: 2021-10-06

## 2021-10-12 ENCOUNTER — OFFICE VISIT (OUTPATIENT)
Dept: CARDIAC SURGERY | Facility: CLINIC | Age: 81
End: 2021-10-12

## 2021-10-12 VITALS
RESPIRATION RATE: 16 BRPM | WEIGHT: 212.3 LBS | HEART RATE: 61 BPM | DIASTOLIC BLOOD PRESSURE: 86 MMHG | TEMPERATURE: 97.2 F | HEIGHT: 67 IN | BODY MASS INDEX: 33.32 KG/M2 | OXYGEN SATURATION: 99 % | SYSTOLIC BLOOD PRESSURE: 150 MMHG

## 2021-10-12 DIAGNOSIS — K22.5 ZENKER'S DIVERTICULUM: Primary | ICD-10-CM

## 2021-10-12 PROCEDURE — 99024 POSTOP FOLLOW-UP VISIT: CPT | Performed by: PHYSICIAN ASSISTANT

## 2021-10-15 ENCOUNTER — TELEPHONE (OUTPATIENT)
Dept: CARDIOLOGY CLINIC | Facility: CLINIC | Age: 81
End: 2021-10-15

## 2021-10-28 ENCOUNTER — TELEPHONE (OUTPATIENT)
Dept: BEHAVIORAL/MENTAL HEALTH CLINIC | Facility: CLINIC | Age: 81
End: 2021-10-28

## 2021-10-28 ENCOUNTER — TELEPHONE (OUTPATIENT)
Dept: FAMILY MEDICINE CLINIC | Facility: CLINIC | Age: 81
End: 2021-10-28

## 2021-10-29 ENCOUNTER — SOCIAL WORK (OUTPATIENT)
Dept: BEHAVIORAL/MENTAL HEALTH CLINIC | Facility: CLINIC | Age: 81
End: 2021-10-29
Payer: COMMERCIAL

## 2021-10-29 ENCOUNTER — OFFICE VISIT (OUTPATIENT)
Dept: FAMILY MEDICINE CLINIC | Facility: CLINIC | Age: 81
End: 2021-10-29
Payer: COMMERCIAL

## 2021-10-29 VITALS
RESPIRATION RATE: 16 BRPM | SYSTOLIC BLOOD PRESSURE: 136 MMHG | DIASTOLIC BLOOD PRESSURE: 80 MMHG | TEMPERATURE: 98 F | HEART RATE: 72 BPM | HEIGHT: 67 IN | WEIGHT: 218 LBS | BODY MASS INDEX: 34.21 KG/M2

## 2021-10-29 DIAGNOSIS — T07.XXXA MULTIPLE BRUISES: ICD-10-CM

## 2021-10-29 DIAGNOSIS — S80.812A: Primary | ICD-10-CM

## 2021-10-29 DIAGNOSIS — F43.20 ADJUSTMENT DISORDER, UNSPECIFIED TYPE: Primary | ICD-10-CM

## 2021-10-29 PROBLEM — R05.9 COUGH: Status: RESOLVED | Noted: 2018-09-12 | Resolved: 2021-10-29

## 2021-10-29 PROCEDURE — 3075F SYST BP GE 130 - 139MM HG: CPT | Performed by: FAMILY MEDICINE

## 2021-10-29 PROCEDURE — 99214 OFFICE O/P EST MOD 30 MIN: CPT | Performed by: FAMILY MEDICINE

## 2021-10-29 PROCEDURE — 3079F DIAST BP 80-89 MM HG: CPT | Performed by: FAMILY MEDICINE

## 2021-10-29 PROCEDURE — 1160F RVW MEDS BY RX/DR IN RCRD: CPT | Performed by: FAMILY MEDICINE

## 2021-10-29 PROCEDURE — 1036F TOBACCO NON-USER: CPT | Performed by: FAMILY MEDICINE

## 2021-10-29 PROCEDURE — 90834 PSYTX W PT 45 MINUTES: CPT | Performed by: SOCIAL WORKER

## 2021-10-29 RX ORDER — CEPHALEXIN 500 MG/1
500 CAPSULE ORAL EVERY 8 HOURS SCHEDULED
Qty: 21 CAPSULE | Refills: 0 | Status: SHIPPED | OUTPATIENT
Start: 2021-10-29 | End: 2021-11-05

## 2021-11-01 ENCOUNTER — OFFICE VISIT (OUTPATIENT)
Dept: FAMILY MEDICINE CLINIC | Facility: CLINIC | Age: 81
End: 2021-11-01
Payer: COMMERCIAL

## 2021-11-01 ENCOUNTER — APPOINTMENT (OUTPATIENT)
Dept: RADIOLOGY | Facility: CLINIC | Age: 81
End: 2021-11-01
Payer: COMMERCIAL

## 2021-11-01 VITALS
WEIGHT: 218 LBS | HEIGHT: 67 IN | HEART RATE: 72 BPM | BODY MASS INDEX: 34.21 KG/M2 | RESPIRATION RATE: 14 BRPM | DIASTOLIC BLOOD PRESSURE: 76 MMHG | SYSTOLIC BLOOD PRESSURE: 134 MMHG | TEMPERATURE: 97.4 F

## 2021-11-01 DIAGNOSIS — S80.812D: Primary | ICD-10-CM

## 2021-11-01 DIAGNOSIS — S80.812A: ICD-10-CM

## 2021-11-01 DIAGNOSIS — T07.XXXA MULTIPLE BRUISES: ICD-10-CM

## 2021-11-01 PROCEDURE — 3078F DIAST BP <80 MM HG: CPT | Performed by: FAMILY MEDICINE

## 2021-11-01 PROCEDURE — 1036F TOBACCO NON-USER: CPT | Performed by: FAMILY MEDICINE

## 2021-11-01 PROCEDURE — 99213 OFFICE O/P EST LOW 20 MIN: CPT | Performed by: FAMILY MEDICINE

## 2021-11-01 PROCEDURE — 73590 X-RAY EXAM OF LOWER LEG: CPT

## 2021-11-01 PROCEDURE — 3075F SYST BP GE 130 - 139MM HG: CPT | Performed by: FAMILY MEDICINE

## 2021-11-01 PROCEDURE — 1160F RVW MEDS BY RX/DR IN RCRD: CPT | Performed by: FAMILY MEDICINE

## 2021-11-03 ENCOUNTER — TELEPHONE (OUTPATIENT)
Dept: FAMILY MEDICINE CLINIC | Facility: CLINIC | Age: 81
End: 2021-11-03

## 2021-11-09 ENCOUNTER — SOCIAL WORK (OUTPATIENT)
Dept: BEHAVIORAL/MENTAL HEALTH CLINIC | Facility: CLINIC | Age: 81
End: 2021-11-09
Payer: COMMERCIAL

## 2021-11-09 DIAGNOSIS — F43.20 ADJUSTMENT DISORDER, UNSPECIFIED TYPE: Primary | ICD-10-CM

## 2021-11-09 PROCEDURE — 90834 PSYTX W PT 45 MINUTES: CPT | Performed by: SOCIAL WORKER

## 2021-11-16 ENCOUNTER — SOCIAL WORK (OUTPATIENT)
Dept: BEHAVIORAL/MENTAL HEALTH CLINIC | Facility: CLINIC | Age: 81
End: 2021-11-16
Payer: COMMERCIAL

## 2021-11-16 ENCOUNTER — OFFICE VISIT (OUTPATIENT)
Dept: CARDIAC SURGERY | Facility: CLINIC | Age: 81
End: 2021-11-16

## 2021-11-16 VITALS
BODY MASS INDEX: 33.91 KG/M2 | TEMPERATURE: 97.4 F | OXYGEN SATURATION: 93 % | RESPIRATION RATE: 16 BRPM | WEIGHT: 216.05 LBS | HEART RATE: 74 BPM | DIASTOLIC BLOOD PRESSURE: 83 MMHG | SYSTOLIC BLOOD PRESSURE: 151 MMHG | HEIGHT: 67 IN

## 2021-11-16 DIAGNOSIS — K22.5 ZENKER'S DIVERTICULUM: Primary | ICD-10-CM

## 2021-11-16 DIAGNOSIS — F43.20 ADJUSTMENT DISORDER, UNSPECIFIED TYPE: Primary | ICD-10-CM

## 2021-11-16 PROCEDURE — 99024 POSTOP FOLLOW-UP VISIT: CPT | Performed by: PHYSICIAN ASSISTANT

## 2021-11-16 PROCEDURE — 90834 PSYTX W PT 45 MINUTES: CPT | Performed by: SOCIAL WORKER

## 2021-11-23 ENCOUNTER — SOCIAL WORK (OUTPATIENT)
Dept: BEHAVIORAL/MENTAL HEALTH CLINIC | Facility: CLINIC | Age: 81
End: 2021-11-23
Payer: COMMERCIAL

## 2021-11-23 DIAGNOSIS — F43.20 ADJUSTMENT DISORDER, UNSPECIFIED TYPE: Primary | ICD-10-CM

## 2021-11-23 PROCEDURE — 90834 PSYTX W PT 45 MINUTES: CPT | Performed by: SOCIAL WORKER

## 2021-11-30 ENCOUNTER — SOCIAL WORK (OUTPATIENT)
Dept: BEHAVIORAL/MENTAL HEALTH CLINIC | Facility: CLINIC | Age: 81
End: 2021-11-30
Payer: COMMERCIAL

## 2021-11-30 DIAGNOSIS — F43.20 ADJUSTMENT DISORDER, UNSPECIFIED TYPE: Primary | ICD-10-CM

## 2021-11-30 PROCEDURE — 90834 PSYTX W PT 45 MINUTES: CPT | Performed by: SOCIAL WORKER

## 2021-12-07 ENCOUNTER — SOCIAL WORK (OUTPATIENT)
Dept: BEHAVIORAL/MENTAL HEALTH CLINIC | Facility: CLINIC | Age: 81
End: 2021-12-07
Payer: COMMERCIAL

## 2021-12-07 DIAGNOSIS — E11.9 TYPE 2 DIABETES MELLITUS WITHOUT COMPLICATION, WITHOUT LONG-TERM CURRENT USE OF INSULIN (HCC): ICD-10-CM

## 2021-12-07 DIAGNOSIS — F43.20 ADJUSTMENT DISORDER, UNSPECIFIED TYPE: Primary | ICD-10-CM

## 2021-12-07 DIAGNOSIS — I25.10 2-VESSEL CORONARY ARTERY DISEASE: ICD-10-CM

## 2021-12-07 DIAGNOSIS — E78.5 DYSLIPIDEMIA: ICD-10-CM

## 2021-12-07 PROCEDURE — 90834 PSYTX W PT 45 MINUTES: CPT | Performed by: SOCIAL WORKER

## 2021-12-07 RX ORDER — SITAGLIPTIN 50 MG/1
TABLET, FILM COATED ORAL
Qty: 90 TABLET | Refills: 3 | Status: SHIPPED | OUTPATIENT
Start: 2021-12-07

## 2021-12-07 RX ORDER — SIMVASTATIN 40 MG
TABLET ORAL
Qty: 90 TABLET | Refills: 3 | Status: SHIPPED | OUTPATIENT
Start: 2021-12-07

## 2021-12-14 ENCOUNTER — IMMUNIZATIONS (OUTPATIENT)
Dept: FAMILY MEDICINE CLINIC | Facility: HOSPITAL | Age: 81
End: 2021-12-14

## 2021-12-14 ENCOUNTER — SOCIAL WORK (OUTPATIENT)
Dept: BEHAVIORAL/MENTAL HEALTH CLINIC | Facility: CLINIC | Age: 81
End: 2021-12-14
Payer: COMMERCIAL

## 2021-12-14 DIAGNOSIS — F43.20 ADJUSTMENT DISORDER, UNSPECIFIED TYPE: Primary | ICD-10-CM

## 2021-12-14 DIAGNOSIS — Z23 ENCOUNTER FOR IMMUNIZATION: Primary | ICD-10-CM

## 2021-12-14 PROCEDURE — 0064A COVID-19 MODERNA VACC 0.25 ML BOOSTER: CPT

## 2021-12-14 PROCEDURE — 90834 PSYTX W PT 45 MINUTES: CPT | Performed by: SOCIAL WORKER

## 2021-12-14 PROCEDURE — 91306 COVID-19 MODERNA VACC 0.25 ML BOOSTER: CPT

## 2021-12-21 ENCOUNTER — SOCIAL WORK (OUTPATIENT)
Dept: BEHAVIORAL/MENTAL HEALTH CLINIC | Facility: CLINIC | Age: 81
End: 2021-12-21
Payer: COMMERCIAL

## 2021-12-21 DIAGNOSIS — F43.20 ADJUSTMENT DISORDER, UNSPECIFIED TYPE: Primary | ICD-10-CM

## 2021-12-21 PROCEDURE — 90834 PSYTX W PT 45 MINUTES: CPT | Performed by: SOCIAL WORKER

## 2021-12-27 ENCOUNTER — OFFICE VISIT (OUTPATIENT)
Dept: FAMILY MEDICINE CLINIC | Facility: CLINIC | Age: 81
End: 2021-12-27
Payer: COMMERCIAL

## 2021-12-27 VITALS
BODY MASS INDEX: 32.99 KG/M2 | WEIGHT: 210.2 LBS | DIASTOLIC BLOOD PRESSURE: 80 MMHG | SYSTOLIC BLOOD PRESSURE: 122 MMHG | OXYGEN SATURATION: 97 % | HEART RATE: 78 BPM | HEIGHT: 67 IN | TEMPERATURE: 97.7 F | RESPIRATION RATE: 16 BRPM

## 2021-12-27 DIAGNOSIS — F32.A DEPRESSION, UNSPECIFIED DEPRESSION TYPE: ICD-10-CM

## 2021-12-27 DIAGNOSIS — J02.9 SORE THROAT: Primary | ICD-10-CM

## 2021-12-27 DIAGNOSIS — I10 BENIGN HYPERTENSION: ICD-10-CM

## 2021-12-27 DIAGNOSIS — E11.9 TYPE 2 DIABETES MELLITUS WITHOUT COMPLICATION, WITHOUT LONG-TERM CURRENT USE OF INSULIN (HCC): ICD-10-CM

## 2021-12-27 DIAGNOSIS — E78.5 DYSLIPIDEMIA: ICD-10-CM

## 2021-12-27 LAB — SL AMB POCT HEMOGLOBIN AIC: 6.3 (ref ?–6.5)

## 2021-12-27 PROCEDURE — 99214 OFFICE O/P EST MOD 30 MIN: CPT | Performed by: FAMILY MEDICINE

## 2021-12-27 PROCEDURE — 3079F DIAST BP 80-89 MM HG: CPT | Performed by: FAMILY MEDICINE

## 2021-12-27 PROCEDURE — 1036F TOBACCO NON-USER: CPT | Performed by: FAMILY MEDICINE

## 2021-12-27 PROCEDURE — 1160F RVW MEDS BY RX/DR IN RCRD: CPT | Performed by: FAMILY MEDICINE

## 2021-12-27 PROCEDURE — 83036 HEMOGLOBIN GLYCOSYLATED A1C: CPT | Performed by: FAMILY MEDICINE

## 2021-12-27 PROCEDURE — 3074F SYST BP LT 130 MM HG: CPT | Performed by: FAMILY MEDICINE

## 2021-12-28 ENCOUNTER — SOCIAL WORK (OUTPATIENT)
Dept: BEHAVIORAL/MENTAL HEALTH CLINIC | Facility: CLINIC | Age: 81
End: 2021-12-28
Payer: COMMERCIAL

## 2021-12-28 DIAGNOSIS — J02.9 PHARYNGITIS, UNSPECIFIED ETIOLOGY: Primary | ICD-10-CM

## 2021-12-28 DIAGNOSIS — F43.20 ADJUSTMENT DISORDER, UNSPECIFIED TYPE: Primary | ICD-10-CM

## 2021-12-28 PROCEDURE — 90834 PSYTX W PT 45 MINUTES: CPT | Performed by: SOCIAL WORKER

## 2021-12-28 PROCEDURE — U0005 INFEC AGEN DETEC AMPLI PROBE: HCPCS | Performed by: FAMILY MEDICINE

## 2021-12-28 PROCEDURE — U0003 INFECTIOUS AGENT DETECTION BY NUCLEIC ACID (DNA OR RNA); SEVERE ACUTE RESPIRATORY SYNDROME CORONAVIRUS 2 (SARS-COV-2) (CORONAVIRUS DISEASE [COVID-19]), AMPLIFIED PROBE TECHNIQUE, MAKING USE OF HIGH THROUGHPUT TECHNOLOGIES AS DESCRIBED BY CMS-2020-01-R: HCPCS | Performed by: FAMILY MEDICINE

## 2021-12-28 RX ORDER — DOXYCYCLINE HYCLATE 100 MG/1
100 CAPSULE ORAL EVERY 12 HOURS SCHEDULED
Qty: 14 CAPSULE | Refills: 0 | Status: SHIPPED | OUTPATIENT
Start: 2021-12-28 | End: 2022-01-04

## 2021-12-29 ENCOUNTER — TELEPHONE (OUTPATIENT)
Dept: FAMILY MEDICINE CLINIC | Facility: CLINIC | Age: 81
End: 2021-12-29

## 2021-12-29 LAB — SARS-COV-2 RNA RESP QL NAA+PROBE: NEGATIVE

## 2022-01-11 ENCOUNTER — SOCIAL WORK (OUTPATIENT)
Dept: BEHAVIORAL/MENTAL HEALTH CLINIC | Facility: CLINIC | Age: 82
End: 2022-01-11
Payer: COMMERCIAL

## 2022-01-11 DIAGNOSIS — F43.20 ADJUSTMENT DISORDER, UNSPECIFIED TYPE: Primary | ICD-10-CM

## 2022-01-11 PROCEDURE — 90834 PSYTX W PT 45 MINUTES: CPT | Performed by: SOCIAL WORKER

## 2022-01-12 DIAGNOSIS — N18.31 CKD STAGE G3A/A3, GFR 45-59 AND ALBUMIN CREATININE RATIO >300 MG/G (HCC): Primary | ICD-10-CM

## 2022-01-13 ENCOUNTER — APPOINTMENT (OUTPATIENT)
Dept: LAB | Facility: CLINIC | Age: 82
End: 2022-01-13
Payer: COMMERCIAL

## 2022-01-13 DIAGNOSIS — N18.31 CKD STAGE G3A/A3, GFR 45-59 AND ALBUMIN CREATININE RATIO >300 MG/G (HCC): ICD-10-CM

## 2022-01-13 LAB
ALBUMIN SERPL BCP-MCNC: 3.4 G/DL (ref 3.5–5)
ALP SERPL-CCNC: 58 U/L (ref 46–116)
ALT SERPL W P-5'-P-CCNC: 27 U/L (ref 12–78)
ANION GAP SERPL CALCULATED.3IONS-SCNC: 3 MMOL/L (ref 4–13)
AST SERPL W P-5'-P-CCNC: 20 U/L (ref 5–45)
BACTERIA UR QL AUTO: ABNORMAL /HPF
BASOPHILS # BLD AUTO: 0.07 THOUSANDS/ΜL (ref 0–0.1)
BASOPHILS NFR BLD AUTO: 1 % (ref 0–1)
BILIRUB SERPL-MCNC: 0.71 MG/DL (ref 0.2–1)
BILIRUB UR QL STRIP: NEGATIVE
BUN SERPL-MCNC: 20 MG/DL (ref 5–25)
CALCIUM ALBUM COR SERPL-MCNC: 9.7 MG/DL (ref 8.3–10.1)
CALCIUM SERPL-MCNC: 9.2 MG/DL (ref 8.3–10.1)
CHLORIDE SERPL-SCNC: 106 MMOL/L (ref 100–108)
CLARITY UR: ABNORMAL
CO2 SERPL-SCNC: 30 MMOL/L (ref 21–32)
COLOR UR: YELLOW
CREAT SERPL-MCNC: 1.3 MG/DL (ref 0.6–1.3)
CREAT UR-MCNC: 231 MG/DL
EOSINOPHIL # BLD AUTO: 0.5 THOUSAND/ΜL (ref 0–0.61)
EOSINOPHIL NFR BLD AUTO: 8 % (ref 0–6)
ERYTHROCYTE [DISTWIDTH] IN BLOOD BY AUTOMATED COUNT: 13.6 % (ref 11.6–15.1)
GFR SERPL CREATININE-BSD FRML MDRD: 51 ML/MIN/1.73SQ M
GLUCOSE P FAST SERPL-MCNC: 105 MG/DL (ref 65–99)
GLUCOSE UR STRIP-MCNC: NEGATIVE MG/DL
HCT VFR BLD AUTO: 46.5 % (ref 36.5–49.3)
HGB BLD-MCNC: 15 G/DL (ref 12–17)
HGB UR QL STRIP.AUTO: NEGATIVE
HYALINE CASTS #/AREA URNS LPF: ABNORMAL /LPF
IMM GRANULOCYTES # BLD AUTO: 0.02 THOUSAND/UL (ref 0–0.2)
IMM GRANULOCYTES NFR BLD AUTO: 0 % (ref 0–2)
KETONES UR STRIP-MCNC: NEGATIVE MG/DL
LEUKOCYTE ESTERASE UR QL STRIP: NEGATIVE
LYMPHOCYTES # BLD AUTO: 1.74 THOUSANDS/ΜL (ref 0.6–4.47)
LYMPHOCYTES NFR BLD AUTO: 29 % (ref 14–44)
MCH RBC QN AUTO: 30.1 PG (ref 26.8–34.3)
MCHC RBC AUTO-ENTMCNC: 32.3 G/DL (ref 31.4–37.4)
MCV RBC AUTO: 93 FL (ref 82–98)
MICROALBUMIN UR-MCNC: 27.3 MG/L (ref 0–20)
MICROALBUMIN/CREAT 24H UR: 12 MG/G CREATININE (ref 0–30)
MONOCYTES # BLD AUTO: 0.54 THOUSAND/ΜL (ref 0.17–1.22)
MONOCYTES NFR BLD AUTO: 9 % (ref 4–12)
NEUTROPHILS # BLD AUTO: 3.06 THOUSANDS/ΜL (ref 1.85–7.62)
NEUTS SEG NFR BLD AUTO: 53 % (ref 43–75)
NITRITE UR QL STRIP: NEGATIVE
NON-SQ EPI CELLS URNS QL MICRO: ABNORMAL /HPF
NRBC BLD AUTO-RTO: 0 /100 WBCS
PH UR STRIP.AUTO: 5.5 [PH]
PLATELET # BLD AUTO: 210 THOUSANDS/UL (ref 149–390)
PMV BLD AUTO: 11 FL (ref 8.9–12.7)
POTASSIUM SERPL-SCNC: 4.6 MMOL/L (ref 3.5–5.3)
PROT SERPL-MCNC: 6.9 G/DL (ref 6.4–8.2)
PROT UR STRIP-MCNC: NEGATIVE MG/DL
RBC # BLD AUTO: 4.99 MILLION/UL (ref 3.88–5.62)
RBC #/AREA URNS AUTO: ABNORMAL /HPF
SODIUM SERPL-SCNC: 139 MMOL/L (ref 136–145)
SP GR UR STRIP.AUTO: >=1.03 (ref 1–1.03)
UROBILINOGEN UR QL STRIP.AUTO: 0.2 E.U./DL
WBC # BLD AUTO: 5.93 THOUSAND/UL (ref 4.31–10.16)
WBC #/AREA URNS AUTO: ABNORMAL /HPF

## 2022-01-13 PROCEDURE — 82570 ASSAY OF URINE CREATININE: CPT

## 2022-01-13 PROCEDURE — 36415 COLL VENOUS BLD VENIPUNCTURE: CPT

## 2022-01-13 PROCEDURE — 80053 COMPREHEN METABOLIC PANEL: CPT

## 2022-01-13 PROCEDURE — 81001 URINALYSIS AUTO W/SCOPE: CPT

## 2022-01-13 PROCEDURE — 82043 UR ALBUMIN QUANTITATIVE: CPT

## 2022-01-13 PROCEDURE — 85025 COMPLETE CBC W/AUTO DIFF WBC: CPT

## 2022-01-13 NOTE — PROGRESS NOTES
NEPHROLOGY OFFICE VISIT   Breezy Sears 80 y o  male MRN: [id number]  1/14/2022    Reason for Visit:  Follow-up    INTERVAL HISTORY and SUBJECTIVE:    I had the pleasure of seeing Pj Contreras today in the renal clinic for the continued management of CKD  He is a 81-year-old male who was previously seen in consultation by Dr Janay West September 2021 for evaluation of CKD  He was hospitalized briefly at the end of September 2021 for EGD/diverticulitis me of Zenker's diverticulum which he had performed on 09/30/2021  Surgery was a complete success  Nephrology was following for prevention of AK I/renal optimization  No issues last few months  No hospitalizations or ER visits  Overall doing well  He fell on the ice tea other day and slightly injured his knees but otherwise has been enjoying fairly good health  He did have a head cold over the holidays  COVID testing negative  Last labs obtained on 01/13/2022     ASSESSMENT AND PLAN:    Chronic kidney disease, stage IIIA without proteinuria:  -Current creatinine 1 3 mg/dL  baseline creatinine 1 3-1 4 mg/dL   Renal function stable  -microalbumin creatinine ratio 12   -etiology: Likely due to diabetic kidney disease, hypertensive nephrosclerosis, +/- age-related nephron loss  -prior imaging:  Essentially unremarkable, no hydronephrosis  -use ACE-inhibitor or angiotensin receptor blocker essential for renal protection  Currently on ACE-inhibitor   -SGLT2 inhibitor would be of great benefit for renal protection    Essential Hypertension in the setting of CKD:   -home medications:  Trandolapril 1 mg daily    Diabetes mellitus type 2:   -hemoglobin A1c 6 3%  Adequately controlled  -metformin, Januvia    CKD monitoring:   -no evidence of anemia, hemoglobin normal    PATIENT INSTRUCTIONS:    There are no Patient Instructions on file for this visit  No orders of the defined types were placed in this encounter        OBJECTIVE:  Current Weight: Weight - Scale: 95 3 kg (210 lb)  Vitals:    01/14/22 1339 01/14/22 1409   BP:  130/72   BP Location:  Left arm   Patient Position:  Sitting   Cuff Size:  Standard   Pulse:  64   Weight: 95 3 kg (210 lb)    Height: 5' 7" (1 702 m)     Body mass index is 32 89 kg/m²  REVIEW OF SYSTEMS:    Review of Systems   Constitutional: Negative for activity change, appetite change, chills, fatigue, fever and unexpected weight change  HENT: Negative for congestion and sore throat  Eyes: Negative for visual disturbance  Respiratory: Negative for cough and shortness of breath  Cardiovascular: Negative for chest pain, palpitations and leg swelling  Gastrointestinal: Negative for abdominal pain, anal bleeding, blood in stool, constipation, nausea and vomiting  Genitourinary: Negative for dysuria and hematuria  Musculoskeletal: Negative for arthralgias and back pain  Skin: Negative for color change and rash  Neurological: Negative for dizziness, seizures, syncope, weakness, light-headedness and headaches  Psychiatric/Behavioral: The patient is not nervous/anxious  All other systems reviewed and are negative  PHYSICAL EXAM:      Physical Exam  Constitutional:       General: He is not in acute distress  Appearance: Normal appearance  He is well-developed  He is not ill-appearing, toxic-appearing or diaphoretic  HENT:      Head: Normocephalic and atraumatic  Eyes:      General: No scleral icterus  Right eye: No discharge  Left eye: No discharge  Extraocular Movements: Extraocular movements intact  Conjunctiva/sclera: Conjunctivae normal    Neck:      Thyroid: No thyromegaly  Vascular: No carotid bruit or JVD  Trachea: No tracheal deviation  Cardiovascular:      Rate and Rhythm: Normal rate and regular rhythm  Heart sounds: No murmur heard  No friction rub  No gallop  Pulmonary:      Effort: Pulmonary effort is normal       Breath sounds: Normal breath sounds     Abdominal: General: Bowel sounds are normal  There is no distension  Palpations: Abdomen is soft  There is no mass  Tenderness: There is no abdominal tenderness  There is no guarding or rebound  Musculoskeletal:         General: No swelling, tenderness or signs of injury  Normal range of motion  Cervical back: Normal range of motion and neck supple  No rigidity or tenderness  Right lower leg: No edema  Left lower leg: No edema  Lymphadenopathy:      Cervical: No cervical adenopathy  Skin:     General: Skin is warm and dry  Coloration: Skin is not jaundiced or pale  Findings: No erythema or rash  Neurological:      Mental Status: He is alert and oriented to person, place, and time  Psychiatric:         Mood and Affect: Mood normal          Behavior: Behavior normal          Thought Content:  Thought content normal          Judgment: Judgment normal          Medications:    Current Outpatient Medications:     aspirin 81 mg chewable tablet, Chew 81 mg daily, Disp: , Rfl:     Januvia 50 MG tablet, TAKE 1 TABLET BY MOUTH  DAILY, Disp: 90 tablet, Rfl: 3    metFORMIN (GLUCOPHAGE) 500 mg tablet, Take 500 mg by mouth 2 (two) times a day with meals , Disp: , Rfl:     pantoprazole (PROTONIX) 40 mg tablet, Take 1 tablet (40 mg total) by mouth daily before breakfast, Disp: 90 tablet, Rfl: 3    simvastatin (ZOCOR) 40 mg tablet, TAKE 1 TABLET BY MOUTH  DAILY AT BEDTIME, Disp: 90 tablet, Rfl: 3    tamsulosin (FLOMAX) 0 4 mg, Take 1 capsule (0 4 mg total) by mouth daily with dinner, Disp: 90 capsule, Rfl: 3    trandolapril (MAVIK) 1 MG tablet, TAKE ONE TABLET BY MOUTH EVERY DAY, Disp: 90 tablet, Rfl: 2    Laboratory Results:  Results from last 7 days   Lab Units 01/13/22  0904   WBC Thousand/uL 5 93   HEMOGLOBIN g/dL 15 0   HEMATOCRIT % 46 5   PLATELETS Thousands/uL 210   POTASSIUM mmol/L 4 6   CHLORIDE mmol/L 106   CO2 mmol/L 30   BUN mg/dL 20   CREATININE mg/dL 1 30   CALCIUM mg/dL 9 2 Results for orders placed or performed in visit on 01/13/22   Urinalysis with microscopic   Result Value Ref Range    Clarity, UA Hazy     Color, UA Yellow     Specific Gravity, UA >=1 030 1 003 - 1 030    pH, UA 5 5 4 5, 5 0, 5 5, 6 0, 6 5, 7 0, 7 5, 8 0    Glucose, UA Negative Negative mg/dl    Ketones, UA Negative Negative mg/dl    Blood, UA Negative     Protein, UA Negative Negative mg/dl    Nitrite, UA Negative Negative    Bilirubin, UA Negative Negative    Urobilinogen, UA 0 2 0 2, 1 0 E U /dl E U /dl    Leukocytes, UA Negative Negative    WBC, UA None Seen None Seen, 2-4, 5-60 /hpf    RBC, UA 4-10 (A) None Seen, 2-4 /hpf    Hyaline Casts, UA None Seen None Seen /lpf    Bacteria, UA None Seen None Seen, Occasional /hpf    Epithelial Cells None Seen None Seen, Occasional /hpf   Microalbumin / creatinine urine ratio   Result Value Ref Range    Creatinine, Ur 231 0 mg/dL    Microalbum  ,U,Random 27 3 (H) 0 0 - 20 0 mg/L    Microalb Creat Ratio 12 0 - 30 mg/g creatinine   CBC and differential   Result Value Ref Range    WBC 5 93 4 31 - 10 16 Thousand/uL    RBC 4 99 3 88 - 5 62 Million/uL    Hemoglobin 15 0 12 0 - 17 0 g/dL    Hematocrit 46 5 36 5 - 49 3 %    MCV 93 82 - 98 fL    MCH 30 1 26 8 - 34 3 pg    MCHC 32 3 31 4 - 37 4 g/dL    RDW 13 6 11 6 - 15 1 %    MPV 11 0 8 9 - 12 7 fL    Platelets 961 563 - 630 Thousands/uL    nRBC 0 /100 WBCs    Neutrophils Relative 53 43 - 75 %    Immat GRANS % 0 0 - 2 %    Lymphocytes Relative 29 14 - 44 %    Monocytes Relative 9 4 - 12 %    Eosinophils Relative 8 (H) 0 - 6 %    Basophils Relative 1 0 - 1 %    Neutrophils Absolute 3 06 1 85 - 7 62 Thousands/µL    Immature Grans Absolute 0 02 0 00 - 0 20 Thousand/uL    Lymphocytes Absolute 1 74 0 60 - 4 47 Thousands/µL    Monocytes Absolute 0 54 0 17 - 1 22 Thousand/µL    Eosinophils Absolute 0 50 0 00 - 0 61 Thousand/µL    Basophils Absolute 0 07 0 00 - 0 10 Thousands/µL   Comprehensive metabolic panel   Result Value Ref Range    Sodium 139 136 - 145 mmol/L    Potassium 4 6 3 5 - 5 3 mmol/L    Chloride 106 100 - 108 mmol/L    CO2 30 21 - 32 mmol/L    ANION GAP 3 (L) 4 - 13 mmol/L    BUN 20 5 - 25 mg/dL    Creatinine 1 30 0 60 - 1 30 mg/dL    Glucose, Fasting 105 (H) 65 - 99 mg/dL    Calcium 9 2 8 3 - 10 1 mg/dL    Corrected Calcium 9 7 8 3 - 10 1 mg/dL    AST 20 5 - 45 U/L    ALT 27 12 - 78 U/L    Alkaline Phosphatase 58 46 - 116 U/L    Total Protein 6 9 6 4 - 8 2 g/dL    Albumin 3 4 (L) 3 5 - 5 0 g/dL    Total Bilirubin 0 71 0 20 - 1 00 mg/dL    eGFR 51 ml/min/1 73sq m

## 2022-01-14 ENCOUNTER — OFFICE VISIT (OUTPATIENT)
Dept: NEPHROLOGY | Facility: CLINIC | Age: 82
End: 2022-01-14
Payer: COMMERCIAL

## 2022-01-14 VITALS
BODY MASS INDEX: 32.96 KG/M2 | HEIGHT: 67 IN | DIASTOLIC BLOOD PRESSURE: 72 MMHG | SYSTOLIC BLOOD PRESSURE: 130 MMHG | WEIGHT: 210 LBS | HEART RATE: 64 BPM

## 2022-01-14 DIAGNOSIS — N18.31 TYPE 2 DIABETES MELLITUS WITH STAGE 3A CHRONIC KIDNEY DISEASE, WITHOUT LONG-TERM CURRENT USE OF INSULIN (HCC): ICD-10-CM

## 2022-01-14 DIAGNOSIS — I10 BENIGN HYPERTENSION: Primary | ICD-10-CM

## 2022-01-14 DIAGNOSIS — E78.5 DYSLIPIDEMIA: ICD-10-CM

## 2022-01-14 DIAGNOSIS — E11.22 TYPE 2 DIABETES MELLITUS WITH STAGE 3A CHRONIC KIDNEY DISEASE, WITHOUT LONG-TERM CURRENT USE OF INSULIN (HCC): ICD-10-CM

## 2022-01-14 DIAGNOSIS — N18.31 STAGE 3A CHRONIC KIDNEY DISEASE (HCC): ICD-10-CM

## 2022-01-14 DIAGNOSIS — K22.5 ZENKER'S DIVERTICULUM: ICD-10-CM

## 2022-01-14 PROCEDURE — 3075F SYST BP GE 130 - 139MM HG: CPT | Performed by: NURSE PRACTITIONER

## 2022-01-14 PROCEDURE — 99214 OFFICE O/P EST MOD 30 MIN: CPT | Performed by: NURSE PRACTITIONER

## 2022-01-14 PROCEDURE — 3078F DIAST BP <80 MM HG: CPT | Performed by: NURSE PRACTITIONER

## 2022-01-14 PROCEDURE — 1036F TOBACCO NON-USER: CPT | Performed by: NURSE PRACTITIONER

## 2022-01-14 PROCEDURE — 1160F RVW MEDS BY RX/DR IN RCRD: CPT | Performed by: NURSE PRACTITIONER

## 2022-01-14 NOTE — PATIENT INSTRUCTIONS
1  Admit the use of Aleve  Use Tylenol for mild pain  2  No medication changes  3  Follow up in 6 months  4  Urine and blood work before next visit  5   Maintain adequate hydration, exercise as tolerated Anesthesia Volume In Cc: 3

## 2022-01-17 NOTE — PSYCH
This note was not shared with the patient due to this is a psychotherapy note     Assessment/Plan:      Diagnoses and all orders for this visit:    Adjustment disorder, unspecified type          Subjective:     Patient ID: Emilia Marrero is a 80 y o  male who presented for his outpatient follow-up counseling appointment with undersigned therapist  During today's session Guillermo Villarreal expressed he's been changing his approach with Stefani Harrell, his wife  Guillermo Villarreal reports he is trying to let her do things on her own without immediately jumping to completing tasks for her  The undersigned therapist congratulated him and assisted him process his anxiety about not having control  Guillermo Villarreal reports things have gone well this week and Stefani Harrell did not mention his "GF" this week  During today's session Guillermo Cherelle spoke about his step-son Noé Welch and expressed some of the behaviors he was unhappy with  Guillermo Villarreal feels he cannot tell Stefani Harrell how he feels about Noé Welch because she will tell him, "you never liked my son "  Guillermo Villarreal reports Noé Welch has made some "poor decisions" in his life and Guillermo Cherelle has had to pay for some of it  The undersigned therapist assisted Guillermo Villarreal process his feelings about his step-son Noé Welch  Guillermo Cherelle denies suicidal intent, gesture or ideation at this time  The undersigned therapist provided Guillermo Cherelle with 45 minutes of psychotherapy  Review of Systems   Psychiatric/Behavioral: Negative  Objective:     Physical Exam  Psychiatric:         Attention and Perception: Attention and perception normal          Mood and Affect: Mood and affect normal          Speech: Speech normal          Behavior: Behavior normal  Behavior is cooperative  Thought Content:  Thought content normal          Cognition and Memory: Cognition and memory normal          Judgment: Judgment normal

## 2022-01-17 NOTE — PATIENT INSTRUCTIONS
Please continue to schedule a follow-up session within one week  In case of a psychiatric emergency please contact any of the following:  [facility] \[facility]\"" [phone] or [phone]     [place] Suicide Prevention Lifeline : 7-831.276.7337

## 2022-01-18 ENCOUNTER — SOCIAL WORK (OUTPATIENT)
Dept: BEHAVIORAL/MENTAL HEALTH CLINIC | Facility: CLINIC | Age: 82
End: 2022-01-18
Payer: COMMERCIAL

## 2022-01-18 DIAGNOSIS — F43.20 ADJUSTMENT DISORDER, UNSPECIFIED TYPE: Primary | ICD-10-CM

## 2022-01-18 PROCEDURE — 90834 PSYTX W PT 45 MINUTES: CPT | Performed by: SOCIAL WORKER

## 2022-01-21 NOTE — PATIENT INSTRUCTIONS
Please continue to schedule a follow-up session within one week  In case of a psychiatric emergency please contact any of the following:  [facility] [phone] or [phone]     [place] Suicide Prevention Lifeline : 3-770.481.9308

## 2022-01-21 NOTE — PSYCH
This note was not shared with the patient due to this is a psychotherapy note     Assessment/Plan:      Diagnoses and all orders for this visit:    Adjustment disorder, unspecified type        Subjective:   Patient ID: Karyle Blossom is a 80 y o  male who presented for his follow-up outpatient counseling appointment with undersigned therapist   During today's session, Onel Howell reports he fell this weekend due to the snow and did not tell his wife Didi Ashby  He continues to withhold information from Didi Ashby in fear that he will get scolded at  The undersigned therapist continues to encourage Onel Howell to talk to his wife and be open  Onel Howell reports he's been trying to be assertive and give Mariomirtha Symone more tasks to do on her own like making her own coffee or going to bed on her own  Onel Howell reports Mariomirtha Ashby has been receptive and has been more helpful and active  Onel Dante continues to work on dieting and weight loss for [employer] as he is worried about her health  Onel Howell reports things have been going well at home and no complaints at this time  Onel Howell denies suicidal intent, gesture or ideation at this time  The undersigned therapist provided Onel Dante with 45 minutes of psychotherapy  Review of Systems   Psychiatric/Behavioral: Negative  Objective:     Physical Exam  Psychiatric:         Attention and Perception: Attention and perception normal          Mood and Affect: Mood and affect normal          Speech: Speech normal          Behavior: Behavior normal  Behavior is cooperative  Thought Content:  Thought content normal          Cognition and Memory: Cognition and memory normal          Judgment: Judgment normal

## 2022-01-25 ENCOUNTER — SOCIAL WORK (OUTPATIENT)
Dept: BEHAVIORAL/MENTAL HEALTH CLINIC | Facility: CLINIC | Age: 82
End: 2022-01-25
Payer: COMMERCIAL

## 2022-01-25 DIAGNOSIS — F43.20 ADJUSTMENT DISORDER, UNSPECIFIED TYPE: Primary | ICD-10-CM

## 2022-01-25 PROCEDURE — 90834 PSYTX W PT 45 MINUTES: CPT | Performed by: SOCIAL WORKER

## 2022-01-28 NOTE — PATIENT INSTRUCTIONS
Please continue to schedule a follow-up session within one week  In case of a psychiatric emergency please contact any of the following:  [facility] [phone] or [phone]     National Suicide Prevention Lifeline : 3-823.274.3080

## 2022-01-28 NOTE — PSYCH
This note was not shared with the patient due to this is a psychotherapy note   Assessment/Plan:      Diagnoses and all orders for this visit:    Adjustment disorder, unspecified type          Subjective:     Patient ID: Willian Agudelo is a [de-identified] y o  male who presented for his outpatient individual counseling session with undersigned therapist   During today's session Chery Jung processed his feelings of frustration about this mornings situation with Dossie Glass  Chery Rocha shared that he continues to try and accommodate Sanjuanita's needs and she can sometimes say hurtful things back and Chery Rocha then feels upset  The undersigned therapist continues to encourage Rober Hargrove have self-autonomy and face her own consequences  Chery Aj is surging allowing Dossie Glass to experience let downs which cause Ernesto to have pressure in making things right  Chery Aj reports there's been a change in Dossie Glass now sleeping early and not asking him to get things for her  Chery Rocha however is now sleeping in the guest bedroom as he feels he does not have enough space in the bed and wants to make sure Dossie Glass is comfortable  The undersigned therapist continues to work on assertive communication with Chery Rocha and expressing his needs despite Sanjuanita's medical condition and health  Chery Rocha shared that it was his birthday this week and he received a card from Renetta Gates (Sanjuanita's niece) and was grateful for that  Chery Rocha denies suicidal intent, gesture or ideation at this time  The undersigned therapist provided Chery Rocha with 45 minutes of psychotherapy  Review of Systems   Psychiatric/Behavioral: The patient is nervous/anxious  Objective:     Physical Exam  Psychiatric:         Attention and Perception: Attention and perception normal          Mood and Affect: Mood and affect normal          Speech: Speech normal          Behavior: Behavior normal  Behavior is cooperative  Thought Content:  Thought content normal          Cognition and Memory: Cognition and memory normal          Judgment: Judgment normal

## 2022-02-01 ENCOUNTER — SOCIAL WORK (OUTPATIENT)
Dept: BEHAVIORAL/MENTAL HEALTH CLINIC | Facility: CLINIC | Age: 82
End: 2022-02-01
Payer: COMMERCIAL

## 2022-02-01 DIAGNOSIS — F43.20 ADJUSTMENT DISORDER, UNSPECIFIED TYPE: Primary | ICD-10-CM

## 2022-02-01 PROCEDURE — 90834 PSYTX W PT 45 MINUTES: CPT | Performed by: SOCIAL WORKER

## 2022-02-08 ENCOUNTER — SOCIAL WORK (OUTPATIENT)
Dept: BEHAVIORAL/MENTAL HEALTH CLINIC | Facility: CLINIC | Age: 82
End: 2022-02-08
Payer: COMMERCIAL

## 2022-02-08 DIAGNOSIS — F43.20 ADJUSTMENT DISORDER, UNSPECIFIED TYPE: Primary | ICD-10-CM

## 2022-02-08 PROCEDURE — 90834 PSYTX W PT 45 MINUTES: CPT | Performed by: SOCIAL WORKER

## 2022-02-09 NOTE — PSYCH
This note was not shared with the patient due to this is a psychotherapy note     Assessment/Plan:      Diagnoses and all orders for this visit:    Adjustment disorder, unspecified type          Subjective:     Patient ID: Alanna Mclean is a [de-identified] y o  male who presented for his follow-up outpatient counseling appointment with undersigned therapist   During today's session Jian Desir shared an article of an environmental friendly end of life burial and how he wishes to be curried into earths natural soil  Jian Desir reports Javris Izaguirre initially wanted to be cremated and himself as well, but Jarvis Izaguirre now wants to be buried  Jian Desir shared that he continues to sleep in the other room while Jarvis Izaguirre sleeps earlier  Jian Desir reports pain on his right hand thumb but refusing to go to the doctor to get it seen because he doesn't want injections to be offered  Jian Desir has been struggling with writing and carrying things due to the thumb being sore and swollen  Jian Desir shared that he will be working taking down the [place] [employer] tree this week and undersigned therapist encourage he tell Jarvis Izaguirre to help as she should feel she has purpose  Jian Desir disclosed thoughts of not having children and how he sometimes wishes he did  The undersigned therapist assisted Jian Desir process his feelings about not having children and his ex-wife not being honest with him  Jian Desir shared history of his ex-wife and how things progressed in his previous marriage  Jian Desir continues to help Jarvis Izaguirre but reports he continues encouraging Jarvis Izaguirre to do things on her own or ask for help  Jian Desir denies suicidal intent, gesture or ideation at this time  The undersigned therapist provided Jian Desir with 45 minutes of psychotherapy  Review of Systems   Psychiatric/Behavioral: Positive for dysphoric mood           Objective:     Physical Exam  Psychiatric:         Attention and Perception: Attention and perception normal          Mood and Affect: Mood and affect normal          Speech: Speech normal          Behavior: Behavior normal  Behavior is cooperative  Thought Content:  Thought content normal          Cognition and Memory: Cognition and memory normal          Judgment: Judgment normal

## 2022-02-09 NOTE — PATIENT INSTRUCTIONS
Please continue to schedule a follow-up session within one week  In case of a psychiatric emergency please contact any of the following:  [facility] [phone] or [phone]     National Suicide Prevention Lifeline : 5-991.814.6605

## 2022-02-10 ENCOUNTER — OFFICE VISIT (OUTPATIENT)
Dept: CARDIOLOGY CLINIC | Facility: CLINIC | Age: 82
End: 2022-02-10
Payer: COMMERCIAL

## 2022-02-10 VITALS
TEMPERATURE: 96.8 F | SYSTOLIC BLOOD PRESSURE: 140 MMHG | HEART RATE: 66 BPM | OXYGEN SATURATION: 97 % | WEIGHT: 206 LBS | HEIGHT: 67 IN | BODY MASS INDEX: 32.33 KG/M2 | DIASTOLIC BLOOD PRESSURE: 70 MMHG

## 2022-02-10 DIAGNOSIS — I25.10 2-VESSEL CORONARY ARTERY DISEASE: ICD-10-CM

## 2022-02-10 DIAGNOSIS — E66.9 OBESITY (BMI 30-39.9): ICD-10-CM

## 2022-02-10 DIAGNOSIS — R06.02 SHORTNESS OF BREATH: ICD-10-CM

## 2022-02-10 DIAGNOSIS — E78.5 DYSLIPIDEMIA: ICD-10-CM

## 2022-02-10 DIAGNOSIS — I10 BENIGN HYPERTENSION: ICD-10-CM

## 2022-02-10 DIAGNOSIS — E11.9 TYPE 2 DIABETES MELLITUS WITHOUT COMPLICATION, WITHOUT LONG-TERM CURRENT USE OF INSULIN (HCC): ICD-10-CM

## 2022-02-10 PROCEDURE — 93000 ELECTROCARDIOGRAM COMPLETE: CPT | Performed by: INTERNAL MEDICINE

## 2022-02-10 PROCEDURE — 99214 OFFICE O/P EST MOD 30 MIN: CPT | Performed by: INTERNAL MEDICINE

## 2022-02-10 NOTE — PROGRESS NOTES
Progress Note - Cardiology Office  Gonzales Sears [de-identified] y o  male MRN: [id number]   Encounter: [id number]     1  2-vessel coronary artery disease    2  Dyslipidemia    3  Benign hypertension    4  Shortness of breath    5  Type 2 diabetes mellitus without complication, without long-term current use of insulin ([facility])    6  Obesity (BMI 30-39  9)        Assessment/Plan      1  Coronary artery disease with a remote history of angioplasty of LAD with a drug-eluting stent and known RPDA 100% with grade 3 collaterals  He has no symptoms of angina  He has decently active  His blood test from August 2019 reviewed  He has nonischemic nuclear in September of 2018  He is able to walk on the treadmill EKG shows no changes he will be scheduled for exercise stress test    2  Hypertension  Patient has longstanding history of essential hypertension  Blood pressure is borderline acceptable  He has no dizziness no lightheadedness electrolyte has been acceptable labs from January 2022 reviewed  3  Dyslipidemia  Continue statins  Patient on Zocor  4  Diabetes mellitus  Patient is on metformin and Januvia follow-up by PMD   Last HbA1c 6 5  He follows up with endocrinology [facility]  Currently stable  5  Obesity  Patient's BMI is around 32 he has lost weight since his last visit    6  Premature atrial contractions  Holter monitor shows no evidence of occult atrial fibrillation  Few PACs noted  Episode of probably SVT with aberration noted he has normal LV systolic function  No symptoms currently  Denies any palpitation at this time    Discussed with patient at length about pathophysiology of coronary artery disease, irregular heartbeat, need to compliant with medications and losing weight at length  Follow-up in 6 months  All these issues discussed with patient's wife at length  Counseling :  A description of the counseling    Spoke to patient but high intensity statins  Lipitor side effects discussed  He is willing to try it  Goals and Barriers  Patient want to lose weight which he gain during winter months  Little under stress due to wife's condition  Patient's ability to self care: Yes  Medication side effect reviewed with patient in detail and all their questions answered to their satisfaction  Lindsey Herrera is a [de-identified]y o  year old male who came for follow up  Patient has a past medical history significant for coronary artery disease status post angioplasty in 2008 off large size LAD with a known RPDA, hypertension, diabetes mellitus, dyslipidemia and moderate obesity who came for regular follow-up  Patient's last nuclear was in 2014 which was nonischemic  He denies any chest pain or any shortness of breath  No fever no chills no other significant complaint  7/18/2017Patient came for follow-up for coronary artery disease  A status post angioplasty of his large LAD and has known RPDA occlusion  His sugar is well controlled  He is trying to lose weight  Blood pressures acceptable  Denies any chest pain  No PND no orthopnea no nausea no vomiting no other significant complaint  07/26/2021  Above reviewed  Patient came for follow-up he is doing well  He denies any chest pain any shortness of breath  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  He has been busy with his wife hence on conservative Rx  His difficult to get food down is getting worse he is scheduled to follow up with Gastroenterology  No nausea no vomiting no other issues he does have short atrial runs  He has recently blood test done in July 2021 there were acceptable  02/10/2022  Above reviewed  Patient came for follow-up he is doing well  Patient denies any chest pain any shortness of breath    He had a 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 which he had repair done  He had stitches and titanium clips were placed he is doing well since then he is able to swallow everything  He had history of coronary artery disease, hypertension, dyslipidemia with previous history of angioplasty  His under lot of stress due to his wife as she has multiple problems  No nausea no vomiting no fever no chills no PND no orthopnea  No other cardiovascular issues at this time  Review of Systems   Constitutional: Negative for activity change, chills, diaphoresis, fever and unexpected weight change  HENT: Negative for congestion  Eyes: Negative for discharge and redness  Respiratory: Negative for cough, chest tightness, shortness of breath and wheezing  Cardiovascular: Negative  Negative for chest pain, palpitations and leg swelling  Gastrointestinal: Negative for abdominal pain, diarrhea and nausea  Endocrine: Negative  Genitourinary: Negative for decreased urine volume and urgency  Musculoskeletal: Positive for arthralgias and back pain  Negative for gait problem  Skin: Negative for rash and wound  Allergic/Immunologic: Negative  Neurological: Negative for dizziness, seizures, syncope, weakness, light-headedness and headaches  Hematological: Negative  Psychiatric/Behavioral: Negative for agitation and confusion  The patient is nervous/anxious          Historical Information   Past Medical History:   Diagnosis Date    Abnormal blood chemistry     resolved: 11/9/16    Abnormal glucose     last assessed: 9/24/14    Arthritis     CAD (coronary artery disease)     Chronic kidney disease     STONES  & CKD    Coronary atherosclerosis     DM2 (diabetes mellitus, type 2) (HCC)     Dyslipidemia     Facial nerve disorder     HTN (hypertension)     Hyperlipidemia     last assessed: 1/13/17    Kidney stones     Lumbosacral radiculopathy     Nerve root and plexus disorder     Obesity     Osteoarthritis     Prostate asymmetry  Pure hypercholesterolemia      Past Surgical History:   Procedure Laterality Date    CORONARY ANGIOPLASTY      1 STENT     GA ESOPHAGUS SURG PROCEDURE UNLISTED N/A 9/30/2021    Procedure: EGD; DIVERTICULECTOMY ZENKERS ENDOSCOPIC;  Surgeon: Moose Ortiz MD;  [facility];  Service: Thoracic    UPPER GASTROINTESTINAL ENDOSCOPY       Social History     Substance and Sexual Activity   Alcohol Use Not Currently     Social History     Substance and Sexual Activity   Drug Use No     Social History     Tobacco Use   Smoking Status Never Smoker   Smokeless Tobacco Never Used     Family History:   Family History   Problem Relation Age of Onset    Cancer Mother     Ovarian cancer Mother     Hypertension Sister     Lung disease Father         black    Mental illness Neg Hx     Substance Abuse Neg Hx        Meds/Allergies     Allergies   Allergen Reactions    Other Other (See Comments)     REAPRO- SEVERELY LOWERED RBCS [facility]    Ciprofloxacin Hives       Current Outpatient Medications:     aspirin 81 mg chewable tablet, Chew 81 mg daily, Disp: , Rfl:     Januvia 50 MG tablet, TAKE 1 TABLET BY MOUTH  DAILY, Disp: 90 tablet, Rfl: 3    metFORMIN (GLUCOPHAGE) 500 mg tablet, Take 500 mg by mouth 2 (two) times a day with meals , Disp: , Rfl:     simvastatin (ZOCOR) 40 mg tablet, TAKE 1 TABLET BY MOUTH  DAILY AT BEDTIME, Disp: 90 tablet, Rfl: 3    tamsulosin (FLOMAX) 0 4 mg, Take 1 capsule (0 4 mg total) by mouth daily with dinner, Disp: 90 capsule, Rfl: 3    trandolapril (MAVIK) 1 MG tablet, TAKE ONE TABLET BY MOUTH EVERY DAY, Disp: 90 tablet, Rfl: 2    pantoprazole (PROTONIX) 40 mg tablet, Take 1 tablet (40 mg total) by mouth daily before breakfast (Patient not taking: Reported on 2/10/2022 ), Disp: 90 tablet, Rfl: 3    Vitals: Blood pressure 140/70, pulse 66, temperature (!) 96 8 °F (36 °C), temperature source Temporal, height 5' 7" (1 702 m), weight 93 4 kg (206 lb), SpO2 97 %     Body mass index is 32 26 kg/m²  BP Readings from Last 3 Encounters:   02/10/22 140/70   01/14/22 130/72   12/27/21 122/80       Physical Exam:  Physical Exam  Constitutional:       General: He is not in acute distress  Appearance: He is well-developed  He is not diaphoretic  Neck:      Thyroid: No thyromegaly  Vascular: No JVD  Trachea: No tracheal deviation  Cardiovascular:      Rate and Rhythm: Normal rate and regular rhythm  Heart sounds: S1 normal and S2 normal  Heart sounds not distant  Murmur heard  Systolic (ejection) murmur is present with a grade of 2/6  No friction rub  No gallop  No S3 or S4 sounds  Pulmonary:      Effort: Pulmonary effort is normal  No respiratory distress  Breath sounds: Normal breath sounds  No wheezing or rales  Chest:      Chest wall: No tenderness  Abdominal:      General: Bowel sounds are normal  There is no distension  Palpations: Abdomen is soft  Tenderness: There is no abdominal tenderness  Musculoskeletal:         General: No deformity  Cervical back: Neck supple  Skin:     General: Skin is warm and dry  Coloration: Skin is not pale  Findings: No rash  Neurological:      Mental Status: He is alert and oriented to person, place, and time  Psychiatric:         Behavior: Behavior normal          Judgment: Judgment normal          Diagnostic Studies Review Cardio:  Stress Test: Nuclear stress test in October 2014 shows no ischemia EF 70%  Nuclear stress test done on 09/26/2018 was normal with EF around 70%  Echocardiogram/ROCK: Echo Doppler done in March 2015 shows borderline concentric left hypertrophy EF 55-60%, after reminding dilated, trace MR, trace TR PA pressure 35 mmHg  Echo Doppler done 09/26/2018    Echo Doppler shows normal LV systolic function grade 1 diastolic dysfunction, right ventricle mildly dilated, left atrium mild-to-moderate dilated, mild MR, mild TR PA pressure 30 mm of mercury  No change from old echo  Catheterization: His cardiac catheter patient done in 2008 shows critical stenosis of LAD which was successfully stented with a drug-eluting stent  He had nonobstructive disease in the circumflex and right coronary artery and RPDA is 100% occluded with grade 2 collaterals  Vascular imaging: In October of 2014 abdominal aortogram study shows no aneurysm  Holter monitor  Holter monitor done in March 2019 shows few PACs and PVCs  Most of the PVCs were single  There were 12 beat NSVT though it was irregular so we could not rule out SVT with aberration    ECG Report:   Comparison to prior ECGs: no interval change  7/18/2017  Normal sinus rhythm 1  with no significant ST changes  Heart rate was 68 bpm     Repeat EKG done 08/14/2018 shows normal sinus rhythm heart rate 73 beats per minute  No change from old EKG  Twelve lead EKG in our office done on 02/14/2018 shows normal sinus rhythm heart rate 72 beats per minute few PACs were noted  As compared to old EKG PACs are newly reported  Twelve lead EKG 09/18/2019 shows normal sinus rhythm rare PACs heart rate 71 beats per minute no other significant ST changes  Twelve lead EKG 03/18/2020 shows normal sinus rhythm rare PACs heart rate 67 beats per minute no change from old EKG  Twelve lead EKG 07/26/2021 shows normal sinus rhythm heart rate 77 beats per minute no change from previous EKG    Twelve lead EKG 02/10/2022 shows normal sinus with sinus arrhythmia heart rate 66 beats per minute no change from previous EKG      Blood test from July 2021 reviewed  Lab Review     Lab Results   Component Value Date     02/17/2017    K 4 6 01/13/2022     01/13/2022    CO2 30 01/13/2022    BUN 20 01/13/2022    CREATININE 1 30 01/13/2022    GLUCOSE 125 (H) 02/17/2017    GLUF 105 (H) 01/13/2022    CALCIUM 9 2 01/13/2022    CORRECTEDCA 9 7 01/13/2022    AST 20 01/13/2022    ALT 27 01/13/2022    ALKPHOS 58 01/13/2022 PROT 6 5 02/17/2017    BILITOT 0 4 02/17/2017    EGFR 51 01/13/2022     Lab Results   Component Value Date    GLUCOSE 125 (H) 02/17/2017    CALCIUM 9 2 01/13/2022     02/17/2017    K 4 6 01/13/2022    CO2 30 01/13/2022     01/13/2022    BUN 20 01/13/2022    CREATININE 1 30 01/13/2022       Lab Results   Component Value Date    HGBA1C 6 3 12/27/2021     Lipid Profile:   Lab Results   Component Value Date    CHOL 173 02/17/2017    HDL 52 06/23/2021    LDLCALC 87 06/23/2021    TRIG 85 06/23/2021     Lab Results   Component Value Date    CHOL 173 02/17/2017    CHOL 151 11/21/2016       Dr Brant Burroughs MD [facility]      "This note has been constructed using a voice recognition system  Therefore there may be syntax, spelling, and/or grammatical errors   Please call if you have any questions  "

## 2022-02-15 ENCOUNTER — SOCIAL WORK (OUTPATIENT)
Dept: BEHAVIORAL/MENTAL HEALTH CLINIC | Facility: CLINIC | Age: 82
End: 2022-02-15
Payer: COMMERCIAL

## 2022-02-15 DIAGNOSIS — F43.20 ADJUSTMENT DISORDER, UNSPECIFIED TYPE: Primary | ICD-10-CM

## 2022-02-15 PROCEDURE — 90834 PSYTX W PT 45 MINUTES: CPT | Performed by: SOCIAL WORKER

## 2022-02-16 NOTE — PATIENT INSTRUCTIONS
Please continue to schedule a follow-up session within one week  In case of a psychiatric emergency please contact any of the following:  [facility] [phone] or [phone]     National Suicide Prevention Lifeline : 3-784.806.7388

## 2022-02-16 NOTE — PSYCH
This note was not shared with the patient due to this is a psychotherapy note     Assessment/Plan:      Diagnoses and all orders for this visit:    Adjustment disorder, unspecified type          Subjective:     Patient ID: Madhavi Ford is a [de-identified] y o  male who presented for his follow-up outpatient counseling appointment with undersigned therapist  The undersigned therapist continues to assist Racquel Montana process his feelings about caring for his medically ill wife, London Caller  Racquel Montana reports he attempted to call the pharmacy to see if the insurance would cover diapers, but reports it was denied  Ernesto processed his feelings about his step-son Nabil Stark  Racquel Montana reports Nabil Stark has lived with them since 2004 and labeled him as a "sociopath "  Racquel Montana expressed feeling frustrated that he does everything for him, such as: applying for senior living, writing letters to judges, paying his bills, picking up his medication and mailing it  The undersigned therapist encouraged he talk about his feelings and help Nabil Stark learn to be self-sufficient as he's already 61 years old  Racquel Montana continues to do for London Caller but reports he's been trying to let her do things on her own  Racquel Montana denies suicidal intent, gesture or ideation at this time  The undersigned therapist provided Racquel Montana with 45 minutes of psychotherapy  Review of Systems   Psychiatric/Behavioral: The patient is nervous/anxious  Objective:     Physical Exam  Psychiatric:         Attention and Perception: Attention and perception normal          Mood and Affect: Mood and affect normal          Speech: Speech normal          Behavior: Behavior normal  Behavior is cooperative           Cognition and Memory: Cognition and memory normal

## 2022-02-17 ENCOUNTER — OFFICE VISIT (OUTPATIENT)
Dept: UROLOGY | Facility: CLINIC | Age: 82
End: 2022-02-17
Payer: COMMERCIAL

## 2022-02-17 VITALS
DIASTOLIC BLOOD PRESSURE: 70 MMHG | HEIGHT: 67 IN | OXYGEN SATURATION: 97 % | SYSTOLIC BLOOD PRESSURE: 128 MMHG | WEIGHT: 208.8 LBS | BODY MASS INDEX: 32.77 KG/M2 | HEART RATE: 77 BPM | RESPIRATION RATE: 18 BRPM

## 2022-02-17 DIAGNOSIS — N40.1 BENIGN PROSTATIC HYPERPLASIA WITH LOWER URINARY TRACT SYMPTOMS, SYMPTOM DETAILS UNSPECIFIED: Primary | ICD-10-CM

## 2022-02-17 PROCEDURE — 1160F RVW MEDS BY RX/DR IN RCRD: CPT | Performed by: PHYSICIAN ASSISTANT

## 2022-02-17 PROCEDURE — 3074F SYST BP LT 130 MM HG: CPT | Performed by: PHYSICIAN ASSISTANT

## 2022-02-17 PROCEDURE — 99213 OFFICE O/P EST LOW 20 MIN: CPT | Performed by: PHYSICIAN ASSISTANT

## 2022-02-17 PROCEDURE — 1036F TOBACCO NON-USER: CPT | Performed by: PHYSICIAN ASSISTANT

## 2022-02-17 PROCEDURE — 3078F DIAST BP <80 MM HG: CPT | Performed by: PHYSICIAN ASSISTANT

## 2022-02-17 NOTE — PROGRESS NOTES
No diagnosis found  Assessment and plan:       1  BPH with lower urinary tract symptoms  -patient unsure should if his urinary symptoms improved with dietary changes versus tamsulosin  He will stop the tamsulosin to see if symptoms worsen  If they do he will restart in contact us for annual follow-up  If symptoms remain stable off of pharmacotherapy, will follow with Urology as needed  Angela Nelson PA-C      Chief Complaint     Chief Complaint   Patient presents with    Benign Prostatic Hypertrophy         History of Present Illness     Gonzales Rosen is a [de-identified] y o  male presenting for follow-up of lower urinary tract symptoms  Has previously seen Dr Pao Christopher  He is referred for evaluation of urinary frequency  He has daytime frequency and some nocturia  PVR at last visit was 15 milliliter  Patient was started on tamsulosin as last visit  Digital rectal examination at that time was normal   He presents today for follow-up  Patient denies any adverse side effects of the tamsulosin  He has made some positive dietary and physical activity changes  Is notice urinary symptom improvement however he is not sure if this was from his dietary changes verses the medication  Denies any issues with orthostatic hypotension  Laboratory     Lab Results   Component Value Date    CREATININE 1 30 01/13/2022       Lab Results   Component Value Date    PSA 3 3 06/29/2020    PSA 3 1 10/16/2018         Review of Systems     Review of Systems   Constitutional: Negative for activity change, appetite change, chills, diaphoresis, fatigue, fever and unexpected weight change  Respiratory: Negative for chest tightness and shortness of breath  Cardiovascular: Negative for chest pain, palpitations and leg swelling  Gastrointestinal: Negative for abdominal distention, abdominal pain, constipation, diarrhea, nausea and vomiting     Genitourinary: Negative for decreased urine volume, difficulty urinating, dysuria, enuresis, flank pain, frequency, genital sores, hematuria and urgency  Musculoskeletal: Negative for back pain, gait problem and myalgias  Skin: Negative for color change, pallor, rash and wound  Psychiatric/Behavioral: Negative for behavioral problems  The patient is not nervous/anxious  Allergies     Allergies   Allergen Reactions    Other Other (See Comments)     REAPRO- SEVERELY LOWERED RBCS [facility]    Ciprofloxacin Hives       Physical Exam     Physical Exam  Constitutional:       General: He is not in acute distress  Appearance: Normal appearance  He is normal weight  He is not ill-appearing, toxic-appearing or diaphoretic  HENT:      Head: Normocephalic and atraumatic  Eyes:      General:         Right eye: No discharge  Left eye: No discharge  Conjunctiva/sclera: Conjunctivae normal    Pulmonary:      Effort: Pulmonary effort is normal  No respiratory distress  Musculoskeletal:         General: No swelling or tenderness  Normal range of motion  Skin:     General: Skin is warm and dry  Coloration: Skin is not jaundiced or pale  Neurological:      General: No focal deficit present  Mental Status: He is alert and oriented to person, place, and time  Psychiatric:         Mood and Affect: Mood normal          Behavior: Behavior normal          Thought Content:  Thought content normal            Vital Signs     Vitals:    02/17/22 1440   BP: 128/70   BP Location: Left arm   Patient Position: Sitting   Cuff Size: Large   Pulse: 77   Resp: 18   SpO2: 97%   Weight: 94 7 kg (208 lb 12 8 oz)   Height: 5' 7" (1 702 m)         Current Medications       Current Outpatient Medications:     aspirin 81 mg chewable tablet, Chew 81 mg daily  , Disp: , Rfl:     Januvia 50 MG tablet, TAKE 1 TABLET BY MOUTH  DAILY, Disp: 90 tablet, Rfl: 3    metFORMIN (GLUCOPHAGE) 500 mg tablet, Take 500 mg by mouth 2 (two) times a day with meals  , Disp: , Rfl:     pantoprazole (PROTONIX) 40 mg tablet, Take 1 tablet (40 mg total) by mouth daily before breakfast, Disp: 90 tablet, Rfl: 3    simvastatin (ZOCOR) 40 mg tablet, TAKE 1 TABLET BY MOUTH  DAILY AT BEDTIME, Disp: 90 tablet, Rfl: 3    tamsulosin (FLOMAX) 0 4 mg, Take 1 capsule (0 4 mg total) by mouth daily with dinner, Disp: 90 capsule, Rfl: 3    trandolapril (MAVIK) 1 MG tablet, TAKE ONE TABLET BY MOUTH EVERY DAY, Disp: 90 tablet, Rfl: 2      Active Problems     Patient Active Problem List   Diagnosis    2-vessel coronary artery disease    Benign hypertension    Type 2 diabetes mellitus with stage 3a chronic kidney disease (HCC)    Dyslipidemia    Moderate obesity    Dysphagia    Shortness of breath    Zenker's diverticulum    Chronic kidney disease         Past Medical History     Past Medical History:   Diagnosis Date    Abnormal blood chemistry     resolved: 11/9/16    Abnormal glucose     last assessed: 9/24/14    Arthritis     CAD (coronary artery disease)     Chronic kidney disease     STONES  & CKD    Coronary atherosclerosis     DM2 (diabetes mellitus, type 2) (HCC)     Dyslipidemia     Facial nerve disorder     HTN (hypertension)     Hyperlipidemia     last assessed: 1/13/17    Kidney stones     Lumbosacral radiculopathy     Nerve root and plexus disorder     Obesity     Osteoarthritis     Prostate asymmetry     Pure hypercholesterolemia          Surgical History     Past Surgical History:   Procedure Laterality Date    CORONARY ANGIOPLASTY      1 STENT     CO ESOPHAGUS SURG PROCEDURE UNLISTED N/A 9/30/2021    Procedure: EGD; DIVERTICULECTOMY ZENKERS ENDOSCOPIC;  Surgeon: Smooth Villa MD;  [facility];  Service: Thoracic    UPPER GASTROINTESTINAL ENDOSCOPY           Family History     Family History   Problem Relation Age of Onset    Cancer Mother     Ovarian cancer Mother     Hypertension Sister     Lung disease Father         black    Mental illness Neg Hx     Substance Abuse Neg Hx          Social History     Social History       Radiology

## 2022-02-21 NOTE — PSYCH
This note was not shared with the patient due to this is a psychotherapy note     Assessment/Plan:      Diagnoses and all orders for this visit:    Adjustment disorder, unspecified type          Subjective:     Patient ID: Jaya Quiros is a [de-identified] y o  male who presented for his follow-up weekly appointment with undersigned therapist   During today's session Wesley Beal continues to process his feelings with helping his wife Rowdy Peguero day to day  Shanna Keys reports Rowdy Peguero finally took her Lasik pills for her retention of liquid, but shared that she isn't taking it regularly which concerns him  Shanna Keys continues to try and give Jesie Factor self-autonomy and not make decisions for her  This has been difficult for him as he feels responsible for her at times  Shanna Keys reports he was able to get Jesie Factor a Brock's day gift and continue to have discussion about getting a new mattress  Shanna Keys processed his feelings about his step son Brigette Gramajo and reports he received penitentiary paperwork for him and felt frustrated that he had to fill out the paperwork without understanding it  Shanna Keys continues to solve Austen's problems, but reports wishful thinking that Brigette Gramajo will stay with his girlfriend Tobey Lefort in [place]  Shanna Keys reports he's implemented walking daily and puzzle building as his 2 new hobbies since our last session and feels they've been helpful for him  Shanna Keys denies suicidal intent, gesture or ideation at this time  The undersigned therapist provided Shanna Keys with 45 minutes of psychotherapy  Review of Systems   Psychiatric/Behavioral: Negative  Objective:     Physical Exam  Psychiatric:         Attention and Perception: Attention and perception normal          Mood and Affect: Mood and affect normal          Speech: Speech normal          Behavior: Behavior normal  Behavior is cooperative  Thought Content:  Thought content normal          Cognition and Memory: Cognition and memory normal          Judgment: Judgment normal

## 2022-02-21 NOTE — PATIENT INSTRUCTIONS
Please continue to schedule a follow-up session within one week  In case of a psychiatric emergency please contact any of the following:  [facility] [phone] or [phone]     [place] Suicide Prevention Lifeline : [phone]

## 2022-02-22 ENCOUNTER — SOCIAL WORK (OUTPATIENT)
Dept: BEHAVIORAL/MENTAL HEALTH CLINIC | Facility: CLINIC | Age: 82
End: 2022-02-22
Payer: COMMERCIAL

## 2022-02-22 DIAGNOSIS — F43.20 ADJUSTMENT DISORDER, UNSPECIFIED TYPE: Primary | ICD-10-CM

## 2022-02-22 PROCEDURE — 90834 PSYTX W PT 45 MINUTES: CPT | Performed by: SOCIAL WORKER

## 2022-02-28 NOTE — PSYCH
This note was not shared with the patient due to this is a psychotherapy note   Assessment/Plan:      Diagnoses and all orders for this visit:    Adjustment disorder, unspecified type          Subjective:     Patient ID: Keyla Vasquez is a [de-identified] y o  male who presented for his follow-up outpatient counseling appointment with undersigned therapist   Cheryl Dillard confirmed he received the Medicaid application information packet as we discussed last week  During today's session Cheryl Dillard discussed his reluctance in taking medication  Ernesto's right hand has been causing him significant pain, but he reports history of migraines as a kid and how his family was against medication  In addition, he feels he likes to overcome symptoms on his own  He bought himself a brace to keep his thumb from moving, but reports he doesn't want injections in his hand  The undersigned therapist recommended he see his PCP or an orthopedist, Cheryl Dillard refused  Cheryl Dillard shared about his current stressors regarding Sanjuanita's health and her body retaining fluid  Karolina Pierre also shared that Annmarie Edward has been having delusional thoughts that she's going to win a million dollars from a mail newsletter she's received  Therefore, she's been telling Karolina Face she wants the house clean and to prepare for the million dollars  Karolina Face is feeling worried of telling Annmarie Edward the truth as he fears she will have a stroke if she hears bad news  Cheryl Dillard continues to withhold information from Annmarie Mitchellne to also avoid confrontation and conflict  The undersigned therapist assisted Karolina Face process his feelings about Sanjuanita's mental health and encouraged he inform Teetee Lemus of Sanjuanita's thoughts of her winning the [employer]  Cheryl Dillard reports he's felt like screaming this week due to his current stressors and Sanjuanita's behavior towards him  Cheryl Dillard reports Annmarie Cheyanne continues to say hurtful things to him, but refuses to address the behavior with Annmarie Edward as he wants to continue to avoid conflict   The undersigned therapist encouraged he talk to Jhonatan Bowman about his feelings and set appropriate boundaries  Gabriela Cordero also discussed the end-of-life planning, and how he is worried one day something will happen to Yanngerardo Gracie  The undersigned therapist normalized his thoughts and agreed that planning is sometimes helpful and accepting that death is a part of life  Gabriela Gil denies suicidal intent, gesture or ideation at this time  The undersigned therapist provided Gabriela Moadityalukas with 45 minutes of psychotherapy  Review of Systems   Psychiatric/Behavioral: The patient is nervous/anxious  Objective:     Physical Exam  Psychiatric:         Attention and Perception: Attention and perception normal          Mood and Affect: Mood and affect normal          Speech: Speech normal          Behavior: Behavior normal  Behavior is cooperative  Thought Content:  Thought content normal          Cognition and Memory: Cognition and memory normal          Judgment: Judgment normal

## 2022-02-28 NOTE — PATIENT INSTRUCTIONS
Please continue to schedule a follow-up session within one week  In case of a psychiatric emergency please contact any of the following:  [facility] [phone] or [phone]     [place] Suicide Prevention Lifeline : [phone]

## 2022-03-01 ENCOUNTER — SOCIAL WORK (OUTPATIENT)
Dept: BEHAVIORAL/MENTAL HEALTH CLINIC | Facility: CLINIC | Age: 82
End: 2022-03-01
Payer: COMMERCIAL

## 2022-03-01 DIAGNOSIS — F43.20 ADJUSTMENT DISORDER, UNSPECIFIED TYPE: Primary | ICD-10-CM

## 2022-03-01 PROCEDURE — 90834 PSYTX W PT 45 MINUTES: CPT | Performed by: SOCIAL WORKER

## 2022-03-03 ENCOUNTER — HOSPITAL ENCOUNTER (OUTPATIENT)
Dept: NON INVASIVE DIAGNOSTICS | Facility: HOSPITAL | Age: 82
Discharge: HOME | End: 2022-03-03
Attending: INTERNAL MEDICINE
Payer: COMMERCIAL

## 2022-03-03 VITALS
DIASTOLIC BLOOD PRESSURE: 91 MMHG | BODY MASS INDEX: 32.65 KG/M2 | SYSTOLIC BLOOD PRESSURE: 173 MMHG | HEART RATE: 84 BPM | WEIGHT: 208 LBS | HEIGHT: 67 IN

## 2022-03-03 DIAGNOSIS — I10 BENIGN HYPERTENSION: ICD-10-CM

## 2022-03-03 DIAGNOSIS — E11.9 TYPE 2 DIABETES MELLITUS WITHOUT COMPLICATION, WITHOUT LONG-TERM CURRENT USE OF INSULIN (HCC): ICD-10-CM

## 2022-03-03 DIAGNOSIS — I25.10 2-VESSEL CORONARY ARTERY DISEASE: ICD-10-CM

## 2022-03-03 DIAGNOSIS — E78.5 DYSLIPIDEMIA: ICD-10-CM

## 2022-03-03 DIAGNOSIS — R06.02 SHORTNESS OF BREATH: ICD-10-CM

## 2022-03-03 DIAGNOSIS — E66.9 OBESITY (BMI 30-39.9): ICD-10-CM

## 2022-03-03 LAB
AORTIC ROOT: 3.6 CM
APICAL FOUR CHAMBER EJECTION FRACTION: 47 %
AV LVOT PEAK GRADIENT: 3 MMHG
AV PEAK GRADIENT: 5 MMHG
DOP CALC LVOT AREA: 3.46 CM2
DOP CALC LVOT DIAMETER: 2.1 CM
E WAVE DECELERATION TIME: 161 MS
FRACTIONAL SHORTENING: 20 % (ref 28–44)
INTERVENTRICULAR SEPTUM IN DIASTOLE (PARASTERNAL SHORT AXIS VIEW): 1.2 CM
INTERVENTRICULAR SEPTUM: 1.2 CM (ref 0.55–1.03)
LAAS-AP2: 27.4 CM2
LAAS-AP4: 23.1 CM2
LEFT ATRIUM AREA SYSTOLE SINGLE PLANE A4C: 25.4 CM2
LEFT ATRIUM SIZE: 3.5 CM
LEFT INTERNAL DIMENSION IN SYSTOLE: 3.5 CM (ref 3.41–5.16)
LEFT VENTRICLE DIASTOLIC VOLUME (MOD BIPLANE): 113 ML (ref 102.55–230.95)
LEFT VENTRICLE SYSTOLIC VOLUME (MOD BIPLANE): 68 ML
LEFT VENTRICULAR INTERNAL DIMENSION IN DIASTOLE: 4.4 CM (ref 5.64–8.4)
LEFT VENTRICULAR POSTERIOR WALL IN END DIASTOLE: 1.2 CM (ref 0.53–1.01)
LEFT VENTRICULAR STROKE VOLUME: 38 ML
LV EF: 40 %
LVSV (TEICH): 38 ML
MAX HR PERCENT: 101 %
MAX HR: 140 BPM
MV E'TISSUE VEL-LAT: 7 CM/S
MV E'TISSUE VEL-SEP: 7 CM/S
MV PEAK A VEL: 0.75 M/S
MV PEAK E VEL: 56 CM/S
PV PEAK GRADIENT: 2 MMHG
RATE PRESSURE PRODUCT: NORMAL
RIGHT ATRIUM AREA SYSTOLE A4C: 13.8 CM2
RIGHT VENTRICLE ID DIMENSION: 2.4 CM
SL CV LEFT ATRIUM LENGTH A2C: 6.5 CM
SL CV LV DIAS VOL ENDO Z SCORE: -1.67
SL CV LV EF: 45
SL CV PED ECHO LEFT VENTRICLE DIASTOLIC VOLUME (MOD BIPLANE) 2D: 88 ML
SL CV PED ECHO LEFT VENTRICLE SYSTOLIC VOLUME (MOD BIPLANE) 2D: 50 ML
SL CV STRESS RECOVERY BP: NORMAL MMHG
SL CV STRESS RECOVERY HR: 83 BPM
SL CV STRESS RECOVERY O2 SAT: 99 %
SL CV STRESS STAGE REACHED: 2
STRESS ANGINA INDEX: 0
STRESS BASELINE BP: NORMAL MMHG
STRESS BASELINE HR: 77 BPM
STRESS DUKE TREADMILL SCORE: 4
STRESS O2 SAT REST: 98 %
STRESS PEAK HR: 142 BPM
STRESS PERCENT HR: 101 %
STRESS POST ESTIMATED WORKLOAD: 7 METS
STRESS POST EXERCISE DUR MIN: 4 MIN
STRESS POST EXERCISE DUR SEC: 10 SEC
STRESS POST O2 SAT PEAK: 99 %
STRESS POST PEAK BP: 170 MMHG
STRESS ST DEPRESSION: 0 MM
STRESS TARGET HR: 142 BPM
TR MAX PG: 13 MMHG
TR PEAK VELOCITY: 1.8 M/S
TRICUSPID VALVE PEAK REGURGITATION VELOCITY: 1.78 M/S
Z-SCORE OF INTERVENTRICULAR SEPTUM IN END DIASTOLE: 3.39
Z-SCORE OF LEFT VENTRICULAR DIMENSION IN END DIASTOLE: -4.47
Z-SCORE OF LEFT VENTRICULAR DIMENSION IN END SYSTOLE: -1.43
Z-SCORE OF LEFT VENTRICULAR POSTERIOR WALL IN END DIASTOLE: 3.5

## 2022-03-03 PROCEDURE — 93306 TTE W/DOPPLER COMPLETE: CPT

## 2022-03-03 PROCEDURE — 93016 CV STRESS TEST SUPVJ ONLY: CPT | Performed by: INTERNAL MEDICINE

## 2022-03-03 PROCEDURE — 93306 TTE W/DOPPLER COMPLETE: CPT | Performed by: INTERNAL MEDICINE

## 2022-03-03 PROCEDURE — 93018 CV STRESS TEST I&R ONLY: CPT | Performed by: INTERNAL MEDICINE

## 2022-03-03 PROCEDURE — 93017 CV STRESS TEST TRACING ONLY: CPT

## 2022-03-04 ENCOUNTER — TELEPHONE (OUTPATIENT)
Dept: CARDIOLOGY CLINIC | Facility: CLINIC | Age: 82
End: 2022-03-04

## 2022-03-04 LAB
CHEST PAIN STATEMENT: NORMAL
MAX DIASTOLIC BP: 82 MMHG
MAX HEART RATE: 142 BPM
MAX PREDICTED HEART RATE: 140 BPM
MAX. SYSTOLIC BP: 170 MMHG
PROTOCOL NAME: NORMAL
TARGET HR FORMULA: NORMAL
TEST INDICATION: NORMAL
TIME IN EXERCISE PHASE: NORMAL

## 2022-03-08 ENCOUNTER — SOCIAL WORK (OUTPATIENT)
Dept: BEHAVIORAL/MENTAL HEALTH CLINIC | Facility: CLINIC | Age: 82
End: 2022-03-08
Payer: COMMERCIAL

## 2022-03-08 DIAGNOSIS — F43.20 ADJUSTMENT DISORDER, UNSPECIFIED TYPE: Primary | ICD-10-CM

## 2022-03-08 PROCEDURE — 90834 PSYTX W PT 45 MINUTES: CPT | Performed by: SOCIAL WORKER

## 2022-03-11 NOTE — PATIENT INSTRUCTIONS
Please continue to schedule a follow-up session within one week  In case of a psychiatric emergency please contact any of the following:  [facility] [phone] or [phone]     National Suicide Prevention Lifeline : [phone]

## 2022-03-11 NOTE — PSYCH
This note was not shared with the patient due to this is a psychotherapy note     Assessment/Plan:      Diagnoses and all orders for this visit:    Adjustment disorder, unspecified type          Subjective:     Patient ID: Lali Rolon is a [de-identified] y o  male who presented for his follow-up outpatient counseling appointment  Griffin Hence brought a quilt blanket with pictures of his family and processed his feelings about it  Griffin Hence discussed his feelings about Keerthi Lorenzo and the [employer]" she ended up not winning  Rommie Hence reports he "exploded" on her because he was frustrated that she continued to believe that she was winning the money  Rommie Hence reports feeling guilty for exploding on Keerthi Lorenzo as Keerthi Lorenzo cried and apologized for being wrong about the money  Rommie Hence reports having the idea of [employer] to implement exercise and reports Keerthi Lorenzo has expressed interest in going  Rommie Hence continues to take care of Keerthi Lorenzo and denies feeling anxious or depressed  Chrismijohn Hence continues to struggle telling Keerthi Lorenzo how he feels and internalizes his feelings  Chrismie Hence feels responsible for Sanjuanita's mood and does not want to upset her  The undersigned therapist continues to provide psychoeducation on the importance of assertive communication  Chrismie Hence denies suicidal intent, gesture or ideation at this time  The undersigned therapist provided Rommie Hence with 45 minutes of psychotherapy  Review of Systems   Psychiatric/Behavioral: Negative  Objective:     Physical Exam  Psychiatric:         Attention and Perception: Attention and perception normal          Mood and Affect: Mood and affect normal          Speech: Speech normal          Behavior: Behavior normal  Behavior is cooperative  Thought Content:  Thought content normal          Cognition and Memory: Cognition and memory normal          Judgment: Judgment normal

## 2022-03-15 ENCOUNTER — SOCIAL WORK (OUTPATIENT)
Dept: BEHAVIORAL/MENTAL HEALTH CLINIC | Facility: CLINIC | Age: 82
End: 2022-03-15
Payer: COMMERCIAL

## 2022-03-15 DIAGNOSIS — F43.20 ADJUSTMENT DISORDER, UNSPECIFIED TYPE: Primary | ICD-10-CM

## 2022-03-15 PROCEDURE — 90834 PSYTX W PT 45 MINUTES: CPT | Performed by: SOCIAL WORKER

## 2022-03-16 ENCOUNTER — OFFICE VISIT (OUTPATIENT)
Dept: FAMILY MEDICINE CLINIC | Facility: CLINIC | Age: 82
End: 2022-03-16
Payer: COMMERCIAL

## 2022-03-16 VITALS
HEIGHT: 67 IN | BODY MASS INDEX: 32.96 KG/M2 | TEMPERATURE: 97.8 F | HEART RATE: 72 BPM | SYSTOLIC BLOOD PRESSURE: 110 MMHG | DIASTOLIC BLOOD PRESSURE: 72 MMHG | RESPIRATION RATE: 14 BRPM | WEIGHT: 210 LBS

## 2022-03-16 DIAGNOSIS — E11.22 TYPE 2 DIABETES MELLITUS WITH STAGE 3A CHRONIC KIDNEY DISEASE, WITHOUT LONG-TERM CURRENT USE OF INSULIN (HCC): Primary | ICD-10-CM

## 2022-03-16 DIAGNOSIS — M79.645 BILATERAL THUMB PAIN: ICD-10-CM

## 2022-03-16 DIAGNOSIS — M79.644 BILATERAL THUMB PAIN: ICD-10-CM

## 2022-03-16 DIAGNOSIS — M65.352 TRIGGER LITTLE FINGER OF LEFT HAND: ICD-10-CM

## 2022-03-16 DIAGNOSIS — N18.31 TYPE 2 DIABETES MELLITUS WITH STAGE 3A CHRONIC KIDNEY DISEASE, WITHOUT LONG-TERM CURRENT USE OF INSULIN (HCC): Primary | ICD-10-CM

## 2022-03-16 PROCEDURE — 1160F RVW MEDS BY RX/DR IN RCRD: CPT | Performed by: FAMILY MEDICINE

## 2022-03-16 PROCEDURE — 3078F DIAST BP <80 MM HG: CPT | Performed by: FAMILY MEDICINE

## 2022-03-16 PROCEDURE — 1036F TOBACCO NON-USER: CPT | Performed by: FAMILY MEDICINE

## 2022-03-16 PROCEDURE — 3074F SYST BP LT 130 MM HG: CPT | Performed by: FAMILY MEDICINE

## 2022-03-16 PROCEDURE — 99214 OFFICE O/P EST MOD 30 MIN: CPT | Performed by: FAMILY MEDICINE

## 2022-03-16 NOTE — PATIENT INSTRUCTIONS
10% - bad control"> 10% - bad control,Hemoglobin A1c (HbA1c) greater than 10% indicating poor diabetic control,Haemoglobin A1c greater than 10% indicating poor diabetic control">   Diabetes Mellitus Type 2 in Adults, Ambulatory Care   GENERAL INFORMATION:   Diabetes mellitus type 2  is a disease that affects how your body uses glucose (sugar)  Insulin helps move sugar out of the blood so it can be used for energy  Normally, when the blood sugar level increases, the pancreas makes more insulin  Type 2 diabetes develops because either the body cannot make enough insulin, or it cannot use the insulin correctly  After many years, your pancreas may stop making insulin  Common symptoms include the following:   · More hunger or thirst than usual     · Frequent urination     · Weight loss without trying     · Blurred vision  Seek immediate care for the following symptoms:   · Severe abdominal pain, or pain that spreads to your back  You may also be vomiting  · Trouble staying awake or focusing    · Shaking or sweating    · Blurred or double vision    · Breath has a fruity, sweet smell    · Breathing is deep and labored, or rapid and shallow    · Heartbeat is fast and weak  Treatment for diabetes mellitus type 2  includes keeping your blood sugar at a normal level  You must eat the right foods, and exercise regularly  You may also need medicine if you cannot control your blood sugar level with nutrition and exercise  Manage diabetes mellitus type 2:   · Check your blood sugar level  You will be taught how to check a small drop of blood in a glucose monitor  Ask your healthcare provider when and how often to check during the day  Ask your healthcare provider what your blood sugar levels should be when you check them  · Keep track of carbohydrates (sugar and starchy foods)  Your blood sugar level can get too high if you eat too many carbohydrates   Your dietitian will help you plan meals and snacks that have the right amount of carbohydrates  · Eat low-fat foods  Some examples are skinless chicken and low-fat milk  · Eat less sodium (salt)  Some examples of high-sodium foods to limit are soy sauce, potato chips, and soup  Do not add salt to food you cook  Limit your use of table salt  · Eat high-fiber foods  Foods that are a good source of fiber include vegetables, whole grain bread, and beans  · Limit alcohol  Alcohol affects your blood sugar level and can make it harder to manage your diabetes  Women should limit alcohol to 1 drink a day  Men should limit alcohol to 2 drinks a day  A drink of alcohol is 12 ounces of beer, 5 ounces of wine, or 1½ ounces of liquor  · Get regular exercise  Exercise can help keep your blood sugar level steady, decrease your risk of heart disease, and help you lose weight  Exercise for at least 30 minutes, 5 days a week  Include muscle strengthening activities 2 days each week  Work with your healthcare provider to create an exercise plan  · Check your feet each day  for injuries or open sores  Ask your healthcare provider for activities you can do if you have an open sore  · Quit smoking  If you smoke, it is never too late to quit  Smoking can worsen the problems that may occur with diabetes  Ask your healthcare provider for information about how to stop smoking if you are having trouble quitting  · Ask about your weight:  Ask healthcare providers if you need to lose weight, and how much to lose  Ask them to help you with a weight loss program  Even a 10 to 15 pound weight loss can help you manage your blood sugar level  · Carry medical alert identification  Wear medical alert jewelry or carry a card that says you have diabetes  Ask your healthcare provider where to get these items  · Ask about vaccines  Diabetes puts you at risk of serious illness if you get the flu, pneumonia, or hepatitis   Ask your healthcare provider if you should get a flu, pneumonia, or hepatitis B vaccine, and when to get the vaccine  Follow up with your healthcare provider as directed:  Write down your questions so you remember to ask them during your visits  CARE AGREEMENT:   You have the right to help plan your care  Learn about your health condition and how it may be treated  Discuss treatment options with your caregivers to decide what care you want to receive  You always have the right to refuse treatment  The above information is an  only  It is not intended as medical advice for individual conditions or treatments  Talk to your doctor, nurse or pharmacist before following any medical regimen to see if it is safe and effective for you  © 2014 [address] is for End User's use only and may not be sold, redistributed or otherwise used for commercial purposes  All illustrations and images included in CareNotes® are the copyrighted property of A D A M , Inc  or [employer]  10% - bad control"> 10% - bad control,Hemoglobin A1c (HbA1c) greater than 10% indicating poor diabetic control,Haemoglobin A1c greater than 10% indicating poor diabetic control">   Diabetes Mellitus Type 2 in Adults, Ambulatory Care   GENERAL INFORMATION:   Diabetes mellitus type 2  is a disease that affects how your body uses glucose (sugar)  Insulin helps move sugar out of the blood so it can be used for energy  Normally, when the blood sugar level increases, the pancreas makes more insulin  Type 2 diabetes develops because either the body cannot make enough insulin, or it cannot use the insulin correctly  After many years, your pancreas may stop making insulin  Common symptoms include the following:   · More hunger or thirst than usual     · Frequent urination     · Weight loss without trying     · Blurred vision  Seek immediate care for the following symptoms:   · Severe abdominal pain, or pain that spreads to your back   You may also be vomiting  · Trouble staying awake or focusing    · Shaking or sweating    · Blurred or double vision    · Breath has a fruity, sweet smell    · Breathing is deep and labored, or rapid and shallow    · Heartbeat is fast and weak  Treatment for diabetes mellitus type 2  includes keeping your blood sugar at a normal level  You must eat the right foods, and exercise regularly  You may also need medicine if you cannot control your blood sugar level with nutrition and exercise  Manage diabetes mellitus type 2:   · Check your blood sugar level  You will be taught how to check a small drop of blood in a glucose monitor  Ask your healthcare provider when and how often to check during the day  Ask your healthcare provider what your blood sugar levels should be when you check them  · Keep track of carbohydrates (sugar and starchy foods)  Your blood sugar level can get too high if you eat too many carbohydrates  Your dietitian will help you plan meals and snacks that have the right amount of carbohydrates  · Eat low-fat foods  Some examples are skinless chicken and low-fat milk  · Eat less sodium (salt)  Some examples of high-sodium foods to limit are soy sauce, potato chips, and soup  Do not add salt to food you cook  Limit your use of table salt  · Eat high-fiber foods  Foods that are a good source of fiber include vegetables, whole grain bread, and beans  · Limit alcohol  Alcohol affects your blood sugar level and can make it harder to manage your diabetes  Women should limit alcohol to 1 drink a day  Men should limit alcohol to 2 drinks a day  A drink of alcohol is 12 ounces of beer, 5 ounces of wine, or 1½ ounces of liquor  · Get regular exercise  Exercise can help keep your blood sugar level steady, decrease your risk of heart disease, and help you lose weight  Exercise for at least 30 minutes, 5 days a week  Include muscle strengthening activities 2 days each week   Work with your healthcare provider to create an exercise plan  · Check your feet each day  for injuries or open sores  Ask your healthcare provider for activities you can do if you have an open sore  · Quit smoking  If you smoke, it is never too late to quit  Smoking can worsen the problems that may occur with diabetes  Ask your healthcare provider for information about how to stop smoking if you are having trouble quitting  · Ask about your weight:  Ask healthcare providers if you need to lose weight, and how much to lose  Ask them to help you with a weight loss program  Even a 10 to 15 pound weight loss can help you manage your blood sugar level  · Carry medical alert identification  Wear medical alert jewelry or carry a card that says you have diabetes  Ask your healthcare provider where to get these items  · Ask about vaccines  Diabetes puts you at risk of serious illness if you get the flu, pneumonia, or hepatitis  Ask your healthcare provider if you should get a flu, pneumonia, or hepatitis B vaccine, and when to get the vaccine  Follow up with your healthcare provider as directed:  Write down your questions so you remember to ask them during your visits  CARE AGREEMENT:   You have the right to help plan your care  Learn about your health condition and how it may be treated  Discuss treatment options with your caregivers to decide what care you want to receive  You always have the right to refuse treatment  The above information is an  only  It is not intended as medical advice for individual conditions or treatments  Talk to your doctor, nurse or pharmacist before following any medical regimen to see if it is safe and effective for you  © 2014 [address] is for End User's use only and may not be sold, redistributed or otherwise used for commercial purposes   All illustrations and images included in CareNotes® are the copyrighted property of [employer]  or Armando Victor

## 2022-03-17 NOTE — PROGRESS NOTES
Charlotte Aldana 1940 male MRN: [id number]    FAMILY PRACTICE OFFICE VISIT  [facility] - [address]      ASSESSMENT/PLAN  Charlotte Aldana is a [de-identified] y o  male presents to the office for    Diagnoses and all orders for this visit:    Type 2 diabetes mellitus with stage 3a chronic kidney disease, without long-term current use of insulin (HCC)  -     metFORMIN (GLUCOPHAGE) 500 mg tablet; Take 1 tablet (500 mg total) by mouth 2 (two) times a day with meals    Trigger little finger of left hand  -     Ambulatory Referral to Orthopedic Surgery; Future    Bilateral thumb pain  -     Ambulatory Referral to Orthopedic Surgery; Future       Recommend that the patient continue his metformin  We will absolutely help him with refills given that we are managing his diabetes as well  Trigger finger of the left 5th digit appreciated  Referred to Orthopedic  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 referred to Orthopedic as well  Just recently had another fall  If the falls continue to persist would likely have the patient start physical therapy           Future Appointments   Date Time Provider Camryn Lambert   3/22/2022 10:30 AM Sly Paige LCSW [facility]   3/29/2022 10:30 AM Sly Paige LCSW [facility]   7/25/2022  1:15 PM Niru Bustamante MD [facility]          SUBJECTIVE  CC: Hand Pain (pt c/o rt hand pain and left hand starting to hurt)      HPI:  Charlotte Aldana is a [de-identified] y o  male who presents for acute appointment  Patient states that he has been wearing a brace on his right hand to help with his thumb  States that it has become very painful  Does have trauma to it when he was a child  Also states that his left pinky locks up at times  States that now his low left thumb also is hurting  Patient would like direction on where to go  He would like to have us manage his diabetes   Advised his endocrinologist     Review of Systems   Constitutional: Negative for activity change, appetite change, chills, fatigue and fever  HENT: Negative for congestion  Respiratory: Negative for cough, chest tightness and shortness of breath  Cardiovascular: Negative for chest pain and leg swelling  Gastrointestinal: Negative for abdominal distention, abdominal pain, constipation, diarrhea, nausea and vomiting  Musculoskeletal: Positive for arthralgias  All other systems reviewed and are negative        Historical Information   The patient history was reviewed as follows:  Past Medical History:   Diagnosis Date    Abnormal blood chemistry     resolved: 11/9/16    Abnormal glucose     last assessed: 9/24/14    Arthritis     CAD (coronary artery disease)     Chronic kidney disease     STONES  & CKD    Coronary atherosclerosis     DM2 (diabetes mellitus, type 2) (HCC)     Dyslipidemia     Facial nerve disorder     HTN (hypertension)     Hyperlipidemia     last assessed: 1/13/17    Kidney stones     Lumbosacral radiculopathy     Nerve root and plexus disorder     Obesity     Osteoarthritis     Prostate asymmetry     Pure hypercholesterolemia          Medications:     Current Outpatient Medications:     aspirin 81 mg chewable tablet, Chew 81 mg daily  , Disp: , Rfl:     Januvia 50 MG tablet, TAKE 1 TABLET BY MOUTH  DAILY, Disp: 90 tablet, Rfl: 3    metFORMIN (GLUCOPHAGE) 500 mg tablet, Take 1 tablet (500 mg total) by mouth 2 (two) times a day with meals, Disp: 180 tablet, Rfl: 2    pantoprazole (PROTONIX) 40 mg tablet, Take 1 tablet (40 mg total) by mouth daily before breakfast, Disp: 90 tablet, Rfl: 3    simvastatin (ZOCOR) 40 mg tablet, TAKE 1 TABLET BY MOUTH  DAILY AT BEDTIME, Disp: 90 tablet, Rfl: 3    tamsulosin (FLOMAX) 0 4 mg, Take 1 capsule (0 4 mg total) by mouth daily with dinner, Disp: 90 capsule, Rfl: 3    trandolapril (MAVIK) 1 MG tablet, TAKE ONE TABLET BY MOUTH EVERY DAY, Disp: 90 tablet, Rfl: 2    Allergies   Allergen Reactions    Other Other (See Comments)     REAPRO- SEVERELY LOWERED RBCS [facility]    Ciprofloxacin Hives       OBJECTIVE  Vitals:   Vitals:    03/16/22 1452   BP: 110/72   BP Location: Left arm   Patient Position: Sitting   Cuff Size: Standard   Pulse: 72   Resp: 14   Temp: 97 8 °F (36 6 °C)   TempSrc: Temporal   Weight: 95 3 kg (210 lb)   Height: 5' 7" (1 702 m)         Physical Exam  Vitals reviewed  Constitutional:       Appearance: He is well-developed  HENT:      Head: Normocephalic and atraumatic  Eyes:      Conjunctiva/sclera: Conjunctivae normal       Pupils: Pupils are equal, round, and reactive to light  Cardiovascular:      Rate and Rhythm: Normal rate and regular rhythm  Heart sounds: Normal heart sounds  Pulmonary:      Effort: Pulmonary effort is normal  No respiratory distress  Breath sounds: Normal breath sounds  Musculoskeletal:         General: Normal range of motion  Cervical back: Normal range of motion and neck supple  Comments: Arthritic changes of his right hand   Skin:     General: Skin is warm  Capillary Refill: Capillary refill takes less than 2 seconds  Neurological:      Mental Status: He is alert and oriented to person, place, and time                      Delia Christianson MD,   [facility]  3/17/2022

## 2022-03-18 NOTE — PSYCH
This note was not shared with the patient due to this is a psychotherapy note     Assessment/Plan:      Diagnoses and all orders for this visit:    Adjustment disorder, unspecified type          Subjective:     Patient ID: Jaya Quiros is a [de-identified] y o  male who presented for his weekly follow-up outpatient counseling appointment with undersigned therapist   During today's session Shanna Keys discussed Kimbers birthday and Doc Burt (the granddaughter) possible coming  Shanna Keys shared his feelings about Sanjuanita's delusional behaviors and things that she's been saying that seem irrational   Shanna Keys reports he sometimes worries for her when she says things that aren't true  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, 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  Shanna Keys continues to care for Koko and feel stressed at times with Sanjuanita's passive aggressive communication  Shanna Keys however, continues to struggle with assertiveness  Shanna Keys denies feeling depressed, anxious or sad at this time  The undersigned therapist assisted Shanna Keys process his feelings about Sanjuanita's deteriorating mental health and discussed the possibility of him joining the psychiatrist for an appointment  Shanna Keys reports Koko already mentioned she did not want him in the room  Shanna Keys also shared his personal opinion on medication  Shanna Keys continues to have pain on his left hand and had a brace on during session  Shanna Keys denies suicidal intent, gesture or ideation at this time  The undersigned therapist provided Shanna Keys with 45 minutes of psychotherapy  Review of Systems   Psychiatric/Behavioral: Negative  Objective:     Physical Exam  Psychiatric:         Attention and Perception: Attention and perception normal          Mood and Affect: Mood and affect normal          Speech: Speech normal          Behavior: Behavior normal  Behavior is cooperative  Thought Content:  Thought content normal          Cognition and Memory: Cognition and memory normal          Judgment: Judgment normal

## 2022-03-18 NOTE — PATIENT INSTRUCTIONS
Please continue to schedule a follow-up session within one week  In case of a psychiatric emergency please contact any of the following:  [facility] [phone] or [phone]     National Suicide Prevention Lifeline : [phone]

## 2022-03-21 ENCOUNTER — TELEPHONE (OUTPATIENT)
Dept: UROLOGY | Facility: MEDICAL CENTER | Age: 82
End: 2022-03-21

## 2022-03-21 NOTE — TELEPHONE ENCOUNTER
Pts care is managed by the [place] office  Pt was last seen 2/17/22    Pt states he was asked by Caridad Orlando PA-C to call the office with an update regarding the use of Tamsulosin  Pt states above medication has been effective  Pt reports his urination has decreased from 10-12- times daily to 4/5 times daily  Please be advised   Thank you

## 2022-03-22 ENCOUNTER — SOCIAL WORK (OUTPATIENT)
Dept: BEHAVIORAL/MENTAL HEALTH CLINIC | Facility: CLINIC | Age: 82
End: 2022-03-22
Payer: COMMERCIAL

## 2022-03-22 DIAGNOSIS — F43.20 ADJUSTMENT DISORDER, UNSPECIFIED TYPE: Primary | ICD-10-CM

## 2022-03-22 PROCEDURE — 90834 PSYTX W PT 45 MINUTES: CPT | Performed by: SOCIAL WORKER

## 2022-03-23 NOTE — PATIENT INSTRUCTIONS
Please continue to schedule a follow-up session within one week  In case of a psychiatric emergency please contact any of the following:  [facility] [phone] or [phone]     National Suicide Prevention Lifeline : [phone]

## 2022-03-23 NOTE — PSYCH
This note was not shared with the patient due to this is a psychotherapy note     Assessment/Plan:      Diagnoses and all orders for this visit:    Adjustment disorder, unspecified type          Subjective:     Patient ID: Madhavi Ford is a [de-identified] y o  male who presented for his outpatient follow-up counseling appointment with undersigned therapist   Racquel Montana reports London Caller has been struggling to get out of bed and has been showing some signs of delusional thinking  Racquel Montana was encouraged to inform her psychiatrist of these behaviors as she said "Where are the people?" when Racquel Montana said she fell asleep  Racquel  is also unsure if the increase in her medication caused any side effects or if it was form a dream  The undersigned therapist encourage he keep a log and document the behaviors  Racquel Montana reports his finger and hand still hurt and reports London Caller almost fell this week  He was able to grab onto her and we discussed the [place] with the alarm, but Racquel Montana reports London Caller has been resistant to it for years  Racqueljanak Montana continues to struggle sleeping saying he sleeps 3-4 hours and wakes up ,but denies interest in medication  Racquel Montana shared his history about applying to be a house  and his previous friendships  Racqueljanak Montana continues to remain calm with London Caller and reports her birthday is coming up  Racquel Montana denies suicidal intent, gesture or ideation at this time  The undersigned therapist provided Racquel Seeman with 45 minutes of psychotherapy  Review of Systems   Psychiatric/Behavioral: Negative  Objective:     Physical Exam  Psychiatric:         Attention and Perception: Attention and perception normal          Mood and Affect: Mood and affect normal          Speech: Speech normal          Behavior: Behavior normal  Behavior is cooperative  Thought Content:  Thought content normal          Cognition and Memory: Cognition and memory normal          Judgment: Judgment normal

## 2022-03-29 ENCOUNTER — SOCIAL WORK (OUTPATIENT)
Dept: BEHAVIORAL/MENTAL HEALTH CLINIC | Facility: CLINIC | Age: 82
End: 2022-03-29
Payer: COMMERCIAL

## 2022-03-29 DIAGNOSIS — F43.20 ADJUSTMENT DISORDER, UNSPECIFIED TYPE: Primary | ICD-10-CM

## 2022-03-29 PROCEDURE — 90834 PSYTX W PT 45 MINUTES: CPT | Performed by: SOCIAL WORKER

## 2022-03-29 NOTE — PATIENT INSTRUCTIONS
Please continue to schedule a follow-up session within one week  In case of a psychiatric emergency please contact any of the following:  [facility] [phone] or [phone]     National Suicide Prevention Lifeline : 6-819.723.6120

## 2022-03-29 NOTE — PSYCH
This note was not shared with the patient due to this is a psychotherapy note       Assessment/Plan:      Diagnoses and all orders for this visit:    Adjustment disorder, unspecified type          Subjective:     Patient ID: Joann Gonzalez is a [de-identified] y o  male who presented for his outpatient follow-up counseling appointment with undersigned therapist   During today's session Indira Burgos reports his granddaughter and great grandchildren went for this weekend to his home for [facility] birthday  Indira Burgos reports they all had a wonderful time and Norman Alma Rosaliseth was grateful  Indira Burgos reports this week Norman Greer has been struggling to go to bed early, and that  She will say things like "oh I'm dying "  The undersigned therapist assisted Indira Burgos process how he feels when Norman Greer says these things  Indira Burgos also discussed his end of life wishes for himself and Norman Stliseth  He reports he would like to be buried in the dirt or cremated as he feels humans take space in grave yards  Indira Burgos reports Norman Greer wants to be buried, but once she's passed he will have to decide  Indira Burgos shared that he had previously tried to adopt children on his own before Norman Zoey, and was denied due to being a single male household  Indira Burgos reports continued resentment and anger towards Renu Christian, his step-son; but continues to do favors for him as Norman Zoey requests  Indira Burgos denies feeling depressed, anxious or overwhelmed at this time  Indira Burgos  denies suicidal intent, gesture or ideation at this time  The undersigned therapist provided Indira Burgos with 45 minutes of psychotherapy  Review of Systems   Psychiatric/Behavioral: Negative  Objective:     Physical Exam  Psychiatric:         Attention and Perception: Attention and perception normal          Mood and Affect: Mood and affect normal          Speech: Speech normal          Behavior: Behavior normal  Behavior is cooperative  Thought Content:  Thought content normal          Cognition and Memory: Cognition and memory normal  Judgment: Judgment normal

## 2022-03-30 NOTE — PATIENT INSTRUCTIONS
Please continue to schedule a follow-up session within one week  In case of a psychiatric emergency please contact any of the following:  [facility] [phone] or [phone]     National Suicide Prevention Lifeline : 8-759.111.8411

## 2022-03-30 NOTE — PSYCH
This note was not shared with the patient due to this is a psychotherapy note     Simple    Assessment/Plan:      Diagnoses and all orders for this visit:    Adjustment disorder, unspecified type          Subjective:     Patient ID: Rhiannon Sow is a [de-identified] y o  male who presented for his outpatient counseling appointment  Catalina Zapata reports he's been feeling tired/fatigue and that his hands have been hurting for the last 2-3 days  Gabrielle Hall reports he met with Dr Noé Griffin who reports a cortizone shot can help, he has an appointment set up for April 1  The undersigned therapist assisted Gabrielle Rafael process his feelings about his weekend errands with Jamarcus Block  He reports Jamarcus Block bought herself some clothes, and he feels happy to see her happy and wearing newer things  Gabrielle Hall reports Jamarcus Block has been sleeping better, and now lies in the middle of the bed versus the edge  Gabrielle Hall continues to care for Jamarcus Block and discusses how he has low frustration tolerance  The undersigned therapist encouraged he discuss his feelings of frustration and give himself a break at times  Gabrielle Hall shared his planning for Sanjuanita's birthday coming up  Gabrielle Hall denies suicidal intent, gesture or ideation at this time  The undersigned therapist provided Gabrielle Hall with 45 minutes of psychotherapy  Review of Systems   Psychiatric/Behavioral: Negative  Objective:     Physical Exam  Psychiatric:         Attention and Perception: Attention and perception normal          Mood and Affect: Mood and affect normal          Speech: Speech normal          Behavior: Behavior normal  Behavior is cooperative  Thought Content:  Thought content normal          Cognition and Memory: Cognition and memory normal          Judgment: Judgment normal

## 2022-04-01 ENCOUNTER — OFFICE VISIT (OUTPATIENT)
Dept: OBGYN CLINIC | Facility: CLINIC | Age: 82
End: 2022-04-01
Payer: COMMERCIAL

## 2022-04-01 ENCOUNTER — APPOINTMENT (OUTPATIENT)
Dept: RADIOLOGY | Facility: CLINIC | Age: 82
End: 2022-04-01
Payer: COMMERCIAL

## 2022-04-01 VITALS
RESPIRATION RATE: 17 BRPM | WEIGHT: 211.2 LBS | HEART RATE: 71 BPM | BODY MASS INDEX: 33.15 KG/M2 | SYSTOLIC BLOOD PRESSURE: 133 MMHG | DIASTOLIC BLOOD PRESSURE: 84 MMHG | HEIGHT: 67 IN

## 2022-04-01 DIAGNOSIS — M79.641 RIGHT HAND PAIN: ICD-10-CM

## 2022-04-01 DIAGNOSIS — M65.311 TRIGGER FINGER OF RIGHT THUMB: ICD-10-CM

## 2022-04-01 DIAGNOSIS — M79.642 LEFT HAND PAIN: ICD-10-CM

## 2022-04-01 DIAGNOSIS — M79.645 BILATERAL THUMB PAIN: ICD-10-CM

## 2022-04-01 DIAGNOSIS — M79.644 BILATERAL THUMB PAIN: ICD-10-CM

## 2022-04-01 DIAGNOSIS — M65.312 TRIGGER FINGER OF LEFT THUMB: ICD-10-CM

## 2022-04-01 DIAGNOSIS — M65.352 TRIGGER LITTLE FINGER OF LEFT HAND: Primary | ICD-10-CM

## 2022-04-01 PROCEDURE — 1036F TOBACCO NON-USER: CPT | Performed by: ORTHOPAEDIC SURGERY

## 2022-04-01 PROCEDURE — 20550 NJX 1 TENDON SHEATH/LIGAMENT: CPT | Performed by: ORTHOPAEDIC SURGERY

## 2022-04-01 PROCEDURE — 99213 OFFICE O/P EST LOW 20 MIN: CPT | Performed by: ORTHOPAEDIC SURGERY

## 2022-04-01 PROCEDURE — 73130 X-RAY EXAM OF HAND: CPT

## 2022-04-01 PROCEDURE — 3075F SYST BP GE 130 - 139MM HG: CPT | Performed by: ORTHOPAEDIC SURGERY

## 2022-04-01 PROCEDURE — 3079F DIAST BP 80-89 MM HG: CPT | Performed by: ORTHOPAEDIC SURGERY

## 2022-04-01 PROCEDURE — 1160F RVW MEDS BY RX/DR IN RCRD: CPT | Performed by: ORTHOPAEDIC SURGERY

## 2022-04-01 RX ORDER — TRIAMCINOLONE ACETONIDE 40 MG/ML
20 INJECTION, SUSPENSION INTRA-ARTICULAR; INTRAMUSCULAR
Status: COMPLETED | OUTPATIENT
Start: 2022-04-01 | End: 2022-04-01

## 2022-04-01 RX ORDER — LIDOCAINE HYDROCHLORIDE 10 MG/ML
0.5 INJECTION, SOLUTION INFILTRATION; PERINEURAL
Status: COMPLETED | OUTPATIENT
Start: 2022-04-01 | End: 2022-04-01

## 2022-04-01 RX ADMIN — TRIAMCINOLONE ACETONIDE 20 MG: 40 INJECTION, SUSPENSION INTRA-ARTICULAR; INTRAMUSCULAR at 10:57

## 2022-04-01 RX ADMIN — LIDOCAINE HYDROCHLORIDE 0.5 ML: 10 INJECTION, SOLUTION INFILTRATION; PERINEURAL at 10:57

## 2022-04-01 NOTE — PROGRESS NOTES
Assessment/Plan:  1  Trigger little finger of left hand  Ambulatory Referral to Orthopedic Surgery    Hand/upper extremity injection: L small A1   2  Bilateral thumb pain  Ambulatory Referral to Orthopedic Surgery   3  Right hand pain  XR hand 3+ vw right   4  Left hand pain  XR hand 3+ vw left   5  Trigger finger of left thumb  Hand/upper extremity injection: L thumb A1   6  Trigger finger of right thumb         Scribe Attestation    I,:  Stephy Avery MA am acting as a scribe while in the presence of the attending physician :       I,:  Christina Gaitan DO personally performed the services described in this documentation    as scribed in my presence  :             82-year-old male with bilateral thumb trigger finger and left small finger trigger finger  He is tender to palpation over the A1 pulley's there is obvious triggering left thumb and left small finger  Treatment options were discussed in the form of steroid injections  Patient was agreeable to this  He consented and underwent left thumb and left small finger trigger finger injections in the office today without any complications  Post-injection care was given  We will hold off on a right thumb trigger finger injection as this is not very bothersome for him  Post injection instructions were provided  Patient is a diabetic and was advised to monitor his blood sugar over the next few days  He may follow up with me as needed  Patient has had injections in the past to his trigger fingers and does note the post injection course well  Subjective:   Arslan Li is a [de-identified] y o  male who presents to the office today for evaluation of bilateral hand pain  Patient notes triggering to his bilateral thumb and left small finger  He states this has been ongoing for the past 2 months  Patient did have a right thumb trigger finger injection with Dr Mahin Knutson on 2/17/21 which he states did provide him with good relief   He states his left hand is worse than his right  He denies prior treatment for the left hand trigger fingers  Patient also notes achy pain to his left long and ring fingers and into his palm  He states this is only bothersome at night  Patient does have a history of right long and right ring finger Dupuytren's and did undergo Xiaflex injections for both with Dr Damian Tortter at [facility] appx 2-3 years ago which he states did provide him with good relief  He states the contractures have not been returning  Patient is the main caregiver for his wife  Review of Systems   Constitutional: Negative for chills and fever  HENT: Negative for drooling and sneezing  Eyes: Negative for redness  Respiratory: Negative for cough and wheezing  Gastrointestinal: Negative for nausea and vomiting  Musculoskeletal: Negative for arthralgias, joint swelling and myalgias  Neurological: Negative for weakness and numbness  Psychiatric/Behavioral: Negative for behavioral problems  The patient is not nervous/anxious            Past Medical History:   Diagnosis Date    Abnormal blood chemistry     resolved: 11/9/16    Abnormal glucose     last assessed: 9/24/14    Arthritis     CAD (coronary artery disease)     Chronic kidney disease     STONES  & CKD    Coronary atherosclerosis     DM2 (diabetes mellitus, type 2) (HCC)     Dyslipidemia     Facial nerve disorder     HTN (hypertension)     Hyperlipidemia     last assessed: 1/13/17    Kidney stones     Lumbosacral radiculopathy     Nerve root and plexus disorder     Obesity     Osteoarthritis     Prostate asymmetry     Pure hypercholesterolemia        Past Surgical History:   Procedure Laterality Date    CORONARY ANGIOPLASTY      1 STENT     RI ESOPHAGUS SURG PROCEDURE UNLISTED N/A 9/30/2021    Procedure: EGD; DIVERTICULECTOMY ZENKERS ENDOSCOPIC;  Surgeon: Taylor German MD;  [facility];  Service: Thoracic    UPPER GASTROINTESTINAL ENDOSCOPY         Family History   Problem Relation Age of Onset  Cancer Mother     Ovarian cancer Mother     Hypertension Sister     Lung disease Father         black    Mental illness Neg Hx     Substance Abuse Neg Hx        Social History     Occupational History    Not on file   Tobacco Use    Smoking status: Never Smoker    Smokeless tobacco: Never Used   Vaping Use    Vaping Use: Never used   Substance and Sexual Activity    Alcohol use: Not Currently    Drug use: No    Sexual activity: Not on file         Current Outpatient Medications:     aspirin 81 mg chewable tablet, Chew 81 mg daily  , Disp: , Rfl:     Januvia 50 MG tablet, TAKE 1 TABLET BY MOUTH  DAILY, Disp: 90 tablet, Rfl: 3    metFORMIN (GLUCOPHAGE) 500 mg tablet, Take 1 tablet (500 mg total) by mouth 2 (two) times a day with meals, Disp: 180 tablet, Rfl: 2    simvastatin (ZOCOR) 40 mg tablet, TAKE 1 TABLET BY MOUTH  DAILY AT BEDTIME, Disp: 90 tablet, Rfl: 3    tamsulosin (FLOMAX) 0 4 mg, Take 1 capsule (0 4 mg total) by mouth daily with dinner, Disp: 90 capsule, Rfl: 3    trandolapril (MAVIK) 1 MG tablet, TAKE ONE TABLET BY MOUTH EVERY DAY, Disp: 90 tablet, Rfl: 2    pantoprazole (PROTONIX) 40 mg tablet, Take 1 tablet (40 mg total) by mouth daily before breakfast (Patient not taking: Reported on 4/1/2022 ), Disp: 90 tablet, Rfl: 3    Allergies   Allergen Reactions    Other Other (See Comments)     REAPRO- SEVERELY LOWERED RBCS [facility]    Ciprofloxacin Hives       Objective:  Vitals:    04/01/22 1022   BP: 133/84   Pulse: 71   Resp: 17       Ortho Exam     Left hand    TTP thumb A1 pulley   dupuytren's cord 1st webspace   No webspace contracture  TTP A1 pulley small finger  Obvious triggering thumb and small  Compartments soft  Brisk capillary refill  S/m intact median, radial, and ulnar nerve     Right hand    TTP thumb A1 pulley   Dupuytren's nodule webspace 1st  No obvious triggering thumb  Compartments soft  Brisk capillary refill  S/m intact median, radial, and ulnar nerve Physical Exam  Constitutional:       Appearance: He is well-developed  HENT:      Head: Normocephalic and atraumatic  Eyes:      General:         Right eye: No discharge  Left eye: No discharge  Conjunctiva/sclera: Conjunctivae normal    Cardiovascular:      Rate and Rhythm: Normal rate  Pulmonary:      Effort: Pulmonary effort is normal  No respiratory distress  Musculoskeletal:      Cervical back: Normal range of motion and neck supple  Comments: As noted in HPI   Skin:     General: Skin is warm and dry  Neurological:      Mental Status: He is alert and oriented to person, place, and time  Psychiatric:         Behavior: Behavior normal          Thought Content: Thought content normal          Judgment: Judgment normal      Hand/upper extremity injection: L thumb A1  Universal Protocol:  Consent: Verbal consent obtained  Consent given by: patient  Patient identity confirmed: verbally with patient    Supporting Documentation  Indications: pain   Procedure Details  Condition:trigger finger Location: thumb - L thumb A1   Needle size: 27 G  Ultrasound guidance: no  Medications administered: 0 5 mL lidocaine 1 %; 20 mg triamcinolone acetonide 40 mg/mL    Patient tolerance: patient tolerated the procedure well with no immediate complications  Dressing:  Sterile dressing applied     Hand/upper extremity injection: L small A1  Universal Protocol:  Consent: Verbal consent obtained    Consent given by: patient  Patient identity confirmed: verbally with patient    Supporting Documentation  Indications: pain   Procedure Details  Condition:trigger finger Location: small finger - L small A1   Preparation: Patient was prepped and draped in the usual sterile fashion  Needle size: 27 G  Ultrasound guidance: no  Medications administered: 0 5 mL lidocaine 1 %; 20 mg triamcinolone acetonide 40 mg/mL    Patient tolerance: patient tolerated the procedure well with no immediate complications  Dressing: Sterile dressing applied             I have personally reviewed pertinent films in PACS and my interpretation is as follows:X-ray bilateral hand performed in the office today demonstrates no osseous abnormalities

## 2022-04-18 ENCOUNTER — SOCIAL WORK (OUTPATIENT)
Dept: BEHAVIORAL/MENTAL HEALTH CLINIC | Facility: CLINIC | Age: 82
End: 2022-04-18
Payer: COMMERCIAL

## 2022-04-18 DIAGNOSIS — F43.20 ADJUSTMENT DISORDER, UNSPECIFIED TYPE: Primary | ICD-10-CM

## 2022-04-18 PROCEDURE — 90834 PSYTX W PT 45 MINUTES: CPT | Performed by: SOCIAL WORKER

## 2022-04-21 NOTE — PATIENT INSTRUCTIONS
Please continue to schedule a follow-up session within one week  In case of a psychiatric emergency please contact any of the following:  [facility] [phone] or [phone]     National Suicide Prevention Lifeline : 6-747.563.1237

## 2022-04-21 NOTE — PSYCH
This note was not shared with the patient due to this is a psychotherapy note     Assessment/Plan:      Diagnoses and all orders for this visit:    Adjustment disorder, unspecified type          Subjective:     Patient ID: Froy Ryan is a [de-identified] y o  male who presented for his follow-up outpatient counseling appointment with this undersigned therapist   During today's session the undersigned therapist assisted Jose Manuel Sheehan process his feelings about his wife Duke Perez and her current behaviors  Jose Manuel Sheehan reports she fell recently and he had to call the rescue squad, which now has caused him thoughts of fear that he might not be able to care for Duke Perez  The undersigned therapist encouraged home health aid  Jose Manuel Sheehan was able to share feeling alone at times despite Duke Perez being present  The undersigned therapist assisted Jose Manuel Sheehan process his feelings and encouraged open communication with Duke Perez  However, Jose Manuel Sheehan continues to avoid assertive communication with Duke Perez because he doesn't want to upset her  Jose Manuel Sheehan denies suicidal, depression or anxiety  Jose Manuel Sheehan denies suicidal intent, gesture or ideation at this time  The undersigned therapist provided Jose Manuel Sheehan with 45 minutes of psychotherapy  Review of Systems   Psychiatric/Behavioral: The patient is nervous/anxious  Objective:     Physical Exam  Psychiatric:         Attention and Perception: Attention and perception normal          Mood and Affect: Mood and affect normal          Speech: Speech normal          Behavior: Behavior normal  Behavior is cooperative  Thought Content:  Thought content normal          Cognition and Memory: Cognition and memory normal          Judgment: Judgment normal

## 2022-04-25 ENCOUNTER — TELEPHONE (OUTPATIENT)
Dept: FAMILY MEDICINE CLINIC | Facility: CLINIC | Age: 82
End: 2022-04-25

## 2022-04-26 ENCOUNTER — SOCIAL WORK (OUTPATIENT)
Dept: BEHAVIORAL/MENTAL HEALTH CLINIC | Facility: CLINIC | Age: 82
End: 2022-04-26
Payer: COMMERCIAL

## 2022-04-26 DIAGNOSIS — F43.20 ADJUSTMENT DISORDER, UNSPECIFIED TYPE: Primary | ICD-10-CM

## 2022-04-26 PROCEDURE — 90834 PSYTX W PT 45 MINUTES: CPT | Performed by: SOCIAL WORKER

## 2022-04-29 NOTE — PATIENT INSTRUCTIONS
Please continue to schedule a follow-up session within one week  In case of a psychiatric emergency please contact any of the following:  [facility] [phone] or [phone]     National Suicide Prevention Lifeline : 0-152.584.6800

## 2022-04-29 NOTE — PSYCH
This note was not shared with the patient due to this is a psychotherapy note     Assessment/Plan:      Diagnoses and all orders for this visit:    Adjustment disorder, unspecified type          Subjective:     Patient ID: Antony Zapata is a [de-identified] y o  male who presented for his follow-up outpatient counseling appointment with undersigned therapist   During today's session Faisal Eubanks processed his feelings about Pk Wallace and his fears about her health deteriorating  He reports Pk Wallace was incoherent and delusional, and it appears she had a urinary tract infection  Faisal Eubanks reports he slept on the couch next to Pk Wallace before he called the squad  Faisal Eubanks was tearful in sharing his experience  Faisal Eubanks reports he spoke to the  at the school and she helped getting him a bed and a home health aid  Faisal Eubanks continues to work towards [employer] needs and helping her  Faisal Eubanks denies feeling depressed or anxious, but worried  The undersigned therapist assisted Faisal Eubanks process his feelings about Danita health  Faisal Eubanks denies suicidal intent, gesture or ideation at this time  The undersigned therapist provided Faisal Eubanks with 45 minutes of psychotherapy  Review of Systems   Psychiatric/Behavioral: The patient is nervous/anxious  Objective:     Physical Exam  Psychiatric:         Attention and Perception: Attention and perception normal          Mood and Affect: Affect normal  Mood is anxious  Speech: Speech normal          Behavior: Behavior normal  Behavior is cooperative  Thought Content:  Thought content normal          Cognition and Memory: Cognition and memory normal          Judgment: Judgment normal

## 2022-05-16 ENCOUNTER — SOCIAL WORK (OUTPATIENT)
Dept: BEHAVIORAL/MENTAL HEALTH CLINIC | Facility: CLINIC | Age: 82
End: 2022-05-16
Payer: COMMERCIAL

## 2022-05-16 DIAGNOSIS — F43.20 ADJUSTMENT DISORDER, UNSPECIFIED TYPE: Primary | ICD-10-CM

## 2022-05-16 PROCEDURE — 90834 PSYTX W PT 45 MINUTES: CPT | Performed by: SOCIAL WORKER

## 2022-05-18 NOTE — PATIENT INSTRUCTIONS
Please continue to schedule a follow-up session within one week  In case of a psychiatric emergency please contact any of the following:  [facility] [phone] or [phone]     [place] Suicide Prevention Lifeline : 1-307.103.7956

## 2022-05-18 NOTE — PSYCH
This note was not shared with the patient due to this is a psychotherapy note     Assessment/Plan:      Diagnoses and all orders for this visit:    Adjustment disorder, unspecified type          Subjective:     Patient ID: Antony Zapata is a [employer] y o  male who presented for his follow-up outpatient counseling appointment with undersigned therapist  During today's session Faisal Eubanks reports Pk Wallace got the hospital bed as planned and was happy with the outcome  He reports Pk Wallace loves her new bed and its' been helpful for her  Faisal Cha continues to sleep in a separate bedroom  Faisal [place] reports his stepson might be moving back and he continues to try and assert boundaries with him  Faisal Eubanks processed his feelings about Sanjuanita's health and how it's stressing him out at times but is not open to a home health at this time as he feels the bed has been helpful  Faisal Eubanks continues to provide for Pk Wallace and is trying to find a volunteer to help with his garden and a [address] [facility]  Faisal Eubanks denies feeling depressed or anxious but expressed some concern when seeing Pk Wallace appear confused or delusional   Faisal Eubanks denies suicidal intent, gesture or ideation at this time  The undersigned therapist provided Faisal Eubanks with 45 minutes of psychotherapy  Review of Systems   Psychiatric/Behavioral: Positive for sleep disturbance  Objective:     Physical Exam  Psychiatric:         Attention and Perception: Attention and perception normal          Mood and Affect: Mood and affect normal          Speech: Speech normal          Behavior: Behavior normal  Behavior is cooperative  Thought Content:  Thought content normal          Cognition and Memory: Cognition and memory normal          Judgment: Judgment normal

## 2022-06-07 ENCOUNTER — SOCIAL WORK (OUTPATIENT)
Dept: BEHAVIORAL/MENTAL HEALTH CLINIC | Facility: CLINIC | Age: 82
End: 2022-06-07
Payer: COMMERCIAL

## 2022-06-07 DIAGNOSIS — F43.20 ADJUSTMENT DISORDER, UNSPECIFIED TYPE: Primary | ICD-10-CM

## 2022-06-07 PROCEDURE — 90834 PSYTX W PT 45 MINUTES: CPT | Performed by: SOCIAL WORKER

## 2022-06-10 NOTE — PSYCH
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  Kenton Turk reports he got upset during the last week because Dhara Delvalleflavia made a comment about him "having a girlfriend" just because he was on his phone erasing emails and reviewing things as he does usually  The undersigned therapist encouraged Kenton Turk express how he feels to Dhara Fischer as it bothers him when she says those comments  Kenton Turk reports Dhara Fischer is unhappy with her bed, but appears she's just saying that because Pawan Mckeon did not have space to sleep at the house  Kenton Turk reports Dhara Fischer continues to do well and [facility] were [facility]  Kenton Turk reports Dhara Fischer has been social and friends came to visit the home and Dhara Fischer was engaged and overall in a better mood  Kenton Turk was able to accept help from the [employer] and [employer], and they have helped clean his home and do gardening  Kenton Turk denies suicidal intent, gesture or ideation at this time  The undersigned therapist provided Kenton Turk with 45 minutes of psychotherapy  Review of Systems   Psychiatric/Behavioral: Negative  Objective:     Physical Exam  Psychiatric:         Attention and Perception: Attention and perception normal          Mood and Affect: Mood and affect normal          Speech: Speech normal          Behavior: Behavior normal  Behavior is cooperative  Thought Content:  Thought content normal          Cognition and Memory: Cognition and memory normal          Judgment: Judgment normal

## 2022-06-10 NOTE — PATIENT INSTRUCTIONS
Please continue to schedule a follow-up session within one week  In case of a psychiatric emergency please contact any of the following:  [facility] [phone] or [phone]     National Suicide Prevention Lifeline : 4-830.575.1502

## 2022-06-14 ENCOUNTER — SOCIAL WORK (OUTPATIENT)
Dept: BEHAVIORAL/MENTAL HEALTH CLINIC | Facility: CLINIC | Age: 82
End: 2022-06-14
Payer: COMMERCIAL

## 2022-06-14 DIAGNOSIS — F43.20 ADJUSTMENT DISORDER, UNSPECIFIED TYPE: Primary | ICD-10-CM

## 2022-06-14 PROCEDURE — 90834 PSYTX W PT 45 MINUTES: CPT | Performed by: SOCIAL WORKER

## 2022-06-22 NOTE — PSYCH
This note was not shared with the patient due to this is a psychotherapy note     Assessment/Plan:      Diagnoses and all orders for this visit:    Adjustment disorder, unspecified type          Subjective:     Patient ID: Froy Ryan is a [de-identified] y o  male who presented for his follow-up outpatient counseling appointment with undersigned therapist  Jose Manuel Sheehan reports he got Cirilochago Stephensonjadon a new phone and we discussed how helpful it's been for her  Richard Sissy reports Duke Perez has a UTI again and he is concerned the same symptoms will occur like last time when she ended up in the ED  Jose Manuel Sheehan reports Shanel Maria went to the house and helped with the garden, and Jose Manuel Sheehan had a pleasant experience with Shanel Maria and his girlfriend  The undersigned therapist assisted Jose Manuel Sheehan process his feelings about Shanel Maria and his feelings about being the sole caregiver of Duke Perez  Jose Manuel Sheehan continues to remain hesitant about getting a home health aid and reports he wants to be the one helping still  Jose Manuel Sheehan continues to struggle with being patient, but reports Duke Perez has done fairly well this week  Jose Manuel Sheehan denies suicidal intent, gesture or ideation at this time  The undersigned therapist provided Jose Manuel Sheehan with 45 minutes of psychotherapy  Review of Systems   Psychiatric/Behavioral: Negative  Objective:     Physical Exam  Psychiatric:         Attention and Perception: Attention and perception normal          Mood and Affect: Mood and affect normal          Speech: Speech normal          Behavior: Behavior normal  Behavior is cooperative  Thought Content:  Thought content normal          Cognition and Memory: Cognition and memory normal          Judgment: Judgment normal

## 2022-06-28 ENCOUNTER — SOCIAL WORK (OUTPATIENT)
Dept: BEHAVIORAL/MENTAL HEALTH CLINIC | Facility: CLINIC | Age: 82
End: 2022-06-28
Payer: COMMERCIAL

## 2022-06-28 DIAGNOSIS — F43.20 ADJUSTMENT DISORDER, UNSPECIFIED TYPE: Primary | ICD-10-CM

## 2022-06-28 PROCEDURE — 90834 PSYTX W PT 45 MINUTES: CPT | Performed by: SOCIAL WORKER

## 2022-06-30 NOTE — PATIENT INSTRUCTIONS
Please continue to schedule a follow-up session within one week  In case of a psychiatric emergency please contact any of the following:  [facility] [phone] or [phone]     National Suicide Prevention Lifeline : 3-485.422.3098

## 2022-07-12 ENCOUNTER — SOCIAL WORK (OUTPATIENT)
Dept: BEHAVIORAL/MENTAL HEALTH CLINIC | Facility: CLINIC | Age: 82
End: 2022-07-12
Payer: COMMERCIAL

## 2022-07-12 DIAGNOSIS — F43.20 ADJUSTMENT DISORDER, UNSPECIFIED TYPE: Primary | ICD-10-CM

## 2022-07-12 PROCEDURE — 90834 PSYTX W PT 45 MINUTES: CPT | Performed by: SOCIAL WORKER

## 2022-07-12 NOTE — PATIENT INSTRUCTIONS
Please continue to schedule a follow-up session within one week  In case of a psychiatric emergency please contact any of the following:  [facility] [phone] or [phone]     National Suicide Prevention Lifeline : 6-456.452.8003

## 2022-07-12 NOTE — PSYCH
This note was not shared with the patient due to this is a psychotherapy note     Assessment/Plan:      Diagnoses and all orders for this visit:    Adjustment disorder, unspecified type          Subjective:     Patient ID: Keyla Vasquez is a [de-identified] y o  male who presented for his follow-up outpatient counseling appointment with undersigned therapist   Cheryl Dillard reports he's been feeling tired and has current shoulder pain  Cheryl Dillard was encouraged to see his PCP to discuss his current pain  Cheryl Dillard reports he bought Annmarie Edward a new bed and is looking now for a wheelchair  Cheryl Dillard reports he's concerned about Sanjuanita's current mental health and reports she gets into a "stooper" where she's almost in a daze  Cheryl Dillard was encourage to talk bout it with Sanjuanita's psychiatrist this Thursday for their appointment  Cheryl Dillard also shared that Annmarie Edward had spent time with Mike Best and had a great time  The undersigned therapist assisted Cheryl Dillard process his feelings about the current changes  Cheryl Dillard denies suicidal intent, gesture or ideation at this time  The undersigned therapist provided Cheryl Dillard with 45 minutes of psychotherapy  Review of Systems   Psychiatric/Behavioral: Negative  Objective:     Physical Exam  Psychiatric:         Attention and Perception: Attention and perception normal          Mood and Affect: Mood and affect normal          Speech: Speech normal          Behavior: Behavior normal  Behavior is cooperative  Thought Content:  Thought content normal          Cognition and Memory: Cognition and memory normal          Judgment: Judgment normal

## 2022-07-19 ENCOUNTER — SOCIAL WORK (OUTPATIENT)
Dept: BEHAVIORAL/MENTAL HEALTH CLINIC | Facility: CLINIC | Age: 82
End: 2022-07-19
Payer: COMMERCIAL

## 2022-07-19 DIAGNOSIS — F43.20 ADJUSTMENT DISORDER, UNSPECIFIED TYPE: Primary | ICD-10-CM

## 2022-07-19 PROCEDURE — 90834 PSYTX W PT 45 MINUTES: CPT | Performed by: SOCIAL WORKER

## 2022-07-21 NOTE — PSYCH
This note was not shared with the patient due to this is a psychotherapy note     Assessment/Plan:      Diagnoses and all orders for this visit:    Adjustment disorder, unspecified type          Subjective:     Patient ID: Brett Guerra is a [de-identified] y o  male who presented for his follow-up outpatient counseling appointment with undersigned therapist   Meena Noel reports his step-son Harrison Dias went over the house last night and had a great time with him and Marielle  The undersigned therapist encouraged Meena Noel to continue growing on his relationship with Harrison Dias  Meena Noel continues to care for Kayleigh Og and reports she's been doing relatively well for now  Meenaharmony Noel does grow concerned at times due to [employer], but continues to refuse home health aid  He continues to search for a wheel chair  Meenaharmony Noel has some blood spots on his left arm and was encouraged to see his PCP and discuss his current health concerns  Meena Noel has been doing relatively well and controlling his anger  Meena Noel denies suicidal intent, gesture or ideation at this time  The undersigned therapist provided Meena Noel with 45 minutes of psychotherapy  Review of Systems   Psychiatric/Behavioral: Negative  Objective:     Physical Exam  Psychiatric:         Attention and Perception: Attention and perception normal          Mood and Affect: Mood and affect normal          Speech: Speech normal          Behavior: Behavior normal  Behavior is cooperative  Thought Content:  Thought content normal          Cognition and Memory: Cognition and memory normal          Judgment: Judgment normal

## 2022-07-21 NOTE — PATIENT INSTRUCTIONS
Please continue to schedule a follow-up session within one week  In case of a psychiatric emergency please contact any of the following:  [facility] [phone] or [phone]     National Suicide Prevention Lifeline : 4-757.641.1514

## 2022-07-26 ENCOUNTER — SOCIAL WORK (OUTPATIENT)
Dept: BEHAVIORAL/MENTAL HEALTH CLINIC | Facility: CLINIC | Age: 82
End: 2022-07-26
Payer: COMMERCIAL

## 2022-07-26 DIAGNOSIS — F43.20 ADJUSTMENT DISORDER, UNSPECIFIED TYPE: Primary | ICD-10-CM

## 2022-07-26 PROCEDURE — 90834 PSYTX W PT 45 MINUTES: CPT | Performed by: SOCIAL WORKER

## 2022-07-27 ENCOUNTER — TELEPHONE (OUTPATIENT)
Dept: FAMILY MEDICINE CLINIC | Facility: CLINIC | Age: 82
End: 2022-07-27

## 2022-07-27 ENCOUNTER — OFFICE VISIT (OUTPATIENT)
Dept: FAMILY MEDICINE CLINIC | Facility: CLINIC | Age: 82
End: 2022-07-27
Payer: COMMERCIAL

## 2022-07-27 ENCOUNTER — LAB (OUTPATIENT)
Dept: LAB | Facility: CLINIC | Age: 82
End: 2022-07-27
Payer: COMMERCIAL

## 2022-07-27 VITALS
TEMPERATURE: 98.2 F | HEART RATE: 72 BPM | RESPIRATION RATE: 14 BRPM | DIASTOLIC BLOOD PRESSURE: 80 MMHG | WEIGHT: 215 LBS | SYSTOLIC BLOOD PRESSURE: 140 MMHG | HEIGHT: 67 IN | BODY MASS INDEX: 33.74 KG/M2

## 2022-07-27 DIAGNOSIS — I10 BENIGN HYPERTENSION: ICD-10-CM

## 2022-07-27 DIAGNOSIS — E78.5 DYSLIPIDEMIA: ICD-10-CM

## 2022-07-27 DIAGNOSIS — N18.31 TYPE 2 DIABETES MELLITUS WITH STAGE 3A CHRONIC KIDNEY DISEASE, WITHOUT LONG-TERM CURRENT USE OF INSULIN (HCC): ICD-10-CM

## 2022-07-27 DIAGNOSIS — E11.22 TYPE 2 DIABETES MELLITUS WITH STAGE 3A CHRONIC KIDNEY DISEASE, WITHOUT LONG-TERM CURRENT USE OF INSULIN (HCC): ICD-10-CM

## 2022-07-27 DIAGNOSIS — N18.31 STAGE 3A CHRONIC KIDNEY DISEASE (HCC): ICD-10-CM

## 2022-07-27 DIAGNOSIS — K22.5 ZENKER'S DIVERTICULUM: ICD-10-CM

## 2022-07-27 DIAGNOSIS — M79.641 RIGHT HAND PAIN: ICD-10-CM

## 2022-07-27 DIAGNOSIS — Z12.5 SCREENING PSA (PROSTATE SPECIFIC ANTIGEN): ICD-10-CM

## 2022-07-27 DIAGNOSIS — Z00.00 MEDICARE ANNUAL WELLNESS VISIT, SUBSEQUENT: Primary | ICD-10-CM

## 2022-07-27 LAB
ALBUMIN SERPL BCP-MCNC: 3.6 G/DL (ref 3.5–5)
ALP SERPL-CCNC: 66 U/L (ref 46–116)
ALT SERPL W P-5'-P-CCNC: 33 U/L (ref 12–78)
ANION GAP SERPL CALCULATED.3IONS-SCNC: 3 MMOL/L (ref 4–13)
AST SERPL W P-5'-P-CCNC: 21 U/L (ref 5–45)
BACTERIA UR QL AUTO: ABNORMAL /HPF
BILIRUB SERPL-MCNC: 1.2 MG/DL (ref 0.2–1)
BILIRUB UR QL STRIP: NEGATIVE
BUN SERPL-MCNC: 23 MG/DL (ref 5–25)
CALCIUM SERPL-MCNC: 9.3 MG/DL (ref 8.3–10.1)
CHLORIDE SERPL-SCNC: 107 MMOL/L (ref 96–108)
CHOLEST SERPL-MCNC: 186 MG/DL
CLARITY UR: CLEAR
CO2 SERPL-SCNC: 31 MMOL/L (ref 21–32)
COLOR UR: YELLOW
CREAT SERPL-MCNC: 1.43 MG/DL (ref 0.6–1.3)
CREAT UR-MCNC: 191 MG/DL
CREAT UR-MCNC: 191 MG/DL
ERYTHROCYTE [DISTWIDTH] IN BLOOD BY AUTOMATED COUNT: 13.1 % (ref 11.6–15.1)
GFR SERPL CREATININE-BSD FRML MDRD: 45 ML/MIN/1.73SQ M
GLUCOSE P FAST SERPL-MCNC: 137 MG/DL (ref 65–99)
GLUCOSE UR STRIP-MCNC: NEGATIVE MG/DL
HCT VFR BLD AUTO: 51.2 % (ref 36.5–49.3)
HDLC SERPL-MCNC: 53 MG/DL
HGB BLD-MCNC: 16 G/DL (ref 12–17)
HGB UR QL STRIP.AUTO: NEGATIVE
KETONES UR STRIP-MCNC: NEGATIVE MG/DL
LDLC SERPL CALC-MCNC: 108 MG/DL (ref 0–100)
LEUKOCYTE ESTERASE UR QL STRIP: NEGATIVE
MCH RBC QN AUTO: 30.4 PG (ref 26.8–34.3)
MCHC RBC AUTO-ENTMCNC: 31.3 G/DL (ref 31.4–37.4)
MCV RBC AUTO: 97 FL (ref 82–98)
MICROALBUMIN UR-MCNC: 21.9 MG/L (ref 0–20)
MICROALBUMIN/CREAT 24H UR: 11 MG/G CREATININE (ref 0–30)
MUCOUS THREADS UR QL AUTO: ABNORMAL
NITRITE UR QL STRIP: NEGATIVE
NON-SQ EPI CELLS URNS QL MICRO: ABNORMAL /HPF
NONHDLC SERPL-MCNC: 133 MG/DL
PH UR STRIP.AUTO: 5.5 [PH]
PLATELET # BLD AUTO: 187 THOUSANDS/UL (ref 149–390)
PMV BLD AUTO: 11 FL (ref 8.9–12.7)
POTASSIUM SERPL-SCNC: 4.6 MMOL/L (ref 3.5–5.3)
PROT SERPL-MCNC: 7.5 G/DL (ref 6.4–8.4)
PROT UR STRIP-MCNC: NEGATIVE MG/DL
PROT UR-MCNC: 10 MG/DL
PROT/CREAT UR: 0.05 MG/G{CREAT} (ref 0–0.1)
RBC # BLD AUTO: 5.26 MILLION/UL (ref 3.88–5.62)
RBC #/AREA URNS AUTO: ABNORMAL /HPF
SL AMB POCT HEMOGLOBIN AIC: 6.4 (ref ?–6.5)
SODIUM SERPL-SCNC: 141 MMOL/L (ref 135–147)
SP GR UR STRIP.AUTO: 1.02 (ref 1–1.03)
TRIGL SERPL-MCNC: 126 MG/DL
UROBILINOGEN UR STRIP-ACNC: <2 MG/DL
WBC # BLD AUTO: 6.64 THOUSAND/UL (ref 4.31–10.16)
WBC #/AREA URNS AUTO: ABNORMAL /HPF

## 2022-07-27 PROCEDURE — 1160F RVW MEDS BY RX/DR IN RCRD: CPT | Performed by: FAMILY MEDICINE

## 2022-07-27 PROCEDURE — 3288F FALL RISK ASSESSMENT DOCD: CPT | Performed by: FAMILY MEDICINE

## 2022-07-27 PROCEDURE — 83036 HEMOGLOBIN GLYCOSYLATED A1C: CPT | Performed by: FAMILY MEDICINE

## 2022-07-27 PROCEDURE — G0439 PPPS, SUBSEQ VISIT: HCPCS | Performed by: FAMILY MEDICINE

## 2022-07-27 PROCEDURE — 80053 COMPREHEN METABOLIC PANEL: CPT

## 2022-07-27 PROCEDURE — 99214 OFFICE O/P EST MOD 30 MIN: CPT | Performed by: FAMILY MEDICINE

## 2022-07-27 PROCEDURE — 1170F FXNL STATUS ASSESSED: CPT | Performed by: FAMILY MEDICINE

## 2022-07-27 PROCEDURE — 1101F PT FALLS ASSESS-DOCD LE1/YR: CPT | Performed by: FAMILY MEDICINE

## 2022-07-27 PROCEDURE — 3725F SCREEN DEPRESSION PERFORMED: CPT | Performed by: FAMILY MEDICINE

## 2022-07-27 PROCEDURE — 80061 LIPID PANEL: CPT

## 2022-07-27 PROCEDURE — 36415 COLL VENOUS BLD VENIPUNCTURE: CPT

## 2022-07-27 PROCEDURE — 82043 UR ALBUMIN QUANTITATIVE: CPT | Performed by: FAMILY MEDICINE

## 2022-07-27 PROCEDURE — 82570 ASSAY OF URINE CREATININE: CPT

## 2022-07-27 PROCEDURE — 3079F DIAST BP 80-89 MM HG: CPT | Performed by: FAMILY MEDICINE

## 2022-07-27 PROCEDURE — 85027 COMPLETE CBC AUTOMATED: CPT

## 2022-07-27 PROCEDURE — 84156 ASSAY OF PROTEIN URINE: CPT

## 2022-07-27 PROCEDURE — 81001 URINALYSIS AUTO W/SCOPE: CPT

## 2022-07-27 PROCEDURE — 3077F SYST BP >= 140 MM HG: CPT | Performed by: FAMILY MEDICINE

## 2022-07-27 PROCEDURE — 82570 ASSAY OF URINE CREATININE: CPT | Performed by: FAMILY MEDICINE

## 2022-07-27 PROCEDURE — 1125F AMNT PAIN NOTED PAIN PRSNT: CPT | Performed by: FAMILY MEDICINE

## 2022-07-27 NOTE — PROGRESS NOTES
Assessment and Plan:     Problem List Items Addressed This Visit        Digestive    Zenker's diverticulum       Endocrine    Type 2 diabetes mellitus with stage 3a chronic kidney disease ([facility] [address] )    Relevant Orders    POCT hemoglobin A1c (Completed)    Microalbumin / creatinine urine ratio (Completed)    Comprehensive metabolic panel       Cardiovascular and Mediastinum    Benign hypertension       Other    Dyslipidemia    Relevant Orders    Lipid panel (Completed)      Other Visit Diagnoses     Medicare annual wellness visit, subsequent    -  Primary    Right hand pain        Relevant Orders    EMG 2 Limb Upper Extremity    Screening PSA (prostate specific antigen)        Relevant Orders    PSA, Total Screen      Left hematoma of his arm  Ace bandage applied  I do believe it is secondary to recurrent trauma recommend using Ace bandage when working outside  Right hand discomfort likely arthritic pains  However will get an EMG given that there is numbness in certain fingers  Zenker's diverticulum doing extremely well  Repeat all basic labs  Discussion about A1c slowly climbing  Repeat in 3 months  BMI Counseling: Body mass index is 33 67 kg/m²  The BMI is above normal  Nutrition recommendations include consuming healthier snacks and limiting drinks that contain sugar  Rationale for BMI follow-up plan is due to patient being overweight or obese  Depression Screening and Follow-up Plan: Patient was screened for depression during today's encounter  They screened negative with a PHQ-2 score of 1  Preventive health issues were discussed with patient, and age appropriate screening tests were ordered as noted in patient's After Visit Summary  Personalized health advice and appropriate referrals for health education or preventive services given if needed, as noted in patient's After Visit Summary  History of Present Illness:     Patient presents for a Medicare Wellness Visit    Patient has been very focused on the health of his wife  He states that he is constantly worrying about her  He has been seeing a therapist weekly  Patient currently not complaining of any difficulty swallowing  Since his surgery  Hypertension taking his medication as prescribed  His biggest concern is that he has a hematoma on the left arm that is changing locations and not improving  States that when he is working it seems to be his hand that he pushes things around with  Patient taking all his medications as prescribed without any difficulties  States that he has come pains about right hand pain  At times does have a little bit of numbness in it  HPI   Patient Care Team:  Sofie Mcallister MD as PCP - General (Family Medicine)  MD Aubrey Benavidez MD (Urology)  Cleo Carter MD (Ophthalmology)     Review of Systems:     Review of Systems   Constitutional: Positive for fatigue  Negative for activity change, appetite change, chills and fever  HENT: Negative for congestion  Respiratory: Negative for cough, chest tightness and shortness of breath  Cardiovascular: Negative for chest pain and leg swelling  Gastrointestinal: Negative for abdominal distention, abdominal pain, constipation, diarrhea, nausea and vomiting  Musculoskeletal: Positive for arthralgias  All other systems reviewed and are negative         Problem List:     Patient Active Problem List   Diagnosis    2-vessel coronary artery disease    Benign hypertension    Type 2 diabetes mellitus with stage 3a chronic kidney disease ([facility] [address] )    Dyslipidemia    Moderate obesity    Dysphagia    Shortness of breath    Zenker's diverticulum    Chronic kidney disease      Past Medical and Surgical History:     Past Medical History:   Diagnosis Date    Abnormal blood chemistry     resolved: 11/9/16    Abnormal glucose     last assessed: 9/24/14    Arthritis     CAD (coronary artery disease)     Chronic kidney disease     STONES  & CKD    Coronary atherosclerosis     DM2 (diabetes mellitus, type 2) ([facility] [address] )     Dyslipidemia     Facial nerve disorder     HTN (hypertension)     Hyperlipidemia     last assessed: 1/13/17    Kidney stones     Lumbosacral radiculopathy     Nerve root and plexus disorder     Obesity     Osteoarthritis     Prostate asymmetry     Pure hypercholesterolemia      Past Surgical History:   Procedure Laterality Date    CORONARY ANGIOPLASTY      1 STENT     DE ESOPHAGUS SURG PROCEDURE UNLISTED N/A 9/30/2021    Procedure: EGD; DIVERTICULECTOMY ZENKERS ENDOSCOPIC;  Surgeon: Patrick Zhaor, MD;  Location: [facility];  Service: Thoracic    UPPER GASTROINTESTINAL ENDOSCOPY        Family History:     Family History   Problem Relation Age of Onset    Cancer Mother     Ovarian cancer Mother     Hypertension Sister     Lung disease Father         black    Mental illness Neg Hx     Substance Abuse Neg Hx       Social History:     Social History     Socioeconomic History    Marital status: /Civil Union     Spouse name: None    Number of children: None    Years of education: None    Highest education level: None   Occupational History    None   Tobacco Use    Smoking status: Never Smoker    Smokeless tobacco: Never Used   Vaping Use    Vaping Use: Never used   Substance and Sexual Activity    Alcohol use: Not Currently    Drug use: No    Sexual activity: None   Other Topics Concern    None   Social History Narrative    None     Social Determinants of Health     Financial Resource Strain: Not on file   Food Insecurity: Not on file   Transportation Needs: Not on file   Physical Activity: Not on file   Stress: Not on file   Social Connections: Not on file   Intimate Partner Violence: Not on file   Housing Stability: Not on file      Medications and Allergies:     Current Outpatient Medications   Medication Sig Dispense Refill    aspirin 81 mg chewable tablet Chew 81 mg daily        metFORMIN (GLUCOPHAGE) 500 mg tablet Take 1 tablet (500 mg total) by mouth 2 (two) times a day with meals 180 tablet 2    simvastatin (ZOCOR) 40 mg tablet TAKE 1 TABLET BY MOUTH  DAILY AT BEDTIME 90 tablet 3    tamsulosin (FLOMAX) 0 4 mg Take 1 capsule (0 4 mg total) by mouth daily with dinner 90 capsule 3    trandolapril (MAVIK) 1 MG tablet TAKE ONE TABLET BY MOUTH EVERY DAY 90 tablet 2    Januvia 50 MG tablet TAKE 1 TABLET BY MOUTH  DAILY 90 tablet 3    pantoprazole (PROTONIX) 40 mg tablet Take 1 tablet (40 mg total) by mouth daily before breakfast (Patient not taking: No sig reported) 90 tablet 3     No current facility-administered medications for this visit  Allergies   Allergen Reactions    Other Other (See Comments)     REAPRO- SEVERELY LOWERED RBCS [facility]QU[facility]    Ciprofloxacin Hives      Immunizations:     Immunization History   Administered Date(s) Administered    COVID-19 MODERNA VACC 0 25 ML IM BOOSTER 12/14/2021    COVID-19 MODERNA VACC 0 5 ML IM 02/12/2021, 03/12/2021    Influenza Split High Dose Preservative Free IM 10/10/2013, 09/24/2014, 09/14/2015, 10/25/2016, 10/18/2017    Influenza, high dose seasonal 0 7 mL 10/05/2018, 09/20/2019, 10/06/2020, 10/05/2021    Influenza, seasonal, injectable 10/05/2011, 10/12/2012, 11/01/2016    Pneumococcal Conjugate 13-Valent 09/14/2015    Pneumococcal Polysaccharide PPV23 10/05/2011    Zoster Vaccine Recombinant 02/04/2019      Health Maintenance: There are no preventive care reminders to display for this patient  Topic Date Due    COVID-19 Vaccine (4 - Booster for Moderna series) 04/14/2022    Influenza Vaccine (1) 09/01/2022      Medicare Screening Tests and Risk Assessments:     Gonzales is here for his Subsequent Wellness visit  Health Risk Assessment:   Patient rates overall health as very good  Patient feels that their physical health rating is same  Patient is dissatisfied with their life  Eyesight was rated as same   Hearing was rated as same  Patient feels that their emotional and mental health rating is same  Patients states they are never, rarely angry  Patient states they are often unusually tired/fatigued  Pain experienced in the last 7 days has been some  Patient's pain rating has been 6/10  Patient states that he has experienced no weight loss or gain in last 6 months  Depression Screening:   PHQ-2 Score: 1      Fall Risk Screening: In the past year, patient has experienced: no history of falling in past year      Home Safety:  Patient does not have trouble with stairs inside or outside of their home  Patient has working smoke alarms and has working carbon monoxide detector  Home safety hazards include: none  Nutrition:   Current diet is Diabetic  Medications:   Patient is currently taking over-the-counter supplements  OTC medications include: see medication list  Patient is able to manage medications  Activities of Daily Living (ADLs)/Instrumental Activities of Daily Living (IADLs):   Walk and transfer into and out of bed and chair?: Yes  Dress and groom yourself?: Yes    Bathe or shower yourself?: Yes    Feed yourself? Yes  Do your laundry/housekeeping?: Yes  Manage your money, pay your bills and track your expenses?: Yes  Make your own meals?: Yes    Do your own shopping?: Yes    Previous Hospitalizations:   Any hospitalizations or ED visits within the last 12 months?: No      Advance Care Planning:   Living will: Yes    Durable POA for healthcare:  Yes    Advanced directive: Yes      PREVENTIVE SCREENINGS      Cardiovascular Screening:    General: Screening Current    Due for: Lipid Panel      Diabetes Screening:     General: Screening Not Indicated and History Diabetes    Due for: Blood Glucose      Colorectal Cancer Screening:     General: Patient Declines      Prostate Cancer Screening:    General: Screening Not Indicated      Osteoporosis Screening:    General: Screening Not Indicated      Abdominal Aortic Aneurysm (AAA) Screening:        General: Screening Not Indicated      Lung Cancer Screening:     General: Screening Not Indicated      Hepatitis C Screening:    General: Screening Current    Screening, Brief Intervention, and Referral to Treatment (SBIRT)    Screening  Typical number of drinks in a day: 0  Typical number of drinks in a week: 0  Interpretation: Low risk drinking behavior  Single Item Drug Screening:  How often have you used an illegal drug (including marijuana) or a prescription medication for non-medical reasons in the past year? never    Single Item Drug Screen Score: 0  Interpretation: Negative screen for possible drug use disorder    No exam data present     Physical Exam:     /80 (BP Location: Right arm, Patient Position: Sitting, Cuff Size: Adult)   Pulse 72   Temp 98 2 °F (36 8 °C) (Temporal)   Resp 14   Ht 5' 7" (1 702 m)   Wt 97 5 kg (215 lb)   BMI 33 67 kg/m²     Physical Exam  Vitals reviewed  Constitutional:       Appearance: He is well-developed  HENT:      Head: Normocephalic and atraumatic  Right Ear: External ear normal       Left Ear: External ear normal       Nose: Nose normal       Mouth/Throat:      Mouth: Mucous membranes are moist       Pharynx: Oropharynx is clear  Eyes:      Conjunctiva/sclera: Conjunctivae normal       Pupils: Pupils are equal, round, and reactive to light  Cardiovascular:      Rate and Rhythm: Normal rate and regular rhythm  Heart sounds: Normal heart sounds  Pulmonary:      Effort: Pulmonary effort is normal       Breath sounds: Normal breath sounds  No wheezing  Abdominal:      General: Bowel sounds are normal  There is no distension  Palpations: Abdomen is soft  There is no mass  Tenderness: There is no abdominal tenderness  Genitourinary:     Penis: Normal     Musculoskeletal:         General: No tenderness  Normal range of motion  Cervical back: Normal range of motion and neck supple     Skin: General: Skin is warm  Capillary Refill: Capillary refill takes less than 2 seconds  Findings: Bruising (left arm) present  Neurological:      Mental Status: He is alert and oriented to person, place, and time  Cranial Nerves: No cranial nerve deficit  Sensory: No sensory deficit  Motor: No abnormal muscle tone  Coordination: Coordination normal       Deep Tendon Reflexes: Reflexes normal    Psychiatric:         Behavior: Behavior normal          Thought Content:  Thought content normal          Judgment: Judgment normal           Jordy Peralta MD

## 2022-07-27 NOTE — PATIENT INSTRUCTIONS

## 2022-08-01 NOTE — PATIENT INSTRUCTIONS
Please continue to schedule a follow-up session within one week  In case of a psychiatric emergency please contact any of the following:  [facility] [phone] or [phone]     [place] Suicide Prevention Lifeline : 8-723.869.2288

## 2022-08-01 NOTE — PSYCH
This note was not shared with the patient due to this is a psychotherapy note     Assessment/Plan:      Diagnoses and all orders for this visit:    Adjustment disorder, unspecified type          Subjective:     Patient ID: Nathalie Palencia is a [de-identified] y o  male who presented for his follow-up outpatient counseling appointment  Shan Manzo reports Davon Nichols was crying over soriano she received, and we discussed the act of kindness  Shan Manzo discussed his current weekly events with undersigned therapist and reports things have been going relatively well  He was able to get a wheelchair by a former friend and reports Davon Gut was a bit confused over the weekend and doesn't remember what she eats  Shan Manzo reports Davon Gut continues to get upset an tells him "you keep telling me what to do," and we discussed balancing it out and letting Davon Gut have self autonomy  Shan Manzo denies suicidal intent, gesture or ideation at this time  The undersigned therapist provided Shan Manzo with 45 minutes of psychotherapy  Review of Systems   Psychiatric/Behavioral: Negative  Objective:     Physical Exam  Psychiatric:         Attention and Perception: Attention and perception normal          Mood and Affect: Mood and affect normal          Speech: Speech normal          Behavior: Behavior normal  Behavior is cooperative  Thought Content:  Thought content normal          Cognition and Memory: Cognition and memory normal          Judgment: Judgment normal

## 2022-08-02 ENCOUNTER — TELEMEDICINE (OUTPATIENT)
Dept: BEHAVIORAL/MENTAL HEALTH CLINIC | Facility: CLINIC | Age: 82
End: 2022-08-02
Payer: COMMERCIAL

## 2022-08-02 DIAGNOSIS — F43.20 ADJUSTMENT DISORDER, UNSPECIFIED TYPE: Primary | ICD-10-CM

## 2022-08-02 PROCEDURE — 90834 PSYTX W PT 45 MINUTES: CPT | Performed by: SOCIAL WORKER

## 2022-08-03 ENCOUNTER — OFFICE VISIT (OUTPATIENT)
Dept: CARDIOLOGY CLINIC | Facility: CLINIC | Age: 82
End: 2022-08-03
Payer: COMMERCIAL

## 2022-08-03 VITALS
TEMPERATURE: 97 F | WEIGHT: 213 LBS | SYSTOLIC BLOOD PRESSURE: 100 MMHG | HEIGHT: 67 IN | HEART RATE: 77 BPM | BODY MASS INDEX: 33.43 KG/M2 | OXYGEN SATURATION: 97 % | DIASTOLIC BLOOD PRESSURE: 70 MMHG

## 2022-08-03 DIAGNOSIS — R06.02 SHORTNESS OF BREATH: ICD-10-CM

## 2022-08-03 DIAGNOSIS — I25.10 2-VESSEL CORONARY ARTERY DISEASE: ICD-10-CM

## 2022-08-03 DIAGNOSIS — E11.9 TYPE 2 DIABETES MELLITUS WITHOUT COMPLICATION, WITHOUT LONG-TERM CURRENT USE OF INSULIN (HCC): ICD-10-CM

## 2022-08-03 DIAGNOSIS — E66.9 OBESITY (BMI 30-39.9): ICD-10-CM

## 2022-08-03 DIAGNOSIS — I10 BENIGN HYPERTENSION: ICD-10-CM

## 2022-08-03 DIAGNOSIS — E78.5 DYSLIPIDEMIA: ICD-10-CM

## 2022-08-03 DIAGNOSIS — I49.9 IRREGULAR HEARTBEAT: ICD-10-CM

## 2022-08-03 PROCEDURE — 93000 ELECTROCARDIOGRAM COMPLETE: CPT | Performed by: INTERNAL MEDICINE

## 2022-08-03 PROCEDURE — 99214 OFFICE O/P EST MOD 30 MIN: CPT | Performed by: INTERNAL MEDICINE

## 2022-08-03 RX ORDER — EZETIMIBE 10 MG/1
10 TABLET ORAL DAILY
Qty: 90 TABLET | Refills: 1 | Status: SHIPPED | OUTPATIENT
Start: 2022-08-03

## 2022-08-03 NOTE — PROGRESS NOTES
Progress Note - Cardiology Office  Gonzales Sears [de-identified] y o  male MRN: [id number]   Encounter: [id number]     7  Irregular heartbeat    2  2-vessel coronary artery disease    3  Dyslipidemia    4  Benign hypertension    5  Shortness of breath    6  Type 2 diabetes mellitus without complication, without long-term current use of insulin ([facility])    7  Obesity (BMI 30-39  9)        Assessment/Plan      1  Coronary artery disease with a remote history of angioplasty of LAD with a drug-eluting stent and known RPDA 100% with grade 3 collaterals  He has no symptoms of angina  He has decently active  His blood test from August 2019 reviewed  He has nonischemic nuclear in September of 2018  Exercise stress test shows no exercise induced ischemia    2  Hypertension  Patient has longstanding history of essential hypertension  Blood pressure has been     3  Dyslipidemia  Continue statins  Patient on Zocor    Since he had history of coronary artery disease we would prefer cholesterol to be lower  Will add Zetia 10 mg as he has previously tried Lipitor he could not tolerate it  He would like to continue Zocor will add Zetia    4  Diabetes mellitus  Patient is on metformin and Januvia follow-up by PMD   Last HbA1c 6 5  He follows up with endocrinology [facility]  Currently stable  5  Obesity  Patient's BMI is around 33  Encouraged him to lose weight    6  Premature atrial contractions  Patient has frequent atrial contraction  He has a history of 8 initial today he has frequent PACs  We will check a monitor for 1 week to rule out any occult atrial fibrillation  Discussed with patient at length about pathophysiology of coronary artery disease, irregular heartbeat, need to compliant with medications and losing weight at length  Follow-up in 6 months  All these issues discussed with patient's wife at length      Counseling :  A description of the counseling  Spoke to patient but high intensity statins  Lipitor side effects discussed  He is willing to try it  Goals and Barriers  Patient want to lose weight which he gain during winter months  Little under stress due to wife's condition  Patient's ability to self care: Yes  Medication side effect reviewed with patient in detail and all their questions answered to their satisfaction  Nivia Bhakta is a [de-identified]y o  year old male who came for follow up  Patient has a past medical history significant for coronary artery disease status post angioplasty in 2008 off large size LAD with a known RPDA, hypertension, diabetes mellitus, dyslipidemia and moderate obesity who came for regular follow-up  Patient's last nuclear was in 2014 which was nonischemic  He denies any chest pain or any shortness of breath  No fever no chills no other significant complaint  7/18/2017Patient came for follow-up for coronary artery disease  A status post angioplasty of his large LAD and has known RPDA occlusion  His sugar is well controlled  He is trying to lose weight  Blood pressures acceptable  Denies any chest pain  No PND no orthopnea no nausea no vomiting no other significant complaint  07/26/2021  Above reviewed  Patient came for follow-up he is doing well  He denies any chest pain any shortness of breath  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  He has been busy with his wife hence on conservative Rx  His difficult to get food down is getting worse he is scheduled to follow up with Gastroenterology  No nausea no vomiting no other issues he does have short atrial runs  He has recently blood test done in July 2021 there were acceptable  02/10/2022  Above reviewed  Patient came for follow-up he is doing well  Patient denies any chest pain any shortness of breath    He had a 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 which he had repair done  He had stitches and titanium clips were placed he is doing well since then he is able to swallow everything  He had history of coronary artery disease, hypertension, dyslipidemia with previous history of angioplasty  His under lot of stress due to his wife as she has multiple problems  No nausea no vomiting no fever no chills no PND no orthopnea  No other cardiovascular issues at this time  08/03/2022  Above reviewed  Patient came for follow-up he is doing well  He has recently cardiac workup done in the form of echo and exercise stress test   Echo shows his EF is now 45 50% with mild valvular disease  His exercise stress test was acceptable  He has medical history significant for hypertension, diabetes mellitus, few PACs but today found to have irregular heartbeat  Initial EKG shows it could be sinus with frequent PACs  A repeat rhythm strip was done which clearly shows ectopic atrial rhythm with frequent PACs and PVCs  Patient does not feel any change in symptoms he is doing well  Though he is under stress due to his wife's illness  His stress test and echo reviewed with him  Review of Systems   Constitutional: Negative for activity change, chills, diaphoresis, fever and unexpected weight change  HENT: Negative for congestion  Eyes: Negative for discharge and redness  Respiratory: Negative for cough, chest tightness, shortness of breath and wheezing  Cardiovascular: Negative  Negative for chest pain, palpitations and leg swelling  Gastrointestinal: Negative for abdominal pain, diarrhea and nausea  Endocrine: Negative  Genitourinary: Negative for decreased urine volume and urgency  Musculoskeletal: Negative  Negative for arthralgias, back pain and gait problem  Skin: Negative for rash and wound  Allergic/Immunologic: Negative      Neurological: Negative for dizziness, seizures, syncope, weakness, light-headedness and headaches  Hematological: Negative  Psychiatric/Behavioral: Negative for agitation and confusion  The patient is not nervous/anxious          Historical Information   Past Medical History:   Diagnosis Date    Abnormal blood chemistry     resolved: 11/9/16    Abnormal glucose     last assessed: 9/24/14    Arthritis     CAD (coronary artery disease)     Chronic kidney disease     STONES  & CKD    Coronary atherosclerosis     DM2 (diabetes mellitus, type 2) (HCC)     Dyslipidemia     Facial nerve disorder     HTN (hypertension)     Hyperlipidemia     last assessed: 1/13/17    Kidney stones     Lumbosacral radiculopathy     Nerve root and plexus disorder     Obesity     Osteoarthritis     Prostate asymmetry     Pure hypercholesterolemia      Past Surgical History:   Procedure Laterality Date    CORONARY ANGIOPLASTY      1 STENT     [place] ESOPHAGUS SURG PROCEDURE UNLISTED N/A 9/30/2021    Procedure: EGD; DIVERTICULECTOMY ZENKERS ENDOSCOPIC;  Surgeon: Mary Royal MD;  [facility];  Service: Thoracic    UPPER GASTROINTESTINAL ENDOSCOPY       Social History     Substance and Sexual Activity   Alcohol Use Not Currently     Social History     Substance and Sexual Activity   Drug Use No     Social History     Tobacco Use   Smoking Status Never Smoker   Smokeless Tobacco Never Used     Family History:   Family History   Problem Relation Age of Onset    Cancer Mother     Ovarian cancer Mother     Hypertension Sister     Lung disease Father         black    Mental illness Neg Hx     Substance Abuse Neg Hx        Meds/Allergies     Allergies   Allergen Reactions    Other Other (See Comments)     REAPRO- SEVERELY LOWERED [facility]    Ciprofloxacin Hives       Current Outpatient Medications:     aspirin 81 mg chewable tablet, Chew 81 mg daily  , Disp: , Rfl:     ezetimibe (ZETIA) 10 mg tablet, Take 1 tablet (10 mg total) by mouth daily, Disp: 90 tablet, Rfl: 1    Januvia 50 MG tablet, TAKE 1 TABLET BY MOUTH  DAILY, Disp: 90 tablet, Rfl: 3    metFORMIN (GLUCOPHAGE) 500 mg tablet, Take 1 tablet (500 mg total) by mouth 2 (two) times a day with meals, Disp: 180 tablet, Rfl: 2    simvastatin (ZOCOR) 40 mg tablet, TAKE 1 TABLET BY MOUTH  DAILY AT BEDTIME, Disp: 90 tablet, Rfl: 3    tamsulosin (FLOMAX) 0 4 mg, Take 1 capsule (0 4 mg total) by mouth daily with dinner, Disp: 90 capsule, Rfl: 3    trandolapril (MAVIK) 1 MG tablet, TAKE ONE TABLET BY MOUTH EVERY DAY, Disp: 90 tablet, Rfl: 2    pantoprazole (PROTONIX) 40 mg tablet, Take 1 tablet (40 mg total) by mouth daily before breakfast (Patient not taking: No sig reported), Disp: 90 tablet, Rfl: 3    Vitals: Blood pressure 100/70, pulse 77, temperature (!) 97 °F (36 1 °C), height 5' 7" (1 702 m), weight 96 6 kg (213 lb), SpO2 97 %  ?  Body mass index is 33 36 kg/m²  BP Readings from Last 3 Encounters:   08/03/22 100/70   07/27/22 140/80   04/01/22 133/84       Physical Exam:  Physical Exam  Constitutional:       General: He is not in acute distress  Appearance: He is well-developed  He is not diaphoretic  Neck:      Thyroid: No thyromegaly  Vascular: No JVD  Trachea: No tracheal deviation  Cardiovascular:      Rate and Rhythm: Normal rate  Heart sounds: S1 normal and S2 normal  Heart sounds not distant  Murmur heard  Systolic (ejection) murmur is present with a grade of 2/6  No friction rub  No gallop  No S3 or S4 sounds  Comments: Heart rate is irregular he has frequent PACs and PVCs  Pulmonary:      Effort: Pulmonary effort is normal  No respiratory distress  Breath sounds: Normal breath sounds  No wheezing or rales  Chest:      Chest wall: No tenderness  Abdominal:      General: Bowel sounds are normal  There is no distension  Palpations: Abdomen is soft  Tenderness: There is no abdominal tenderness  Musculoskeletal:         General: No deformity  Cervical back: Neck supple  Skin:     General: Skin is warm and dry  Coloration: Skin is not pale  Findings: No rash  Neurological:      Mental Status: He is alert and oriented to person, place, and time  Psychiatric:         Behavior: Behavior normal          Judgment: Judgment normal          Diagnostic Studies Review Cardio:  Stress Test: Nuclear stress test in October 2014 shows no ischemia EF 70%  Nuclear stress test done on 09/26/2018 was normal with EF around 70%  Echocardiogram/ROCK: Echo Doppler done in March 2015 shows borderline concentric left hypertrophy EF 55-60%, after reminding dilated, trace MR, trace TR PA pressure 35 mmHg  Echo Doppler done 09/26/2018  Echo Doppler shows normal LV systolic function grade 1 diastolic dysfunction, right ventricle mildly dilated, left atrium mild-to-moderate dilated, mild MR, mild TR PA pressure 30 mm of mercury  No change from old echo  Catheterization: His cardiac catheter patient done in 2008 shows critical stenosis of LAD which was successfully stented with a drug-eluting stent  He had nonobstructive disease in the circumflex and right coronary artery and RPDA is 100% occluded with grade 2 collaterals  Vascular imaging: In October of 2014 abdominal aortogram study shows no aneurysm  Holter monitor  Holter monitor done in March 2019 shows few PACs and PVCs  Most of the PVCs were single  There were 12 beat NSVT though it was irregular so we could not rule out SVT with aberration    ECG Report:   Comparison to prior ECGs: no interval change  7/18/2017  Normal sinus rhythm 1  with no significant ST changes  Heart rate was 68 bpm     Repeat EKG done 08/14/2018 shows normal sinus rhythm heart rate 73 beats per minute  No change from old EKG  Twelve lead EKG in our office done on 02/14/2018 shows normal sinus rhythm heart rate 72 beats per minute few PACs were noted    As compared to old EKG PACs are newly reported  Twelve lead EKG 09/18/2019 shows normal sinus rhythm rare PACs heart rate 71 beats per minute no other significant ST changes  Twelve lead EKG 03/18/2020 shows normal sinus rhythm rare PACs heart rate 67 beats per minute no change from old EKG  Twelve lead EKG 07/26/2021 shows normal sinus rhythm heart rate 77 beats per minute no change from previous EKG    Twelve lead EKG 02/10/2022 shows normal sinus with sinus arrhythmia heart rate 66 beats per minute no change from previous EKG  Twelve lead EKG initially on arrival shows most likely sinus rhythm with frequent PACs  Repeat rhythm strip clearly shows ectopic atrial rhythm with heart rate 72 beats per minute  Blood test from July 2021 reviewed  Lab Review     Lab Results   Component Value Date     02/17/2017    K 4 6 07/27/2022     07/27/2022    CO2 31 07/27/2022    BUN 23 07/27/2022    CREATININE 1 43 (H) 07/27/2022    GLUCOSE 125 (H) 02/17/2017    GLUF 137 (H) 07/27/2022    CALCIUM 9 3 07/27/2022    CORRECTEDCA 9 7 01/13/2022    AST 21 07/27/2022    ALT 33 07/27/2022    ALKPHOS 66 07/27/2022    PROT 6 5 02/17/2017    BILITOT 0 4 02/17/2017    EGFR 45 07/27/2022     Lab Results   Component Value Date    GLUCOSE 125 (H) 02/17/2017    CALCIUM 9 3 07/27/2022     02/17/2017    K 4 6 07/27/2022    CO2 31 07/27/2022     07/27/2022    BUN 23 07/27/2022    CREATININE 1 43 (H) 07/27/2022       Lab Results   Component Value Date    HGBA1C 6 4 07/27/2022     Lipid Profile:   Lab Results   Component Value Date    CHOL 173 02/17/2017    HDL 53 07/27/2022    LDLCALC 108 (H) 07/27/2022    TRIG 126 07/27/2022     Lab Results   Component Value Date    CHOL 173 02/17/2017    CHOL 151 11/21/2016       Dr Luisa Sepulveda MD [facility]      "This note has been constructed using a voice recognition system  Therefore there may be syntax, spelling, and/or grammatical errors   Please call if you have any questions  "

## 2022-08-04 ENCOUNTER — OFFICE VISIT (OUTPATIENT)
Dept: NEPHROLOGY | Facility: CLINIC | Age: 82
End: 2022-08-04
Payer: COMMERCIAL

## 2022-08-04 ENCOUNTER — OFFICE VISIT (OUTPATIENT)
Dept: FAMILY MEDICINE CLINIC | Facility: CLINIC | Age: 82
End: 2022-08-04
Payer: COMMERCIAL

## 2022-08-04 VITALS
RESPIRATION RATE: 16 BRPM | WEIGHT: 216 LBS | HEIGHT: 67 IN | HEART RATE: 72 BPM | BODY MASS INDEX: 33.9 KG/M2 | DIASTOLIC BLOOD PRESSURE: 80 MMHG | SYSTOLIC BLOOD PRESSURE: 122 MMHG | TEMPERATURE: 98.3 F

## 2022-08-04 VITALS
BODY MASS INDEX: 33.9 KG/M2 | DIASTOLIC BLOOD PRESSURE: 68 MMHG | HEIGHT: 67 IN | SYSTOLIC BLOOD PRESSURE: 130 MMHG | HEART RATE: 80 BPM | WEIGHT: 216 LBS

## 2022-08-04 DIAGNOSIS — E11.22 TYPE 2 DIABETES MELLITUS WITH STAGE 3A CHRONIC KIDNEY DISEASE, WITHOUT LONG-TERM CURRENT USE OF INSULIN (HCC): ICD-10-CM

## 2022-08-04 DIAGNOSIS — N18.31 STAGE 3A CHRONIC KIDNEY DISEASE (HCC): Primary | ICD-10-CM

## 2022-08-04 DIAGNOSIS — I49.9 IRREGULAR HEART BEAT: Primary | ICD-10-CM

## 2022-08-04 DIAGNOSIS — N18.31 TYPE 2 DIABETES MELLITUS WITH STAGE 3A CHRONIC KIDNEY DISEASE, WITHOUT LONG-TERM CURRENT USE OF INSULIN (HCC): ICD-10-CM

## 2022-08-04 DIAGNOSIS — I12.9 BENIGN HYPERTENSION WITH CKD (CHRONIC KIDNEY DISEASE) STAGE III (HCC): ICD-10-CM

## 2022-08-04 DIAGNOSIS — I25.10 2-VESSEL CORONARY ARTERY DISEASE: ICD-10-CM

## 2022-08-04 DIAGNOSIS — N18.30 BENIGN HYPERTENSION WITH CKD (CHRONIC KIDNEY DISEASE) STAGE III (HCC): ICD-10-CM

## 2022-08-04 PROCEDURE — 3079F DIAST BP 80-89 MM HG: CPT | Performed by: FAMILY MEDICINE

## 2022-08-04 PROCEDURE — 1160F RVW MEDS BY RX/DR IN RCRD: CPT | Performed by: FAMILY MEDICINE

## 2022-08-04 PROCEDURE — 99214 OFFICE O/P EST MOD 30 MIN: CPT | Performed by: FAMILY MEDICINE

## 2022-08-04 PROCEDURE — 3074F SYST BP LT 130 MM HG: CPT | Performed by: FAMILY MEDICINE

## 2022-08-04 PROCEDURE — 99213 OFFICE O/P EST LOW 20 MIN: CPT | Performed by: PHYSICIAN ASSISTANT

## 2022-08-04 NOTE — PROGRESS NOTES
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 + age related nephron loss +/- hypertensive nephrosclerosis, less likely diabetic nephropathy  Creatinine and electrolytes stable  UA bland  Minimal proteinuria  Hypertension- Avoid salt  Avoid NSAIDs  Antihypertensive regimen includes trandolapril 1mg daily  BP acceptable  No changes  Diabetes mellitus type 2- continue management with your PCP  HbA1c controlled at 6 4  Follow up with Dr Vahid Fontenot or an advanced practitioner in 9 months  Please call the office with any questions or concerns  Reason for Visit: Follow-up (CKD 3/)    HPI: John Red is a [de-identified] y o  male who is here for follow up of chronic kidney disease  He is feeling well overall  He denies dizziness, SOB, palpitations, LE edema, changes in urination  He has gained a few pounds and feels he is eating more and not exercising as much  He is the primary caretaker of his wife who is housebound  ROS: A complete review of systems was performed and was negative unless otherwise noted in the history of present illness  Allergies:    Other and Ciprofloxacin    Medications:     Current Outpatient Medications:     aspirin 81 mg chewable tablet, Chew 81 mg daily  , Disp: , Rfl:     ezetimibe (ZETIA) 10 mg tablet, Take 1 tablet (10 mg total) by mouth daily, Disp: 90 tablet, Rfl: 1    Januvia 50 MG tablet, TAKE 1 TABLET BY MOUTH  DAILY, Disp: 90 tablet, Rfl: 3    metFORMIN (GLUCOPHAGE) 500 mg tablet, Take 1 tablet (500 mg total) by mouth 2 (two) times a day with meals, Disp: 180 tablet, Rfl: 2    simvastatin (ZOCOR) 40 mg tablet, TAKE 1 TABLET BY MOUTH  DAILY AT BEDTIME, Disp: 90 tablet, Rfl: 3    tamsulosin (FLOMAX) 0 4 mg, Take 1 capsule (0 4 mg total) by mouth daily with dinner, Disp: 90 capsule, Rfl: 3    trandolapril (MAVIK) 1 MG tablet, TAKE ONE TABLET BY MOUTH EVERY DAY, Disp: 90 tablet, Rfl: 2    pantoprazole (PROTONIX) 40 mg tablet, Take 1 tablet (40 mg total) by mouth daily before breakfast (Patient not taking: No sig reported), Disp: 90 tablet, Rfl: 3    Past Medical History:   Diagnosis Date    Abnormal blood chemistry     resolved: 11/9/16    Abnormal glucose     last assessed: 9/24/14    Arthritis     CAD (coronary artery disease)     Chronic kidney disease     STONES  & CKD    Coronary atherosclerosis     DM2 (diabetes mellitus, type 2) (HCC)     Dyslipidemia     Facial nerve disorder     HTN (hypertension)     Hyperlipidemia     last assessed: 1/13/17    Kidney stones     Lumbosacral radiculopathy     Nerve root and plexus disorder     Obesity     Osteoarthritis     Prostate asymmetry     Pure hypercholesterolemia      Past Surgical History:   Procedure Laterality Date    CORONARY ANGIOPLASTY      1 STENT     [place] ESOPHAGUS SURG PROCEDURE UNLISTED N/A 9/30/2021    Procedure: EGD; DIVERTICULECTOMY ZENKERS ENDOSCOPIC;  Surgeon: Bonita Suresh MD;  [facility];  Service: Thoracic    UPPER GASTROINTESTINAL ENDOSCOPY       Family History   Problem Relation Age of Onset    Cancer Mother     Ovarian cancer Mother     Hypertension Sister     Lung disease Father         black    Mental illness Neg Hx     Substance Abuse Neg Hx       reports that he has never smoked  He has never used smokeless tobacco  He reports previous alcohol use  He reports that he does not use drugs      Physical Exam:   Vitals:    08/04/22 1248 08/04/22 1309   BP: 130/68   BP Location:  Right arm   Patient Position:  Sitting   Cuff Size:  Standard   Pulse:  80   Weight: 98 kg (216 lb)    Height: 5' 7" (1 702 m)      Body mass index is 33 83 kg/m²  General: NAD  Neuro: AAO  Skin: no rash  Eyes: anicteric  ENMT: mm moist  Neck: no masses  Respiratory: CTAB  Cardiovascular: irregular  Extremities: no edema  Gastrointestinal: soft nt nd    Procedure:  No results found for this or any previous visit  Lab Results   Component Value Date    GLUCOSE 125 (H) 02/17/2017    CALCIUM 9 3 07/27/2022     02/17/2017    K 4 6 07/27/2022    CO2 31 07/27/2022     07/27/2022    BUN 23 07/27/2022    CREATININE 1 43 (H) 07/27/2022     I have personally reviewed the blood work as stated above and in my note

## 2022-08-04 NOTE — PATIENT INSTRUCTIONS
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

## 2022-08-04 NOTE — PROGRESS NOTES
Mojgan Connor 1940 male MRN: [id number]    FAMILY PRACTICE OFFICE VISIT  [facility] - [address]      ASSESSMENT/PLAN  Mojgan Connor is a [de-identified] y o  male presents to the office for    Diagnoses and all orders for this visit:    Irregular heart beat    Type 2 diabetes mellitus with stage 3a chronic kidney disease, without long-term current use of insulin ([address] )    2-vessel coronary artery disease     Reviewed EKGs with the patient  Patient was anxious about the news yesterday and seems to have done research overnight which led him not to sleep and he wanted more clarification  I did advise him that I agree with his cardiologist I believe that he should start taking the new medication and doubling up on his aspirin until we have more answers  I did witness any irregular heartbeat today here in the office  Recommend a Holter monitor to be performed  We have sent down to our central scheduling so that they can call him with a date and time  I do believe that this is driven by his stress    But given his heart disease we should rule out AFib           Future Appointments   Date Time Provider Camryn Lambert   8/4/2022  1:00 PM [place] [id number] [facility]   8/9/2022 10:30 AM Bill Raya LCSW [facility]   8/16/2022 10:30 AM Bill Raya [facility]   8/23/2022 10:30 AM Bill Raya [facility]   8/30/2022 10:30 AM Bill Raya [facility]   9/6/2022 10:30 [facility]   9/13/2022 10:30 AM Bill Raya LCSW [facility]   9/20/2022 10:30 AM Bill Raya [facility]   9/27/2022 10:30 AM Bill Raya [facility]   10/4/2022 10:30 AM Bill Raya [facility]   10/6/2022 11:00 AM BE NEURO EMG JOSE D BE [facility] NEUR BE M[facility] 10/11/2022 10:30 AM Bobbetta Star, LCSW [facility]   10/18/2022 10:30 AM Bobbetta Star, LCSW [facility]   10/25/2022 10:30 AM Bobbetta Star, LCSW Psych [facility]   10/31/2022  8:00 AM Shweta Singh  [facility]   11/1/2022 10:30 AM Bobbetta Star, LCSW Psych [facility]   11/8/2022 10:30 AM Bobbetta Star, LCSW Psych [facility]   11/15/2022 10:30 AM Bobbetta Star, LCSW Psych [facility]   11/22/2022 10:30 AM Bobbetta Star, LCSW Psych [facility]   11/29/2022 10:30 AM Bobbetta Star, LCSW Psych [facility]   12/6/2022 10:30 AM Bobbetta Star, LCSW Psych [facility]   12/13/2022 10:30 AM Bobbetta Star, LCSW Psych [facility]   12/20/2022 10:30 AM Bobbetta Star, LCSW [facility]   12/27/2022 10:30 AM Bobbetta Star, LCSW Psych [facility]          SUBJECTIVE  CC: Follow-up (Cardiology results EKG Dr Green Blizzard)      HPI:  Nivia Bhakta is a [de-identified] y o  male who presents for explanation of his results yesterday at the cardiologist   Patient is concerned given that new medications were given to him  Patient states that he has been stressed and does at times feels his pulse irregular when he takes it but has not had any symptoms of shortness of breath or chest pain  Patient was advised to use a Holter monitor for 1 week and he wants to know if this is enough time to catch what is going on with him  He is concerned given that he is the sole caretaker for his wife  Review of Systems   Constitutional: Negative for activity change, appetite change, chills, fatigue and fever  HENT: Negative for congestion  Respiratory: Negative for cough, chest tightness and shortness of breath  Cardiovascular: Negative for chest pain and leg swelling     Gastrointestinal: Negative for abdominal distention, abdominal pain, constipation, diarrhea, nausea and vomiting  All other systems reviewed and are negative        Historical Information   The patient history was reviewed as follows:  Past Medical History:   Diagnosis Date    Abnormal blood chemistry     resolved: 11/9/16    Abnormal glucose     last assessed: 9/24/14    Arthritis     CAD (coronary artery disease)     Chronic kidney disease     STONES  & CKD    Coronary atherosclerosis     DM2 (diabetes mellitus, type 2) (HCC)     Dyslipidemia     Facial nerve disorder     HTN (hypertension)     Hyperlipidemia     last assessed: 1/13/17    Kidney stones     Lumbosacral radiculopathy     Nerve root and plexus disorder     Obesity     Osteoarthritis     Prostate asymmetry     Pure hypercholesterolemia          Medications:     Current Outpatient Medications:     aspirin 81 mg chewable tablet, Chew 81 mg daily  , Disp: , Rfl:     Januvia 50 MG tablet, TAKE 1 TABLET BY MOUTH  DAILY, Disp: 90 tablet, Rfl: 3    metFORMIN (GLUCOPHAGE) 500 mg tablet, Take 1 tablet (500 mg total) by mouth 2 (two) times a day with meals, Disp: 180 tablet, Rfl: 2    simvastatin (ZOCOR) 40 mg tablet, TAKE 1 TABLET BY MOUTH  DAILY AT BEDTIME, Disp: 90 tablet, Rfl: 3    tamsulosin (FLOMAX) 0 4 mg, Take 1 capsule (0 4 mg total) by mouth daily with dinner, Disp: 90 capsule, Rfl: 3    trandolapril (MAVIK) 1 MG tablet, TAKE ONE TABLET BY MOUTH EVERY DAY, Disp: 90 tablet, Rfl: 2    ezetimibe (ZETIA) 10 mg tablet, Take 1 tablet (10 mg total) by mouth daily (Patient not taking: Reported on 8/4/2022), Disp: 90 tablet, Rfl: 1    pantoprazole (PROTONIX) 40 mg tablet, Take 1 tablet (40 mg total) by mouth daily before breakfast (Patient not taking: No sig reported), Disp: 90 tablet, Rfl: 3    Allergies   Allergen Reactions    Other Other (See Comments)     REAPRO- SEVERELY LOWERED RBCS RE[facility]ING HOSPITAL    Ciprofloxacin Hives       OBJECTIVE  Vitals:   Vitals:    08/04/22 1009   BP: 122/80   BP Location: Right arm   Patient Position: Sitting   Cuff Size: Adult   Pulse: 72   Resp: 16   Temp: 98 3 °F (36 8 °C)   TempSrc: Temporal   Weight: 98 kg (216 lb)   Height: 5' 7" (1 702 m)         Physical Exam  Vitals reviewed  Constitutional:       Appearance: He is well-developed  HENT:      Head: Normocephalic and atraumatic  Eyes:      Conjunctiva/sclera: Conjunctivae normal       Pupils: Pupils are equal, round, and reactive to light  Cardiovascular:      Heart sounds: Normal heart sounds  Comments: Did hear a skipped beat today    Pulmonary:      Effort: Pulmonary effort is normal  No respiratory distress  Breath sounds: Normal breath sounds  Musculoskeletal:         General: Normal range of motion  Cervical back: Normal range of motion and neck supple  Skin:     General: Skin is warm  Capillary Refill: Capillary refill takes less than 2 seconds  Neurological:      Mental Status: He is alert and oriented to person, place, and time                      Edyta Parish MD,   [facility]  8/4/2022

## 2022-08-05 ENCOUNTER — DOCUMENTATION (OUTPATIENT)
Dept: FAMILY MEDICINE CLINIC | Facility: CLINIC | Age: 82
End: 2022-08-05

## 2022-08-05 ENCOUNTER — TELEPHONE (OUTPATIENT)
Dept: CARDIOLOGY CLINIC | Facility: CLINIC | Age: 82
End: 2022-08-05

## 2022-08-05 NOTE — PSYCH
This note was not shared with the patient due to this is a psychotherapy note     Virtual Regular Visit    Verification of patient location:    Patient is located in the following state in which I hold an active license [place]      Assessment/Plan:    Problem List Items Addressed This Visit    None     Visit Diagnoses     Adjustment disorder, unspecified type    -  Primary               Reason for visit is No chief complaint on file  Encounter provider Karla Medellin LCSW    Provider located at [address] [place] [id number] [phone]      Recent Visits  Date Type Provider Dept   08/04/22 Office Visit Alfornia Cushing, MD [address] [facility]   08/02/22 Radha [address], [address] [facility]   Showing recent visits within past 7 days and meeting all other requirements  Future Appointments  No visits were found meeting these conditions  Showing future appointments within next 150 days and meeting all other requirements       The patient was identified by name and date of birth  Haven Damon was informed that this is a telemedicine visit and that the visit is being conducted through Telephone  My office door was closed  No one else was in the room  He acknowledged consent and understanding of privacy and security of the video platform  The patient has agreed to participate and understands they can discontinue the visit at any time  Patient is aware this is a billable service  Subjective  Haven Damon is a [de-identified] y o  male who presented for a follow-up virtual individual counseling session  At Ernesto's request, today's session was conducted as a virtual phone session in order to comply with social distancing secondary to the coronavirus pandemic    It was the undersigned therapist's intent to complete a video visit but Zen Hansen was unable to make video connection so we defaulted to the phone  During today's session Erwin Moon reports that he went to see his PCP and was able to take care of his medical needs  Erwin Moon reports he also called to get yolie a wider wheelchair because she experienced discomfort this week when they went shopping with it  Erwin Moon reports he met with [facility] doctor and was able to share some of her incoherent behaviors, and was recommended to a neurologist   Erwin Red reports he went to the chiropractor and it's helped him readjust his spine  Erwin Moon has a couple of medical appointments coming up with the cardiologist and kidney specialist   Erwin Moon continues to work on being patient with Seymour Ezequiel  Erwin Moon denies suicidal intent, gesture or ideation at this time  The undersigned therapist provided Erwin Moon with 45 minutes of psychotherapy           Past Medical History:   Diagnosis Date    Abnormal blood chemistry     resolved: 11/9/16    Abnormal glucose     last assessed: 9/24/14    Arthritis     CAD (coronary artery disease)     Chronic kidney disease     STONES  & CKD    Coronary atherosclerosis     DM2 (diabetes mellitus, type 2) (HCC)     Dyslipidemia     Facial nerve disorder     HTN (hypertension)     Hyperlipidemia     last assessed: 1/13/17    Kidney stones     Lumbosacral radiculopathy     Nerve root and plexus disorder     Obesity     Osteoarthritis     Prostate asymmetry     Pure hypercholesterolemia        Past Surgical History:   Procedure Laterality Date    CORONARY ANGIOPLASTY      1 STENT     RI ESOPHAGUS SURG PROCEDURE UNLISTED N/A 9/30/2021    Procedure: EGD; DIVERTICULECTOMY ZENKERS ENDOSCOPIC;  Surgeon: Cat Galvan MD;  [facility];  Service: Thoracic    UPPER GASTROINTESTINAL ENDOSCOPY         Current Outpatient Medications   Medication Sig Dispense Refill    aspirin 81 mg chewable tablet Chew 81 mg daily        ezetimibe (ZETIA) 10 mg tablet Take 1 tablet (10 mg total) by mouth daily 90 tablet 1    Januvia 50 MG tablet TAKE 1 TABLET BY MOUTH  DAILY 90 tablet 3    metFORMIN (GLUCOPHAGE) 500 mg tablet Take 1 tablet (500 mg total) by mouth 2 (two) times a day with meals 180 tablet 2    pantoprazole (PROTONIX) 40 mg tablet Take 1 tablet (40 mg total) by mouth daily before breakfast (Patient not taking: No sig reported) 90 tablet 3    simvastatin (ZOCOR) 40 mg tablet TAKE 1 TABLET BY MOUTH  DAILY AT BEDTIME 90 tablet 3    tamsulosin (FLOMAX) 0 4 mg Take 1 capsule (0 4 mg total) by mouth daily with dinner 90 capsule 3    trandolapril (MAVIK) 1 MG tablet TAKE ONE TABLET BY MOUTH EVERY DAY 90 tablet 2     No current facility-administered medications for this visit  Allergies   Allergen Reactions    Other Other (See Comments)     REAPRO- SEVERELY LOWERED RBCS [facility]    Ciprofloxacin Hives       Review of Systems   Psychiatric/Behavioral: Negative  Video Exam    There were no vitals filed for this visit  Physical Exam  Psychiatric:         Attention and Perception: Attention and perception normal          Mood and Affect: Mood and affect normal          Speech: Speech normal          Behavior: Behavior normal  Behavior is cooperative  Thought Content: Thought content normal          Cognition and Memory: Cognition and memory normal          Judgment: Judgment normal           I spent 45 minutes directly with the patient during this visit    VIRTUAL VISIT [address] verbally agrees to participate in Truzip  Pt is aware that Truzip could be limited without vital signs or the ability to perform a full hands-on physical exam  Gonzales Sears understands he or the provider may request at any time to terminate the video visit and request the patient to seek care or treatment in person

## 2022-08-09 ENCOUNTER — SOCIAL WORK (OUTPATIENT)
Dept: BEHAVIORAL/MENTAL HEALTH CLINIC | Facility: CLINIC | Age: 82
End: 2022-08-09
Payer: COMMERCIAL

## 2022-08-09 DIAGNOSIS — F43.20 ADJUSTMENT DISORDER, UNSPECIFIED TYPE: Primary | ICD-10-CM

## 2022-08-09 PROCEDURE — 90834 PSYTX W PT 45 MINUTES: CPT | Performed by: SOCIAL WORKER

## 2022-08-16 ENCOUNTER — SOCIAL WORK (OUTPATIENT)
Dept: BEHAVIORAL/MENTAL HEALTH CLINIC | Facility: CLINIC | Age: 82
End: 2022-08-16
Payer: COMMERCIAL

## 2022-08-16 DIAGNOSIS — F43.20 ADJUSTMENT DISORDER, UNSPECIFIED TYPE: Primary | ICD-10-CM

## 2022-08-16 PROCEDURE — 90834 PSYTX W PT 45 MINUTES: CPT | Performed by: SOCIAL WORKER

## 2022-08-17 NOTE — PSYCH
This note was not shared with the patient due to this is a psychotherapy note     Assessment/Plan:      Diagnoses and all orders for this visit:    Adjustment disorder, unspecified type          Subjective:     Patient ID: Shasha Dc is a [de-identified] y o  male who presented for his follow-up outpatient counseling appointment with undersigned therapist  During today's session Nasima Donahue reports he's been feeling a bit better but continues to experience pain in his butt and back  Nasima Donahue went to the chiropractor last week and reports things have improved  Nasima Donahue reports he also had a hearing test done, and is aware he's experience hearing loss  Nasima Donahue reports Treva Gipson has been in pain and will be getting an epidural to help with her back sometime next week  Nasima Donahue continues to be the main person caring for Treva Gipson  Nasima Donahue reports on Friday Pauline Perea went to visit and he will be going back to [place] as Marielle's niece has to attend school back in [place]  Nasima Donahue processed his weekly plans and feelings with undersigned therapist  Nasima Donahue denies suicidal intent, gesture or ideation at this time  The undersigned therapist provided Nasima Donahue with 45 minutes of psychotherapy  Review of Systems   Psychiatric/Behavioral: Negative  Objective:     Physical Exam  Psychiatric:         Attention and Perception: Attention and perception normal          Mood and Affect: Mood and affect normal          Speech: Speech normal          Behavior: Behavior normal  Behavior is cooperative  Thought Content:  Thought content normal          Cognition and Memory: Cognition and memory normal          Judgment: Judgment normal

## 2022-08-19 ENCOUNTER — CLINICAL SUPPORT (OUTPATIENT)
Dept: CARDIOLOGY CLINIC | Facility: CLINIC | Age: 82
End: 2022-08-19
Payer: COMMERCIAL

## 2022-08-19 DIAGNOSIS — I10 BENIGN HYPERTENSION: ICD-10-CM

## 2022-08-19 DIAGNOSIS — I25.10 2-VESSEL CORONARY ARTERY DISEASE: ICD-10-CM

## 2022-08-19 DIAGNOSIS — R06.02 SHORTNESS OF BREATH: ICD-10-CM

## 2022-08-19 DIAGNOSIS — E78.5 DYSLIPIDEMIA: ICD-10-CM

## 2022-08-19 DIAGNOSIS — E11.9 TYPE 2 DIABETES MELLITUS WITHOUT COMPLICATION, WITHOUT LONG-TERM CURRENT USE OF INSULIN (HCC): ICD-10-CM

## 2022-08-19 DIAGNOSIS — I49.9 IRREGULAR HEARTBEAT: ICD-10-CM

## 2022-08-19 DIAGNOSIS — E66.9 OBESITY (BMI 30-39.9): ICD-10-CM

## 2022-08-19 PROCEDURE — 93248 EXT ECG>7D<15D REV&INTERPJ: CPT | Performed by: INTERNAL MEDICINE

## 2022-08-23 ENCOUNTER — SOCIAL WORK (OUTPATIENT)
Dept: BEHAVIORAL/MENTAL HEALTH CLINIC | Facility: CLINIC | Age: 82
End: 2022-08-23
Payer: COMMERCIAL

## 2022-08-23 DIAGNOSIS — F43.20 ADJUSTMENT DISORDER, UNSPECIFIED TYPE: Primary | ICD-10-CM

## 2022-08-23 PROCEDURE — 90834 PSYTX W PT 45 MINUTES: CPT | Performed by: SOCIAL WORKER

## 2022-08-24 ENCOUNTER — TELEPHONE (OUTPATIENT)
Dept: CARDIOLOGY CLINIC | Facility: CLINIC | Age: 82
End: 2022-08-24

## 2022-08-24 NOTE — PSYCH
This note was not shared with the patient due to this is a psychotherapy note     Assessment/Plan:      Diagnoses and all orders for this visit:    Adjustment disorder, unspecified type          Subjective:     Patient ID: Nathalie Palencia is a [de-identified] y o  male who presented for his follow-up outpatient counseling appointment with undersigned therapist  During today's session the undersigned therapist assisted Shan Manzo process his feelings about his wife and his care giving duties  Shan Manzo reports continued pain in his butt cheek and it is now shooting pain down to his knee and shin  We discussed the possibility of him informing the chiropractor as it seems to have gotten worse  Shan Manzo reports Davon Nichols had stomach pain and he's been having trouble sleeping  However, Shan Manzo refuses to take Medication at this time  Shan Manzo continues to [employer] for Davon Gut and process his feelings about his relationship with Davon Gut  Shan Manzo reports he does try to be affectionate with Davon Gut and she requested doing more around the house but unsure what to do  The undersigned therapist encourage Shan Manzo inform her of some tasks she can do  Shan Manzo  denies suicidal intent, gesture or ideation at this time  The undersigned therapist provided Shan Manzo with 45 minutes of psychotherapy  Review of Systems   Psychiatric/Behavioral: Negative  Objective:     Physical Exam  Psychiatric:         Attention and Perception: Attention and perception normal          Mood and Affect: Mood and affect normal          Speech: Speech normal          Behavior: Behavior normal  Behavior is cooperative  Thought Content:  Thought content normal          Cognition and Memory: Cognition and memory normal          Judgment: Judgment normal

## 2022-08-24 NOTE — TELEPHONE ENCOUNTER
Left a v/m on cell for the patient to return a call to the clinical staff  Home voicemail box is full

## 2022-08-24 NOTE — TELEPHONE ENCOUNTER
----- Message from Archie Paige MD sent at 8/23/2022  4:15 PM EDT -----  Patient had a min HR of 42 bpm, max HR of 154 bpm, and avg HR of 73 bpm   Predominant underlying rhythm was Sinus Rhythm  First Degree AV Block was  present  442 Supraventricular Tachycardia runs occurred, the run with the fastest  interval lasting 8 beats with a max rate of 154 bpm, the longest lasting 12 beats  with an avg rate of 115 bpm  Some episodes of Supraventricular Tachycardia may  be possible Atrial Tachycardia with variable block  Isolated SVEs were frequent  (12 8%, 62833), SVE Couplets were occasional (3 9%, 6636), and SVE Triplets were  occasional (1 9%, 2171)  Isolated VEs were occasional (1 9%, 6638), VE Couplets  were rare (<1 0%, 153), and VE Triplets were rare (<1 0%, 1)  Ventricular Bigeminy  and Trigeminy were present  Patient's average heart rate 73 beats per minute  He had few PACs and PVCs and a short atrial run  Continue current Rx  He does have extra beat there were around 20% of the total beats  He should continue taking her medications  And need to follow at some point

## 2022-08-25 DIAGNOSIS — M62.838 MUSCLE SPASM: Primary | ICD-10-CM

## 2022-08-25 RX ORDER — CYCLOBENZAPRINE HCL 5 MG
5 TABLET ORAL
Qty: 30 TABLET | Refills: 0 | Status: SHIPPED | OUTPATIENT
Start: 2022-08-25

## 2022-08-30 ENCOUNTER — TELEPHONE (OUTPATIENT)
Dept: BEHAVIORAL/MENTAL HEALTH CLINIC | Facility: CLINIC | Age: 82
End: 2022-08-30

## 2022-08-30 ENCOUNTER — TELEMEDICINE (OUTPATIENT)
Dept: BEHAVIORAL/MENTAL HEALTH CLINIC | Facility: CLINIC | Age: 82
End: 2022-08-30
Payer: COMMERCIAL

## 2022-08-30 DIAGNOSIS — F43.20 ADJUSTMENT DISORDER, UNSPECIFIED TYPE: Primary | ICD-10-CM

## 2022-08-30 PROCEDURE — 90834 PSYTX W PT 45 MINUTES: CPT | Performed by: SOCIAL WORKER

## 2022-08-30 NOTE — PSYCH
This note was not shared with the patient due to this is a psychotherapy note     Virtual Regular Visit    Verification of patient location:    Patient is located in the following state in which I hold an active license [place]      Assessment/Plan:    Problem List Items Addressed This Visit    None     Visit Diagnoses     Adjustment disorder, unspecified type    -  Primary               Reason for visit is No chief complaint on file  Encounter provider Aurelio Duarte LCSW    Provider located at [address] [place] [id number] [phone]      Recent Visits  No visits were found meeting these conditions  Showing recent visits within past 7 days and meeting all other requirements  Today's Visits  Date Type Provider Dept   08/30/22 Telephone Aurelio Duarte, [address]  [facility]   08/30/22 Telephone Aurelio Duarte, [address] [facility]   08/30/22 Radha Zamorano, [place] today's visits and meeting all other requirements  Future Appointments  No visits were found meeting these conditions  Showing future appointments within next 150 days and meeting all other requirements       The patient was identified by name and date of birth  Maite Gutierrez was informed that this is a telemedicine visit and that the visit is being conducted through Telephone  My office door was closed  No one else was in the room  He acknowledged consent and understanding of privacy and security of the video platform  The patient has agreed to participate and understands they can discontinue the visit at any time  Patient is aware this is a billable service  Subjective  Maite Gutierrez is a [de-identified] y o  male who presented for a follow-up virtual individual counseling session    Due to schedule conflicts, today's session was conducted as a virtual phone session  It was the undersigned therapist's intent to complete a video visit but Jeanine Joshua was unable to make video connection so we defaulted to the phone  During today's session Jeanine Joshua discussed feeling calm and at peace these last few days despite Kimbers health  Jeanine Tres reports Camryn Turner continues to have brain fog but he's been able to re-direct and help her  The [place] nurse went to the house for the wellness visit  Jeanine Tres reports he celebrated his 35rd year anniversary and Jeanine Joshua bought [employer] for anniversary and went to [facility]  The undersigned therapist assisted Jeanine Joshua process his feelings about Danita health and his patience for her  Jeanine Joshua has been including Camryn Turner in helping with some household chores  Jeanine Rook denies suicidal intent, gesture or ideation at this time  The undersigned therapist provided Jeanine Joshua with 45 minutes of psychotherapy           Past Medical History:   Diagnosis Date    Abnormal blood chemistry     resolved: 11/9/16    Abnormal glucose     last assessed: 9/24/14    Arthritis     CAD (coronary artery disease)     Chronic kidney disease     STONES  & CKD    Coronary atherosclerosis     DM2 (diabetes mellitus, type 2) (HCC)     Dyslipidemia     Facial nerve disorder     HTN (hypertension)     Hyperlipidemia     last assessed: 1/13/17    Kidney stones     Lumbosacral radiculopathy     Nerve root and plexus disorder     Obesity     Osteoarthritis     Prostate asymmetry     Pure hypercholesterolemia        Past Surgical History:   Procedure Laterality Date    CORONARY ANGIOPLASTY      1 STENT     DC ESOPHAGUS SURG PROCEDURE UNLISTED N/A 9/30/2021    Procedure: EGD; DIVERTICULECTOMY ZENKERS ENDOSCOPIC;  Surgeon: Kinsey Garcia MD;  [facility];  Service: Thoracic    UPPER GASTROINTESTINAL ENDOSCOPY         Current Outpatient Medications   Medication Sig Dispense Refill    cyclobenzaprine (FLEXERIL) 5 mg tablet Take 1 tablet (5 mg total) by mouth daily at bedtime as needed for muscle spasms 30 tablet 0    aspirin 81 mg chewable tablet Chew 81 mg daily        ezetimibe (ZETIA) 10 mg tablet Take 1 tablet (10 mg total) by mouth daily 90 tablet 1    Januvia 50 MG tablet TAKE 1 TABLET BY MOUTH  DAILY 90 tablet 3    metFORMIN (GLUCOPHAGE) 500 mg tablet Take 1 tablet (500 mg total) by mouth 2 (two) times a day with meals 180 tablet 2    pantoprazole (PROTONIX) 40 mg tablet Take 1 tablet (40 mg total) by mouth daily before breakfast (Patient not taking: No sig reported) 90 tablet 3    simvastatin (ZOCOR) 40 mg tablet TAKE 1 TABLET BY MOUTH  DAILY AT BEDTIME 90 tablet 3    tamsulosin (FLOMAX) 0 4 mg Take 1 capsule (0 4 mg total) by mouth daily with dinner 90 capsule 3    trandolapril (MAVIK) 1 MG tablet TAKE ONE TABLET BY MOUTH EVERY DAY 90 tablet 2     No current facility-administered medications for this visit  Allergies   Allergen Reactions    Other Other (See Comments)     REAPRO- SEVERELY LOWERED RBCS RE[facility]    Ciprofloxacin Hives       Review of Systems   Psychiatric/Behavioral: Negative  Video Exam    There were no vitals filed for this visit  Physical Exam  Psychiatric:         Attention and Perception: Attention and perception normal          Mood and Affect: Mood and affect normal          Speech: Speech normal          Behavior: Behavior normal  Behavior is cooperative  Thought Content:  Thought content normal          Cognition and Memory: Cognition and memory normal          Judgment: Judgment normal           I spent 45 minutes directly with the patient during this visit

## 2022-08-30 NOTE — TELEPHONE ENCOUNTER
The undersigned therapist attempted to call the client's house phone for our scheduled session  However, his mailbox is full and no answer  Writer was unable to leave a voicemail

## 2022-09-06 ENCOUNTER — SOCIAL WORK (OUTPATIENT)
Dept: BEHAVIORAL/MENTAL HEALTH CLINIC | Facility: CLINIC | Age: 82
End: 2022-09-06
Payer: COMMERCIAL

## 2022-09-06 DIAGNOSIS — F43.20 ADJUSTMENT DISORDER, UNSPECIFIED TYPE: Primary | ICD-10-CM

## 2022-09-06 PROCEDURE — 90834 PSYTX W PT 45 MINUTES: CPT | Performed by: SOCIAL WORKER

## 2022-09-07 NOTE — PSYCH
This note was not shared with the patient due to this is a psychotherapy note     Assessment/Plan:      Diagnoses and all orders for this visit:    Adjustment disorder, unspecified type          Subjective:     Patient ID: Alanna Mclean is a [de-identified] y o  male who presented for his follow-up outpatient counseling appointment with undersigned therapist   Jian Desir reports he was worried about Jarvis Izaguirre this week because she was recently hospitalized due to her stomach pain  Jian Desir had to call the ambulance and reports she refused the colonoscopy because she's worried  It resulted in Jarvis Izaguirre having diverticulitis but she's been doing better now  Jian Desir reports he's been having trouble sleeping but continues to refuse medication  Jian Desir continues to be the primary caregiver for Jarvis Izaguirre and we discussed having a home health aid  Jian Desir got the baby monitor and uses them to watch Jarvis Izaguirre  Jian Desir denies suicidal intent, gesture or ideation at this time  The undersigned therapist provided Jian Desir with 45 minutes of psychotherapy  Review of Systems   Psychiatric/Behavioral: Negative  Objective:     Physical Exam  Psychiatric:         Attention and Perception: Attention and perception normal          Mood and Affect: Affect normal  Mood is anxious  Speech: Speech normal          Behavior: Behavior normal  Behavior is cooperative  Thought Content:  Thought content normal          Cognition and Memory: Cognition and memory normal          Judgment: Judgment normal

## 2022-09-13 ENCOUNTER — SOCIAL WORK (OUTPATIENT)
Dept: BEHAVIORAL/MENTAL HEALTH CLINIC | Facility: CLINIC | Age: 82
End: 2022-09-13
Payer: COMMERCIAL

## 2022-09-13 DIAGNOSIS — F43.20 ADJUSTMENT DISORDER, UNSPECIFIED TYPE: Primary | ICD-10-CM

## 2022-09-13 PROCEDURE — 90834 PSYTX W PT 45 MINUTES: CPT | Performed by: SOCIAL WORKER

## 2022-09-20 ENCOUNTER — SOCIAL WORK (OUTPATIENT)
Dept: BEHAVIORAL/MENTAL HEALTH CLINIC | Facility: CLINIC | Age: 82
End: 2022-09-20
Payer: COMMERCIAL

## 2022-09-20 DIAGNOSIS — F43.20 ADJUSTMENT DISORDER, UNSPECIFIED TYPE: Primary | ICD-10-CM

## 2022-09-20 PROCEDURE — 90834 PSYTX W PT 45 MINUTES: CPT | Performed by: SOCIAL WORKER
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
## 2022-09-27 ENCOUNTER — SOCIAL WORK (OUTPATIENT)
Dept: BEHAVIORAL/MENTAL HEALTH CLINIC | Facility: CLINIC | Age: 82
End: 2022-09-27
Payer: COMMERCIAL

## 2022-09-27 DIAGNOSIS — F43.20 ADJUSTMENT DISORDER, UNSPECIFIED TYPE: Primary | ICD-10-CM

## 2022-09-27 PROCEDURE — 90834 PSYTX W PT 45 MINUTES: CPT | Performed by: SOCIAL WORKER

## 2022-10-03 NOTE — PSYCH
This note was not shared with the patient due to this is a psychotherapy note     Assessment/Plan:      Diagnoses and all orders for this visit:    Adjustment disorder, unspecified type          Subjective:     Patient ID: Mono Smith is a [de-identified] y o  male who presented for his follow-up outpatient counseling appointment with undersigned therapist  During today's session Erwin Moon reports [employer] is going to assist with home health aid services for Seymour Ezequiel  The undersigned therapist assisted Erwin Moon process his feelings about receiving support and encouraged he use it as much as he can  Erwin Moon reports it's been harder for him to carry Seymour Ezequiel and help her, and his back has been hurting him  The undersigned therapist encouraged he tell his PCP and to take the prescribed medication he has, but Erwin Moon is always reluctant of taking medication  Erwin Moon shared some stories of his ex-wife and his sister, and we discussed his reason for staying with Seymour Ezequiel despite her controlling tendencies  Erwin Moon reports since his first divorce, he wanted to make this marriage work no matter what  Erwin Moon denies suicidal intent, gesture or ideation at this time  The undersigned therapist provided Erwin Moon with 45 minutes of psychotherapy  Review of Systems   Psychiatric/Behavioral: Positive for sleep disturbance  Objective:     Physical Exam  Psychiatric:         Attention and Perception: Attention and perception normal          Mood and Affect: Mood and affect normal          Speech: Speech normal          Behavior: Behavior normal  Behavior is cooperative  Thought Content:  Thought content normal          Cognition and Memory: Cognition and memory normal          Judgment: Judgment normal

## 2022-10-04 ENCOUNTER — SOCIAL WORK (OUTPATIENT)
Dept: BEHAVIORAL/MENTAL HEALTH CLINIC | Facility: CLINIC | Age: 82
End: 2022-10-04
Payer: COMMERCIAL

## 2022-10-04 DIAGNOSIS — F43.20 ADJUSTMENT DISORDER, UNSPECIFIED TYPE: Primary | ICD-10-CM

## 2022-10-04 PROCEDURE — 90834 PSYTX W PT 45 MINUTES: CPT | Performed by: SOCIAL WORKER

## 2022-10-04 NOTE — PSYCH
This note was not shared with the patient due to this is a psychotherapy note     Assessment/Plan:      Diagnoses and all orders for this visit:    Adjustment disorder, unspecified type          Subjective:     Patient ID: Maite Gutierrez is a [de-identified] y o  male who presented for his follow-uo outpatient appointment with undersigned therapist  During today's session, Antonio Mascorro reports that Joselyn Lamas has been reciecing PT, OT and now has a home health aid  The undersigned therapist assisted Antonio Mascorro process his feelings about receiving these services, and he's been open and hopeful about it all  Antonio Mascorro has been expressing his feelings about being Sanjuanita's caregiver and giving his weekly updates  Antonio Mascorro shared some memories of co workers he would spend time with in his past   Antonio Mascorro denies feeling depressed or anxious at this time, and continues to watch sports and participate in hobbies he enjoys  Antonio Mascorro denies suicidal intent, gesture or ideation at this time  The undersigned therapist provided Antonio Mascorro with 45 minutes of psychotherapy  Review of Systems   Psychiatric/Behavioral: Negative  Objective:     Physical Exam  Psychiatric:         Attention and Perception: Attention and perception normal          Mood and Affect: Mood and affect normal          Speech: Speech normal          Behavior: Behavior normal  Behavior is cooperative  Thought Content:  Thought content normal          Cognition and Memory: Cognition and memory normal          Judgment: Judgment normal

## 2022-10-18 ENCOUNTER — SOCIAL WORK (OUTPATIENT)
Dept: BEHAVIORAL/MENTAL HEALTH CLINIC | Facility: CLINIC | Age: 82
End: 2022-10-18
Payer: COMMERCIAL

## 2022-10-18 DIAGNOSIS — F43.20 ADJUSTMENT DISORDER, UNSPECIFIED TYPE: Primary | ICD-10-CM

## 2022-10-18 PROCEDURE — 90837 PSYTX W PT 60 MINUTES: CPT | Performed by: SOCIAL WORKER

## 2022-10-24 NOTE — PSYCH
This note was not shared with the patient due to this is a psychotherapy note     Visit Time    Visit Start Time: 10:32 AM  Visit Stop Time: 11:30 AM  Total Visit Duration: 58 minutes     Assessment/Plan:      Diagnoses and all orders for this visit:    Adjustment disorder, unspecified type          Subjective:     Patient ID: Chitra Carranza is a [de-identified] y o  male who presented for his follow-up outpatient counseling session with undersigned therapist   During today's session Anitha Tubbs shared the recent updates he's experienced regarding his wife Hans Baum was recently ill and went to the hospital after she was unable to walk  Anitha Tubbs reports the hospital recommended she go into an assisted living and rehab because caring for her will be difficult  Anitha Tubbs was sad and cried during today's session talking about how he does not like feeling lonely  Anitha Tubbs reports he fears Hans Baum won't like the rehab and request to come home, and he is struggling to care for her  The undersigned therapist encouraged he support her going to rehab and a home health aid can always be available for them  Anitha Tubbs has been feeling sad thinking of Hans Baum but goes to visit her for approx  4 hours a day  The undersigned therapist provided supportive counseling  Anitha Tubbs denies suicidal intent, gesture or ideation at this time  HPI    Review of Systems   Psychiatric/Behavioral: Positive for decreased concentration and dysphoric mood  The patient is nervous/anxious  Objective:     Physical Exam  Psychiatric:         Attention and Perception: Attention and perception normal          Mood and Affect: Mood is anxious  Affect is tearful  Speech: Speech normal          Behavior: Behavior normal  Behavior is cooperative  Thought Content:  Thought content normal          Cognition and Memory: Cognition and memory normal          Judgment: Judgment normal

## 2022-10-25 ENCOUNTER — SOCIAL WORK (OUTPATIENT)
Dept: BEHAVIORAL/MENTAL HEALTH CLINIC | Facility: CLINIC | Age: 82
End: 2022-10-25

## 2022-10-25 ENCOUNTER — CLINICAL SUPPORT (OUTPATIENT)
Dept: FAMILY MEDICINE CLINIC | Facility: CLINIC | Age: 82
End: 2022-10-25
Payer: COMMERCIAL

## 2022-10-25 VITALS — TEMPERATURE: 97.5 F

## 2022-10-25 DIAGNOSIS — F43.20 ADJUSTMENT DISORDER, UNSPECIFIED TYPE: Primary | ICD-10-CM

## 2022-10-25 DIAGNOSIS — Z23 NEED FOR INFLUENZA VACCINATION: Primary | ICD-10-CM

## 2022-10-25 PROCEDURE — 90662 IIV NO PRSV INCREASED AG IM: CPT

## 2022-10-25 PROCEDURE — G0008 ADMIN INFLUENZA VIRUS VAC: HCPCS

## 2022-10-25 NOTE — PSYCH
This note was not shared with the patient due to this is a psychotherapy note     Visit Time    Visit Start Time: 10:38 AM  Visit Stop Time: 11:30 AM  Total Visit Duration:      Assessment/Plan:      Diagnoses and all orders for this visit:    Adjustment disorder, unspecified type          Subjective:     Patient ID: Lucina Jasso is a [de-identified] y o  male who presented for his follow-up outpatient counseling session with undersigned therapist   For today's session the undersigned therapist provided supportive counseling to Francia Donato and we continue to discuss his feelings about taking care of his wife Darylene Kitchens Phineas Roach reports Darylene Kitchens didn't go into the nursing home as recommended by the hospital staff and that Francia Donato takes pride in helping care for her  Francia Donato reports Darylene Kitchens will not have occupatoinal therapy anymore due to her previous experience, but will continue physical therapy  Francia Donato reports he was so tired last week because of having to see Darylene Kitchens twice a day while she was in the hospital, that now she's home he feels more at [facility] reports interest in getting Darylene Kitchens to see a neurologist due to her spells of incoherence and concern that something else may be happening medically  The undersigned therapist encouraged he tell her PCP about his concerns and ask questions to all medical providers  Francia Donato continues to refuse a home health aid and wants to care for her on his own  Francia Donato was able to speak to her sister this week and we discussed his relationship with her  Francia Donato denies suicidal intent, gesture or ideation at this time  HPI    Review of Systems   Psychiatric/Behavioral: The patient is nervous/anxious  Objective:     Physical Exam  Psychiatric:         Attention and Perception: Attention and perception normal          Mood and Affect: Affect normal  Mood is anxious  Speech: Speech normal          Behavior: Behavior normal  Behavior is cooperative  Thought Content:  Thought content normal          Cognition and Memory: Cognition and memory normal          Judgment: Judgment normal         The

## 2022-10-31 ENCOUNTER — OFFICE VISIT (OUTPATIENT)
Dept: FAMILY MEDICINE CLINIC | Facility: CLINIC | Age: 82
End: 2022-10-31

## 2022-10-31 ENCOUNTER — APPOINTMENT (OUTPATIENT)
Dept: LAB | Facility: CLINIC | Age: 82
End: 2022-10-31

## 2022-10-31 ENCOUNTER — TELEPHONE (OUTPATIENT)
Dept: FAMILY MEDICINE CLINIC | Facility: CLINIC | Age: 82
End: 2022-10-31

## 2022-10-31 VITALS
OXYGEN SATURATION: 98 % | BODY MASS INDEX: 34.21 KG/M2 | RESPIRATION RATE: 14 BRPM | TEMPERATURE: 97.4 F | WEIGHT: 218 LBS | HEIGHT: 67 IN | HEART RATE: 66 BPM | DIASTOLIC BLOOD PRESSURE: 80 MMHG | SYSTOLIC BLOOD PRESSURE: 126 MMHG

## 2022-10-31 DIAGNOSIS — E78.5 DYSLIPIDEMIA: ICD-10-CM

## 2022-10-31 DIAGNOSIS — N18.31 TYPE 2 DIABETES MELLITUS WITH STAGE 3A CHRONIC KIDNEY DISEASE, WITHOUT LONG-TERM CURRENT USE OF INSULIN (HCC): ICD-10-CM

## 2022-10-31 DIAGNOSIS — I10 BENIGN HYPERTENSION: Primary | ICD-10-CM

## 2022-10-31 DIAGNOSIS — J02.9 SORE THROAT: ICD-10-CM

## 2022-10-31 DIAGNOSIS — Z12.5 SCREENING PSA (PROSTATE SPECIFIC ANTIGEN): ICD-10-CM

## 2022-10-31 DIAGNOSIS — E11.22 TYPE 2 DIABETES MELLITUS WITH STAGE 3A CHRONIC KIDNEY DISEASE, WITHOUT LONG-TERM CURRENT USE OF INSULIN (HCC): ICD-10-CM

## 2022-10-31 LAB
ANION GAP SERPL CALCULATED.3IONS-SCNC: 4 MMOL/L (ref 4–13)
BUN SERPL-MCNC: 24 MG/DL (ref 5–25)
CALCIUM SERPL-MCNC: 9.1 MG/DL (ref 8.3–10.1)
CHLORIDE SERPL-SCNC: 107 MMOL/L (ref 96–108)
CO2 SERPL-SCNC: 30 MMOL/L (ref 21–32)
CREAT SERPL-MCNC: 1.35 MG/DL (ref 0.6–1.3)
EST. AVERAGE GLUCOSE BLD GHB EST-MCNC: 146 MG/DL
GFR SERPL CREATININE-BSD FRML MDRD: 49 ML/MIN/1.73SQ M
GLUCOSE P FAST SERPL-MCNC: 145 MG/DL (ref 65–99)
HBA1C MFR BLD: 6.7 %
POTASSIUM SERPL-SCNC: 5 MMOL/L (ref 3.5–5.3)
PSA SERPL-MCNC: 2.7 NG/ML (ref 0–4)
SODIUM SERPL-SCNC: 141 MMOL/L (ref 135–147)

## 2022-10-31 RX ORDER — AZITHROMYCIN 250 MG/1
TABLET, FILM COATED ORAL
Qty: 6 TABLET | Refills: 0 | Status: SHIPPED | OUTPATIENT
Start: 2022-10-31 | End: 2022-11-04

## 2022-10-31 NOTE — TELEPHONE ENCOUNTER
----- Message from Franco Arias sent at 10/31/2022  8:07 AM EDT -----  Regarding: DM Eye Exam  10/31/22 8:07 AM    Hello, our patient Adry Landers has had Diabetic Eye Exam completed/performed  Please assist in updating the patient chart by making an External outreach to Dr Newman Lian facility located in [place]  The date of service is 2022      Thank you,  Franco Farris FP

## 2022-10-31 NOTE — PROGRESS NOTES
Maria Luz Nelson 1940 male MRN: [id number]    FAMILY PRACTICE OFFICE VISIT  [facility] - [address]      ASSESSMENT/PLAN  Maria Luz Nelson is a [de-identified] y o  male presents to the office for    Diagnoses and all orders for this visit:    Benign hypertension    Type 2 diabetes mellitus with stage 3a chronic kidney disease, without long-term current use of insulin ([address] )  -     Basic metabolic panel; Future  -     Hemoglobin A1C; Future    Dyslipidemia    Sore throat  -     azithromycin (ZITHROMAX) 250 mg tablet; Take 2 tablets today then 1 tablet daily x 4 days    Will trial the patient on azithromycin  If the patient does not improve with a sore throat recommend that the patient be seen by his surgeon  Type 2 diabetic sent for A1c and BMP foot exam within normal limits  Sent eye exam records  Hyperlipidemia reviewed previous lipid panel    Will repeat in 6 months  Blood pressure currently elevated               Future Appointments   Date Time Provider Camryn Lambert   11/1/2022 10:30 AM Melvena Kranthi, LCSW [facility]   11/8/2022 10:30 AM Melvena Kranthi, LCSW [facility]   11/15/2022 10:30 AM Melvena Kranthi, LCSW [facility]   11/22/2022 10:30 AM Melvena Kranthi, LCSW [facility]   11/29/2022 10:30 AM Melvena Kranthi, LCSW [facility]   12/6/2022 10:30 AM Melvena Kranthi, LCSW Psych [facility]   12/13/2022 10:30 AM Melvena Kranthi, LCSW Psych [facility]   12/15/2022  1:00 PM Oc Palomares MD [facility]   12/20/2022 10:30 AM Melvena Kranthi, LCSW Psych [facility]   12/27/2022 10:30 AM [facility] [facility]   4/13/2023  3:00 PM [facility]  [facility]   5/1/2023  8:00 AM Mina Vieira MD [facility]          SUBJECTIVE  CC: Follow-up      HPI:  Maria Luz Nelson is a [de-identified] y o  male who presents for a follow-up appointment  Patient is taking all his medications appropriately without any side effects  He states that his main concern today is that he has a sore throat for the last week  He is concerned that it might be an infection or from his previous surgery  It has been 1 year since his surgery of Zenker's diverticulum  Patient unfortunately has been the sole caretaker for his wife  Has been affecting him  He is currently seeing the specialist/therapist on a weekly basis  Review of Systems   Constitutional: Negative for activity change, appetite change, chills, fatigue and fever  HENT: Positive for sore throat  Negative for congestion  Respiratory: Negative for cough, chest tightness and shortness of breath  Cardiovascular: Negative for chest pain and leg swelling  Gastrointestinal: Negative for abdominal distention, abdominal pain, constipation, diarrhea, nausea and vomiting  All other systems reviewed and are negative        Historical Information   The patient history was reviewed as follows:  Past Medical History:   Diagnosis Date   • Abnormal blood chemistry     resolved: 11/9/16   • Abnormal glucose     last assessed: 9/24/14   • Arthritis    • CAD (coronary artery disease)    • Chronic kidney disease     STONES  & CKD   • Coronary atherosclerosis    • DM2 (diabetes mellitus, type 2) (HCC)    • Dyslipidemia    • Facial nerve disorder    • HTN (hypertension)    • Hyperlipidemia     last assessed: 1/13/17   • Kidney stones    • Lumbosacral radiculopathy    • Nerve root and plexus disorder    • Obesity    • Osteoarthritis    • Prostate asymmetry    • Pure hypercholesterolemia          Medications:     Current Outpatient Medications:   •  aspirin 81 mg chewable tablet, Chew 81 mg daily  , Disp: , Rfl:   •  azithromycin (ZITHROMAX) 250 mg tablet, Take 2 tablets today then 1 tablet daily x 4 days, Disp: 6 tablet, Rfl: 0  •  cyclobenzaprine (FLEXERIL) 5 mg tablet, Take 1 tablet (5 mg total) by mouth daily at bedtime as needed for muscle spasms, Disp: 30 tablet, Rfl: 0  •  ezetimibe (ZETIA) 10 mg tablet, Take 1 tablet (10 mg total) by mouth daily, Disp: 90 tablet, Rfl: 1  •  Januvia 50 MG tablet, TAKE 1 TABLET BY MOUTH  DAILY, Disp: 90 tablet, Rfl: 3  •  metFORMIN (GLUCOPHAGE) 500 mg tablet, Take 1 tablet (500 mg total) by mouth 2 (two) times a day with meals, Disp: 180 tablet, Rfl: 2  •  simvastatin (ZOCOR) 40 mg tablet, TAKE 1 TABLET BY MOUTH  DAILY AT BEDTIME, Disp: 90 tablet, Rfl: 3  •  tamsulosin (FLOMAX) 0 4 mg, Take 1 capsule (0 4 mg total) by mouth daily with dinner, Disp: 90 capsule, Rfl: 3  •  trandolapril (MAVIK) 1 MG tablet, TAKE ONE TABLET BY MOUTH EVERY DAY, Disp: 90 tablet, Rfl: 2    Allergies   Allergen Reactions   • Other Other (See Comments)     REAPRO- SEVERELY LOWERED RBCS RE[facility] HOSPITAL   • Ciprofloxacin Hives       OBJECTIVE  Vitals:   Vitals:    10/31/22 0803   BP: 126/80   BP Location: Left arm   Patient Position: Sitting   Cuff Size: Large   Pulse: 66   Resp: 14   Temp: (!) 97 4 °F (36 3 °C)   SpO2: 98%   Weight: 98 9 kg (218 lb)   Height: 5' 7" (1 702 m)         Physical Exam  Vitals reviewed  Constitutional:       Appearance: He is well-developed  HENT:      Head: Normocephalic and atraumatic  Mouth/Throat:      Pharynx: Posterior oropharyngeal erythema present  Eyes:      Conjunctiva/sclera: Conjunctivae normal       Pupils: Pupils are equal, round, and reactive to light  Cardiovascular:      Rate and Rhythm: Normal rate and regular rhythm  Pulses: no weak pulses          Dorsalis pedis pulses are 2+ on the right side and 2+ on the left side  Posterior tibial pulses are 2+ on the right side and 2+ on the left side  Heart sounds: Normal heart sounds  Pulmonary:      Effort: Pulmonary effort is normal  No respiratory distress  Breath sounds: Normal breath sounds     Musculoskeletal:         General: Normal range of motion  Cervical back: Normal range of motion and neck supple  Feet:      Right foot:      Skin integrity: No ulcer, skin breakdown, erythema, warmth, callus or dry skin  Left foot:      Skin integrity: No ulcer, skin breakdown, erythema, warmth, callus or dry skin  Skin:     General: Skin is warm  Capillary Refill: Capillary refill takes less than 2 seconds  Neurological:      Mental Status: He is alert and oriented to person, place, and time  Psychiatric:         Mood and Affect: Mood normal           Diabetic Foot Exam    Patient's shoes and socks removed  Right Foot/Ankle   Right Foot Inspection  Skin Exam: skin normal and skin intact  No dry skin, no warmth, no callus, no erythema, no maceration, no abnormal color, no pre-ulcer, no ulcer and no callus  Toe Exam: ROM and strength within normal limits  No swelling    Sensory   Vibration: intact  Proprioception: intact  Monofilament testing: intact    Vascular  Capillary refills: < 3 seconds  The right DP pulse is 2+  The right PT pulse is 2+  Left Foot/Ankle  Left Foot Inspection  Skin Exam: skin normal and skin intact  No dry skin, no warmth, no erythema, no maceration, normal color, no pre-ulcer, no ulcer and no callus  Toe Exam: ROM and strength within normal limits  No swelling  Sensory   Vibration: intact  Proprioception: intact  Monofilament testing: intact    Vascular  Capillary refills: < 3 seconds  The left DP pulse is 2+  The left PT pulse is 2+       Assign Risk Category  No deformity present  No loss of protective sensation  No weak pulses  Risk: 0            [facility]  10/31/2022

## 2022-11-01 ENCOUNTER — SOCIAL WORK (OUTPATIENT)
Dept: BEHAVIORAL/MENTAL HEALTH CLINIC | Facility: CLINIC | Age: 82
End: 2022-11-01

## 2022-11-01 DIAGNOSIS — F43.20 ADJUSTMENT DISORDER, UNSPECIFIED TYPE: Primary | ICD-10-CM

## 2022-11-07 ENCOUNTER — TELEPHONE (OUTPATIENT)
Dept: NEPHROLOGY | Facility: CLINIC | Age: 82
End: 2022-11-07

## 2022-11-08 ENCOUNTER — SOCIAL WORK (OUTPATIENT)
Dept: BEHAVIORAL/MENTAL HEALTH CLINIC | Facility: CLINIC | Age: 82
End: 2022-11-08

## 2022-11-08 DIAGNOSIS — F43.20 ADJUSTMENT DISORDER, UNSPECIFIED TYPE: Primary | ICD-10-CM

## 2022-11-09 NOTE — PSYCH
This note was not shared with the patient due to this is a psychotherapy note   Assessment/Plan:      Diagnoses and all orders for this visit:    Adjustment disorder, unspecified type          Subjective:     Patient ID: Kayla Mckinney is a [de-identified] y o  male who presented for his follow-up outpatient counseling session with undersigned therapist  During today's session Aye Garcia reports he feels he didn't have a good week  He reports he's been having a tough time taking care of Trenton Juli this week due to her "catatonia stooper "  Aye Garcia reports Trenton Juli presents confused and described the symptoms of catatonia  Aye Garcia reports he's patient with her but sometimes gets aggravated with how confused Trenton Bustos is when she does the same routine daily  The undersigned therapist continues to encourage a Home Health Aid, but Aye Garcia continues to refuse  Aye Garcia reports he's been anxious about his sore throat due to his surgery  Aye Garcia was encouraged to contact his PCP, who recommended he see the surgeon  The undersigned therapist continues to provide supportive counseling to Aye Radha Garcia denies suicidal intent, gesture or ideation at this time  A: Aye Garcia continues to feel purpose in taking care of Trenton Juli, but is struggling with caring for her  Aye Garcia denies anxiety and depression at this time  P: Meet weekly  HPI    Review of Systems   Psychiatric/Behavioral: The patient is nervous/anxious  Objective:     Physical Exam  Psychiatric:         Attention and Perception: Attention and perception normal          Mood and Affect: Affect normal  Mood is anxious  Speech: Speech normal          Behavior: Behavior normal  Behavior is cooperative  Thought Content:  Thought content normal          Cognition and Memory: Cognition and memory normal          Judgment: Judgment normal

## 2022-11-14 ENCOUNTER — TELEPHONE (OUTPATIENT)
Dept: CARDIAC SURGERY | Facility: CLINIC | Age: 82
End: 2022-11-14

## 2022-11-14 ENCOUNTER — TELEPHONE (OUTPATIENT)
Dept: GYNECOLOGIC ONCOLOGY | Facility: CLINIC | Age: 82
End: 2022-11-14

## 2022-11-14 ENCOUNTER — TELEPHONE (OUTPATIENT)
Dept: FAMILY MEDICINE CLINIC | Facility: CLINIC | Age: 82
End: 2022-11-14

## 2022-11-14 ENCOUNTER — OFFICE VISIT (OUTPATIENT)
Dept: FAMILY MEDICINE CLINIC | Facility: CLINIC | Age: 82
End: 2022-11-14

## 2022-11-14 VITALS
OXYGEN SATURATION: 96 % | RESPIRATION RATE: 14 BRPM | WEIGHT: 217 LBS | DIASTOLIC BLOOD PRESSURE: 80 MMHG | SYSTOLIC BLOOD PRESSURE: 124 MMHG | HEART RATE: 68 BPM | HEIGHT: 67 IN | TEMPERATURE: 97.8 F | BODY MASS INDEX: 34.06 KG/M2

## 2022-11-14 DIAGNOSIS — K22.5 ZENKER'S DIVERTICULUM: Primary | ICD-10-CM

## 2022-11-14 DIAGNOSIS — K22.5 ZENKER'S DIVERTICULUM: ICD-10-CM

## 2022-11-14 DIAGNOSIS — R49.0 HOARSE VOICE QUALITY: Primary | ICD-10-CM

## 2022-11-14 NOTE — TELEPHONE ENCOUNTER
I spoke with patient  His PCP ordered a barium swallow  I asked him to have his PCP schedule the barium swallow and we would take a look at it  If we think it's appropriate to come to us, we will schedule an appt, but we will help arrange an ENT appt, if that is what the patient will benefit from  If the pcp does not schedule the barium swallow, the patient will call us back and we can assist with that  His issue is his voice being somewhat hoarse, no swallowing problems

## 2022-11-14 NOTE — PROGRESS NOTES
Rhainnon Sow 1940 male MRN: [id number]    FAMILY PRACTICE OFFICE VISIT  [facility] - [address]      ASSESSMENT/PLAN  Rhiannon Sow is a [de-identified] y o  male presents to the office for    Diagnoses and all orders for this visit:    Hoarse voice quality  -     Ambulatory Referral to Thoracic Surgery; Future    Zenker's diverticulum  -     Ambulatory Referral to Thoracic Surgery; Future              Future Appointments   Date Time Provider Camryn Lambert   11/22/2022 10:30 AM Brinkley Yobany, LCSW [facility]   11/22/2022 11:30 [place] [address] [address]   11/29/2022 10:30 AM Brinkley Yobany, LCSW Psych [facility]   12/6/2022 10:30 AM Madi Yobany, LCSW [facility]   12/6/2022  3:15 PM Domi Sow MD [facility]   12/13/2022 10:30 AM Brinkley Yobany, LCSW [facility]   12/15/2022  1:00 PM Magdi Fernandez MD [facility]   12/20/2022 10:30 AM Madi Yobany, LCSW [facility]   12/27/2022 10:30 AM Brinkley Yobany, LCSW [facility]   4/13/2023  3:00 PM [place] [facility]  [address] [facility]   5/1/2023  8:00 AM Reymundo Bernabe MD [facility]   5/10/2023  2:00 PM Kay Finley MD [facility]          SUBJECTIVE  CC: Sore Throat      HPI:  Rhiannon Sow is a [de-identified] y o  male who presents for acute appointment  States that his sore throat continues to persist   Did not get any relief with the antibiotics  Patient states he has not seen the surgeon since follow-up  His concerned this might be a relapse of his zenkers diverticulum  Review of Systems   Constitutional: Negative for activity change, appetite change, chills, fatigue and fever  HENT: Positive for trouble swallowing  Negative for congestion  Respiratory: Negative for cough, chest tightness and shortness of breath      Cardiovascular: Negative for chest pain and leg swelling  Gastrointestinal: Negative for abdominal distention, abdominal pain, constipation, diarrhea, nausea and vomiting  All other systems reviewed and are negative        Historical Information   The patient history was reviewed as follows:  Past Medical History:   Diagnosis Date   • Abnormal blood chemistry     resolved: 11/9/16   • Abnormal glucose     last assessed: 9/24/14   • Arthritis    • CAD (coronary artery disease)    • Chronic kidney disease     STONES  & CKD   • Coronary atherosclerosis    • DM2 (diabetes mellitus, type 2) (HCC)    • Dyslipidemia    • Facial nerve disorder    • HTN (hypertension)    • Hyperlipidemia     last assessed: 1/13/17   • Kidney stones    • Lumbosacral radiculopathy    • Nerve root and plexus disorder    • Obesity    • Osteoarthritis    • Prostate asymmetry    • Pure hypercholesterolemia          Medications:     Current Outpatient Medications:   •  aspirin 81 mg chewable tablet, Chew 81 mg daily  , Disp: , Rfl:   •  cyclobenzaprine (FLEXERIL) 5 mg tablet, Take 1 tablet (5 mg total) by mouth daily at bedtime as needed for muscle spasms, Disp: 30 tablet, Rfl: 0  •  ezetimibe (ZETIA) 10 mg tablet, Take 1 tablet (10 mg total) by mouth daily, Disp: 90 tablet, Rfl: 1  •  Januvia 50 MG tablet, TAKE 1 TABLET BY MOUTH  DAILY, Disp: 90 tablet, Rfl: 3  •  metFORMIN (GLUCOPHAGE) 500 mg tablet, Take 1 tablet (500 mg total) by mouth 2 (two) times a day with meals, Disp: 180 tablet, Rfl: 2  •  simvastatin (ZOCOR) 40 mg tablet, TAKE 1 TABLET BY MOUTH  DAILY AT BEDTIME, Disp: 90 tablet, Rfl: 3  •  tamsulosin (FLOMAX) 0 4 mg, Take 1 capsule (0 4 mg total) by mouth daily with dinner, Disp: 90 capsule, Rfl: 3  •  trandolapril (MAVIK) 1 MG tablet, TAKE ONE TABLET BY MOUTH EVERY DAY, Disp: 90 tablet, Rfl: 2    Allergies   Allergen Reactions   • Other Other (See Comments)     REAPRO- SEVERELY LOWERED [address]   • Ciprofloxacin Hives       OBJECTIVE  Vitals:   Vitals: 11/14/22 1410   BP: 124/80   BP Location: Left arm   Patient Position: Sitting   Cuff Size: Large   Pulse: 68   Resp: 14   Temp: 97 8 °F (36 6 °C)   SpO2: 96%   Weight: 98 4 kg (217 lb)   Height: 5' 7" (1 702 m)         Physical Exam  Constitutional:       Appearance: Normal appearance  HENT:      Left Ear: Tympanic membrane is erythematous  Pulmonary:      Effort: Pulmonary effort is normal    Musculoskeletal:         General: Normal range of motion  Neurological:      General: No focal deficit present  Mental Status: He is alert and oriented to person, place, and time  Psychiatric:         Mood and Affect: Mood normal          Behavior: Behavior normal          Thought Content:  Thought content normal          Judgment: Judgment normal                     Alfornia Cushing, MD,   [facility]  11/15/2022

## 2022-11-14 NOTE — TELEPHONE ENCOUNTER
Pt was told by Dr Shakira Castle office that the pcp usually orders the barium swallow test prior to appt  Please advise

## 2022-11-14 NOTE — TELEPHONE ENCOUNTER
Patient was at his primary care doctor this morning  He saw Dr Jeremy Fields a year ago  He has been having issues with his voice  His primary care doctor will be sending over a note stating that patient should have a barium swallow performed

## 2022-11-15 ENCOUNTER — SOCIAL WORK (OUTPATIENT)
Dept: BEHAVIORAL/MENTAL HEALTH CLINIC | Facility: CLINIC | Age: 82
End: 2022-11-15

## 2022-11-15 ENCOUNTER — TELEPHONE (OUTPATIENT)
Dept: GYNECOLOGIC ONCOLOGY | Facility: CLINIC | Age: 82
End: 2022-11-15

## 2022-11-15 DIAGNOSIS — F43.20 ADJUSTMENT DISORDER, UNSPECIFIED TYPE: Primary | ICD-10-CM

## 2022-11-15 NOTE — TELEPHONE ENCOUNTER
Tried to contact patient to advise order was sent to steve and she will contacting him to schedule    Mailbox full could not leave message

## 2022-11-16 NOTE — PSYCH
This note was not shared with the patient due to this is a psychotherapy note     Assessment/Plan:      Diagnoses and all orders for this visit:    Adjustment disorder, unspecified type          Subjective:     Patient ID: Shasha Dc is a [de-identified] y o  male who presented for his follow-up outpatient counseling session with undersigned therapist  During today's session Nasima Donahue reports he got a new car this week and in the process Treva Gipson was crying and thought he left her  Nasima Donahue continues to express concern that Sanjuanita's memory isn't as sharp and she continues to fluctuate in her "stoopers "  Nasima Donahue reports her biggest fear is him leaving her, but he reassured her he isn't  Nasima Donahue reports he's been struggling to sleep as he worries about Treva Gipson but also about his personal health  Nasima Donahue has been hearing a whistling sound coming from his throat and he recently had surgery  Nasima Donahue reports his appetite has been good and continues to work in the house  Nasima Donahue denies suicidal intent, gesture or ideation at this time  Assessment: Nasima Donahue continues to care for Treva Gipson and refuse Home Health Aid services at this time  Nasima Donahue is concerned about his personal health but appears to minimize his fears due to caring for Treva Gipson  Nasima Donahue denies anxiety or depression at this time and continues to meet with undersigned therapist for supportive counseling

## 2022-11-22 ENCOUNTER — HOSPITAL ENCOUNTER (OUTPATIENT)
Dept: RADIOLOGY | Facility: HOSPITAL | Age: 82
Discharge: HOME | End: 2022-11-22
Attending: FAMILY MEDICINE

## 2022-11-22 DIAGNOSIS — K22.5 ZENKER'S DIVERTICULUM: ICD-10-CM

## 2022-11-23 ENCOUNTER — TELEPHONE (OUTPATIENT)
Dept: SURGICAL ONCOLOGY | Facility: CLINIC | Age: 82
End: 2022-11-23

## 2022-11-23 NOTE — PSYCH
This note was not shared with the patient due to this is a psychotherapy note     Assessment/Plan:      Diagnoses and all orders for this visit:    Adjustment disorder, unspecified type          Subjective:     Patient ID: Juan Crockett is a [de-identified] y o  male who presented for his follow-up outpatient counseling appointment with undersigned therapist  During today's session Beba Mello reports Sanjuanita's been doing very well for the last 2 days and feels she's been most coherent than ever  Beba Mello continues to take care of Rainell Ramp but reports feeling anxious about his throat and the whistling sound he hears  Beba Mello was recommended to see the surgeon and ENT to address his concerns  The undersigned therapist assisted Beba Mello process his feelings of fear about his health, and how he worries about Rainell Ramp and who will care for her  Beba Mello continues to refuse home health aid  Beba Mello had to tell Rainell Ramp that [place] tree won't be put up this year because it's highly stressful, and Rainell Ramp was upset  However, Beba Mello has been able to offer alternatives for Rainell Ramp to feel [place] spirit  Beba Mello continues to pay the cleaning lady to come to the home and reports all has been well  The undersigned therapist provided supportive counseling  Beba Mello denies suicidal intent, gesture or ideation at this time  ASSESSMENT:    HPI    Review of Systems   Psychiatric/Behavioral: The patient is nervous/anxious  Physical Exam  Psychiatric:         Attention and Perception: Attention and perception normal          Mood and Affect: Affect normal  Mood is anxious  Speech: Speech normal          Behavior: Behavior normal  Behavior is cooperative  Thought Content: Thought content normal          Cognition and Memory: Cognition and memory normal          Judgment: Judgment normal       Comments: Beba Mello continues to be [employer] caregiver and refuse home health services  Beba Mello feels overwhelmed at times caring for her, but feels great purpose  Josse Orellana is anxious about his health and is recommended to follow-up with his doctors

## 2022-11-23 NOTE — TELEPHONE ENCOUNTER
I spoke with Hipolito Russell and advised him to stay on liquids until he sees Dr Katja Luu on 11/25/22 @ 12:00  He's agreeable with time and date  I told him to call office if he has any problems over the [address]

## 2022-11-25 ENCOUNTER — OFFICE VISIT (OUTPATIENT)
Dept: CARDIAC SURGERY | Facility: CLINIC | Age: 82
End: 2022-11-25

## 2022-11-25 ENCOUNTER — TELEPHONE (OUTPATIENT)
Dept: GYNECOLOGIC ONCOLOGY | Facility: CLINIC | Age: 82
End: 2022-11-25

## 2022-11-25 VITALS
HEART RATE: 77 BPM | OXYGEN SATURATION: 97 % | SYSTOLIC BLOOD PRESSURE: 129 MMHG | DIASTOLIC BLOOD PRESSURE: 81 MMHG | TEMPERATURE: 97.7 F | RESPIRATION RATE: 17 BRPM | HEIGHT: 67 IN | BODY MASS INDEX: 33.36 KG/M2 | WEIGHT: 212.52 LBS

## 2022-11-25 DIAGNOSIS — K22.5 ZENKER'S DIVERTICULUM: ICD-10-CM

## 2022-11-25 DIAGNOSIS — R49.0 HOARSE VOICE QUALITY: ICD-10-CM

## 2022-11-25 NOTE — TELEPHONE ENCOUNTER
Patient called into the office with an initial complaint of his pouch that was placed over a year ago by Dr Fidelia Khan  Patient  Is stating that the pouch is coming back into his throat when he coughs or clears his throat  Patient called into the office Friday with the same complaint but would like to know if Dr Fidelia Khan is going to send him into the hospital  Patient states that he is his wife's caregiver and would need to plan ahead if hospitalization was an option   Patient can be contacted back @ [phone]    Patient is also seeing Dr Fidelia Khan today at 12pm in the office,

## 2022-11-25 NOTE — PROGRESS NOTES
Assessment/Plan:    Zenker's diverticulum  Mr Holley Saxena had a very large Zenker's diverticulum and underwent an endoscopic cricopharyngeal myotomy in September 2021  He did very well post-operatively and has been tolerating all food consistencies without trouble  He recently had one episode of feeling food get stuck and throat clearing  Recent barium swallow was reviewed and this demonstrates some fluid retention within the diverticulum pouch which is not abnormal following this surgery  It looks the same as the post-operative swallow  The cricopharyngeus muscle is divided and the diverticulum will not get any bigger; however some symptoms occasionally are not abnormal  He can have a regular diet, and should sleep upright on a few pillows  He will follow up on an as needed basis  Diagnoses and all orders for this visit:    Hoarse voice quality  -     Ambulatory Referral to Thoracic Surgery    Zenker's diverticulum  -     Ambulatory Referral to Thoracic Surgery          Thoracic History          Subjective:    Patient ID: Tierney Salvador is a [de-identified] y o  male  HPI   Mr Holley Saxena is an [de-identified]year old man who underwent an endoscopic Zenker's repair 9/30/21  He did well post-operatively until recently when he experienced some coughing and throat clearing a few days ago  He had a larger pill get stuck today  Otherwise, he has been doing extremely well and eating anything he wants  Barium swallow 11 22 22 reveals some fluid retained within the diverticulum  The following portions of the patient's history were reviewed and updated as appropriate: allergies, current medications, past family history, past medical history, past social history, past surgical history and problem list     Review of Systems   Constitutional: Negative  HENT: Negative  Respiratory: Positive for cough (throat clearing)  Cardiovascular: Negative  Gastrointestinal: Negative  Musculoskeletal: Negative  Skin: Negative  Neurological: Negative  Hematological: Negative  Psychiatric/Behavioral: Negative  Objective:   Physical Exam  Constitutional:       General: He is not in acute distress  Appearance: Normal appearance  HENT:      Head: Normocephalic and atraumatic  Eyes:      General: No scleral icterus  Conjunctiva/sclera: Conjunctivae normal    Cardiovascular:      Rate and Rhythm: Normal rate and regular rhythm  Pulmonary:      Effort: Pulmonary effort is normal  No respiratory distress  Breath sounds: Normal breath sounds  Abdominal:      General: There is no distension  Palpations: Abdomen is soft  Musculoskeletal:      Cervical back: Neck supple  Lymphadenopathy:      Cervical: No cervical adenopathy  Skin:     General: Skin is warm and dry  Neurological:      General: No focal deficit present  Mental Status: He is alert and oriented to person, place, and time  Psychiatric:         Mood and Affect: Mood normal      /81 (BP Location: Left arm, Patient Position: Sitting, Cuff Size: Standard)   Pulse 77   Temp 97 7 °F (36 5 °C) (Temporal)   Resp 17   Ht 5' 7" (1 702 m)   Wt 96 4 kg (212 lb 8 4 oz)   SpO2 97%   BMI 33 29 kg/m²       FL barium swallow ROUTINE esophagus    Result Date: 11/22/2022  Narrative BARIUM SWALLOW-ESOPHAGRAM INDICATION:   K22 5: Diverticulum of esophagus, acquired  COMPARISON:  8/12/2019 and 10/1/2021 IMAGES:  50 FLUOROSCOPY TIME:   1 7 MIN  TECHNIQUE: The patient was given effervescent granules and barium by mouth and images of the esophagus were obtained  FINDINGS: The esophagus is normal in caliber  Esophageal motility is normal and emptying of contrast from the esophagus is prompt  No mucosal lesion, ulceration or evidence of fold thickening is seen  There is redemonstration of an approximate 4 cm diverticulum off of the left lateral portion of the pharyngoesophageal junction  Gastroesophageal reflux was spontaneous    There is a small hiatal hernia  Impression 4 cm diverticulum off of the left lateral aspect of the pharyngeal esophageal junction  Small hiatal hernia with spontaneous gastroesophageal reflux   Workstation performed: [id number]

## 2022-11-25 NOTE — TELEPHONE ENCOUNTER
Called patient back and since he is still tolerating a diet of liquids and some soft foods, no urgent hospitalization is required

## 2022-11-25 NOTE — ASSESSMENT & PLAN NOTE
Mr Aditya Hussein had a very large Zenker's diverticulum and underwent an endoscopic cricopharyngeal myotomy in September 2021  He did very well post-operatively and has been tolerating all food consistencies without trouble  He recently had one episode of feeling food get stuck and throat clearing  Recent barium swallow was reviewed and this demonstrates some fluid retention within the diverticulum pouch which is not abnormal following this surgery  It looks the same as the post-operative swallow  The cricopharyngeus muscle is divided and the diverticulum will not get any bigger; however some symptoms occasionally are not abnormal  He can have a regular diet, and should sleep upright on a few pillows  He will follow up on an as needed basis

## 2022-11-29 ENCOUNTER — TELEPHONE (OUTPATIENT)
Dept: BEHAVIORAL/MENTAL HEALTH CLINIC | Facility: CLINIC | Age: 82
End: 2022-11-29

## 2022-11-29 NOTE — TELEPHONE ENCOUNTER
The undersigned therapist contacted the above patient to his house phone and cellphone but no voicemail was able to be left, due to it being full  Writer texted client requesting a call back  No response  The undersigned therapist will meet with Best Hoff during our next scheduled session

## 2022-12-06 ENCOUNTER — SOCIAL WORK (OUTPATIENT)
Dept: BEHAVIORAL/MENTAL HEALTH CLINIC | Facility: CLINIC | Age: 82
End: 2022-12-06

## 2022-12-06 DIAGNOSIS — F43.20 ADJUSTMENT DISORDER, UNSPECIFIED TYPE: Primary | ICD-10-CM

## 2022-12-13 ENCOUNTER — SOCIAL WORK (OUTPATIENT)
Dept: BEHAVIORAL/MENTAL HEALTH CLINIC | Facility: CLINIC | Age: 82
End: 2022-12-13

## 2022-12-13 DIAGNOSIS — F43.20 ADJUSTMENT DISORDER, UNSPECIFIED TYPE: Primary | ICD-10-CM

## 2022-12-13 NOTE — PSYCH
This note was not shared with the patient due to this is a psychotherapy note   Assessment/Plan:      Diagnoses and all orders for this visit:    Adjustment disorder, unspecified type          Subjective:     Patient ID: Danay Monique is a [de-identified] y o  male who presented for his follow-up outpatient session  During today's session, Shahrzad San reports his voice is still hoarse and we discussed if he's concenred to make an appointment with his PCP  Shahrzad San reports continued back pain but denies the desire to take medication for it  Shahrzad San reports he was contacted this week by the [place] police department asking for his ex-wife, but he is unsure why they contacted him  Shahrzad San reports Sherice Mak continues to doing well and her physical therapy will end this week  Shahrzad San continues to share some of his previous memories in the [facility] area and shares his experience with undersigned therapist   The undersigned therapist provided supportive counseling  Shahrzad Steeles denies suicidal intent, gesture or ideation at this time  Assessment:  Review of Systems   Psychiatric/Behavioral: Negative  Physical Exam  Psychiatric:         Attention and Perception: Attention and perception normal          Mood and Affect: Affect normal  Mood is anxious  Speech: Speech normal          Behavior: Behavior normal  Behavior is cooperative  Thought Content:  Thought content normal          Cognition and Memory: Cognition and memory normal          Judgment: Judgment normal

## 2022-12-19 DIAGNOSIS — E11.9 TYPE 2 DIABETES MELLITUS WITHOUT COMPLICATION, WITHOUT LONG-TERM CURRENT USE OF INSULIN (HCC): ICD-10-CM

## 2022-12-19 DIAGNOSIS — E78.5 DYSLIPIDEMIA: ICD-10-CM

## 2022-12-19 DIAGNOSIS — I25.10 2-VESSEL CORONARY ARTERY DISEASE: ICD-10-CM

## 2022-12-19 RX ORDER — SITAGLIPTIN 50 MG/1
TABLET, FILM COATED ORAL
Qty: 90 TABLET | Refills: 3 | Status: SHIPPED | OUTPATIENT
Start: 2022-12-19

## 2022-12-19 RX ORDER — SIMVASTATIN 40 MG
TABLET ORAL
Qty: 90 TABLET | Refills: 3 | Status: SHIPPED | OUTPATIENT
Start: 2022-12-19

## 2022-12-20 ENCOUNTER — SOCIAL WORK (OUTPATIENT)
Dept: BEHAVIORAL/MENTAL HEALTH CLINIC | Facility: CLINIC | Age: 82
End: 2022-12-20

## 2022-12-20 DIAGNOSIS — F43.20 ADJUSTMENT DISORDER, UNSPECIFIED TYPE: Primary | ICD-10-CM

## 2022-12-22 ENCOUNTER — TELEPHONE (OUTPATIENT)
Dept: ADMINISTRATIVE | Facility: OTHER | Age: 82
End: 2022-12-22

## 2022-12-22 ENCOUNTER — OFFICE VISIT (OUTPATIENT)
Dept: FAMILY MEDICINE CLINIC | Facility: CLINIC | Age: 82
End: 2022-12-22

## 2022-12-22 VITALS
HEART RATE: 86 BPM | OXYGEN SATURATION: 98 % | BODY MASS INDEX: 33.12 KG/M2 | HEIGHT: 67 IN | DIASTOLIC BLOOD PRESSURE: 80 MMHG | SYSTOLIC BLOOD PRESSURE: 124 MMHG | WEIGHT: 211 LBS | TEMPERATURE: 97.3 F | RESPIRATION RATE: 16 BRPM

## 2022-12-22 DIAGNOSIS — I10 BENIGN HYPERTENSION: ICD-10-CM

## 2022-12-22 DIAGNOSIS — R05.9 COUGH, UNSPECIFIED TYPE: Primary | ICD-10-CM

## 2022-12-22 DIAGNOSIS — E11.22 TYPE 2 DIABETES MELLITUS WITH STAGE 3A CHRONIC KIDNEY DISEASE, WITHOUT LONG-TERM CURRENT USE OF INSULIN (HCC): ICD-10-CM

## 2022-12-22 DIAGNOSIS — N18.31 TYPE 2 DIABETES MELLITUS WITH STAGE 3A CHRONIC KIDNEY DISEASE, WITHOUT LONG-TERM CURRENT USE OF INSULIN (HCC): ICD-10-CM

## 2022-12-22 RX ORDER — FAMOTIDINE 40 MG/1
40 TABLET, FILM COATED ORAL DAILY
Qty: 30 TABLET | Refills: 0 | Status: SHIPPED | OUTPATIENT
Start: 2022-12-22

## 2022-12-22 NOTE — LETTER
Diabetic Eye Exam Form    Date Requested: 22  Patient: Roula Randle  Patient : 1940   Referring Provider: Richa Graham MD      DIABETIC Eye Exam Date _______________________________      Type of Exam MUST be documented for Diabetic Eye Exams  Please CHECK ONE  Retinal Exam       Dilated Retinal Exam       OCT       Optomap-Iris Exam      Fundus Photography       Left Eye - Please check Retinopathy or No Retinopathy        Exam did show retinopathy    Exam did not show retinopathy       Right Eye - Please check Retinopathy or No Retinopathy       Exam did show retinopathy    Exam did not show retinopathy       Comments __________________________________________________________    Practice Providing Exam ______________________________________________    Exam Performed By (print name) _______________________________________      Provider Signature ___________________________________________________      These reports are needed for  compliance  Please fax this completed form and a copy of the Diabetic Eye Exam report to our office located at [place] [address] as soon as possible via [phone] juve Lee: Phone [phone]  We thank you for your assistance in treating our mutual patient

## 2022-12-22 NOTE — TELEPHONE ENCOUNTER
----- Message from Stephanie Ordoñez MD sent at 12/22/2022 10:44 AM EST -----  Regarding: care gap request  12/22/22 10:44 AM    Hello, our patient attached above has had Diabetic Eye Exam completed/performed   Please assist in updating the patient chart by making an External outreach to Dr Daniel Noble      Thank you,  Mary Flores  [facility] FLANNERY

## 2022-12-22 NOTE — PROGRESS NOTES
Adryan Dwyer 1940 male MRN: [id number]    [address] [facility] - [address]      ASSESSMENT/PLAN  Adryan Dwyer is a [de-identified] y o  male presents to the office for    Diagnoses and all orders for this visit:    Cough, unspecified type  -     famotidine (PEPCID) 40 MG tablet; Take 1 tablet (40 mg total) by mouth daily    Benign hypertension    Type 2 diabetes mellitus with stage 3a chronic kidney disease, without long-term current use of insulin (HCC)       Reviewed Barium; given that the cardiac thoracic surgeon felt that there was no indications of surgery I would like to trial him on a Pepcid 40 mg daily right before bedtime  I would like him to touch base with his gastroenterologist if this does not work with his symptoms  Blood pressure is currently stable  Diabetes is stable  Just recently saw the eye doctor with no acute concerns    Will call their office to get records           Future Appointments   Date Time Provider Camryn Lambert   12/27/2022 10:30 AM Van Durons, CANDIDOW Psych [facility]   1/10/2023 10:30 AM Ilah Bares, LCSW [facility]   1/17/2023 10:30 AM Ilah Bares, LCSW Psych [facility]   1/24/2023 10:30 AM Ilah Bares, LCSW Psych [facility]   1/26/2023  3:00 PM Srinivasan Agarwal MD [facility]   1/31/2023 10:30 AM Ilah Bares, LCSW Psych [facility]   2/7/2023 10:30 AM Ilah Bares, LCSW Psych [facility]   2/14/2023 10:30 AM Ilah Bares, LCSW Psych [facility]   2/21/2023 10:30 AM Ilah Bares, LCSW Psych [facility]   2/28/2023 10:30 AM Ilah Bares, LCSW Psych [facility]   3/7/2023 10:30 AM Ilah Bares, LCSW Psych [facility]   3/14/2023 10:30 AM Ilah Bares, LCSW Psych [facility]   3/21/2023 10:30 AM aVn Maza LCSW Psych [facility]   3/28/2023 10:30 AM Marlyn Hamburger, LCSW Psych [facility]   4/4/2023 10:30 AM Marlyn Hamburger, LCSW [facility]   4/11/2023 10:30 AM Marlyn Hamburger, LCSW Psych [facility]   4/13/2023  3:00 PM [facility]  [facility]   4/18/2023 10:30 AM Marlyn Hamburger, LCSW [facility]   4/25/2023 10:30 AM Marlyn Hamburger, LCSW [facility]   5/1/2023  8:00 AM Rosio Churchill MD [facility]   5/10/2023  2:00 PM Viji Martinez MD [facility]          SUBJECTIVE  CC: Follow-up (Throat pain )      HPI:  Shawn Bustos is a [de-identified] y o  male who presents for a follow-up appointment  Patient had an abnormal barium swallow was advised to see the cardiothoracic surgeon  Cardiothoracic surgeon felt like he was not a surgical candidate felt like everything was intact  Patient states he continues to have that vibrating voice at times and would like evaluation  Patient does admit to having symptoms worse at nighttime  Takes his blood pressure medication as prescribed  Patient does see his eye doctor and recently just saw them with no acute concerns  Is taking all his diabetic medications as prescribed  Does complain a little bit of tightness in the back  Using Flexeril does help him  Review of Systems   Constitutional: Negative for activity change, appetite change, chills, fatigue and fever  HENT: Positive for sore throat  Negative for congestion  Respiratory: Negative for cough, chest tightness and shortness of breath  Cardiovascular: Negative for chest pain and leg swelling  Gastrointestinal: Negative for abdominal distention, abdominal pain, constipation, diarrhea, nausea and vomiting  All other systems reviewed and are negative        Historical Information   The patient history was reviewed as follows:  Past Medical History:   Diagnosis Date   • Abnormal blood chemistry     resolved: 11/9/16   • Abnormal glucose     last assessed: 9/24/14   • Arthritis    • CAD (coronary artery disease)    • Chronic kidney disease     STONES  & CKD   • Coronary atherosclerosis    • DM2 (diabetes mellitus, type 2) (HCC)    • Dyslipidemia    • Facial nerve disorder    • HTN (hypertension)    • Hyperlipidemia     last assessed: 1/13/17   • Kidney stones    • Lumbosacral radiculopathy    • Nerve root and plexus disorder    • Obesity    • Osteoarthritis    • Prostate asymmetry    • Pure hypercholesterolemia          Medications:     Current Outpatient Medications:   •  aspirin 81 mg chewable tablet, Chew 81 mg daily  , Disp: , Rfl:   •  cyclobenzaprine (FLEXERIL) 5 mg tablet, Take 1 tablet (5 mg total) by mouth daily at bedtime as needed for muscle spasms, Disp: 30 tablet, Rfl: 0  •  ezetimibe (ZETIA) 10 mg tablet, Take 1 tablet (10 mg total) by mouth daily, Disp: 90 tablet, Rfl: 1  •  Januvia 50 MG tablet, TAKE 1 TABLET BY MOUTH  DAILY, Disp: 90 tablet, Rfl: 3  •  metFORMIN (GLUCOPHAGE) 500 mg tablet, Take 1 tablet (500 mg total) by mouth 2 (two) times a day with meals, Disp: 180 tablet, Rfl: 2  •  simvastatin (ZOCOR) 40 mg tablet, TAKE 1 TABLET BY MOUTH  DAILY AT BEDTIME, Disp: 90 tablet, Rfl: 3  •  tamsulosin (FLOMAX) 0 4 mg, Take 1 capsule (0 4 mg total) by mouth daily with dinner, Disp: 90 capsule, Rfl: 3  •  trandolapril (MAVIK) 1 MG tablet, TAKE ONE TABLET BY MOUTH EVERY DAY, Disp: 90 tablet, Rfl: 2    Allergies   Allergen Reactions   • Other Other (See Comments)     REAPRO- SEVERELY LOWERED RBCS REQUIRING HOSPITAL   • Ciprofloxacin Hives       OBJECTIVE  Vitals:   Vitals:    12/22/22 1020   BP: 124/80   BP Location: Left arm   Patient Position: Sitting   Cuff Size: Large   Pulse: 86   Resp: 16   Temp: (!) 97 3 °F (36 3 °C)   SpO2: 98%   Weight: 95 7 kg (211 lb)   Height: 5' 7" (1 702 m)         Physical Exam  Constitutional:       Appearance: Normal appearance     HENT:      Mouth/Throat:      Pharynx: Posterior oropharyngeal erythema present  Pulmonary:      Effort: Pulmonary effort is normal    Musculoskeletal:         General: Normal range of motion  Neurological:      General: No focal deficit present  Mental Status: He is alert and oriented to person, place, and time  Psychiatric:         Mood and Affect: Mood normal          Behavior: Behavior normal          Thought Content:  Thought content normal          Judgment: Judgment normal                     Lisette Pinon MD,   [facility]  12/22/2022

## 2022-12-23 NOTE — TELEPHONE ENCOUNTER
Upon review of the In Basket request and the patient's chart, initial outreach has been made via fax to facility  Please see Contacts section for details       Thank you  Karina Brar MA

## 2022-12-27 ENCOUNTER — SOCIAL WORK (OUTPATIENT)
Dept: BEHAVIORAL/MENTAL HEALTH CLINIC | Facility: CLINIC | Age: 82
End: 2022-12-27

## 2022-12-27 DIAGNOSIS — F43.20 ADJUSTMENT DISORDER, UNSPECIFIED TYPE: Primary | ICD-10-CM

## 2022-12-27 NOTE — PSYCH
This note was not shared with the patient due to this is a psychotherapy note   Assessment/Plan:      Diagnoses and all orders for this visit:    Adjustment disorder, unspecified type          Subjective:     Patient ID: Madhavi Ford is a [de-identified] y o  male who presented for his follow-up outpatient counseling session with undersigned therapist   Racquel Montana reports feeling sad on Christmas day thinking that it could potentially be Sanjuanita's last Sharita with him  The undersigned therapist assisted Racquel Montana process his feelings of potentially losing London Caller  His fear is him dying before her, and London Caller being alone  Racquel Montana continues to express difficulty in caring for London Caller, but refusing to get a home health aid as he feels it gives him purpose and would like to be there for her  Racquel Montana reports continued throat issues and we discussed going for a second opinion that Dr Fozia Newton recommended  Racquel Montana reports he's also in the process of getting an endoscopy  Racquel Montana states London Caller will continue having PT  Racquel Montana denies suicidal intent, gesture or ideation at this time  Assessment:   Review of Systems   Psychiatric/Behavioral: Positive for decreased concentration and dysphoric mood  The patient is nervous/anxious  Physical Exam  Psychiatric:         Attention and Perception: Attention and perception normal          Mood and Affect: Mood is anxious and depressed  Affect is tearful  Speech: Speech normal          Behavior: Behavior normal  Behavior is cooperative  Thought Content:  Thought content normal          Cognition and Memory: Cognition and memory normal          Judgment: Judgment normal

## 2022-12-28 LAB
LEFT EYE DIABETIC RETINOPATHY: NORMAL
RIGHT EYE DIABETIC RETINOPATHY: NORMAL

## 2022-12-28 NOTE — PSYCH
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 and we discussed how it's been living with his condition  Vinnie Morales continues to care for Deja Mast and not interested in a home health aid at this time  Vinnie Morales states he took Deja Mast to get her shoulder injected for her pain and is waiting for the fluid to make her feel better  Vinnie Morales expressed his arsenio plans  The undersigned therapist provided supportive counseling  Vinnie Morales  denies suicidal intent, gesture or ideation at this time  Assessment:  Review of Systems   Psychiatric/Behavioral: Negative  Physical Exam  Psychiatric:         Attention and Perception: Attention and perception normal          Mood and Affect: Mood and affect normal          Speech: Speech normal          Behavior: Behavior normal  Behavior is cooperative  Thought Content:  Thought content normal          Cognition and Memory: Cognition and memory normal          Judgment: Judgment normal

## 2022-12-28 NOTE — TELEPHONE ENCOUNTER
Upon review of the In Basket request we were able to locate, review, and update the patient chart as requested for Diabetic Eye Exam     Any additional questions or concerns should be emailed to the Practice Liaisons via the appropriate education email address, please do not reply via In Basket      Thank you  David Sims, MA

## 2023-01-03 ENCOUNTER — SOCIAL WORK (OUTPATIENT)
Dept: BEHAVIORAL/MENTAL HEALTH CLINIC | Facility: CLINIC | Age: 83
End: 2023-01-03

## 2023-01-03 DIAGNOSIS — F43.20 ADJUSTMENT DISORDER, UNSPECIFIED TYPE: Primary | ICD-10-CM

## 2023-01-10 ENCOUNTER — TELEMEDICINE (OUTPATIENT)
Dept: BEHAVIORAL/MENTAL HEALTH CLINIC | Facility: CLINIC | Age: 83
End: 2023-01-10

## 2023-01-10 DIAGNOSIS — F43.20 ADJUSTMENT DISORDER, UNSPECIFIED TYPE: Primary | ICD-10-CM

## 2023-01-10 NOTE — PSYCH
This note was not shared with the patient due to this is a psychotherapy note     Virtual Regular Visit    Verification of patient location:    Patient is located in the following state in which I hold an active license [place]      Assessment/Plan:    Problem List Items Addressed This Visit    None  Visit Diagnoses     Adjustment disorder, unspecified type    -  Primary               Reason for visit is No chief complaint on file  Encounter provider Duane Lawson LCSW    Provider located at [address] [place] [address], [address] [id number] [phone]      Recent Visits  No visits were found meeting these conditions  Showing recent visits within past 7 days and meeting all other requirements  Today's Visits  Date Type Provider Dept   01/10/23 Marine Myers today's visits and meeting all other requirements  Future Appointments  No visits were found meeting these conditions  Showing future appointments within next 150 days and meeting all other requirements       The patient was identified by name and date of birth  Abraham Pascal was informed that this is a telemedicine visit and that the visit is being conducted through Telephone  My office door was closed  No one else was in the room  He acknowledged consent and understanding of privacy and security of the video platform  The patient has agreed to participate and understands they can discontinue the visit at any time  Patient is aware this is a billable service  Subjective  Abraham Pascal is a [de-identified] y o  male who presented for a follow-up virtual individual counseling session  At the [facility] therapist request, today's session was conducted as a virtual phone session in order to comply with social distancing secondary to the coronavirus pandemic    It was the undersigned therapist's intent to complete a video visit but Kerrin Kussmaul was unable to make video connection so we defaulted to the phone  Kerrin Kussmaul reports he's been having a "rough week" with Andrea Jean since his neighbor Marion Miller collapsed and   Marion Miller was the neighbor that helped him the most with house chores and helped him with Andrea Jean as needed  Mundo's [facility] is today in [place] and Kerrin Kussmaul was upset he wasn't able to drive there and wouldn't want to leave Andrea Jean alone for 3-4 hours  Kerrin Kussmaul was crying and now thinking about his own end of life plans  Kerrin Kussmaul reports fear that Andrea Jean would be alone and we discussed living kapadia and ways to plan properly  The undersigned therapist normalized Ernesto's grieving experience and encouraged he continue talking about it and processing his sadness  Kerrin Kussmaul  denies suicidal intent, gesture or ideation at this time             Past Medical History:   Diagnosis Date   • Abnormal blood chemistry     resolved: 16   • Abnormal glucose     last assessed: 14   • Arthritis    • CAD (coronary artery disease)    • Chronic kidney disease     STONES  & CKD   • Coronary atherosclerosis    • DM2 (diabetes mellitus, type 2) (HCC)    • Dyslipidemia    • Facial nerve disorder    • HTN (hypertension)    • Hyperlipidemia     last assessed: 17   • Kidney stones    • Lumbosacral radiculopathy    • Nerve root and plexus disorder    • Obesity    • Osteoarthritis    • Prostate asymmetry    • Pure hypercholesterolemia        Past Surgical History:   Procedure Laterality Date   • CORONARY ANGIOPLASTY      1 STENT    • ID UNLISTED PROCEDURE ESOPHAGUS N/A 2021    Procedure: EGD; DIVERTICULECTOMY ZENKERS ENDOSCOPIC;  Surgeon: Clare Kennedy MD;  Location: [facility];  Service: Thoracic   • UPPER GASTROINTESTINAL ENDOSCOPY         Current Outpatient Medications   Medication Sig Dispense Refill   • aspirin 81 mg chewable tablet Chew 81 mg daily       • cyclobenzaprine (FLEXERIL) 5 mg tablet Take 1 tablet (5 mg total) by mouth daily at bedtime as needed for muscle spasms 30 tablet 0   • ezetimibe (ZETIA) 10 mg tablet Take 1 tablet (10 mg total) by mouth daily 90 tablet 1   • famotidine (PEPCID) 40 MG tablet Take 1 tablet (40 mg total) by mouth daily 30 tablet 0   • Januvia 50 MG tablet TAKE 1 TABLET BY MOUTH  DAILY 90 tablet 3   • metFORMIN (GLUCOPHAGE) 500 mg tablet Take 1 tablet (500 mg total) by mouth 2 (two) times a day with meals 180 tablet 2   • simvastatin (ZOCOR) 40 mg tablet TAKE 1 TABLET BY MOUTH  DAILY AT BEDTIME 90 tablet 3   • tamsulosin (FLOMAX) 0 4 mg Take 1 capsule (0 4 mg total) by mouth daily with dinner 90 capsule 3   • trandolapril (MAVIK) 1 MG tablet TAKE ONE TABLET BY MOUTH EVERY DAY 90 tablet 2     No current facility-administered medications for this visit  Allergies   Allergen Reactions   • Other Other (See Comments)     [address]   • Ciprofloxacin Hives     Assessment:  Review of Systems   Psychiatric/Behavioral: Positive for decreased concentration, dysphoric mood and sleep disturbance  Physical Exam  Psychiatric:         Attention and Perception: Attention and perception normal          Mood and Affect: Mood normal  Affect is tearful  Speech: Speech normal          Behavior: Behavior normal  Behavior is cooperative  Thought Content:  Thought content normal          Cognition and Memory: Cognition and memory normal          Judgment: Judgment normal       Comments: Experiencing loss

## 2023-01-10 NOTE — PSYCH
This note was not shared with the patient due to this is a psychotherapy note     Assessment/Plan:      Diagnoses and all orders for this visit:    Adjustment disorder, unspecified type          Subjective:     Patient ID: Telma Salazar is a [de-identified] y o  male who presented for his follow-up outpatient counseling session with undersigned therapist  During today's session Chloe Tomlin requested assistance on fixing his [place] headphones, and assisted him with his bluetooth  Chloe Tomlin reports Nancy Juan continues to struggle with lifting her legs and doesn't want to leave the house without handrails or ramps  Chloe Tomlin received the new battery for the laptop but reports the software has to be updated  The undersigned therapist continues to provide supportive counseling and assist Chloe Tomlin on identifying his feelings  Chloe Tomlin continues to care for Nancy Juan  Chloe Tomlin denies suicidal intent, gesture or ideation at this time  Assessment:  Review of Systems   Psychiatric/Behavioral: Negative  Physical Exam  Psychiatric:         Attention and Perception: Attention and perception normal          Mood and Affect: Mood and affect normal          Speech: Speech normal          Behavior: Behavior normal  Behavior is cooperative  Thought Content:  Thought content normal          Cognition and Memory: Cognition and memory normal          Judgment: Judgment normal

## 2023-01-17 ENCOUNTER — SOCIAL WORK (OUTPATIENT)
Dept: BEHAVIORAL/MENTAL HEALTH CLINIC | Facility: CLINIC | Age: 83
End: 2023-01-17

## 2023-01-17 DIAGNOSIS — F43.20 ADJUSTMENT DISORDER, UNSPECIFIED TYPE: Primary | ICD-10-CM

## 2023-01-23 DIAGNOSIS — R05.9 COUGH, UNSPECIFIED TYPE: ICD-10-CM

## 2023-01-23 RX ORDER — FAMOTIDINE 40 MG/1
40 TABLET, FILM COATED ORAL DAILY
Qty: 90 TABLET | Refills: 1 | Status: SHIPPED | OUTPATIENT
Start: 2023-01-23

## 2023-01-23 NOTE — TELEPHONE ENCOUNTER
Dr Vaca Nuris:    Patient called to let you know that this medication has been working really well for him  He's asking if you can send a refill to [facility] [place]?

## 2023-01-24 ENCOUNTER — SOCIAL WORK (OUTPATIENT)
Dept: BEHAVIORAL/MENTAL HEALTH CLINIC | Facility: CLINIC | Age: 83
End: 2023-01-24

## 2023-01-24 DIAGNOSIS — F43.20 ADJUSTMENT DISORDER, UNSPECIFIED TYPE: Primary | ICD-10-CM

## 2023-01-25 NOTE — PSYCH
This note was not shared with the patient due to this is a psychotherapy note   Behavioral Health Psychotherapy Progress Note    Psychotherapy Provided: Individual Psychotherapy     1  Adjustment disorder, unspecified type              DATA:  Mike Sun presented for his follow-up outpatient individual/family counseling session with undersigned therapist   During today's session Radha Frazier reports Michelle Presley is getting a physical therapist evaluation completed  Radha Frazier states he's been struggling to cope with Mundo's death but has been in contact with his wife Yvonne Frey  Radha Frazier states he's been trying to build a ramp for Michelle Presley and easier access to get to the car, but misses Mundo for these types of jobs  Radha Frazier is now thinking about his own death and worried about Michelle Presley if something were to happen to him  Radha Frazier reports his appetite has been good but sleep continues to be a struggle  Radha Frazier denies suicidal intent, gesture or ideation at this time  During this session, this clinician used the following therapeutic modalities: Bereavement Therapy    Substance Abuse was not addressed during this session  If the client is diagnosed with a co-occurring substance use disorder, please indicate any changes in the frequency or amount of use: NA   Stage of change for addressing substance use diagnoses: No substance use/Not applicable    ASSESSMENT:  Hebert Rush presents with a Euthymic/ normal mood  his affect is Normal range and intensity, which is congruent, with his mood and the content of the session  The client has made progress on their goals  Hebert Rush presents with a none risk of suicide, none risk of self-harm, and none risk of harm to others  For any risk assessment that surpasses a "low" rating, a safety plan must be developed  A safety plan was indicated: no  If yes, describe in detail NA     PLAN: Between sessions, Hebert Rush will continue to meet weekly for supportive counseling   At the next session, the therapist will use Cognitive Behavioral Therapy and Supportive Psychotherapy to address his current stressors with Carolina Marlow  Behavioral Health Treatment Plan and Discharge Planning: Valdo Shaw is aware of and agrees to continue to work on their treatment plan  They have identified and are working toward their discharge goals   yes    Visit start and stop times:    01/25/23  Start Time: 1030  Stop Time: 1130  Total Visit Time: 60 minutes

## 2023-01-31 ENCOUNTER — SOCIAL WORK (OUTPATIENT)
Dept: BEHAVIORAL/MENTAL HEALTH CLINIC | Facility: CLINIC | Age: 83
End: 2023-01-31

## 2023-01-31 DIAGNOSIS — F43.20 ADJUSTMENT DISORDER, UNSPECIFIED TYPE: Primary | ICD-10-CM

## 2023-01-31 NOTE — PSYCH
This note was not shared with the patient due to this is a psychotherapy note   Behavioral Health Psychotherapy Progress Note    Psychotherapy Provided: Individual Psychotherapy     1  Adjustment disorder, unspecified type            DATA: Pancho Garcia reports today's his birthday and had gotten himself some gifts which made Michael Bauer happy  Pancho Garcia continue to worry about Michael Semen feeling disoriented when she wakes up, but has worked towards accepting that it's part of her routine and he will wait for her to fully wake up  Pancho Garcia states after [employer] death he continues to think about his own and wants to set up a plan to protect Michael Bauer just in case something were to happen to him  Pancho Garcia states Michael Bauer has speech therapy and physical therapy  Pancho Garcia also shared his feelings about his mother's  and how it was for him  Pancho Garcia denies suicidal intent, gesture or ideation at this time  During this session, this clinician used the following therapeutic modalities: Supportive Psychotherapy    Substance Abuse was not addressed during this session  ASSESSMENT:  Yesy Mtz presents with a Euthymic/ normal mood  his affect is Normal range and intensity, which is congruent, with his mood and the content of the session  The client has made progress on their goals  Yesy Mtz presents with a none risk of suicide, none risk of self-harm, and none risk of harm to others  For any risk assessment that surpasses a "low" rating, a safety plan must be developed  A safety plan was indicated: no  If yes, describe in detail NA    PLAN: Between sessions, Yesy Mtz will continue outpatient therapy  At the next session, the therapist will use Supportive Psychotherapy to address his stressors with caring for Michael Bauer  Behavioral Health Treatment Plan and Discharge Planning: Yesy Mtz is aware of and agrees to continue to work on their treatment plan       Visit start and stop times:    23  Start Time: 1030  Stop Time: 1130  Total Visit Time: 60 minutes

## 2023-02-02 ENCOUNTER — HOSPITAL ENCOUNTER (OUTPATIENT)
Dept: NEUROLOGY | Facility: HOSPITAL | Age: 83
End: 2023-02-02
Attending: FAMILY MEDICINE

## 2023-02-02 DIAGNOSIS — M79.641 RIGHT HAND PAIN: ICD-10-CM

## 2023-02-07 ENCOUNTER — SOCIAL WORK (OUTPATIENT)
Dept: BEHAVIORAL/MENTAL HEALTH CLINIC | Facility: CLINIC | Age: 83
End: 2023-02-07

## 2023-02-07 DIAGNOSIS — F43.20 ADJUSTMENT DISORDER, UNSPECIFIED TYPE: Primary | ICD-10-CM

## 2023-02-07 NOTE — PSYCH
This note was not shared with the patient due to this is a psychotherapy note     Behavioral Health Psychotherapy Progress Note    Psychotherapy Provided: Individual Psychotherapy     1  Adjustment disorder, unspecified type          DATA: During today's session Jessenia Moura reports Compa Garcia has had hardship and struggling with memory when she just wakes up  Jessenia Moura reports Compa Garcia finally used the ramp he built for her, during physical therapy  Jessenia Moura continues to work towards caring for Compa Garcia and worried something will happen to him so he's been planning who will care for Compa Garcia if he passes away   This is a result from their neighbor [address] [place] [address] dying that Jessenia Moura wants to make sure future arrangements are made  Jessenia Moura states Compa Garcia fell yesterday and he had to call the paramedics to help her, which we discussed has been a common occurence  Jessenia Moura states continued no interest on a home health aid despite the difficulties he faces with caring for Compa Moura continues to have poor sleep, trouble sleeping and we discussed sleep hygiene  Jessenia Moura denies suicidal intent, gesture or ideation at this time  During this session, this clinician used the following therapeutic modalities: Bereavement Therapy, Cognitive Behavioral Therapy and Supportive Psychotherapy    Substance Abuse was not addressed during this session  If the client is diagnosed with a co-occurring substance use disorder, please indicate any changes in the frequency or amount of use: NA   Stage of change for addressing substance use diagnoses: No substance use/Not applicable    ASSESSMENT:  Carlos Wyatt presents with a Euthymic/ normal mood  his affect is Normal range and intensity, which is congruent, with his mood and the content of the session  The client has not made progress on their goals  Carlos Wyatt presents with a none risk of suicide, none risk of self-harm, and none risk of harm to others      For any risk assessment that surpasses a "low" rating, a safety plan must be developed  A safety plan was indicated: no  If yes, describe in detail NA     PLAN: Between sessions, Timothy Avery will continue meeting with individual therapist weekly, and practice sleep hygiene  At the next session, the therapist will use Bereavement Therapy, Cognitive Behavioral Therapy and Supportive Psychotherapy to address his current stressors and recent loss of his neighbor Mundo      Visit start and stop times:    02/07/23  Start Time: 1030  Stop Time: 1130  Total Visit Time: 60 minutes

## 2023-02-09 NOTE — PSYCH
This note was not shared with the patient due to this is a psychotherapy note     Assessment/Plan:      Diagnoses and all orders for this visit:    Adjustment disorder, unspecified type          Subjective:     Patient ID: Tierney Salvador is a [de-identified] y o  male who presented for his follow-up outpatient counseling appointment with undersigned therapist   During today's session Katia Olmos reports Murphy Upton was unable to attend her great grand daughters birthday as planned due to her retention of fluid on her feet  Katia Olmos expressed his feelings about Sanjuanita's health and continues to deny the use of a home health aid  Katia Olmos reports Murphy Upton has been wanting a new bed, and will be working towards getting her one soon  Katia Olmos reports Geovany Bland will be coming down today and we discussed his feelings about Geovany Bland  Katia Olmos reports he's been more involved, but feels he continues to be a "phony "  Katia Olmos has not been sleeping well as he's been helping Murphy Upton throughout the morning, but he dozes off into naps here and there  Katia Olmos reports he's been doing well and getting things done in the home  Katia Olmos denies suicidal intent, gesture or ideation at this time  The undersigned therapist provided Katia Olmos with 45 minutes of psychotherapy  Review of Systems   Psychiatric/Behavioral: Negative  Objective:     Physical Exam  Psychiatric:         Attention and Perception: Attention and perception normal          Mood and Affect: Mood and affect normal          Speech: Speech normal          Behavior: Behavior normal  Behavior is cooperative  Thought Content:  Thought content normal          Cognition and Memory: Cognition and memory normal          Judgment: Judgment normal  [place]    Diagnosis: BROWN Cirrhosis  Patient location during procedure: done in OR  Procedure start time: 2/9/2023 8:22 AM  Timeout: 2/9/2023 8:22 AM  Procedure end time: 2/9/2023 9:00 AM    Staffing  Authorizing Provider: David Anderson MD  Performing Provider: David Anderson MD    Anesthesiologist was present at the time of the procedure.  Preanesthetic Checklist  Completed: patient identified, IV checked, site marked, risks and benefits discussed, surgical consent, monitors and equipment checked, pre-op evaluation, timeout performed and anesthesia consent given  [place]  Skin Prep: chlorhexidine gluconate  Local Infiltration: none  Location: right,  internal jugular vein  Vessel Caliber: large, patent, compressibility normal  Vascular Doppler:  not done  Introducer: 9 Fr single lumen, manometry used.  Device: CCO/Oximetric Catheter   Catheter Size: 8 Fr  Catheter placement by yes. Heme positive aspiration all ports. PAC floated with balloon up not wedgedSterile sheath used  Locked at: 48 cm.Insertion Attempts: 1  Indication: intravenous therapy, hemodynamic monitoring  Ultrasound Guidance  Needle advanced into vessel with real time Ultrasound guidance.  Guidewire confirmed in vessel.  Sterile sheath used.  Assessment  Central Line Bundle Protocol followed. Hand hygiene before procedure, surgical cap worn, surgical mask worn, sterile surgical gloves worn, large sterile drape used.  Verification: blood return  Dressing: secured with tape and tegaderm and sutured in place and taped  Patient: [facility]

## 2023-02-13 DIAGNOSIS — E78.5 DYSLIPIDEMIA: ICD-10-CM

## 2023-02-13 RX ORDER — EZETIMIBE 10 MG/1
TABLET ORAL
Qty: 90 TABLET | Refills: 3 | Status: SHIPPED | OUTPATIENT
Start: 2023-02-13

## 2023-02-14 ENCOUNTER — SOCIAL WORK (OUTPATIENT)
Dept: BEHAVIORAL/MENTAL HEALTH CLINIC | Facility: CLINIC | Age: 83
End: 2023-02-14

## 2023-02-14 DIAGNOSIS — F43.20 ADJUSTMENT DISORDER, UNSPECIFIED TYPE: Primary | ICD-10-CM

## 2023-02-14 NOTE — PSYCH
This note was not shared with the patient due to this is a psychotherapy note   Behavioral Health Psychotherapy Progress Note    Psychotherapy Provided: Individual Psychotherapy     1  Adjustment disorder, unspecified type            DATA: During this session, this clinician used the following therapeutic modalities: Cognitive Behavioral Therapy and Supportive Psychotherapy to address Ernesto's current stressors  Radha Frazier reports he had his hands recently tested as referred by his PCP and no concerns at this time  Radha Ramsey continues to feel a whisteling noise coming from ihs throat and will be getting an endoscopy soon  Radha Frazier states Michelle Presley has been receiving speech and physical therapy, but it was a challenge for her recently because it appears she's been losing hre memory  Radha Frazier also states Michelle Presley had a rash under her breast which he continues to care for, but we discussed a home health aide and Radha Frazier continues to deny services  Radha Frazier continues to care for Michelle Presley and is working towards a set plan for [facility] just in case Ernesto passes  Radha Frazier continues to struggle sleeping through the night, but refuses medication to aid his sleep at this time  Radha Frazier denies suicidal intent, gesture or ideation at this time  Substance Abuse was not addressed during this session  If the client is diagnosed with a co-occurring substance use disorder, please indicate any changes in the frequency or amount of use: NA   Stage of change for addressing substance use diagnoses: No substance use/Not applicable    ASSESSMENT:  Hebert Rush presents with a Euthymic/ normal mood  his affect is Normal range and intensity, which is congruent, with his mood and the content of the session  The client has made progress on their goals  Hebert Rush presents with a none risk of suicide, none risk of self-harm, and none risk of harm to others  For any risk assessment that surpasses a "low" rating, a safety plan must be developed      A safety plan was indicated: no  If yes, describe in detail     PLAN: Between sessions, Rodney Wise will continue meeting weekly  At the next session, the therapist will use Cognitive Behavioral Therapy and Supportive Psychotherapy to address Ernesto's current stressors      Visit start and stop times:    02/14/23  Start Time: 1030  Stop Time: 1130  Total Visit Time: 60 minutes

## 2023-02-20 NOTE — PSYCH
This note was not shared with the patient due to this is a psychotherapy note     Behavioral Health Psychotherapy Progress Note    Psychotherapy Provided: Individual Psychotherapy     1  Adjustment disorder, unspecified type            DATA:  Heri Bacon presented for his follow-up outpatient individual counseling session with undersigned therapist   During today's session, Heri Bacon reports Jarek Leigh had recently gone to the hospital due to her inability to take medication or get up  Alexandratte Fitting reports Friday afternoon she was in and  she was discharged  Heri Bacon expressed feeling upset, sad and scared that something would happen to Jarek Leigh  The undersigned therapist assisted Alexandratte Fitting process his feelings of fear of losing Jarek Liriano Fitting reports Jarek Leigh has been doing well since she returned to the hospital but she's been saying bizarre things such as: I  and they brought me back to life  We discussed the possibility of Terencerol Reese showing signs of dementia and encouraged a meeting with their PCP to discuss further  Alexandratte Fitting reports [employer] is something he is going to try and apply for to potentially get services  Heri Bacon continues to present well, motivated and positive  Heri Bacon continues to grieve the loss of his neighbor Barrington Bacon denies suicidal intent, gesture or ideation at this time  During this session, this clinician used the following therapeutic modalities: Supportive Psychotherapy    Substance Abuse was not addressed during this session  If the client is diagnosed with a co-occurring substance use disorder, please indicate any changes in the frequency or amount of use: NA  Stage of change for addressing substance use diagnoses: No substance use/Not applicable    ASSESSMENT:  Mehdi Rivera presents with a Euthymic/ normal mood  his affect is Normal range and intensity, which is congruent, with his mood and the content of the session       Mehdi Rivera presents with a none risk of suicide, none risk of self-harm, and none risk of harm to others  For any risk assessment that surpasses a "low" rating, a safety plan must be developed  A safety plan was indicated: no  If yes, describe in detail NA     PLAN: Between sessions, Peterson Almeida will apply for [facility] services and continue coming to therapy as scheduled  At the next session, the therapist will use Cognitive Behavioral Therapy and Supportive Psychotherapy to address Ernesto's current stressors in caring for his wife      Visit start and stop times:    02/20/23  Start Time: 1030  Stop Time: 1130  Total Visit Time: 60 minutes

## 2023-02-21 ENCOUNTER — SOCIAL WORK (OUTPATIENT)
Dept: BEHAVIORAL/MENTAL HEALTH CLINIC | Facility: CLINIC | Age: 83
End: 2023-02-21

## 2023-02-21 DIAGNOSIS — F43.20 ADJUSTMENT DISORDER, UNSPECIFIED TYPE: Primary | ICD-10-CM

## 2023-02-22 NOTE — PSYCH
This note was not shared with the patient due to this is a psychotherapy note   Behavioral Health Psychotherapy Progress Note    Psychotherapy Provided: Individual Psychotherapy     1  Adjustment disorder, unspecified type              DATA: For today's session, the undersigned therapist assisted Rita Hagen process his current stressors with caring for Alberto Hagen reports he's applying for [facility] to get Alberto Werner as much support as possible  Rita Hagen reports the last two days have been difficult to care for Alberto Werner as she doesn't want to change her diaper  However, Rita Hagen has been patient and helpful to her at this time  Rita Hagen has been trying to encourage Alberto Werner to walk some more, but reports she is feeling unable to walk at this time  Rita Hagen had to cancel Sanjuanita's appointment with her PCP yesterday because she feels unable to get out of the house  Alberto Werner continues to have a Physical Therapist come to the home, but not as consistent  Ritamarsha Hagen continues to struggle with sleeping as he's concerned for [employer]  Rita Hagen continues to complete his future arrangements with death and an advanced directive just in case  Rita Hagen denies suicidal intent, gesture or ideation at this time  During this session, this clinician used the following therapeutic modalities: Cognitive Behavioral Therapy    Substance Abuse was not addressed during this session  If the client is diagnosed with a co-occurring substance use disorder, please indicate any changes in the frequency or amount of use: NA  Stage of change for addressing substance use diagnoses: No substance use/Not applicable    ASSESSMENT:  Izzy Sutton presents with a Euthymic/ normal mood  his affect is Normal range and intensity, which is congruent, with his mood and the content of the session  Izzy Sutton presents with a none risk of suicide, none risk of self-harm, and none risk of harm to others      For any risk assessment that surpasses a "low" rating, a safety plan must be developed  A safety plan was indicated: no  If yes, describe in detail NA    PLAN: Between sessions, Betty Lawson will continue meeting with undersigned therapist and apply for as many services to help Sherice Mak  At the next session, the therapist will use Cognitive Behavioral Therapy and Supportive Psychotherapy to address Ernesto's stressors          Visit start and stop times:    02/22/23  Start Time: 1030  Stop Time: 1130  Total Visit Time: 60 minutes

## 2023-03-07 ENCOUNTER — SOCIAL WORK (OUTPATIENT)
Dept: BEHAVIORAL/MENTAL HEALTH CLINIC | Facility: CLINIC | Age: 83
End: 2023-03-07

## 2023-03-07 DIAGNOSIS — F43.20 ADJUSTMENT DISORDER, UNSPECIFIED TYPE: Primary | ICD-10-CM

## 2023-03-15 NOTE — PSYCH
This note was not shared with the patient due to this is a psychotherapy note   Behavioral Health Psychotherapy Progress Note    Psychotherapy Provided: Individual Psychotherapy     1  Adjustment disorder, unspecified type            DATA: The undersigned therapist met with the above patient for our scheduled session  Jessenia Moura reports today that Compa Garcia continues to attend Speech therapy, physical therapy and reports he enjoyed seeing Compa Garcia get help  Jesesnia Moura discussed his recent conversation with Dr Enzo Graham about Compa Garcia and the undersigned therapist assisted Jessenia Moura process his feelings about Compa Moura has overall been doing well emotionally  Jessenia Moura continues to arrive for supportive counseling  Jessenia Moura denies suicidal intent, gesture or ideation at this time  During this session, this clinician used the following therapeutic modalities: Cognitive Behavioral Therapy and Supportive Psychotherapy    Substance Abuse was not addressed during this session  If the client is diagnosed with a co-occurring substance use disorder, please indicate any changes in the frequency or amount of use: N/A   Stage of change for addressing substance use diagnoses: No substance use/Not applicable    ASSESSMENT:  Carlos Wyatt presents with a Euthymic/ normal mood  his affect is Normal range and intensity, which is congruent, with his mood and the content of the session  The client has made progress on their goals  Carlos Wyatt presents with a none risk of suicide, none risk of self-harm, and none risk of harm to others  For any risk assessment that surpasses a "low" rating, a safety plan must be developed  A safety plan was indicated: no  If yes, describe in detail N/A - no history of SI/HI ideation plan or intent     PLAN: Between sessions, Carlos Wyatt will meet as scheduled   At the next session, the therapist will use Cognitive Behavioral Therapy and Supportive Psychotherapy to address his current stressors taking care of Felix Yoon         Visit start and stop times:    03/15/23  Start Time: 1030  Stop Time: 1130  Total Visit Time: 60 minutes

## 2023-03-21 ENCOUNTER — SOCIAL WORK (OUTPATIENT)
Dept: BEHAVIORAL/MENTAL HEALTH CLINIC | Facility: CLINIC | Age: 83
End: 2023-03-21

## 2023-03-21 DIAGNOSIS — F43.20 ADJUSTMENT DISORDER, UNSPECIFIED TYPE: Primary | ICD-10-CM

## 2023-03-28 ENCOUNTER — SOCIAL WORK (OUTPATIENT)
Dept: BEHAVIORAL/MENTAL HEALTH CLINIC | Facility: CLINIC | Age: 83
End: 2023-03-28

## 2023-03-28 DIAGNOSIS — F43.20 ADJUSTMENT DISORDER, UNSPECIFIED TYPE: Primary | ICD-10-CM

## 2023-03-30 NOTE — PSYCH
"This note was not shared with the patient due to this is a psychotherapy note   Behavioral Health Psychotherapy Progress Note    Psychotherapy Provided: Individual Psychotherapy     1  Adjustment disorder, unspecified type          DATA: The undersigned therapist met with the above patient for our scheduled session  During this session, this clinician used the following therapeutic modalities: Cognitive Behavioral Therapy and Supportive Psychotherapy  Jayy Pike reports he's been feeling overwhelmed at times and felt like \"leaving,\" but knows he wants to care for Cunningham  Jayy Pike states last Thursday Cunningham was at the hospital with a headache and blood pressure up to 230  Jayy Pike expressed feeling nervous and sad about Sanjuanita's health, but she was then released with more BP medication  Jayy Pike states he 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 and keeping himself busy  Jayy Pike continues to struggle sleeping, but not open to medication at this time  Jayy Pike denies suicidal intent, gesture or ideation at this time  Substance Abuse was not addressed during this session  If the client is diagnosed with a co-occurring substance use disorder, please indicate any changes in the frequency or amount of use: N/A  Stage of change for addressing substance use diagnoses: No substance use/Not applicable    ASSESSMENT:  Jessica Montoya presents with a Euthymic/ normal mood  his affect is Normal range and intensity, which is congruent, with his mood and the content of the session  The client has made progress on their goals  Jessica Montoya presents with a none risk of suicide, none risk of self-harm, and none risk of harm to others  For any risk assessment that surpasses a \"low\" rating, a safety plan must be developed      A safety plan was indicated: no  If yes, describe in detail N/A     PLAN: Between sessions, Jessica Montoya will meet as scheduled for Cognitive Behavioral Therapy and Supportive Psychotherapy to address his current " stressors      Visit start and stop times:    03/30/23  Start Time: 1030  Stop Time: 1130  Total Visit Time: 60 minutes

## 2023-04-03 ENCOUNTER — OFFICE VISIT (OUTPATIENT)
Dept: CARDIOLOGY CLINIC | Facility: CLINIC | Age: 83
End: 2023-04-03

## 2023-04-03 VITALS
OXYGEN SATURATION: 96 % | SYSTOLIC BLOOD PRESSURE: 100 MMHG | WEIGHT: 217 LBS | HEART RATE: 61 BPM | BODY MASS INDEX: 34.06 KG/M2 | HEIGHT: 67 IN | DIASTOLIC BLOOD PRESSURE: 62 MMHG

## 2023-04-03 DIAGNOSIS — E11.9 TYPE 2 DIABETES MELLITUS WITHOUT COMPLICATION, WITHOUT LONG-TERM CURRENT USE OF INSULIN (HCC): ICD-10-CM

## 2023-04-03 DIAGNOSIS — E78.5 DYSLIPIDEMIA: ICD-10-CM

## 2023-04-03 DIAGNOSIS — I49.9 IRREGULAR HEARTBEAT: ICD-10-CM

## 2023-04-03 DIAGNOSIS — R06.02 SHORTNESS OF BREATH: ICD-10-CM

## 2023-04-03 DIAGNOSIS — E66.9 OBESITY (BMI 30-39.9): ICD-10-CM

## 2023-04-03 DIAGNOSIS — I10 BENIGN HYPERTENSION: ICD-10-CM

## 2023-04-03 DIAGNOSIS — I25.10 2-VESSEL CORONARY ARTERY DISEASE: ICD-10-CM

## 2023-04-03 NOTE — PROGRESS NOTES
Progress Note - Cardiology Office  Gonzales Sears 82 y o  male MRN: [id number]   Encounter: [id number]     1  Shortness of breath    2  2-vessel coronary artery disease    3  Benign hypertension    4  Dyslipidemia    5  Irregular heartbeat    6  Type 2 diabetes mellitus without complication, without long-term current use of insulin ([facility])    7  Obesity (BMI 30-39  9)        Assessment/Plan      1  Coronary artery disease with a remote history of angioplasty of LAD with a drug-eluting stent and known RPDA 100% with grade 3 collaterals  He has no symptoms of angina  He has decently active  His blood test from August 2019 reviewed  He has nonischemic nuclear in September of 2018  Exercise stress test shows no exercise induced ischemia  Test was done in March 2022  2  Hypertension  Patient has longstanding history of essential hypertension  Blood pressure has been able no symptoms of dizziness lightness Labs done October 2022 acceptable    3  Dyslipidemia  Continue statins  Patient on Zocor    Since he had history of coronary artery disease we would prefer cholesterol to be lower  Will add Zetia 10 mg as he has previously tried Lipitor he could not tolerate it  Scheduled to have repeat blood test     4  Diabetes mellitus  Patient is on metformin and Januvia follow-up by PMD   Last HbA1c 6 5  He follows up with endocrinology [facility]  Currently stable  5  Obesity  Has class II obesity with a BMI now around 34 encouraged to lose weight  6  Premature atrial contractions  Patient has history of frequent PACs he had PACs they are around 12 4% of the total beats  He would benefit from beta-blockers  But his blood pressure is low  Discussed with patient at length about pathophysiology of coronary artery disease, irregular heartbeat, need to compliant with medications and losing weight at length  Follow-up in 6 months    All these issues discussed with patient's wife at length  Counseling :  A description of the counseling  Spoke to patient but high intensity statins  Lipitor side effects discussed  He is willing to try it  Goals and Barriers  Patient want to lose weight which he gain during winter months  Little under stress due to wife's condition  Patient's ability to self care: Yes  Medication side effect reviewed with patient in detail and all their questions answered to their satisfaction  Myrtle Tiwari is a 82y o  year old male who came for follow up  Patient has a past medical history significant for coronary artery disease status post angioplasty in 2008 off large size LAD with a known RPDA, hypertension, diabetes mellitus, dyslipidemia and moderate obesity who came for regular follow-up  Patient's last nuclear was in 2014 which was nonischemic  He denies any chest pain or any shortness of breath  No fever no chills no other significant complaint  7/18/2017Patient came for follow-up for coronary artery disease  A status post angioplasty of his large LAD and has known RPDA occlusion  His sugar is well controlled  He is trying to lose weight  Blood pressures acceptable  Denies any chest pain  No PND no orthopnea no nausea no vomiting no other significant complaint  07/26/2021  Above reviewed  Patient came for follow-up he is doing well  He denies any chest pain any shortness of breath  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  He has been busy with his wife hence on conservative Rx  His difficult to get food down is getting worse he is scheduled to follow up with Gastroenterology  No nausea no vomiting no other issues he does have short atrial runs  He has recently blood test done in July 2021 there were acceptable  02/10/2022  Above reviewed  Patient came for follow-up he is doing well    Patient denies any chest pain any shortness of breath  He had a 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 which he had repair done  He had stitches and titanium clips were placed he is doing well since then he is able to swallow everything  He had history of coronary artery disease, hypertension, dyslipidemia with previous history of angioplasty  His under lot of stress due to his wife as she has multiple problems  No nausea no vomiting no fever no chills no PND no orthopnea  No other cardiovascular issues at this time  08/03/2022  Above reviewed  Patient came for follow-up he is doing well  He has recently cardiac workup done in the form of echo and exercise stress test   Echo shows his EF is now 45 50% with mild valvular disease  His exercise stress test was acceptable  He has medical history significant for hypertension, diabetes mellitus, few PACs but today found to have irregular heartbeat  Initial EKG shows it could be sinus with frequent PACs  A repeat rhythm strip was done which clearly shows ectopic atrial rhythm with frequent PACs and PVCs  Patient does not feel any change in symptoms he is doing well  Though he is under stress due to his wife's illness  His stress test and echo reviewed with him  4/3/2023  Above reviewed  Patient came for follow-up he is doing well  He denies any new complaints  He has recent cardiac work-up done in the form of echo and exercise stress test   Echo shows EF 4050% with mild valvular disease  His exercise stress test was acceptable  He had a recent blood test done which shows his LDL is now 108  Used to be around 70-80 in 2021  His EKG shows sinus them with short atrial runs  No nausea no vomiting no fever no chills  He has been under a lot of stress due to his wife's illness  He says he has been compliant with his medication but did have some dietary indiscretions  He has been compliant with his metformin    He has some tingling sensation in his hand  He is scheduled to see orthopedics  Occasional back pain but no other cardiovascular issues  He may not be taking aspirin but he will restart it  Review of Systems   Constitutional: Negative for activity change, chills, diaphoresis, fever and unexpected weight change  HENT: Negative for congestion  Eyes: Negative for discharge and redness  Respiratory: Negative for cough, chest tightness, shortness of breath and wheezing  Cardiovascular: Negative  Negative for chest pain, palpitations and leg swelling  Gastrointestinal: Negative for abdominal pain, diarrhea and nausea  Endocrine: Negative  Genitourinary: Negative for decreased urine volume and urgency  Musculoskeletal: Positive for arthralgias and back pain  Negative for gait problem  Skin: Negative for rash and wound  Allergic/Immunologic: Negative  Neurological: Negative for dizziness, seizures, syncope, weakness, light-headedness and headaches  Hematological: Negative  Psychiatric/Behavioral: Negative for agitation and confusion  The patient is nervous/anxious          Historical Information   Past Medical History:   Diagnosis Date   • Abnormal blood chemistry     resolved: 11/9/16   • Abnormal glucose     last assessed: 9/24/14   • Arthritis    • CAD (coronary artery disease)    • Chronic kidney disease     STONES  & CKD   • Coronary atherosclerosis    • DM2 (diabetes mellitus, type 2) ([facility])    • Dyslipidemia    • Facial nerve disorder    • HTN (hypertension)    • Hyperlipidemia     last assessed: 1/13/17   • Kidney stones    • Lumbosacral radiculopathy    • Nerve root and plexus disorder    • Obesity    • Osteoarthritis    • Prostate asymmetry    • Pure hypercholesterolemia      Past Surgical History:   Procedure Laterality Date   • CORONARY ANGIOPLASTY      1 STENT    • [place] UNLISTED PROCEDURE ESOPHAGUS N/A 9/30/2021    Procedure: EGD; DIVERTICULECTOMY ZENKERS ENDOSCOPIC;  Surgeon: Franki Andrade "MD [place];  Location: [facility];  Service: Thoracic   • UPPER GASTROINTESTINAL ENDOSCOPY       Social History     Substance and Sexual Activity   Alcohol Use Not Currently     Social History     Substance and Sexual Activity   Drug Use No     Social History     Tobacco Use   Smoking Status Never   Smokeless Tobacco Never     Family History:   Family History   Problem Relation Age of Onset   • Cancer Mother    • Ovarian cancer Mother    • Hypertension Sister    • Lung disease Father         black   • Mental illness Neg Hx    • Substance Abuse Neg Hx        Meds/Allergies     Allergies   Allergen Reactions   • Other Other (See Comments)     REAPRO- SEVERELY LOWERED RBCS [facility]   • Ciprofloxacin Hives       Current Outpatient Medications:   •  aspirin 81 mg chewable tablet, Chew 81 mg daily  , Disp: , Rfl:   •  cyclobenzaprine (FLEXERIL) 5 mg tablet, Take 1 tablet (5 mg total) by mouth daily at bedtime as needed for muscle spasms, Disp: 30 tablet, Rfl: 0  •  ezetimibe (ZETIA) 10 mg tablet, TAKE 1 TABLET BY MOUTH  DAILY, Disp: 90 tablet, Rfl: 3  •  famotidine (PEPCID) 40 MG tablet, Take 1 tablet (40 mg total) by mouth daily, Disp: 90 tablet, Rfl: 1  •  Januvia 50 MG tablet, TAKE 1 TABLET BY MOUTH  DAILY, Disp: 90 tablet, Rfl: 3  •  metFORMIN (GLUCOPHAGE) 500 mg tablet, Take 1 tablet (500 mg total) by mouth 2 (two) times a day with meals, Disp: 180 tablet, Rfl: 2  •  simvastatin (ZOCOR) 40 mg tablet, TAKE 1 TABLET BY MOUTH  DAILY AT BEDTIME, Disp: 90 tablet, Rfl: 3  •  tamsulosin (FLOMAX) 0 4 mg, Take 1 capsule (0 4 mg total) by mouth daily with dinner, Disp: 90 capsule, Rfl: 3  •  trandolapril (MAVIK) 1 MG tablet, TAKE ONE TABLET BY MOUTH EVERY DAY, Disp: 90 tablet, Rfl: 2    Vitals: Blood pressure 100/62, pulse 61, height 5' 7\" (1 702 m), weight 98 4 kg (217 lb), SpO2 96 %  ?  Body mass index is 33 99 kg/m²      BP Readings from Last 3 Encounters:   04/03/23 100/62   12/22/22 124/80   11/25/22 129/81 " Physical Exam:  Physical Exam  Constitutional:       General: He is not in acute distress  Appearance: He is well-developed  He is not diaphoretic  Neck:      Thyroid: No thyromegaly  Vascular: No JVD  Trachea: No tracheal deviation  Cardiovascular:      Rate and Rhythm: Normal rate and regular rhythm  Heart sounds: S1 normal and S2 normal  Heart sounds not distant  Murmur heard  Systolic (ejection) murmur is present with a grade of 2/6  No friction rub  No gallop  No S3 or S4 sounds  Pulmonary:      Effort: Pulmonary effort is normal  No respiratory distress  Breath sounds: Normal breath sounds  No wheezing or rales  Chest:      Chest wall: No tenderness  Abdominal:      General: Bowel sounds are normal  There is no distension  Palpations: Abdomen is soft  Tenderness: There is no abdominal tenderness  Musculoskeletal:         General: No deformity  Cervical back: Neck supple  Skin:     General: Skin is warm and dry  Coloration: Skin is not pale  Findings: No rash  Neurological:      Mental Status: He is alert and oriented to person, place, and time  Psychiatric:         Behavior: Behavior normal          Judgment: Judgment normal          Diagnostic Studies Review Cardio:  Stress Test: Nuclear stress test in October 2014 shows no ischemia EF 70%  Nuclear stress test done on 09/26/2018 was normal with EF around 70%  Echocardiogram/ROCK: Echo Doppler done in March 2015 shows borderline concentric left hypertrophy EF 55-60%, after reminding dilated, trace MR, trace TR PA pressure 35 mmHg  Echo Doppler done 09/26/2018  Echo Doppler shows normal LV systolic function grade 1 diastolic dysfunction, right ventricle mildly dilated, left atrium mild-to-moderate dilated, mild MR, mild TR PA pressure 30 mm of mercury  No change from old echo      Catheterization: His cardiac catheter patient done in 2008 shows critical stenosis of LAD which was successfully stented with a drug-eluting stent  He had nonobstructive disease in the circumflex and right coronary artery and RPDA is 100% occluded with grade 2 collaterals  Vascular imaging: In October of 2014 abdominal aortogram study shows no aneurysm  Holter monitor  Holter monitor done in March 2019 shows few PACs and PVCs  Most of the PVCs were single  There were 12 beat NSVT though it was irregular so we could not rule out SVT with aberration    ECG Report:   Comparison to prior ECGs: no interval change  7/18/2017  Normal sinus rhythm 1  with no significant ST changes  Heart rate was 68 bpm     Repeat EKG done 08/14/2018 shows normal sinus rhythm heart rate 73 beats per minute  No change from old EKG  Twelve lead EKG in our office done on 02/14/2018 shows normal sinus rhythm heart rate 72 beats per minute few PACs were noted  As compared to old EKG PACs are newly reported  Twelve lead EKG 09/18/2019 shows normal sinus rhythm rare PACs heart rate 71 beats per minute no other significant ST changes  Twelve lead EKG 03/18/2020 shows normal sinus rhythm rare PACs heart rate 67 beats per minute no change from old EKG  Twelve lead EKG 07/26/2021 shows normal sinus rhythm heart rate 77 beats per minute no change from previous EKG    Twelve lead EKG 02/10/2022 shows normal sinus with sinus arrhythmia heart rate 66 beats per minute no change from previous EKG  Twelve lead EKG initially on arrival shows most likely sinus rhythm with frequent PACs  Repeat rhythm strip clearly shows ectopic atrial rhythm with heart rate 72 beats per minute  4/3/2023 12 EKG was sinus, with marked sinus arrhythmia heart rate 61 bpm no change from previous EKG      Blood test from July 2021 reviewed  Lab Review     Lab Results   Component Value Date     02/17/2017    K 5 0 10/31/2022     10/31/2022    CO2 30 10/31/2022    BUN 24 10/31/2022    CREATININE 1 35 (H) 10/31/2022    GLUCOSE "125 (H) 02/17/2017    GLUF 145 (H) 10/31/2022    CALCIUM 9 1 10/31/2022    CORRECTEDCA 9 7 01/13/2022    AST 21 07/27/2022    ALT 33 07/27/2022    ALKPHOS 66 07/27/2022    PROT 6 5 02/17/2017    BILITOT 0 4 02/17/2017    EGFR 49 10/31/2022     Lab Results   Component Value Date    GLUCOSE 125 (H) 02/17/2017    CALCIUM 9 1 10/31/2022     02/17/2017    K 5 0 10/31/2022    CO2 30 10/31/2022     10/31/2022    BUN 24 10/31/2022    CREATININE 1 35 (H) 10/31/2022       Lab Results   Component Value Date    HGBA1C 6 7 (H) 10/31/2022     Lipid Profile:   Lab Results   Component Value Date    CHOL 173 02/17/2017    HDL 53 07/27/2022    LDLCALC 108 (H) 07/27/2022    TRIG 126 07/27/2022     Lab Results   Component Value Date    CHOL 173 02/17/2017    CHOL 151 11/21/2016       Dr Eloy Galvan MD [facility]      \"This note has been constructed using a voice recognition system  Therefore there may be syntax, spelling, and/or grammatical errors  Please call if you have any questions   \"  "

## 2023-04-04 ENCOUNTER — SOCIAL WORK (OUTPATIENT)
Dept: BEHAVIORAL/MENTAL HEALTH CLINIC | Facility: CLINIC | Age: 83
End: 2023-04-04

## 2023-04-04 ENCOUNTER — APPOINTMENT (OUTPATIENT)
Dept: LAB | Facility: CLINIC | Age: 83
End: 2023-04-04

## 2023-04-04 DIAGNOSIS — N18.31 STAGE 3A CHRONIC KIDNEY DISEASE (HCC): ICD-10-CM

## 2023-04-04 DIAGNOSIS — I25.10 2-VESSEL CORONARY ARTERY DISEASE: ICD-10-CM

## 2023-04-04 DIAGNOSIS — R06.02 SHORTNESS OF BREATH: ICD-10-CM

## 2023-04-04 DIAGNOSIS — I49.9 IRREGULAR HEARTBEAT: ICD-10-CM

## 2023-04-04 DIAGNOSIS — F43.20 ADJUSTMENT DISORDER, UNSPECIFIED TYPE: Primary | ICD-10-CM

## 2023-04-04 DIAGNOSIS — E11.9 TYPE 2 DIABETES MELLITUS WITHOUT COMPLICATION, WITHOUT LONG-TERM CURRENT USE OF INSULIN (HCC): ICD-10-CM

## 2023-04-04 DIAGNOSIS — E66.9 OBESITY (BMI 30-39.9): ICD-10-CM

## 2023-04-04 DIAGNOSIS — I10 BENIGN HYPERTENSION: ICD-10-CM

## 2023-04-04 DIAGNOSIS — E78.5 DYSLIPIDEMIA: ICD-10-CM

## 2023-04-04 LAB
ALBUMIN SERPL BCP-MCNC: 3.7 G/DL (ref 3.5–5)
ALP SERPL-CCNC: 59 U/L (ref 46–116)
ALT SERPL W P-5'-P-CCNC: 27 U/L (ref 12–78)
ANION GAP SERPL CALCULATED.3IONS-SCNC: 2 MMOL/L (ref 4–13)
AST SERPL W P-5'-P-CCNC: 24 U/L (ref 5–45)
BILIRUB SERPL-MCNC: 0.73 MG/DL (ref 0.2–1)
BUN SERPL-MCNC: 27 MG/DL (ref 5–25)
CALCIUM SERPL-MCNC: 9.1 MG/DL (ref 8.3–10.1)
CHLORIDE SERPL-SCNC: 107 MMOL/L (ref 96–108)
CHOLEST SERPL-MCNC: 147 MG/DL
CO2 SERPL-SCNC: 30 MMOL/L (ref 21–32)
CREAT SERPL-MCNC: 1.48 MG/DL (ref 0.6–1.3)
ERYTHROCYTE [DISTWIDTH] IN BLOOD BY AUTOMATED COUNT: 13.3 % (ref 11.6–15.1)
GFR SERPL CREATININE-BSD FRML MDRD: 43 ML/MIN/1.73SQ M
GLUCOSE P FAST SERPL-MCNC: 117 MG/DL (ref 65–99)
HCT VFR BLD AUTO: 49.3 % (ref 36.5–49.3)
HDLC SERPL-MCNC: 56 MG/DL
HGB BLD-MCNC: 15.8 G/DL (ref 12–17)
LDLC SERPL CALC-MCNC: 72 MG/DL (ref 0–100)
MAGNESIUM SERPL-MCNC: 2.5 MG/DL (ref 1.6–2.6)
MCH RBC QN AUTO: 30.9 PG (ref 26.8–34.3)
MCHC RBC AUTO-ENTMCNC: 32 G/DL (ref 31.4–37.4)
MCV RBC AUTO: 96 FL (ref 82–98)
NONHDLC SERPL-MCNC: 91 MG/DL
PHOSPHATE SERPL-MCNC: 3.3 MG/DL (ref 2.3–4.1)
PLATELET # BLD AUTO: 199 THOUSANDS/UL (ref 149–390)
PMV BLD AUTO: 11.8 FL (ref 8.9–12.7)
POTASSIUM SERPL-SCNC: 4.5 MMOL/L (ref 3.5–5.3)
PROT SERPL-MCNC: 7.1 G/DL (ref 6.4–8.4)
RBC # BLD AUTO: 5.12 MILLION/UL (ref 3.88–5.62)
SODIUM SERPL-SCNC: 139 MMOL/L (ref 135–147)
TRIGL SERPL-MCNC: 93 MG/DL
WBC # BLD AUTO: 6 THOUSAND/UL (ref 4.31–10.16)

## 2023-04-04 NOTE — PSYCH
"This note was not shared with the patient due to this is a psychotherapy note   Behavioral Health Psychotherapy Progress Note    Psychotherapy Provided: Individual Psychotherapy     1  Adjustment disorder, unspecified type            DATA: During today's session the undersigned therapist met with the above patient for our scheduled session  Gordon Georges shared about his dating history and recent doctors' appointments  Gordon Georges reports he went to the cardiologist said EKG is good but does have high cholesterol  Towards the end of session Gordon Georges discussed his recent disagreement he had with Pradip Precise and feeling overwhelmed about Pradip Precise due to diaper change and her controlling tendencies  The undersigned therapist assisted Gordon Georges process his feelings about this and discussed a home health aid as a possible stress reliever  During this session, this clinician used the following therapeutic modalities: Cognitive Behavioral Therapy and Supportive Psychotherapy    Substance Abuse was not addressed during this session  If the client is diagnosed with a co-occurring substance use disorder, please indicate any changes in the frequency or amount of use: N/A  Stage of change for addressing substance use diagnoses: No substance use/Not applicable    ASSESSMENT:  Italo Keith presents with a Euthymic/ normal mood  his affect is Normal range and intensity, which is congruent, with his mood and the content of the session  The client has not made progress on their goals  Italo Keith presents with a none risk of suicide, none risk of self-harm, and none risk of harm to others  For any risk assessment that surpasses a \"low\" rating, a safety plan must be developed  A safety plan was indicated: no  If yes, describe in detail N/A     PLAN: Between sessions, Italo Keith will continue meeting as needed   At the next session, the therapist will use Cognitive Behavioral Therapy and Supportive Psychotherapy to address his current " stressors        Visit start and stop times:    04/04/23  Start Time: 1030  Stop Time: 1130  Total Visit Time: 60 minutes

## 2023-04-05 ENCOUNTER — TELEPHONE (OUTPATIENT)
Dept: NEPHROLOGY | Facility: CLINIC | Age: 83
End: 2023-04-05

## 2023-04-05 NOTE — PSYCH
"This note was not shared with the patient due to this is a psychotherapy note   Behavioral Health Psychotherapy Progress Note    Psychotherapy Provided: Individual Psychotherapy     1  Adjustment disorder, unspecified type            DATA: The undersigned therapist met with the above patient for our scheduled session  Radha Fleming reports Wesly went to visit Easton Lee for her 85rd birthday  Natalie's mom was also present  Radha Fleming reports having a good time spending time with the children  Radha Fleming completed his will and reports feeling less anxious if he passes what would happen  Ernesto's thumb continues to hurt  Radha Fleming continues to care for Easton Lee and express his feelings of frustration  Radha Fleming denies suicidal intent, gesture or ideation at this time  During this session, this clinician used the following therapeutic modalities: Cognitive Behavioral Therapy and Supportive Psychotherapy    Substance Abuse was not addressed during this session  If the client is diagnosed with a co-occurring substance use disorder, please indicate any changes in the frequency or amount of use: N/A  Stage of change for addressing substance use diagnoses: No substance use/Not applicable    ASSESSMENT:  Dayday Washington presents with a Euthymic/ normal and Anxious mood  his affect is Normal range and intensity, which is congruent, with his mood and the content of the session  The client has made progress on their goals  Dayday Washington presents with a none risk of suicide, none risk of self-harm, and none risk of harm to others  For any risk assessment that surpasses a \"low\" rating, a safety plan must be developed  A safety plan was indicated: no  If yes, describe in detail N/A     PLAN: Between sessions, Dayday Washington will continue to meet as scheduled  At the next session, the therapist will use Cognitive Behavioral Therapy and Supportive Psychotherapy to address his current stressors        Visit start and stop times:    04/05/23  Start " Time: 1030  Stop Time: 1130  Total Visit Time: 60 minutes

## 2023-04-05 NOTE — TELEPHONE ENCOUNTER
----- Message from [place], [place] sent at 4/5/2023  5:50 AM EDT -----  Blood work stable  No changes needed at this time

## 2023-04-07 ENCOUNTER — TELEPHONE (OUTPATIENT)
Dept: CARDIOLOGY CLINIC | Facility: CLINIC | Age: 83
End: 2023-04-07

## 2023-04-07 NOTE — TELEPHONE ENCOUNTER
----- Message from Daren Melendrez MD sent at 4/5/2023  8:17 AM EDT -----  Patient blood test reviewed  They are acceptable  Will continue same medication  Patient should keep his appointment for follow-up

## 2023-04-25 ENCOUNTER — SOCIAL WORK (OUTPATIENT)
Dept: BEHAVIORAL/MENTAL HEALTH CLINIC | Facility: CLINIC | Age: 83
End: 2023-04-25

## 2023-04-25 ENCOUNTER — OFFICE VISIT (OUTPATIENT)
Dept: FAMILY MEDICINE CLINIC | Facility: CLINIC | Age: 83
End: 2023-04-25

## 2023-04-25 ENCOUNTER — TELEPHONE (OUTPATIENT)
Dept: OBGYN CLINIC | Facility: HOSPITAL | Age: 83
End: 2023-04-25

## 2023-04-25 VITALS
SYSTOLIC BLOOD PRESSURE: 140 MMHG | OXYGEN SATURATION: 97 % | BODY MASS INDEX: 33.9 KG/M2 | TEMPERATURE: 98 F | DIASTOLIC BLOOD PRESSURE: 79 MMHG | HEART RATE: 66 BPM | HEIGHT: 67 IN | RESPIRATION RATE: 16 BRPM | WEIGHT: 216 LBS

## 2023-04-25 DIAGNOSIS — E78.5 DYSLIPIDEMIA: ICD-10-CM

## 2023-04-25 DIAGNOSIS — N18.31 TYPE 2 DIABETES MELLITUS WITH STAGE 3A CHRONIC KIDNEY DISEASE, WITHOUT LONG-TERM CURRENT USE OF INSULIN (HCC): Primary | ICD-10-CM

## 2023-04-25 DIAGNOSIS — I10 BENIGN HYPERTENSION: ICD-10-CM

## 2023-04-25 DIAGNOSIS — G56.01 CARPAL TUNNEL SYNDROME ON RIGHT: ICD-10-CM

## 2023-04-25 DIAGNOSIS — E11.22 TYPE 2 DIABETES MELLITUS WITH STAGE 3A CHRONIC KIDNEY DISEASE, WITHOUT LONG-TERM CURRENT USE OF INSULIN (HCC): Primary | ICD-10-CM

## 2023-04-25 DIAGNOSIS — F32.A DEPRESSION, UNSPECIFIED DEPRESSION TYPE: ICD-10-CM

## 2023-04-25 DIAGNOSIS — F43.20 ADJUSTMENT DISORDER, UNSPECIFIED TYPE: Primary | ICD-10-CM

## 2023-04-25 LAB — SL AMB POCT HEMOGLOBIN AIC: 6.6 (ref ?–6.5)

## 2023-04-25 NOTE — PROGRESS NOTES
Ten Sawyer 1940 male MRN: [id number]    FAMILY PRACTICE OFFICE VISIT  [facility] - [address]      ASSESSMENT/PLAN  Gonzales Puente is a 82 y o  male presents to the office for    Diagnoses and all orders for this visit:    Type 2 diabetes mellitus with stage 3a chronic kidney disease, without long-term current use of insulin (HCC)  -     POCT hemoglobin A1c    Carpal tunnel syndrome on right  -     Ambulatory Referral to Orthopedic Surgery; Future    Depression, unspecified depression type    Dyslipidemia    Benign hypertension     Reviewed patient's A1c continues to be at 6 6%  Recommend continuing modifying his diet  His LDL did improve to 72  Highly recommend that the patient continue lifestyle modifications  I do worry about the patient's depression  I feel that it is a lot on him of taking care of his wife slowly  We have discussed possibly bringing in a friend once a week to give him more time to himself  He has significant carpal tunnel of the right hand recommend a surgery consult  Did discuss possible steroid injections as well  Blood pressure is currently elevated but did come down slowly as the patient was sitting in his chair    BMI Counseling: Body mass index is 33 83 kg/m²  The BMI is above normal  Nutrition recommendations include consuming healthier snacks and limiting drinks that contain sugar  Rationale for BMI follow-up plan is due to patient being overweight or obese  Depression Screening and Follow-up Plan: Patient was screened for depression during today's encounter  They screened negative with a PHQ-2 score of 1            Future Appointments   Date Time Provider Camryn Lambert   5/1/2023  8:00 AM Luis Holt MD [facility]   5/10/2023  2:00 PM Gavi Og MD [facility]   6/6/2023  9:30 AM Evin Vogt LCSW Psych [facility]   6/13/2023  9:30 AM Evin Vogt LCSW Psych [facility] 6/20/2023  9:30 AM Erin Blazing, LCSW Psych [facility]   6/27/2023  9:30 AM Erin Blazing, LCSW Psych [facility]   7/11/2023  9:30 AM Erin Blazing, LCSW Psych [facility]   7/18/2023  9:30 AM Erin Blazing, LCSW Psych [facility]   7/25/2023  9:30 AM Erin Blazing, LCSW Psych [facility]   8/1/2023 11:30 AM Erin Blazing, LCSW Psych [facility]   8/8/2023 11:30 AM Erin Blazing, LCSW Psych [facility]   8/15/2023 11:30 AM Erin Blazing, LCSW Psych [facility]   8/22/2023 11:30 AM Erin Blazing, LCSW Psych [facility]   8/29/2023 11:30 AM Erin Blazing, LCSW Psych [facility]   9/5/2023 11:30 AM Erin Blazing, LCSW Psych [facility]   9/12/2023 11:30 AM Erin Blazing, LCSW Psych [facility]   9/19/2023 11:30 AM Erin Blazing, LCSW Psych [facility]   9/26/2023 11:30 AM Erin Blazing, LCSW Psych [facility]   10/3/2023 11:30 AM Erin Blazing, LCSW Psych [facility]   10/10/2023 11:30 AM Erin Blazing, LCSW Psych [facility]   10/17/2023 11:30 AM Erin Blazing, LCSW Psych [facility]   10/24/2023 11:30 AM Erin Blazing, LCSW Psych [facility]   10/31/2023 11:30 AM Erin Blazing, LCSW Psych [facility]   11/7/2023 11:30 AM Erin Blazing, LCSW Psych [facility]   11/14/2023 11:30 AM Erin Blazing, LCSW Psych [facility]   11/21/2023 11:30 AM Erin Blazing, LCSW Psych [facility]   11/28/2023 11:30 AM Erin Blazing, LCSW Psych [facility]   12/5/2023 11:30 AM Erin Blazing, LCSW Psych [facility]   12/12/2023 11:30 AM Erin Blazing, LCSW Psych [facility]   12/19/2023 11:30 AM Erin Blazing, LCSW Psych [facility]   12/26/2023 11:30 AM Erin Blazing, LCSW Psych [facility]          SUBJECTIVE  CC: Follow-up (Cholersterol), Diabetes, and Numbness (Right hand )      HPI:  Karoline Moseley is a 82 y o  male who presents for an acute/follow-up appointment  Patient states that his right hand continues to show significant numbness in the first 3 digits  States 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  Patient states that he has made lifestyle modifications ever since getting diagnosed with his cardiologist of high to LDL  Patient is a diabetic and has been limiting his sugar content  States that his blood pressure was likely elevated secondary to getting it taken right when he sat down in the chair  Patient states it takes time for him to recover  Has been depressed given that he is a sole caretaker of his wife  And unfortunately he feels like he is never doing enough  Which he is an amazing   Review of Systems   Constitutional: Negative for activity change, appetite change, chills, fatigue and fever  HENT: Negative for congestion  Respiratory: Negative for cough, chest tightness and shortness of breath  Cardiovascular: Negative for chest pain and leg swelling  Gastrointestinal: Negative for abdominal distention, abdominal pain, constipation, diarrhea, nausea and vomiting  Musculoskeletal: Positive for arthralgias  All other systems reviewed and are negative        Historical Information   The patient history was reviewed as follows:  Past Medical History:   Diagnosis Date   • Abnormal blood chemistry     resolved: 11/9/16   • Abnormal glucose     last assessed: 9/24/14   • Arthritis    • CAD (coronary artery disease)    • Chronic kidney disease     STONES  & CKD   • Coronary atherosclerosis    • DM2 (diabetes mellitus, type 2) ([facility])    • Dyslipidemia    • Facial nerve disorder    • HTN (hypertension)    • Hyperlipidemia     last assessed: 1/13/17   • Kidney stones    • Lumbosacral radiculopathy    • Nerve root and plexus disorder "  • Obesity    • Osteoarthritis    • Prostate asymmetry    • Pure hypercholesterolemia          Medications:     Current Outpatient Medications:   •  aspirin 81 mg chewable tablet, Chew 81 mg daily  , Disp: , Rfl:   •  cyclobenzaprine (FLEXERIL) 5 mg tablet, Take 1 tablet (5 mg total) by mouth daily at bedtime as needed for muscle spasms, Disp: 30 tablet, Rfl: 0  •  ezetimibe (ZETIA) 10 mg tablet, Take 1 tablet (10 mg total) by mouth daily, Disp: 90 tablet, Rfl: 3  •  famotidine (PEPCID) 40 MG tablet, Take 1 tablet (40 mg total) by mouth daily, Disp: 90 tablet, Rfl: 1  •  Januvia 50 MG tablet, TAKE 1 TABLET BY MOUTH  DAILY, Disp: 90 tablet, Rfl: 3  •  metFORMIN (GLUCOPHAGE) 500 mg tablet, TAKE ONE TABLET BY MOUTH TWICE A DAY WITH MEALS (GLUCOPHAGE), Disp: 180 tablet, Rfl: 2  •  simvastatin (ZOCOR) 40 mg tablet, TAKE 1 TABLET BY MOUTH  DAILY AT BEDTIME, Disp: 90 tablet, Rfl: 3  •  tamsulosin (FLOMAX) 0 4 mg, Take 1 capsule (0 4 mg total) by mouth daily with dinner, Disp: 90 capsule, Rfl: 3  •  trandolapril (MAVIK) 1 MG tablet, TAKE ONE TABLET BY MOUTH EVERY DAY, Disp: 90 tablet, Rfl: 2    Allergies   Allergen Reactions   • Other Other (See Comments)     REAPRO- SEVERELY LOWERED RBCS REQUIRING HOSPITAL   • Ciprofloxacin Hives       OBJECTIVE  Vitals:   Vitals:    04/25/23 0930 04/25/23 1001   BP: 150/84 140/79   BP Location: Left arm    Patient Position: Sitting    Cuff Size: Large    Pulse: 66    Resp: 16    Temp: 98 °F (36 7 °C)    SpO2: 97%    Weight: 98 kg (216 lb)    Height: 5' 7\" (1 702 m)          Physical Exam  Vitals reviewed  Constitutional:       Appearance: He is well-developed  HENT:      Head: Normocephalic and atraumatic  Eyes:      Conjunctiva/sclera: Conjunctivae normal       Pupils: Pupils are equal, round, and reactive to light  Cardiovascular:      Rate and Rhythm: Normal rate and regular rhythm  Heart sounds: Normal heart sounds     Pulmonary:      Effort: Pulmonary effort is normal  " No respiratory distress  Breath sounds: Normal breath sounds  Musculoskeletal:         General: Normal range of motion  Cervical back: Normal range of motion and neck supple  Skin:     General: Skin is warm  Capillary Refill: Capillary refill takes less than 2 seconds  Findings: Rash (brusin of the left forarm) present  Neurological:      Mental Status: He is alert and oriented to person, place, and time                      Wali Donaldson MD,   [facility]  4/25/2023

## 2023-04-25 NOTE — TELEPHONE ENCOUNTER
Patient is being referred to a orthopedics  Please schedule accordingly      [address]   [phone]

## 2023-04-26 ENCOUNTER — TELEPHONE (OUTPATIENT)
Dept: FAMILY MEDICINE CLINIC | Facility: CLINIC | Age: 83
End: 2023-04-26

## 2023-04-26 NOTE — TELEPHONE ENCOUNTER
Patient wants you to know he has appointment this Monday 5/1 with Dr Alexandra Suazo regarding his hand

## 2023-05-01 ENCOUNTER — SOCIAL WORK (OUTPATIENT)
Dept: BEHAVIORAL/MENTAL HEALTH CLINIC | Facility: CLINIC | Age: 83
End: 2023-05-01

## 2023-05-01 DIAGNOSIS — F43.20 ADJUSTMENT DISORDER, UNSPECIFIED TYPE: Primary | ICD-10-CM

## 2023-05-03 NOTE — PSYCH
"This note was not shared with the patient due to this is a psychotherapy note   Behavioral Health Psychotherapy Progress Note    Psychotherapy Provided: Individual Psychotherapy     1  Adjustment disorder, unspecified type            DATA: The undersigned therapist met with the above patient for our scheduled session  During today's session Nawaf Russell discussed his current stressors with caring for his wife Mayur Russell reports he's developed carpal tunnel and is considering a surgery as his thumb is in a lot of pain  Nawaf Russell reports Mayur Salguero continues to present \"in a fog,\" at times and doesn't get up from her chair  Nawaf Russell continues to worry for [employer] but continues to deny wanting a home health aid  Nawaf Russell reports getting the  to go into the home for Mayur Salguero and discussed Mayur Salguero not being able to get out of the home  Nawaf Russell denies suicidal intent, gesture or ideation at this time  During this session, this clinician used the following therapeutic modalities: Cognitive Behavioral Therapy    Substance Abuse was not addressed during this session  If the client is diagnosed with a co-occurring substance use disorder, please indicate any changes in the frequency or amount of use: N/A  Stage of change for addressing substance use diagnoses: No substance use/Not applicable    ASSESSMENT:  Katarina Hendrix presents with a Euthymic/ normal mood  his affect is Normal range and intensity, which is congruent, with his mood and the content of the session  The client has made progress on their goals  Katarina Hendrix presents with a none risk of suicide, none risk of self-harm, and none risk of harm to others  For any risk assessment that surpasses a \"low\" rating, a safety plan must be developed  A safety plan was indicated: no  If yes, describe in detail N/A    PLAN: Between sessions, Katarina Hendrix will continue caring for Mayur Salguero and see a surgeon for his hand   At the next session, the therapist will use " Cognitive Behavioral Therapy to address his current stressors        Visit start and stop times:    04/25/2023  Start Time: 1030  Stop Time: 1130  Total Visit Time: 60 minutes

## 2023-05-09 ENCOUNTER — TELEMEDICINE (OUTPATIENT)
Dept: BEHAVIORAL/MENTAL HEALTH CLINIC | Facility: CLINIC | Age: 83
End: 2023-05-09

## 2023-05-09 DIAGNOSIS — F43.20 ADJUSTMENT DISORDER, UNSPECIFIED TYPE: Primary | ICD-10-CM

## 2023-05-09 NOTE — PSYCH
This note was not shared with the patient due to this is a psychotherapy note     Virtual Regular Visit    Verification of patient location:    Patient is located at Home in the following state in which I hold an active license [place]      Assessment/Plan:    Problem List Items Addressed This Visit    None  Visit Diagnoses     Adjustment disorder, unspecified type    -  Primary        Reason for visit is No chief complaint on file  Encounter provider Celia Ye LCSW    Provider located at [address] [place] [id number] [phone]      Recent Visits  No visits were found meeting these conditions  Showing recent visits within past 7 days and meeting all other requirements  Today's Visits  Date Type Provider Dept   05/09/23 Telemedicine [facility] today's visits and meeting all other requirements  Future Appointments  No visits were found meeting these conditions  Showing future appointments within next 150 days and meeting all other requirements       The patient was identified by name and date of birth  Marlene Pelayo was informed that this is a telemedicine visit and that the visit is being conducted throughTelephone  My office door was closed  No one else was in the room  He acknowledged consent and understanding of privacy and security of the video platform  The patient has agreed to participate and understands they can discontinue the visit at any time  Patient is aware this is a billable service  Subjective  Gonzales Killianam is a 82 y o  male who presented virtually for his follow-up appointment with undersigned therapist  During today's session, Petar Stallworth discussed his concerns about Haley Thomasos and her current behaviors of possible alzheimer's   The undersigned threapist encouraged he reach out to [facility] therapist to try and address her current behaviors  Maxi Carranza discussed his current stressor of [place] [employer] and possible [place] possible license suspension  We discussed things being out of his control  Maxi Carranza discussed his gardening plans and discussed his current new [place] neighbor  The undersigned therapist assisted Maxi Carranza process his feelings of Wong Pacheco and his current stressors  Maxi Carranza expresses gratitude of the help he receives  Maxi Carranza denies suicidal intent, gesture or ideation at this time          HPI     Past Medical History:   Diagnosis Date   • Abnormal blood chemistry     resolved: 11/9/16   • Abnormal glucose     last assessed: 9/24/14   • Arthritis    • CAD (coronary artery disease)    • Chronic kidney disease     STONES  & CKD   • Coronary atherosclerosis    • DM2 (diabetes mellitus, type 2) (HCC)    • Dyslipidemia    • Facial nerve disorder    • HTN (hypertension)    • Hyperlipidemia     last assessed: 1/13/17   • Kidney stones    • Lumbosacral radiculopathy    • Nerve root and plexus disorder    • Obesity    • Osteoarthritis    • Prostate asymmetry    • Pure hypercholesterolemia        Past Surgical History:   Procedure Laterality Date   • CORONARY ANGIOPLASTY      1 STENT    • [place] UNLISTED PROCEDURE ESOPHAGUS N/A 9/30/2021    Procedure: EGD; DIVERTICULECTOMY ZENKERS ENDOSCOPIC;  Surgeon: Wu Campbell MD;  Location: [facility];  Service: Thoracic   • UPPER GASTROINTESTINAL ENDOSCOPY         Current Outpatient Medications   Medication Sig Dispense Refill   • aspirin 81 mg chewable tablet Chew 81 mg daily       • cyclobenzaprine (FLEXERIL) 5 mg tablet Take 1 tablet (5 mg total) by mouth daily at bedtime as needed for muscle spasms 30 tablet 0   • ezetimibe (ZETIA) 10 mg tablet Take 1 tablet (10 mg total) by mouth daily 90 tablet 3   • famotidine (PEPCID) 40 MG tablet Take 1 tablet (40 mg total) by mouth daily 90 tablet 1   • Januvia 50 MG tablet TAKE 1 TABLET BY MOUTH  DAILY 90 tablet 3   • metFORMIN (GLUCOPHAGE) 500 mg tablet TAKE ONE TABLET BY MOUTH TWICE A DAY WITH MEALS (GLUCOPHAGE) 180 tablet 2   • simvastatin (ZOCOR) 40 mg tablet TAKE 1 TABLET BY MOUTH  DAILY AT BEDTIME 90 tablet 3   • tamsulosin (FLOMAX) 0 4 mg Take 1 capsule (0 4 mg total) by mouth daily with dinner 90 capsule 3   • trandolapril (MAVIK) 1 MG tablet TAKE ONE TABLET BY MOUTH EVERY DAY 90 tablet 2     No current facility-administered medications for this visit  Allergies   Allergen Reactions   • Other Other (See Comments)     REAPRO- SEVERELY LOWERED RBCS [facility]   • Ciprofloxacin Hives       Review of Systems   Psychiatric/Behavioral: Negative  Video Exam    There were no vitals filed for this visit  Physical Exam  Psychiatric:         Attention and Perception: Attention and perception normal          Mood and Affect: Mood and affect normal          Speech: Speech normal          Behavior: Behavior normal  Behavior is cooperative  Thought Content:  Thought content normal          Cognition and Memory: Cognition and memory normal          Judgment: Judgment normal           Visit Time    Visit Start Time: 8:30 am   Visit Stop Time: 9:30 am   Total Visit Duration: 60 minutes

## 2023-05-10 NOTE — PSYCH
"This note was not shared with the patient due to this is a psychotherapy note   Behavioral Health Psychotherapy Progress Note    Psychotherapy Provided: Individual Psychotherapy     1  Adjustment disorder, unspecified type            DATA: The undersigned therapist met with the above patient for out scheduled session  For today's session, Qian Lawson discussed his recent concerns with Nati Pickens and the stressors he's experienced  Qian Lawson continues to care for Nati Pickens and discusses the difficulties in doing so  Qian Lawson continues to deny wanting a home health aide  Qian Lawson reports his right hand needs carpel tunnel surgery and is thinking about following through with it  Qian Lawson will be seeing his doctor about his hand and we discussed the pros and cons of surgery  Qian Lawson denies suicidal intent, gesture or ideation at this time  During this session, this clinician used the following therapeutic modalities: Cognitive Behavioral Therapy and Supportive Psychotherapy    Substance Abuse was not addressed during this session  If the client is diagnosed with a co-occurring substance use disorder, please indicate any changes in the frequency or amount of use: N/A  Stage of change for addressing substance use diagnoses: No substance use/Not applicable    ASSESSMENT:  Pete Goetz presents with a Euthymic/ normal mood  his affect is Normal range and intensity, which is congruent, with his mood and the content of the session  The client has made progress on their goals  Pete Goetz presents with a none risk of suicide, none risk of self-harm, and none risk of harm to others  For any risk assessment that surpasses a \"low\" rating, a safety plan must be developed  A safety plan was indicated: no  If yes, describe in detail N/A    PLAN: Between sessions, Pete Goetz will continue caring for Nati Pickens and going to his follow-up medical appointments   At the next session, the therapist will use Cognitive Behavioral Therapy and " Supportive Psychotherapy to address his current stressors      Visit start and stop times:    05/01/23/  Start Time: 0830  Stop Time: 0930  Total Visit Time: 60 minutes

## 2023-05-11 ENCOUNTER — OFFICE VISIT (OUTPATIENT)
Dept: NEPHROLOGY | Facility: CLINIC | Age: 83
End: 2023-05-11

## 2023-05-11 VITALS
BODY MASS INDEX: 33.43 KG/M2 | HEART RATE: 70 BPM | SYSTOLIC BLOOD PRESSURE: 130 MMHG | WEIGHT: 213 LBS | DIASTOLIC BLOOD PRESSURE: 82 MMHG | HEIGHT: 67 IN

## 2023-05-11 DIAGNOSIS — N18.30 TYPE 2 DIABETES MELLITUS WITH STAGE 3 CHRONIC KIDNEY DISEASE AND HYPERTENSION (HCC): Primary | ICD-10-CM

## 2023-05-11 DIAGNOSIS — E66.8 MODERATE OBESITY: ICD-10-CM

## 2023-05-11 DIAGNOSIS — I12.9 TYPE 2 DIABETES MELLITUS WITH STAGE 3 CHRONIC KIDNEY DISEASE AND HYPERTENSION (HCC): Primary | ICD-10-CM

## 2023-05-11 DIAGNOSIS — E11.22 TYPE 2 DIABETES MELLITUS WITH STAGE 3 CHRONIC KIDNEY DISEASE AND HYPERTENSION (HCC): Primary | ICD-10-CM

## 2023-05-11 RX ORDER — MULTIVITAMIN
1 TABLET ORAL DAILY
COMMUNITY

## 2023-05-11 NOTE — PROGRESS NOTES
NEPHROLOGY OFFICE VISIT   Taye Sears 82 y o  male MRN: [id number]  5/11/2023    Reason for Visit: Chronic kidney disease    History of Present Illness (HPI):    I had the pleasure of seeing Donte Sierra today in the renal clinic for the continued management of chronic kidney disease  Since our last visit, there has been no ER visits or hospitilizations  He currently has no complaints at this time and is feeling well  Patient denies any chest pain, shortness of breath and swelling  The last blood work was done on April 4, which we have reviewed together  Included a CMP and CBC  No new complaints except having some pain and tingling in his right hand  He is planned to see a specialist for this  Possible carpal tunnel syndrome  No relief with a sling that he is try to over him  ASSESSMENT and PLAN:    Chronic Kidney Disease Stage IIIA with microalbuminuria  --Imaging:  Renal ultrasound from 2018 showed no evidence of hydronephrosis and no renal mass  --Urinalysis:  Analysis from July was bland  --Proteinuria:  His last urine protein creatinine ratio was 0 05  Check a microalbumin/creatinine ratio at the next visit  --Baseline creatinine: mid 1's, 1 3-1 5 mg/dL  --Etiology:  Presumed to be secondary to diabetic kidney disease along with hypertensive nephrosclerosis  --Biopsy Proven:  No  --Status:  His renal function continues to remain stable  His blood pressure and diabetes under excellent control  --Management:  Continue ACE-inhibitor for blood pressure control and anti proteinuric affects  Avoid NSAIDs  Continue diabetic control  Weight loss exercise and diet strongly recommended  --Reducing Cardiovascular Risk Factors:  Simvastatin+ Ezetimibe reduced atherosclerotic events in CKD (SHARP trial);  Low dose aspirin safe (if no contraindications exist); smoking is an independent risk factor for Chronic Kidney Disease and progression, strongly recommend smoking cessation     Hypertension  -- Stage I ([place])  -- with underlying chronic kidney disease  -- Body mass index is 33 83 kg/m²  -- Volume status: Euvolemic  -- Etiology:  Essential hypertension and obesity  -- Secondary Work-Up:  Not clinically indicated  -- Target Goal: < 130/80 (ACC/AHA, CKD with proteinuria, CKD in diabetics) ; if tolerated can target SBP < 120 mmHg in high cardiovascular risk ( Age > 75, CKD, CVD, No Diabetics SPRINT)  -- Lifestyle Modifications: DASH Diet, Weight Loss for ideal body weight, 45 mins of cardiovascular exercise 3 times a week as tolerated, and if no contraindications exist, smoking cessation, limit alcohol use, avoid NSAIDS, monitor blood pressure at home  -- Status: Blood pressure at target  -- Current antihypertensive regiment: Trandolaprill 1 mg daily  -- Changes:  No changes, continue weight loss blood pressure at target     Zenker's diverticulum     Diabetes mellitus type 2  -hemoglobin A1c: 6 6 (A1C values may be falsely elevated or decreased in those with CKD, assay method should be certified by [employer])  Target A1C between 7-8 5 (will need to be individualized for each patient), and would utilize A1C  in conjunction with continuous glucose monitoring (CGM)  It should also be noted that the A1c with more advanced kidney disease would be inaccurate due to decreased red blood cell life along with anemia   -current medications:  Metformin, Januvia  -proteinuria:  No significant proteinuria  -retinopathy:  Not reported  -neuropathy:  Not reported  -please follow with an ophthalmologist and podiatrist every year  -continued self monitoring of blood glucose at home  -Management in CKD Recommendations:   • Metformin:  Avoid if creatinine clearance is less than 30 cc/min (concern for lactic acidosis)  • Sodium-Glucose Cotransporter-2 (SGLT2) Inhibitors:   May see an acute drop in the eGFR initially when starting the medication but then a stabilization of the renal function, with slower loss of renal function as compared to placebo  Relative risk of end-stage renal disease, doubling of serum creatinine or death from renal causes were also found to be lower as compared to placebo  ([facility])  DAPA-CKD study, showed that patients with chronic kidney disease regardless of the presence or absence of diabetes the risk of composite of a sustained decline in the estimated GFR of at least 50%, end-stage renal disease or death from renal or cardiovascular causes was significantly lower with Dapagliflozin than with placebo  EMPEROR-Reduced study also showed beneficial effects  If no contraindications exist I would recommend this medication added to the diabetic regiment, if further optimal diabetic control is required  If eGFR < 60 cc/min (avoid ertugliflozin); If eGFR < 45 cc/min (caution with or avoid dapagliflozin, emphagliflozin), if eGFR  < 30 cc/min (caution or avoid all including canagliflozin, more for efficacy)  • Sulfonylureas:  Preferred short-acting (glipizide, glimepiride, repaglinide), relatively safe in patients with non dialysis CKD  • Thiazolidinedones/Alpha-Gluosidase inhibitors/Dipeptidyl peptidase-4 inhibitors:  Generally not considered first-line agents in CKD (limited data in long-term safety and efficacy)  • Insulin:  Starting dose may need to be lower than what would ordinarily be used, as there is a decrease metabolism of insulin (no dose adjustment if the GFR > 50 mL/min, dose should be reduced to 75% of baseline if GFR 10-50 mL/min, and 50% baseline if GFR < 10 mL/min)     Obesity  -- BMI 33 36  --Recommend decreasing portion sizes, encouraging healthy choices of fruits and vegetables, consuming healthier snacks and moderation in carbohydrate intake   Exercise recommendations; 3-5 times a week, the American Heart Association recommends 150 minutes a week moderate exercise  --Strongly recommend evaluation by nutritionist  --Overweight and obesity have been associated with increased mortality and morbidity  Associated with a reduction in life expectancy, associated with increased all cause and cardiovascular mortality  --Metabolic risks, including increased risk of diabetes type 2, insulin resistance with hyperinsulinemia (weight loss of 5 to 7% associated with a decreased risk of type 2 diabetes among individuals with prediabetes and weight loss of 15% can lead to dramatic improvement of diabetes in nearly half of people)  Dyslipidemia, hypertension and heart disease are all increased in patients with obesity  Along with increased risk of stroke increased risk of multiple cancer types  Can also be associated with increased renal dysfunction, strongest risk factor for new onset chronic kidney disease  There is also a degree of compensatory hyperfiltration to meet high in the metabolic demands of increased body weight  This can also result in increased increased risk for nephrolithiasis  Mineral bone disorder-chronic kidney disease  -- Calcium and phosphorus within normal limits    Pain in the right hand  -- Suspected carpal tunnel syndrome  -- Patient plan for evaluation by specialist  -- Avoid NSAIDs, can continue Tylenol  -- No relief with wrist splint    PATIENT INSTRUCTIONS:    Patient Instructions   1 )  Low 2 g sodium diet    2 )  Monitor weights at home    3 )  Avoid NSAIDs (ibuprofen, Motrin, Advil, Aleve, naproxen)    4 )  Monitor blood pressure at home, call if blood pressure greater than 150/90 persistently    5 ) I will plan to discuss all results including blood work, and/or imaging at our next visit, unless there is an urgent indication, in which case I will call you earlier  If you have any questions or concerns about your results, please feel free to call our office                Orders Placed This Encounter   Procedures   • Albumin / creatinine urine ratio     Standing Status:   Future "Standing Expiration Date:   5/11/2024   • Comprehensive metabolic panel     This is a patient instruction: Patient fasting for 8 hours or longer recommended  Standing Status:   Future     Standing Expiration Date:   5/11/2024   • Hemoglobin A1C     Standing Status:   Future     Standing Expiration Date:   5/11/2024   • Cystatin C With eGFR     Standing Status:   Future     Standing Expiration Date:   5/11/2024   • CBC     Standing Status:   Future     Standing Expiration Date:   5/11/2024       OBJECTIVE:  Current Weight: Weight - Scale: 96 6 kg (213 lb)  Vitals:    05/11/23 1254   BP: 130/82   BP Location: Left arm   Patient Position: Sitting   Cuff Size: Standard   Pulse: 70   Weight: 96 6 kg (213 lb)   Height: 5' 7\" (1 702 m)    Body mass index is 33 36 kg/m²  REVIEW OF SYSTEMS:    Review of Systems   Constitutional: Negative for activity change and fever  Respiratory: Negative for cough, chest tightness, shortness of breath and wheezing  Cardiovascular: Negative for chest pain and leg swelling  Gastrointestinal: Negative for abdominal pain, diarrhea, nausea and vomiting  Endocrine: Negative for polyuria  Genitourinary: Negative for difficulty urinating, dysuria, flank pain, frequency and urgency  Musculoskeletal:        Pain in right hand   Skin: Negative for rash  Neurological: Negative for dizziness, syncope, light-headedness and headaches  PHYSICAL EXAM:      Physical Exam  Vitals and nursing note reviewed  Constitutional:       General: He is not in acute distress  Appearance: He is well-developed  He is obese  HENT:      Head: Normocephalic and atraumatic  Eyes:      General: No scleral icterus  Conjunctiva/sclera: Conjunctivae normal       Pupils: Pupils are equal, round, and reactive to light  Cardiovascular:      Rate and Rhythm: Normal rate and regular rhythm  Heart sounds: S1 normal and S2 normal  No murmur heard  No friction rub  No gallop   " Pulmonary:      Effort: Pulmonary effort is normal  No respiratory distress  Breath sounds: Normal breath sounds  No wheezing or rales  Abdominal:      General: Bowel sounds are normal       Palpations: Abdomen is soft  Tenderness: There is no abdominal tenderness  There is no rebound  Musculoskeletal:         General: Normal range of motion  Cervical back: Normal range of motion and neck supple  Skin:     General: Skin is dry  Findings: No rash  Neurological:      Mental Status: He is alert and oriented to person, place, and time     Psychiatric:         Behavior: Behavior normal          Medications:    Current Outpatient Medications:   •  aspirin 81 mg chewable tablet, Chew 81 mg daily  , Disp: , Rfl:   •  cyclobenzaprine (FLEXERIL) 5 mg tablet, Take 1 tablet (5 mg total) by mouth daily at bedtime as needed for muscle spasms, Disp: 30 tablet, Rfl: 0  •  ezetimibe (ZETIA) 10 mg tablet, Take 1 tablet (10 mg total) by mouth daily, Disp: 90 tablet, Rfl: 3  •  famotidine (PEPCID) 40 MG tablet, Take 1 tablet (40 mg total) by mouth daily, Disp: 90 tablet, Rfl: 1  •  Januvia 50 MG tablet, TAKE 1 TABLET BY MOUTH  DAILY, Disp: 90 tablet, Rfl: 3  •  metFORMIN (GLUCOPHAGE) 500 mg tablet, TAKE ONE TABLET BY MOUTH TWICE A DAY WITH MEALS (GLUCOPHAGE), Disp: 180 tablet, Rfl: 2  •  Multiple Vitamin (multivitamin) tablet, Take 1 tablet by mouth daily, Disp: , Rfl:   •  simvastatin (ZOCOR) 40 mg tablet, TAKE 1 TABLET BY MOUTH  DAILY AT BEDTIME, Disp: 90 tablet, Rfl: 3  •  tamsulosin (FLOMAX) 0 4 mg, Take 1 capsule (0 4 mg total) by mouth daily with dinner, Disp: 90 capsule, Rfl: 3  •  trandolapril (MAVIK) 1 MG tablet, TAKE ONE TABLET BY MOUTH EVERY DAY, Disp: 90 tablet, Rfl: 2    Laboratory Results:        Invalid input(s): ALBUMIN    Results for orders placed or performed in visit on 04/25/23   POCT hemoglobin A1c   Result Value Ref Range    Hemoglobin A1C 6 6 (A) 6 5

## 2023-05-15 ENCOUNTER — SOCIAL WORK (OUTPATIENT)
Dept: BEHAVIORAL/MENTAL HEALTH CLINIC | Facility: CLINIC | Age: 83
End: 2023-05-15

## 2023-05-15 DIAGNOSIS — F43.20 ADJUSTMENT DISORDER, UNSPECIFIED TYPE: Primary | ICD-10-CM

## 2023-05-15 NOTE — PSYCH
"This note was not shared with the patient due to this is a psychotherapy note   Behavioral Health Psychotherapy Progress Note    Psychotherapy Provided: Individual Psychotherapy     1  Adjustment disorder, unspecified type            DATA: The undersigned therapist met with the above patient for our scheduled session  Damaris Buerger continues to express that he's been struggling with caring for Rosa Jensen and her health as her legs have been blistering, but Damaris Buerger does not want a home health aid at this time  The undersigned therapist assisted Damaris Buerger process his feelings about caring for Linnie Epps Damaris Buerger reports Rosa Jensen continues to express inability to walk but when physical therapist comes to the home she's able ot stand up  Damaris Buerger has been concerned that Rosa Jensen has been saying some unusual things like, \"down stairs, up stairs, the attic, changing the colors of the house,\" when Damaris Buerger states [place] and there is no change  The undersigned therapist continues to encourage Damaris Buerger contact [facility]'s care team to address his concerns  Damaris Buerger continues to work on gardening and discussed his recent house renovations  Damaris Buerger reports his neighbor has been helping with gardening and feels grateful, Damaris Buerger continues to present positively  Damaris Buerger denies suicidal intent, gesture or ideation at this time  During this session, this clinician used the following therapeutic modalities: Cognitive Behavioral Therapy and Supportive Psychotherapy    Substance Abuse was not addressed during this session, as Damaris Buerger denies and issues or concerns at this time  ASSESSMENT:  Drea Gibbs presents with a Euthymic/ normal mood  his affect is Normal range and intensity, which is congruent, with his mood and the content of the session  The client has not made progress on their goals  Drea Gibbs presents with a none risk of suicide, none risk of self-harm, and none risk of harm to others      For any risk assessment that surpasses a \"low\" rating, a " safety plan must be developed  A safety plan was indicated: no  If yes, describe in detail N/A     PLAN: Between sessions, Marcin Mckeon will attempt to help Koko  At the next session, the therapist will use Cognitive Behavioral Therapy and Supportive Psychotherapy to address his current stressors         Visit start and stop times:    05/15/23  Start Time: 0930  Stop Time: 1030  Total Visit Time: 60 minutes

## 2023-05-17 ENCOUNTER — TELEPHONE (OUTPATIENT)
Dept: OBGYN CLINIC | Facility: HOSPITAL | Age: 83
End: 2023-05-17

## 2023-05-17 NOTE — TELEPHONE ENCOUNTER
Caller: patient    Doctor: Bianka Duggan    Reason for call: pt called to confirm his appt    Call back#:

## 2023-05-18 ENCOUNTER — OFFICE VISIT (OUTPATIENT)
Dept: OBGYN CLINIC | Facility: CLINIC | Age: 83
End: 2023-05-18

## 2023-05-18 VITALS
DIASTOLIC BLOOD PRESSURE: 95 MMHG | HEIGHT: 67 IN | HEART RATE: 60 BPM | WEIGHT: 213 LBS | SYSTOLIC BLOOD PRESSURE: 165 MMHG | BODY MASS INDEX: 33.43 KG/M2

## 2023-05-18 DIAGNOSIS — I12.9 TYPE 2 DIABETES MELLITUS WITH STAGE 3 CHRONIC KIDNEY DISEASE AND HYPERTENSION (HCC): ICD-10-CM

## 2023-05-18 DIAGNOSIS — E11.22 TYPE 2 DIABETES MELLITUS WITH STAGE 3 CHRONIC KIDNEY DISEASE AND HYPERTENSION (HCC): ICD-10-CM

## 2023-05-18 DIAGNOSIS — N18.30 TYPE 2 DIABETES MELLITUS WITH STAGE 3 CHRONIC KIDNEY DISEASE AND HYPERTENSION (HCC): ICD-10-CM

## 2023-05-18 DIAGNOSIS — G56.01 CARPAL TUNNEL SYNDROME ON RIGHT: Primary | ICD-10-CM

## 2023-05-18 RX ORDER — LIDOCAINE HYDROCHLORIDE 5 MG/ML
1 INJECTION, SOLUTION INFILTRATION; PERINEURAL
Status: COMPLETED | OUTPATIENT
Start: 2023-05-18 | End: 2023-05-18

## 2023-05-18 RX ORDER — BETAMETHASONE SODIUM PHOSPHATE AND BETAMETHASONE ACETATE 3; 3 MG/ML; MG/ML
6 INJECTION, SUSPENSION INTRA-ARTICULAR; INTRALESIONAL; INTRAMUSCULAR; SOFT TISSUE
Status: COMPLETED | OUTPATIENT
Start: 2023-05-18 | End: 2023-05-18

## 2023-05-18 RX ADMIN — LIDOCAINE HYDROCHLORIDE 1 ML: 5 INJECTION, SOLUTION INFILTRATION; PERINEURAL at 09:56

## 2023-05-18 RX ADMIN — BETAMETHASONE SODIUM PHOSPHATE AND BETAMETHASONE ACETATE 6 MG: 3; 3 INJECTION, SUSPENSION INTRA-ARTICULAR; INTRALESIONAL; INTRAMUSCULAR; SOFT TISSUE at 09:56

## 2023-05-18 NOTE — PROGRESS NOTES
ORTHOPAEDIC HAND, WRIST, AND ELBOW OFFICE  VISIT       ASSESSMENT/PLAN:      Diagnoses and all orders for this visit:    Carpal tunnel syndrome on right  -     Ambulatory Referral to Orthopedic Surgery  -     Hand/upper extremity injection: R carpal tunnel        47-year-old male with right carpal tunnel syndrome    • Treatment options discussed which include nonoperative and operative management  We discussed use of splinting, therapy, activity modification, use of NSAIDs (oral and topical), and injections  We discussed risks and benefits of each and well as expected reasonable outcomes  Surgery can be considered following unsuccessful treatment with nonoperative management  • The patient was given the opportunity to ask questions  Questions were answered to the patient's satisfaction  • The patient decided to move forward with non operative treatment options via shared decision making  • The patient consented and underwent a right carpal tunnel steroid injection in the office today without any complications  He is aware he cannot undergo surgical intervention until 3 months after a steroid injection due to increased risk of infection  • The patient is a diabetic and was advised to monitor his blood sugars as the steroid injection can raise his blood sugar  • The patient is the main caregiver for his wife and will discuss help with family and friends for possible surgical intervention in the future  Follow Up:  8 weeks       To Do Next Visit:  Re-evaluation of current issue      ____________________________________________________________________________________________________________________________________________      CHIEF COMPLAINT:  Chief Complaint   Patient presents with   • Right Wrist - Pain       SUBJECTIVE:  Patient is a 82 y o  RHD male who presents today for evaluation and treatment of right hand numbness and tingling   The patient states this has been ongoing for the past few months  He notes numbness and tingling to his right thumb, index, and long fingers  He denies any numbness on the left  He does note triggering to his left index finger  The patient has tried night time splinting without relief  The patient has a EMG we are available to review  The patient is the main caregiver for his wife who is house bound and in a wheelchair              I have personally reviewed all the relevant PMH, PSH, SH, FH, Medications and allergies      PAST MEDICAL HISTORY:  Past Medical History:   Diagnosis Date   • Abnormal blood chemistry     resolved: 11/9/16   • Abnormal glucose     last assessed: 9/24/14   • Arthritis    • CAD (coronary artery disease)    • Chronic kidney disease     STONES  & CKD   • Coronary atherosclerosis    • DM2 (diabetes mellitus, type 2) (HCC)    • Dyslipidemia    • Facial nerve disorder    • HTN (hypertension)    • Hyperlipidemia     last assessed: 1/13/17   • Kidney stones    • Lumbosacral radiculopathy    • Nerve root and plexus disorder    • Obesity    • Osteoarthritis    • Prostate asymmetry    • Pure hypercholesterolemia        PAST SURGICAL HISTORY:  Past Surgical History:   Procedure Laterality Date   • CORONARY ANGIOPLASTY      1 STENT    • OK UNLISTED PROCEDURE ESOPHAGUS N/A 9/30/2021    Procedure: EGD; DIVERTICULECTOMY ZENKERS ENDOSCOPIC;  Surgeon: Norris Camargo MD;  Location: [facility];  Service: Thoracic   • UPPER GASTROINTESTINAL ENDOSCOPY         FAMILY HISTORY:  Family History   Problem Relation Age of Onset   • Cancer Mother    • Ovarian cancer Mother    • Hypertension Sister    • Lung disease Father         black   • Mental illness Neg Hx    • Substance Abuse Neg Hx        SOCIAL HISTORY:  Social History     Tobacco Use   • Smoking status: Never   • Smokeless tobacco: Never   Vaping Use   • Vaping Use: Never used   Substance Use Topics   • Alcohol use: Not Currently   • Drug use: No       MEDICATIONS:    Current Outpatient Medications:   •  aspirin 81 mg chewable tablet, Chew 81 mg daily  , Disp: , Rfl:   •  cyclobenzaprine (FLEXERIL) 5 mg tablet, Take 1 tablet (5 mg total) by mouth daily at bedtime as needed for muscle spasms, Disp: 30 tablet, Rfl: 0  •  ezetimibe (ZETIA) 10 mg tablet, Take 1 tablet (10 mg total) by mouth daily, Disp: 90 tablet, Rfl: 3  •  famotidine (PEPCID) 40 MG tablet, Take 1 tablet (40 mg total) by mouth daily, Disp: 90 tablet, Rfl: 1  •  Januvia 50 MG tablet, TAKE 1 TABLET BY MOUTH  DAILY, Disp: 90 tablet, Rfl: 3  •  metFORMIN (GLUCOPHAGE) 500 mg tablet, TAKE ONE TABLET BY MOUTH TWICE A DAY WITH MEALS (GLUCOPHAGE), Disp: 180 tablet, Rfl: 2  •  Multiple Vitamin (multivitamin) tablet, Take 1 tablet by mouth daily, Disp: , Rfl:   •  simvastatin (ZOCOR) 40 mg tablet, TAKE 1 TABLET BY MOUTH  DAILY AT BEDTIME, Disp: 90 tablet, Rfl: 3  •  tamsulosin (FLOMAX) 0 4 mg, Take 1 capsule (0 4 mg total) by mouth daily with dinner, Disp: 90 capsule, Rfl: 3  •  trandolapril (MAVIK) 1 MG tablet, TAKE ONE TABLET BY MOUTH EVERY DAY, Disp: 90 tablet, Rfl: 2    ALLERGIES:  Allergies   Allergen Reactions   • Other Other (See Comments)     REAPRO- SEVERELY LOWERED RBCS [facility]   • Ciprofloxacin Hives           REVIEW OF SYSTEMS:  Musculoskeletal:        As noted in HPI  All other systems reviewed and are negative      VITALS:  Vitals:    05/18/23 0924   BP: 165/95   Pulse: 60       LABS:  HgA1c:   Lab Results   Component Value Date    HGBA1C 6 6 (A) 04/25/2023     BMP:   Lab Results   Component Value Date    GLUCOSE 125 (H) 02/17/2017    CALCIUM 9 1 04/04/2023     02/17/2017    K 4 5 04/04/2023    CO2 30 04/04/2023     04/04/2023    BUN 27 (H) 04/04/2023    CREATININE 1 48 (H) 04/04/2023       _____________________________________________________  PHYSICAL EXAMINATION:  General: well developed and well nourished, alert, oriented times 3 and appears comfortable  Psychiatric: Normal  HEENT: Normocephalic, Atraumatic Trachea Midline, No torticollis  Pulmonary: No audible wheezing or respiratory distress   Abdomen/GI: Non tender, non distended   Cardiovascular: No pitting edema, 2+ radial pulse   Skin: No masses, erythema, lacerations, fluctation, ulcerations  Neurovascular: Sensation Intact to the Median, Ulnar, Radial Nerve, Motor Intact to the Median, Ulnar, Radial Nerve and Pulses Intact  Musculoskeletal: Normal, except as noted in detailed exam and in HPI  MUSCULOSKELETAL EXAMINATION:  Right hand   Sensation was not decreased in the median nerve distribution  Sensation was not decreased in the ulnar nerve distribution  There was not APB atrophy  There was no intrinsic atrophy  Tinel's at the wrist was Positive  Tinel's at the elbow was Negative  Wrist flexion compression was positive  Evaluation for lacertus syndrome was Negative  Left hand  Sensation was not decreased in the median nerve distribution  Sensation was not decreased in the ulnar nerve distribution  There was not APB atrophy  There was no intrinsic atrophy  Tinel's at the wrist was Negative  Tinel's at the elbow was Negative  Wrist flexion compression was negative  Evaluation for lacertus syndrome was Negative         ___________________________________________________  STUDIES REVIEWED:  EMG BUE performed on 2/2/23 demonstrate moderate right carpal tunnel syndrome  PROCEDURES PERFORMED:  Hand/upper extremity injection: R carpal tunnel  [place] Protocol:  Consent: Verbal consent obtained    Consent given by: patient  Patient identity confirmed: verbally with patient    Supporting Documentation  Indications: pain   Procedure Details  Condition:carpal tunnel syndrome Site: R carpal tunnel   Preparation: Patient was prepped and draped in the usual sterile fashion  Needle size: 25 G  Ultrasound guidance: no  Medications administered: 1 mL lidocaine 0 5 %; 6 mg betamethasone acetate-betamethasone sodium phosphate 6 (3-3) mg/mL    Patient tolerance: patient tolerated the procedure well with no immediate complications  Dressing:  Sterile dressing applied              _____________________________________________________      Scribe Attestation    I,:  Stephy Avery MA am acting as a scribe while in the presence of the attending physician :       I,:  Miladis Lewis MD personally performed the services described in this documentation    as scribed in my presence :

## 2023-05-19 ENCOUNTER — TELEPHONE (OUTPATIENT)
Dept: FAMILY MEDICINE CLINIC | Facility: CLINIC | Age: 83
End: 2023-05-19

## 2023-05-19 NOTE — TELEPHONE ENCOUNTER
DOUG: Patient saw Dr Yaakov Osborn and was given a wrist injection for his carpal tunnel  It has given him some relief already and he will be following up with her in July

## 2023-05-22 ENCOUNTER — SOCIAL WORK (OUTPATIENT)
Dept: BEHAVIORAL/MENTAL HEALTH CLINIC | Facility: CLINIC | Age: 83
End: 2023-05-22

## 2023-05-22 DIAGNOSIS — F43.20 ADJUSTMENT DISORDER, UNSPECIFIED TYPE: Primary | ICD-10-CM

## 2023-05-22 NOTE — PSYCH
"This note was not shared with the patient due to this is a psychotherapy note   Behavioral Health Psychotherapy Progress Note    Psychotherapy Provided: Individual Psychotherapy     1  Adjustment disorder, unspecified type            DATA: The undersigned therapist met with the above patient for our scheduled session  During today's session, Conchita Bourgeois reports he went to Dr Ane Dance for his hand/wrist injection and reports feeling better  Myronpeewee MorganChristelle states he has felt \"a throat bubble,\" that he feels when he coughs and was a bit concerned about it  Discussed to monitor it an dif it persists to discuss PCP  Conchita Bourgeois continues to care for Nina Amos and expressed more concern as Nina Amos is unable to get up from her chair  Conchita Christelle reports Nina Amos is unable to care for herself and we continue to discuss a home health aide  Conchita Morganidel 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  Myronpeewee MorganChristelle denies suicidal intent, gesture or ideation at this time  During this session, this clinician used the following therapeutic modalities: Supportive Psychotherapy    Substance Abuse was not addressed during this session  If the client is diagnosed with a co-occurring substance use disorder, please indicate any changes in the frequency or amount of use: N/A  Stage of change for addressing substance use diagnoses: No substance use/Not applicable    ASSESSMENT:  Aristides Ni presents with a Euthymic/ normal mood  his affect is Normal range and intensity, which is congruent, with his mood and the content of the session  The client has made progress on their goals  Aristides Ni presents with a none risk of suicide, none risk of self-harm, and none risk of harm to others  For any risk assessment that surpasses a \"low\" rating, a safety plan must be developed  A safety plan was indicated: no  If yes, describe in detail N/A    PLAN: Between sessions, Aristides Ni will continue to meet weekly   At the next session, the " therapist will use Supportive Psychotherapy to address his current stressors      Visit start and stop times:    05/22/23  Start Time: 0930  Stop Time: 1030  Total Visit Time: 60 minutes

## 2023-06-06 ENCOUNTER — SOCIAL WORK (OUTPATIENT)
Dept: BEHAVIORAL/MENTAL HEALTH CLINIC | Facility: CLINIC | Age: 83
End: 2023-06-06
Payer: COMMERCIAL

## 2023-06-06 DIAGNOSIS — F43.20 ADJUSTMENT DISORDER, UNSPECIFIED TYPE: Primary | ICD-10-CM

## 2023-06-06 PROCEDURE — 90834 PSYTX W PT 45 MINUTES: CPT | Performed by: SOCIAL WORKER

## 2023-06-12 NOTE — PSYCH
This note was not shared with the patient due to this is a psychotherapy note    Behavioral Health Psychotherapy Progress Note    Psychotherapy Provided: Individual Psychotherapy     1  Adjustment disorder, unspecified type              DATA: Vishnu Heller arrived late for today's session and apologized that he had forgotten  For today's session Vishnu Heller reports he's been feeling very tired lately and discussed his idea with purchasing a new urine collecting kit for Koko Heller states it's been getting harder for him to change Sanjuanita's diapers and he's been trying to encourage her to get up  Vishnu Heller was recommended to buy ramps or place them to help assist Koko be mobile and get outside  Vishnu Heller reports Koko continues to attend physical therapy in home  Vishnu Heller expressed his feelings about Koko and how frustrated he feels at times  Vishnu Heller reports he's been feeling overwhelmed over getting estimates for his room and having to make all these decisions on his own  Vishnu Heller appears annoyed with Sanjuanita's controlling behaviors, and states he ignores her now  Koko appears to not be content most of the time which frustrated Vishnu Heller denies suicidal intent, gesture or ideation at this time  During this session, this clinician used the following therapeutic modalities: Cognitive Behavioral Therapy and Supportive Psychotherapy    Substance Abuse was not addressed during this session  If the client is diagnosed with a co-occurring substance use disorder, please indicate any changes in the frequency or amount of use: N/A  Stage of change for addressing substance use diagnoses: No substance use/Not applicable    ASSESSMENT:  Guerrero Mccoy presents with a Euthymic/ normal mood  his affect is Normal range and intensity, which is congruent, with his mood and the content of the session  The client has not made progress on their goals      Guerrero Mccoy presents with a none risk of suicide, none risk of self-harm, and none risk of harm "to others  For any risk assessment that surpasses a \"low\" rating, a safety plan must be developed  A safety plan was indicated: no  If yes, describe in detail n a    PLAN: Between sessions, Liseth Fish will continue meeting as needed, continue to think about hiring a home health aide  At the next session, the therapist will use Cognitive Behavioral Therapy to address his current stressors        Visit start and stop times:    06/06/23  Start Time: 0955  Stop Time: 1036  Total Visit Time: 41 minutes  "

## 2023-06-13 ENCOUNTER — SOCIAL WORK (OUTPATIENT)
Dept: BEHAVIORAL/MENTAL HEALTH CLINIC | Facility: CLINIC | Age: 83
End: 2023-06-13
Payer: COMMERCIAL

## 2023-06-13 DIAGNOSIS — F43.20 ADJUSTMENT DISORDER, UNSPECIFIED TYPE: Primary | ICD-10-CM

## 2023-06-13 PROCEDURE — 90837 PSYTX W PT 60 MINUTES: CPT | Performed by: SOCIAL WORKER

## 2023-06-20 ENCOUNTER — SOCIAL WORK (OUTPATIENT)
Dept: BEHAVIORAL/MENTAL HEALTH CLINIC | Facility: CLINIC | Age: 83
End: 2023-06-20
Payer: COMMERCIAL

## 2023-06-20 DIAGNOSIS — F43.20 ADJUSTMENT DISORDER, UNSPECIFIED TYPE: Primary | ICD-10-CM

## 2023-06-20 PROCEDURE — 90837 PSYTX W PT 60 MINUTES: CPT | Performed by: SOCIAL WORKER

## 2023-06-20 NOTE — PSYCH
"This note was not shared with the patient due to this is a psychotherapy note     Behavioral Health Psychotherapy Progress Note    Psychotherapy Provided: Individual Psychotherapy     1  Adjustment disorder, unspecified type          DATA: The undersigned therapist assisted Kathryn Quintero process his feelings about his home health aide and how happy he is for Stan Atkins to have someone help her  Kathryn Leon states Ford Appiah will start working Wednesday and Stan Atkins continues to attend Physical Therapy  Kathryn Quintero states Stan Atkins walked from the chair to the kitchen and he was praising her  Kathryn Quintero also discussed his  career story and how he gave the keys to his principal who was upset with him  The undersigned therapist assisted Kathryn Quintero process his feelings about caring for Stan Atkins        During this session, this clinician used the following therapeutic modalities: Cognitive Behavioral Therapy and Supportive Psychotherapy    Substance Abuse was not addressed during this session  If the client is diagnosed with a co-occurring substance use disorder, please indicate any changes in the frequency or amount of use: N/A Kathryn Quintero does not use substances  Stage of change for addressing substance use diagnoses: No substance use/Not applicable    ASSESSMENT:  Hopkins Officer presents with a Euthymic/ normal mood  his affect is Normal range and intensity, which is congruent, with his mood and the content of the session  The client has made progress on their goals  Ada Officer presents with a none risk of suicide, none risk of self-harm, and none risk of harm to others  For any risk assessment that surpasses a \"low\" rating, a safety plan must be developed  A safety plan was indicated: no  If yes, describe in detail N/A    PLAN: Between sessions, Hopkins Officer will continue meeting as needed and hiring the health aide   At the next session, the therapist will use Cognitive Behavioral Therapy and Supportive Psychotherapy to address his " current stressors      Visit start and stop times:    06/13/23  Start Time: 0930  Stop Time: 1030  Total Visit Time: 60 minutes

## 2023-06-27 ENCOUNTER — SOCIAL WORK (OUTPATIENT)
Dept: BEHAVIORAL/MENTAL HEALTH CLINIC | Facility: CLINIC | Age: 83
End: 2023-06-27
Payer: COMMERCIAL

## 2023-06-27 DIAGNOSIS — F43.20 ADJUSTMENT DISORDER, UNSPECIFIED TYPE: Primary | ICD-10-CM

## 2023-06-27 PROCEDURE — 90837 PSYTX W PT 60 MINUTES: CPT | Performed by: SOCIAL WORKER

## 2023-06-27 NOTE — PSYCH
This note was not shared with the patient due to this is a psychotherapy note     Behavioral Health Psychotherapy Progress Note    Psychotherapy Provided: Individual Psychotherapy     1. Adjustment disorder, unspecified type            DATA: The undersigned therapist met with the above patient for our scheduled session. Jenny Duncan reports his left shoulder and arm has been hurting and continue to encourage seeing his PCP. Jenny Duncan reports Theoplis Ship the physical therapist continues to see Gus Galicia and is pleased with Sanjuanita's walking. In [address] continues to see Alida Rowe for speech therapy. Jenny Duncan continues to feel stressed over the roof leaking but states he is waiting for his step-son's friend to do the work. Jenny Duncan continues to care for Gus Galicia and Wednesday's have Theresa Omer as the home health aide. Jenny Duncan denies suicidal intent, gesture or ideation at this time. During this session, this clinician used the following therapeutic modalities: Cognitive Behavioral Therapy and Supportive Psychotherapy    Substance Abuse was not addressed during this session. ASSESSMENT:  Yazmin De Jesus presents with a Euthymic/ normal mood. his affect is Normal range and intensity, which is congruent, with his mood and the content of the session. The client has made progress on their goals. Yazmin De Jesus presents with a none risk of suicide, none risk of self-harm, and none risk of harm to others. For any risk assessment that surpasses a "low" rating, a safety plan must be developed. A safety plan was indicated: no  If yes, describe in detail N/A Maral denies suicidal intent, gesture or ideation at this time. PLAN: Between sessions, Yazmin De Jesus will continue meeting with undersigned therapist weekly. At the next session, the therapist will use Cognitive Behavioral Therapy to address her current stressors.       Visit start and stop times:    06/27/23  Start Time: 0930  Stop Time: 1030  Total Visit Time: 60 minutes

## 2023-07-03 DIAGNOSIS — N40.1 BPH WITH OBSTRUCTION/LOWER URINARY TRACT SYMPTOMS: ICD-10-CM

## 2023-07-03 DIAGNOSIS — N13.8 BPH WITH OBSTRUCTION/LOWER URINARY TRACT SYMPTOMS: ICD-10-CM

## 2023-07-03 RX ORDER — TAMSULOSIN HYDROCHLORIDE 0.4 MG/1
0.4 CAPSULE ORAL
Qty: 90 CAPSULE | Refills: 3 | Status: SHIPPED | OUTPATIENT
Start: 2023-07-03

## 2023-07-03 NOTE — PSYCH
This note was not shared with the patient due to this is a psychotherapy note     Behavioral Health Psychotherapy Progress Note    Psychotherapy Provided: Individual Psychotherapy     1. Adjustment disorder, unspecified type               DATA: The undersigned therapist met with the above patient for our scheduled session. Leela Araujo states he's been feeling tired today and has noticed continued pain on his shoulder. Writer recommended he inform his PCP And see her. Leela Araujo states Jose Luis Andersen had in-home bloodwork and Lisbeth Portillo (the home health aide) has been working Wednesday's and its' been a huge help for Leela Araujo. Leela Araujo states he called DR. Flores to increase Sanjuanita's Lasik pills due to her swollen legs and feet. Leela Araujo reports he's had a leak from the roof he's been meaning to fix, and will have to spend approx. 9K to fix it. Leela Araujo reports struggle sleeping due to financial stress and thinking about the improvement he has to make on the home. Leela Araujo denies suicidal intent, gesture or ideation at this time. During this session, this clinician used the following therapeutic modalities: Cognitive Behavioral Therapy and Supportive Psychotherapy    Substance Abuse was not addressed during this session. If the client is diagnosed with a co-occurring substance use disorder, please indicate any changes in the frequency or amount of use: N/A Leela Araujo does not drink or do drugs. Stage of change for addressing substance use diagnoses: No substance use/Not applicable    ASSESSMENT:  Carrie Buenrostro presents with a Euthymic/ normal mood. his affect is Normal range and intensity, which is congruent, with his mood and the content of the session. The client has made progress on their goals. Carrie Buenrostro presents with a none risk of suicide, none risk of self-harm, and none risk of harm to others. For any risk assessment that surpasses a "low" rating, a safety plan must be developed.     A safety plan was indicated: no  If yes, describe in detail N/A     PLAN: Between sessions, Carrie Buenrostro is encouraged to contact his PCP due to his current symptoms of shoulder pain. At the next session, the therapist will use Cognitive Behavioral Therapy and Supportive Psychotherapy to address his current stressors in caring for [facility] .     Visit start and stop times:    06/20/23  Start Time: 0930  Stop Time: 1030  Total Visit Time: 60 minutes

## 2023-07-11 ENCOUNTER — SOCIAL WORK (OUTPATIENT)
Dept: BEHAVIORAL/MENTAL HEALTH CLINIC | Facility: CLINIC | Age: 83
End: 2023-07-11
Payer: COMMERCIAL

## 2023-07-11 DIAGNOSIS — F43.20 ADJUSTMENT DISORDER, UNSPECIFIED TYPE: Primary | ICD-10-CM

## 2023-07-11 PROCEDURE — 90837 PSYTX W PT 60 MINUTES: CPT | Performed by: SOCIAL WORKER

## 2023-07-13 ENCOUNTER — OFFICE VISIT (OUTPATIENT)
Dept: OBGYN CLINIC | Facility: CLINIC | Age: 83
End: 2023-07-13
Payer: COMMERCIAL

## 2023-07-13 VITALS
WEIGHT: 213 LBS | SYSTOLIC BLOOD PRESSURE: 162 MMHG | HEART RATE: 78 BPM | BODY MASS INDEX: 33.43 KG/M2 | DIASTOLIC BLOOD PRESSURE: 68 MMHG | HEIGHT: 67 IN

## 2023-07-13 DIAGNOSIS — E11.22 TYPE 2 DIABETES MELLITUS WITH STAGE 3 CHRONIC KIDNEY DISEASE AND HYPERTENSION (HCC): ICD-10-CM

## 2023-07-13 DIAGNOSIS — I12.9 TYPE 2 DIABETES MELLITUS WITH STAGE 3 CHRONIC KIDNEY DISEASE AND HYPERTENSION (HCC): ICD-10-CM

## 2023-07-13 DIAGNOSIS — N18.30 TYPE 2 DIABETES MELLITUS WITH STAGE 3 CHRONIC KIDNEY DISEASE AND HYPERTENSION (HCC): ICD-10-CM

## 2023-07-13 DIAGNOSIS — G56.01 CARPAL TUNNEL SYNDROME ON RIGHT: Primary | ICD-10-CM

## 2023-07-13 PROCEDURE — 20526 THER INJECTION CARP TUNNEL: CPT | Performed by: ORTHOPAEDIC SURGERY

## 2023-07-13 PROCEDURE — 99214 OFFICE O/P EST MOD 30 MIN: CPT | Performed by: ORTHOPAEDIC SURGERY

## 2023-07-13 RX ORDER — LIDOCAINE HYDROCHLORIDE 10 MG/ML
1 INJECTION, SOLUTION INFILTRATION; PERINEURAL
Status: COMPLETED | OUTPATIENT
Start: 2023-07-13 | End: 2023-07-13

## 2023-07-13 RX ORDER — BETAMETHASONE SODIUM PHOSPHATE AND BETAMETHASONE ACETATE 3; 3 MG/ML; MG/ML
6 INJECTION, SUSPENSION INTRA-ARTICULAR; INTRALESIONAL; INTRAMUSCULAR; SOFT TISSUE
Status: COMPLETED | OUTPATIENT
Start: 2023-07-13 | End: 2023-07-13

## 2023-07-13 RX ADMIN — BETAMETHASONE SODIUM PHOSPHATE AND BETAMETHASONE ACETATE 6 MG: 3; 3 INJECTION, SUSPENSION INTRA-ARTICULAR; INTRALESIONAL; INTRAMUSCULAR; SOFT TISSUE at 10:15

## 2023-07-13 RX ADMIN — LIDOCAINE HYDROCHLORIDE 1 ML: 10 INJECTION, SOLUTION INFILTRATION; PERINEURAL at 10:15

## 2023-07-13 NOTE — PROGRESS NOTES
Assessment/Plan:  1. Carpal tunnel syndrome on right        2. 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 We discussed use of splinting, therapy, activity modification, use of NSAIDs (oral and topical), and injections. We discussed risks and benefits of each and well as expected reasonable outcomes. Surgery can be considered following unsuccessful treatment with nonoperative management. • The patient was given the opportunity to ask questions. Questions were answered to the patient's satisfaction. • The patient decided to move forward with corticosteroid injection via shared decision making. Injections are the best option for him given he is the primary caregiver for his wife. He understood that his blood sugars can become transiently elevated following the injection. • Follow up in 8 weeks for re-evaluation    Hand/upper extremity injection: R carpal tunnel  Pooler Protocol:  Consent: Verbal consent obtained. Risks and benefits: risks, benefits and alternatives were discussed  Consent given by: patient  Time out: Immediately prior to procedure a "time out" was called to verify the correct patient, procedure, equipment, support staff and site/side marked as required.   Patient understanding: patient states understanding of the procedure being performed  Site marked: the operative site was marked  Patient identity confirmed: verbally with patient    Supporting Documentation  Indications: pain   Procedure Details  Condition:carpal tunnel syndrome Site: R carpal tunnel   Preparation: Patient was prepped and draped in the usual sterile fashion  Needle size: 25 G  Ultrasound guidance: no  Medications administered: 1 mL lidocaine 1 %; 6 mg betamethasone acetate-betamethasone sodium phosphate 6 (3-3) mg/mL    Patient tolerance: patient tolerated the procedure well with no immediate complications  Dressing:  Sterile dressing applied       CC: Right carpal tunnel syndrome    Subjective:   Gonzales Sears is a right hand dominant 82 y.o. male who presents with right carpal tunnel syndrome. He received a corticosteroid injection on 5/18/2023. He reports the numbness and tingling has reduced significantly in the right index and long fingers. However, he is still experiencing numbness in the right thumb. He notes difficulty with pinching and gripping with the thumb. He denies any over the counter pain medications. He denies any recent injuries or traumas. Review of Systems   Constitutional: Negative for chills and fever. HENT: Negative for ear pain and sore throat. Eyes: Negative for pain and visual disturbance. Respiratory: Negative for cough and shortness of breath. Cardiovascular: Negative for chest pain and palpitations. Gastrointestinal: Negative for abdominal pain and vomiting. Genitourinary: Negative for dysuria and hematuria. Musculoskeletal: Negative for arthralgias and back pain. Skin: Negative for color change and rash. Neurological: Negative for seizures and syncope. All other systems reviewed and are negative.       Past Medical History:   Diagnosis Date   • Abnormal blood chemistry     resolved: 11/9/16   • Abnormal glucose     last assessed: 9/24/14   • Arthritis    • CAD (coronary artery disease)    • Chronic kidney disease     STONES  & CKD   • Coronary atherosclerosis    • DM2 (diabetes mellitus, type 2) ([facility])    • Dyslipidemia    • Facial nerve disorder    • HTN (hypertension)    • Hyperlipidemia     last assessed: 1/13/17   • Kidney stones    • Lumbosacral radiculopathy    • Nerve root and plexus disorder    • Obesity    • Osteoarthritis    • Prostate asymmetry    • Pure hypercholesterolemia        Past Surgical History:   Procedure Laterality Date   • CORONARY ANGIOPLASTY      1 STENT    • LA UNLISTED PROCEDURE ESOPHAGUS N/A 9/30/2021 Procedure: EGD; DIVERTICULECTOMY [facility];  Surgeon: Kristine Jackson MD;  Location: [facility];  Service: Thoracic   • UPPER GASTROINTESTINAL ENDOSCOPY         Family History   Problem Relation Age of Onset   • Cancer Mother    • Ovarian cancer Mother    • Hypertension Sister    • Lung disease Father         black   • Mental illness Neg Hx    • Substance Abuse Neg Hx        Social History     Occupational History   • Not on file   Tobacco Use   • Smoking status: Never   • Smokeless tobacco: Never   Vaping Use   • Vaping Use: Never used   Substance and Sexual Activity   • Alcohol use: Not Currently   • Drug use: No   • Sexual activity: Not on file         Current Outpatient Medications:   •  aspirin 81 mg chewable tablet, Chew 81 mg daily  , Disp: , Rfl:   •  cyclobenzaprine (FLEXERIL) 5 mg tablet, Take 1 tablet (5 mg total) by mouth daily at bedtime as needed for muscle spasms, Disp: 30 tablet, Rfl: 0  •  ezetimibe (ZETIA) 10 mg tablet, Take 1 tablet (10 mg total) by mouth daily, Disp: 90 tablet, Rfl: 3  •  famotidine (PEPCID) 40 MG tablet, Take 1 tablet (40 mg total) by mouth daily, Disp: 90 tablet, Rfl: 1  •  Januvia 50 MG tablet, TAKE 1 TABLET BY MOUTH  DAILY, Disp: 90 tablet, Rfl: 3  •  metFORMIN (GLUCOPHAGE) 500 mg tablet, TAKE ONE TABLET BY MOUTH TWICE A DAY WITH MEALS (GLUCOPHAGE), Disp: 180 tablet, Rfl: 2  •  Multiple Vitamin (multivitamin) tablet, Take 1 tablet by mouth daily, Disp: , Rfl:   •  simvastatin (ZOCOR) 40 mg tablet, TAKE 1 TABLET BY MOUTH  DAILY AT BEDTIME, Disp: 90 tablet, Rfl: 3  •  tamsulosin (FLOMAX) 0.4 mg, Take 1 capsule (0.4 mg total) by mouth daily with dinner, Disp: 90 capsule, Rfl: 3  •  trandolapril (MAVIK) 1 MG tablet, TAKE ONE TABLET BY MOUTH EVERY DAY, Disp: 90 tablet, Rfl: 2    Allergies   Allergen Reactions   • Other Other (See Comments)     REAPRO- [facility]   • Ciprofloxacin Hives       Objective:   There were no vitals filed for this visit.    The patient was awake, alert, and oriented to person, place, and time. No acute distress. Normocephalic. EOMI. Mucous membranes moist.  Normal respiratory effort. Examination of the affected extremity was compared to the unaffected extremity. Skin was intact. No swelling or ecchymosis. No deformity. Hand and fingers were warm and well-perfused. Capillary refill was brisk. Full active range of motion of the elbows, forearms, wrists, and fingers. 5/5 wrist flexor/extensors and . Tinel's at the wrist negative. Wrist flexion compression was negative. Sensation in the median nerve was comparable to the contralateral hand and ulnar nerve, although right thumb sensation was slightly decreased. Imaging/Diagnostic Studies:    No studies to review    This document was created using speech voice recognition software. Grammatical errors, random word insertions, pronoun errors, and incomplete sentences are an occasional consequence of this system due to software limitations, ambient noise, and hardware issues. Any formal questions or concerns about content, text, or information contained within the body of this dictation should be directly addressed to the provider for clarification.     Scribe Attestation    I,:  Lorena Gray am acting as a scribe while in the presence of the attending physician.:       I,:  Kiana Ambrosio MD personally performed the services described in this documentation    as scribed in my presence.:

## 2023-07-17 NOTE — PSYCH
This note was not shared with the patient due to this is a psychotherapy note     Behavioral Health Psychotherapy Progress Note    Psychotherapy Provided: Individual Psychotherapy     1. Adjustment disorder, unspecified type            DATA: The undersigned therapist met with the above patient for our scheduled session. For today's session, Sally Kim reports Kimbers legs have been swollen and bruises on her legs. She has an appointment with Dr. Halle Stiles and we discussed what to tell the doctor. Sally Kim would like to have a break from Nazia Ac and we discussed resting if she went to the hospital given her health issues. Sally Kim reports feeling concerned for [employer] as she's been forgetful and reports she's been upset and overwhelmed. Sally Kim states Julissa Montanez continues to help her and has been worried that Nazia Ac won't get into the car for transportation today. Sally Kim denies suicidal intent, gesture or ideation at this time. During this session, this clinician used the following therapeutic modalities: Cognitive Behavioral Therapy    Substance Abuse was not addressed during this session. ASSESSMENT:  Familia Small presents with a Euthymic/ normal mood. his affect is Normal range and intensity, which is congruent, with his mood and the content of the session. The client has made progress on their goals. Familia Small presents with a none risk of suicide, none risk of self-harm, and none risk of harm to others. For any risk assessment that surpasses a "low" rating, a safety plan must be developed. A safety plan was indicated: no  If yes, describe in detail N/a denies suicidal intent, gesture or ideation at this time. PLAN: Between sessions, Familia Small will continue meeting as needed. At the next session, the therapist will use Cognitive Behavioral Therapy to address her current stressors.     Visit start and stop times:    07/11/23  Start Time: 0930  Stop Time: 1030  Total Visit Time: 60 minutes

## 2023-07-18 ENCOUNTER — SOCIAL WORK (OUTPATIENT)
Dept: BEHAVIORAL/MENTAL HEALTH CLINIC | Facility: CLINIC | Age: 83
End: 2023-07-18
Payer: COMMERCIAL

## 2023-07-18 DIAGNOSIS — F43.20 ADJUSTMENT DISORDER, UNSPECIFIED TYPE: Primary | ICD-10-CM

## 2023-07-18 PROCEDURE — 90837 PSYTX W PT 60 MINUTES: CPT | Performed by: SOCIAL WORKER

## 2023-07-25 ENCOUNTER — SOCIAL WORK (OUTPATIENT)
Dept: BEHAVIORAL/MENTAL HEALTH CLINIC | Facility: CLINIC | Age: 83
End: 2023-07-25
Payer: COMMERCIAL

## 2023-07-25 DIAGNOSIS — F43.20 ADJUSTMENT DISORDER, UNSPECIFIED TYPE: Primary | ICD-10-CM

## 2023-07-25 PROCEDURE — 90837 PSYTX W PT 60 MINUTES: CPT | Performed by: SOCIAL WORKER

## 2023-07-26 NOTE — PSYCH
This note was not shared with the patient due to this is a psychotherapy note     Behavioral Health Psychotherapy Progress Note    Psychotherapy Provided: Individual Psychotherapy     1. Adjustment disorder, unspecified type              DATA: The undersigned therapist met with the above patient for our scheduled session. For today, Zully Todd states he recently had another injection put into his right hand, which has helped almost all his fingers. However, reports his thumb continues to be in pain. Zully Todd states that Alejandra Horn, the home health aide, has been a great asset and very helpful for him. Tali Golden has been sleeping well and taking  Medications as prescribed but Zully Todd continues to worry about her health. Zully Todd decided to change the roof and is waiting for his step son Darleen Monique, have his friend go over and check the house. Zully Todd continues to share history of his childhood and his feeling about Tali Golden. Zully Todd denies suicidal intent, gesture or ideation at this time. During this session, this clinician used the following therapeutic modalities: Cognitive Behavioral Therapy    Substance Abuse was not addressed during this session. ASSESSMENT:  Aishwarya Caballero presents with a Euthymic/ normal mood. his affect is Normal range and intensity, which is congruent, with his mood and the content of the session. The client has made progress on their goals. Aishwarya Caballero presents with a none risk of suicide, none risk of self-harm, and none risk of harm to others. For any risk assessment that surpasses a "low" rating, a safety plan must be developed. A safety plan was indicated: no  If yes, describe in detail n/a    PLAN: Between sessions, Aishwarya Caballero will continue meeting as needed. At the next session, the therapist will use Cognitive Behavioral Therapy to address his current stressors.     Visit start and stop times:    07/18/23  Start Time: 0930  Stop Time: 1030  Total Visit Time: 60 minutes

## 2023-07-29 ENCOUNTER — OFFICE VISIT (OUTPATIENT)
Dept: FAMILY MEDICINE CLINIC | Facility: CLINIC | Age: 83
End: 2023-07-29
Payer: COMMERCIAL

## 2023-07-29 VITALS
DIASTOLIC BLOOD PRESSURE: 76 MMHG | HEIGHT: 67 IN | SYSTOLIC BLOOD PRESSURE: 124 MMHG | BODY MASS INDEX: 34.21 KG/M2 | HEART RATE: 65 BPM | TEMPERATURE: 98.2 F | RESPIRATION RATE: 18 BRPM | WEIGHT: 218 LBS | OXYGEN SATURATION: 95 %

## 2023-07-29 DIAGNOSIS — M75.22 BICEPS TENDINITIS OF LEFT UPPER EXTREMITY: Primary | ICD-10-CM

## 2023-07-29 PROCEDURE — 99213 OFFICE O/P EST LOW 20 MIN: CPT | Performed by: FAMILY MEDICINE

## 2023-07-29 NOTE — PROGRESS NOTES
Coleman Osman 1940 male MRN: [id number]    FAMILY PRACTICE OFFICE VISIT  [facility] - [address]      ASSESSMENT/PLAN  Coleman Osman is a 82 y.o. male presents to the office for    Diagnoses and all orders for this visit:    Biceps tendinitis of left upper extremity       Recommend Voltaren gel twice a day as needed  Recommend PT if no improvement  Likely caused by assisting his wife into positions.   IF no improvement with [facility] input needed    RTC as needed           Future Appointments   Date Time Provider [address]   8/8/2023 11:30 AM Penny Shine, LCSW Psych [facility]   8/15/2023 11:30 AM Penny Shine, LCSW Psych [facility]   8/22/2023 11:30 AM Penny Shine, LCSW Psych [facility]   8/29/2023 11:30 AM Penny Shine, LCSW Psych [facility]   9/5/2023 11:30 AM Penny Shine, LCSW Psych [facility]   9/7/2023 10:15 AM Perico Plunkett MD [facility]   9/12/2023 11:30 AM Penny Shine, LCSW Psych [facility]   9/19/2023 11:30 AM Penny Shine, LCSW Psych [facility]   9/26/2023 11:30 AM Penny Shine, LCSW Psych [facility]   10/3/2023 11:30 AM Penny Shine, LCSW Psych [facility]   10/10/2023 11:30 AM Penny Shine, LCSW Psych [facility]   10/17/2023 11:30 AM Penny Shine, LCSW Psych [facility]   10/24/2023 11:30 AM Penny Shine, LCSW Psych [facility]   10/31/2023 11:30 AM Penny Shine, LCSW Psych [facility]   11/7/2023 11:30 AM Penny Shine, LCSW Psych [facility]   11/14/2023 11:30 AM Penny Shine, LCSW Psych [facility]   11/14/2023  2:40 PM Danni Olsen MD [facility]   11/21/2023 11:30 AM BRIGITTE Mercado [facility]   11/28/2023 11:30 AM BRIGITTE Mercado [facility]   12/5/2023 11:30 AM Yulisa Reis LCSW Psych [facility]   12/12/2023 11:30 AM Yulisa Reis LCSW [facility]   12/19/2023 11:30 AM Yulisa Reis LCSW Psych [facility]   12/26/2023 11:30 AM Ozzylast Chato BRIGITTE Psych [facility]          SUBJECTIVE  CC: Arm Pain (Left arm pain x 2 months)      HPI:  Atilio Aguilar is a 82 y.o. male who presents for 2 weeks of persistent left arm pain that comes and goes. Hurts around bicep. Was advised by therpist to come in for evaluation. Patient states that he is consistenly pulling up and lifting his wife to care for her. Denies weakness to the arm. Review of Systems   Constitutional: Negative for activity change, appetite change, chills, fatigue and fever. HENT: Negative for congestion. Respiratory: Negative for cough, chest tightness and shortness of breath. Cardiovascular: Negative for chest pain and leg swelling. Gastrointestinal: Negative for abdominal distention, abdominal pain, constipation, diarrhea, nausea and vomiting. Musculoskeletal: Positive for arthralgias. All other systems reviewed and are negative.       Historical Information   The patient history was reviewed as follows:  Past Medical History:   Diagnosis Date   • Abnormal blood chemistry     resolved: 11/9/16   • Abnormal glucose     last assessed: 9/24/14   • Arthritis    • CAD (coronary artery disease)    • Chronic kidney disease     STONES  & CKD   • Coronary atherosclerosis    • DM2 (diabetes mellitus, type 2) (HCC)    • Dyslipidemia    • Facial nerve disorder    • HTN (hypertension)    • Hyperlipidemia     last assessed: 1/13/17   • Kidney stones    • Lumbosacral radiculopathy    • Nerve root and plexus disorder    • Obesity    • Osteoarthritis    • Prostate asymmetry    • Pure hypercholesterolemia          Medications:     Current Outpatient Medications:   •  aspirin 81 mg chewable tablet, Chew 81 mg daily , Disp: , Rfl:   •  cyclobenzaprine (FLEXERIL) 5 mg tablet, Take 1 tablet (5 mg total) by mouth daily at bedtime as needed for muscle spasms, Disp: 30 tablet, Rfl: 0  •  ezetimibe (ZETIA) 10 mg tablet, Take 1 tablet (10 mg total) by mouth daily, Disp: 90 tablet, Rfl: 3  •  famotidine (PEPCID) 40 MG tablet, Take 1 tablet (40 mg total) by mouth daily, Disp: 90 tablet, Rfl: 1  •  Januvia 50 MG tablet, TAKE 1 TABLET BY MOUTH  DAILY, Disp: 90 tablet, Rfl: 3  •  metFORMIN (GLUCOPHAGE) 500 mg tablet, TAKE ONE TABLET BY MOUTH TWICE A DAY WITH MEALS (GLUCOPHAGE), Disp: 180 tablet, Rfl: 2  •  Multiple Vitamin (multivitamin) tablet, Take 1 tablet by mouth daily, Disp: , Rfl:   •  simvastatin (ZOCOR) 40 mg tablet, TAKE 1 TABLET BY MOUTH  DAILY AT BEDTIME, Disp: 90 tablet, Rfl: 3  •  tamsulosin (FLOMAX) 0.4 mg, Take 1 capsule (0.4 mg total) by mouth daily with dinner, Disp: 90 capsule, Rfl: 3  •  trandolapril (MAVIK) 1 MG tablet, TAKE ONE TABLET BY MOUTH EVERY DAY, Disp: 90 tablet, Rfl: 2    Allergies   Allergen Reactions   • Other Other (See Comments)     REAPRO- SEVERELY LOWERED RBCS [facility]   • Ciprofloxacin Hives       OBJECTIVE  Vitals:   Vitals:    07/29/23 0938   BP: 124/76   BP Location: Left arm   Patient Position: Sitting   Cuff Size: Large   Pulse: 65   Resp: 18   Temp: 98.2 °F (36.8 °C)   SpO2: 95%   Weight: 98.9 kg (218 lb)   Height: 5' 7" (1.702 m)         Physical Exam  Constitutional:       Appearance: Normal appearance. Pulmonary:      Effort: Pulmonary effort is normal.   Musculoskeletal:         General: Tenderness (swelling and tender over left bicep) present. Normal range of motion. Neurological:      General: No focal deficit present. Mental Status: He is alert and oriented to person, place, and time. Psychiatric:         Mood and Affect: Mood normal.         Behavior: Behavior normal.         Thought Content:  Thought content normal.         Judgment: Judgment normal. Mike Price MD,   [facility]  8/5/2023

## 2023-08-01 ENCOUNTER — SOCIAL WORK (OUTPATIENT)
Dept: BEHAVIORAL/MENTAL HEALTH CLINIC | Facility: CLINIC | Age: 83
End: 2023-08-01
Payer: COMMERCIAL

## 2023-08-01 DIAGNOSIS — F43.20 ADJUSTMENT DISORDER, UNSPECIFIED TYPE: Primary | ICD-10-CM

## 2023-08-01 PROCEDURE — 90837 PSYTX W PT 60 MINUTES: CPT | Performed by: SOCIAL WORKER

## 2023-08-03 NOTE — PSYCH
This note was not shared with the patient due to this is a psychotherapy note     Behavioral Health Psychotherapy Progress Note    Psychotherapy Provided: Individual Psychotherapy     1. Adjustment disorder, unspecified type            DATA: The undersigned therapist met with the above patient for our scheduled session. Otilio Hernández reports his step-son Amie Garcia came by the house this weekend and it was an unexpected surprise. Otilio Hernández discussed Sanjuanita's progress and the goal of physical therapy helping her get out of the house for her upcoming doctor's appointment. Otilio Hernández continues to share some of his stories and states one of his friends Christie Sow has been interested in meeting and we discussed scheduling it. Otilio Hernández continues to struggle with making the decision of changing the roof as he's unsure the expense would be necessary. Otilio Hernández recently saw  for his shoulder that was in pain, and reports he will do what Mark suggested and hopes it eases his pain. Otilio Hernández denies suicidal intent, gesture or ideation at this time. During this session, this clinician used the following therapeutic modalities: Cognitive Behavioral Therapy    Substance Abuse was not addressed during this session. ASSESSMENT:  Fernando Barragan presents with a Euthymic/ normal mood. his affect is Normal range and intensity, which is congruent, with his mood and the content of the session. The client has made progress on their goals. Fernando Barragan presents with a none risk of suicide, none risk of self-harm, and none risk of harm to others. For any risk assessment that surpasses a "low" rating, a safety plan must be developed. A safety plan was indicated: no  If yes, describe in detail N/A    PLAN: Between sessions, Fernando Barragan will will continue to take medications as prescribed by their primary care physician or psychiatrist and meet individually for therapy as needed. . At the next session, the therapist will use Cognitive Behavioral Therapy to address his current stressors.       Visit start and stop times:    08/01/23  Start Time: 1130  Stop Time: 1230  Total Visit Time: 60 minutes
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
## 2023-08-08 ENCOUNTER — SOCIAL WORK (OUTPATIENT)
Dept: BEHAVIORAL/MENTAL HEALTH CLINIC | Facility: CLINIC | Age: 83
End: 2023-08-08
Payer: COMMERCIAL

## 2023-08-08 DIAGNOSIS — F43.20 ADJUSTMENT DISORDER, UNSPECIFIED TYPE: Primary | ICD-10-CM

## 2023-08-08 PROCEDURE — 90837 PSYTX W PT 60 MINUTES: CPT | Performed by: SOCIAL WORKER

## 2023-08-10 NOTE — TELEPHONE ENCOUNTER
----- Message from Garcia Avalos MD sent at 3/3/2022  4:15 PM EST -----  As compared to previous echo patient's heart function has slightly decreased  Otherwise no significant change  He has some extra beats noted  Continue medication and keep appointment  Pt's Patient's stress test is normal    Patient can keep regular appointment  Please call patient with the result  0

## 2023-08-13 DIAGNOSIS — I10 BENIGN ESSENTIAL HTN: ICD-10-CM

## 2023-08-14 ENCOUNTER — OFFICE VISIT (OUTPATIENT)
Dept: FAMILY MEDICINE CLINIC | Facility: CLINIC | Age: 83
End: 2023-08-14
Payer: COMMERCIAL

## 2023-08-14 ENCOUNTER — APPOINTMENT (OUTPATIENT)
Dept: RADIOLOGY | Facility: CLINIC | Age: 83
End: 2023-08-14
Payer: COMMERCIAL

## 2023-08-14 VITALS
HEIGHT: 67 IN | WEIGHT: 216.8 LBS | TEMPERATURE: 98 F | DIASTOLIC BLOOD PRESSURE: 80 MMHG | BODY MASS INDEX: 34.03 KG/M2 | OXYGEN SATURATION: 97 % | HEART RATE: 46 BPM | SYSTOLIC BLOOD PRESSURE: 116 MMHG | RESPIRATION RATE: 16 BRPM

## 2023-08-14 DIAGNOSIS — M54.50 LUMBAR PAIN: ICD-10-CM

## 2023-08-14 DIAGNOSIS — M54.50 LUMBAR PAIN: Primary | ICD-10-CM

## 2023-08-14 LAB
SL AMB  POCT GLUCOSE, UA: NORMAL
SL AMB LEUKOCYTE ESTERASE,UA: NORMAL
SL AMB POCT BILIRUBIN,UA: NORMAL
SL AMB POCT BLOOD,UA: NORMAL
SL AMB POCT CLARITY,UA: CLEAR
SL AMB POCT COLOR,UA: YELLOW
SL AMB POCT KETONES,UA: NORMAL
SL AMB POCT NITRITE,UA: NORMAL
SL AMB POCT PH,UA: 5
SL AMB POCT SPECIFIC GRAVITY,UA: 1025
SL AMB POCT URINE PROTEIN: NORMAL
SL AMB POCT UROBILINOGEN: NORMAL

## 2023-08-14 PROCEDURE — 81003 URINALYSIS AUTO W/O SCOPE: CPT | Performed by: FAMILY MEDICINE

## 2023-08-14 PROCEDURE — 72110 X-RAY EXAM L-2 SPINE 4/>VWS: CPT

## 2023-08-14 PROCEDURE — 99213 OFFICE O/P EST LOW 20 MIN: CPT | Performed by: FAMILY MEDICINE

## 2023-08-14 RX ORDER — TRANDOLAPRIL TABLETS 1 MG/1
TABLET ORAL
Qty: 90 TABLET | Refills: 2 | Status: SHIPPED | OUTPATIENT
Start: 2023-08-14

## 2023-08-14 NOTE — PROGRESS NOTES
Alanna Alonzo 1940 male MRN: [id number]    FAMILY PRACTICE OFFICE VISIT  [facility] - [address]      ASSESSMENT/PLAN  Alanna Alonzo is a 82 y.o. male presents to the office for    Diagnoses and all orders for this visit:    Lumbar pain  -     XR spine lumbar minimum 4 views non injury; Future  -     POCT urine dip auto non-scope    Send patient for x-ray downstairs  Urinary symptoms were normal  Please see results in media  Highly recommend Flexeril to be used as needed.   Recommend chiropractor  If no improvement will need to see pain management for evaluation           Future Appointments   Date Time Provider [address]   8/22/2023 11:30 AM Noah Army, LCSW Psych [facility]   8/29/2023 11:30 AM Noah Army, LCSW Psych [facility]   9/5/2023 11:30 AM Noah Army, LCSW Psych [facility]   9/7/2023 10:15 AM Karina Rivera MD [facility]   9/12/2023 11:30 AM Noah Army, LCSW Psych [facility]   9/19/2023 11:30 AM Noah Army, LCSW Psych [facility]   9/26/2023 11:30 AM Noah Army, LCSW Psych [facility]   10/3/2023 11:30 AM Noah Army, LCSW Psych [facility]   10/10/2023 11:30 AM Noah Army, LCSW Psych [facility]   10/17/2023 11:30 AM Noah Army, LCSW Psych [facility]   10/24/2023 11:30 AM Noah Army, LCSW Psych [facility]   10/31/2023 11:30 AM Noah Army, LCSW Psych [facility]   11/7/2023 11:30 AM Noah Army, LCSW Psych [facility]   11/14/2023 11:30 AM Noah Army, LCSW Psych [facility]   11/14/2023  2:40 PM Neil Leary MD [facility]   11/21/2023 11:30 AM Noah Elizabeth LCSW Psych [facility]   11/28/2023 11:30 AM Noah Elizabeth LCSW Psych [facility]   12/5/2023 11:30 AM Amber Blandon Norm Glory Psych [facility]   12/12/2023 11:30 AM Owosso Sun BRIGITTE Psych [facility]   12/19/2023 11:30 AM CANDIDO RobleroDENA Psych [facility]   12/26/2023 11:30 AM Owossocarlos Garsia, BRIGITTE Psych [facility]          SUBJECTIVE  CC: Back Pain (Right sided back pain x 2 or 3 months )      HPI:  Apolinar Fields is a 82 y.o. male who presents for an acute appointment. Patient states about 3 months he has been having right-sided back pain. States that his flank is concerning he feels like he might have kidney problems he does see a nephrologist.  Not due for blood work till February. Patient is one of the sole caretakers of his family. Has not seen his chiropractor in some time    Review of Systems   Constitutional: Negative for activity change, appetite change, chills, fatigue and fever. HENT: Negative for congestion. Respiratory: Negative for cough, chest tightness and shortness of breath. Cardiovascular: Negative for chest pain and leg swelling. Gastrointestinal: Negative for abdominal distention, abdominal pain, constipation, diarrhea, nausea and vomiting. Musculoskeletal: Positive for back pain. All other systems reviewed and are negative.       Historical Information   The patient history was reviewed as follows:  Past Medical History:   Diagnosis Date   • Abnormal blood chemistry     resolved: 11/9/16   • Abnormal glucose     last assessed: 9/24/14   • Arthritis    • CAD (coronary artery disease)    • Chronic kidney disease     STONES  & CKD   • Coronary atherosclerosis    • DM2 (diabetes mellitus, type 2) ([facility])    • Dyslipidemia    • Facial nerve disorder    • HTN (hypertension)    • Hyperlipidemia     last assessed: 1/13/17   • Kidney stones    • Lumbosacral radiculopathy    • Nerve root and plexus disorder    • Obesity    • Osteoarthritis    • Prostate asymmetry    • Pure hypercholesterolemia          Medications:     Current Outpatient Medications:   •  aspirin 81 mg chewable tablet, Chew 81 mg daily  , Disp: , Rfl:   •  cyclobenzaprine (FLEXERIL) 5 mg tablet, Take 1 tablet (5 mg total) by mouth daily at bedtime as needed for muscle spasms, Disp: 30 tablet, Rfl: 0  •  ezetimibe (ZETIA) 10 mg tablet, Take 1 tablet (10 mg total) by mouth daily, Disp: 90 tablet, Rfl: 3  •  famotidine (PEPCID) 40 MG tablet, Take 1 tablet (40 mg total) by mouth daily, Disp: 90 tablet, Rfl: 1  •  Januvia 50 MG tablet, TAKE 1 TABLET BY MOUTH  DAILY, Disp: 90 tablet, Rfl: 3  •  metFORMIN (GLUCOPHAGE) 500 mg tablet, TAKE ONE TABLET BY MOUTH TWICE A DAY WITH MEALS (GLUCOPHAGE), Disp: 180 tablet, Rfl: 2  •  Multiple Vitamin (multivitamin) tablet, Take 1 tablet by mouth daily, Disp: , Rfl:   •  simvastatin (ZOCOR) 40 mg tablet, TAKE 1 TABLET BY MOUTH  DAILY AT BEDTIME, Disp: 90 tablet, Rfl: 3  •  tamsulosin (FLOMAX) 0.4 mg, Take 1 capsule (0.4 mg total) by mouth daily with dinner, Disp: 90 capsule, Rfl: 3  •  trandolapril (MAVIK) 1 MG tablet, TAKE ONE TABLET BY MOUTH EVERY DAY, Disp: 90 tablet, Rfl: 2    Allergies   Allergen Reactions   • Other Other (See Comments)     REAPRO- SEVERELY LOWERED RBCS [facility]   • Ciprofloxacin Hives       OBJECTIVE  Vitals:   Vitals:    08/14/23 1030   BP: 116/80   BP Location: Left arm   Patient Position: Sitting   Cuff Size: Large   Pulse: (!) 46   Resp: 16   Temp: 98 °F (36.7 °C)   SpO2: 97%   Weight: 98.3 kg (216 lb 12.8 oz)   Height: 5' 7" (1.702 m)         Physical Exam  Constitutional:       Appearance: Normal appearance. Pulmonary:      Effort: Pulmonary effort is normal.   Abdominal:      Tenderness: There is no left CVA tenderness. Musculoskeletal:         General: Tenderness present. Normal range of motion. Comments: Lumbar pain reproduced, and muscle tension over the right     Neurological:      General: No focal deficit present. Mental Status: He is alert and oriented to person, place, and time. Psychiatric:         Mood and Affect: Mood normal.         Behavior: Behavior normal.         Thought Content:  Thought content normal.         Judgment: Judgment normal.                    Jenny Sánchez MD,   [facility]  8/14/2023

## 2023-08-22 ENCOUNTER — SOCIAL WORK (OUTPATIENT)
Dept: BEHAVIORAL/MENTAL HEALTH CLINIC | Facility: CLINIC | Age: 83
End: 2023-08-22
Payer: COMMERCIAL

## 2023-08-22 DIAGNOSIS — F43.20 ADJUSTMENT DISORDER, UNSPECIFIED TYPE: Primary | ICD-10-CM

## 2023-08-22 PROCEDURE — 90837 PSYTX W PT 60 MINUTES: CPT | Performed by: SOCIAL WORKER

## 2023-08-23 NOTE — PSYCH
This note was not shared with the patient due to this is a psychotherapy note     Behavioral Health Psychotherapy Progress Note    Psychotherapy Provided: Individual Psychotherapy     1. Adjustment disorder, unspecified type              DATA: The undersigned therapist met with the above patient for our scheduled session. Casie Braxton reports today that  Anson Newsome was unable to walk and not even for physical therapy which usually motivates her. Casie Braxton states he's been concerned that Sanjuanita's back hurts and has an appointment scheduled with Dr. Regis Gallagher coming up. Casie Braxton shared information on a new electric plug and states he's saved money on his electricity. Casie Braxton discussed history with his friends and talking about his friend Markus Mcclellan whose been bed ridden. The undersigned therapist encouraged he visit Markus Mcclellan in person and Casie Braxton agreed. Casie Braxton reports he will try and visit Markus Mcclellan tomorrow and make an effort as Abdifatah's wife asked. Casie Braxton continues to care for Anson Newsome and express some shoulder pain and hand pain but will wait to address his issues. Casie Braxton denies suicidal intent, gesture or ideation at this time. During this session, this clinician used the following therapeutic modalities: Cognitive Behavioral Therapy    Substance Abuse was not addressed during this session. ASSESSMENT:  Padmini Diaz presents with a Euthymic/ normal mood. his affect is Normal range and intensity, which is congruent, with his mood and the content of the session. The client has made progress on their goals. Padmini Diaz presents with a none risk of suicide, none risk of self-harm, and none risk of harm to others. For any risk assessment that surpasses a "low" rating, a safety plan must be developed. A safety plan was indicated: no  If yes, describe in detail n/A Casie Braxton denies suicidal intent, gesture or ideation at this time. PLAN: Between sessions, Padmini Diaz will continue to meet with undersigned therapist as scheduled.  At the next session, the therapist will use Cognitive Behavioral Therapy to address his current stressors.       Visit start and stop times:    08/08/23  Start Time: 1130  Stop Time: 1230  Total Visit Time: 60 minutes

## 2023-08-29 ENCOUNTER — SOCIAL WORK (OUTPATIENT)
Dept: BEHAVIORAL/MENTAL HEALTH CLINIC | Facility: CLINIC | Age: 83
End: 2023-08-29
Payer: COMMERCIAL

## 2023-08-29 DIAGNOSIS — F43.20 ADJUSTMENT DISORDER, UNSPECIFIED TYPE: Primary | ICD-10-CM

## 2023-08-29 PROCEDURE — 90837 PSYTX W PT 60 MINUTES: CPT | Performed by: SOCIAL WORKER

## 2023-08-30 NOTE — PSYCH
This note was not shared with the patient due to this is a psychotherapy note   Behavioral Health Psychotherapy Progress Note    Psychotherapy Provided: Individual Psychotherapy     1. Adjustment disorder, unspecified type            DATA: The undersigned therapist met with the above patient for our scheduled session. Wendy Mckee reports Aleena Bernard continues to be at the rehab and he feels content and happy with how they are treating her. Wendy Mckee is happy to know Aleena Bernard has lost some weight and is doing physical thereapy. We discussed how great that she's having a good experience so she knows she can always go back. Wendy Mckee states he's been experiencing some painful back pain and states his PCP reports it's arthritis related therefore he's going to try and "deal with it."  Wendy Mckee states he's been feeling tired and having trouble sleeping but states Aleve has helped him with his pain the last few days. Wendy Mckee continues to share history of his relationship with other friends and family, and discussed how he misses Aleena Bernard. Wendy Mckee continues to visit her daily and be a supportive . Wendy Mckee denies suicidal intent, gesture or ideation at this time. During this session, this clinician used the following therapeutic modalities: Cognitive Behavioral Therapy    Substance Abuse was not addressed during this session. ASSESSMENT:  Leila Tucker presents with a Euthymic/ normal mood. his affect is Normal range and intensity, which is congruent, with his mood and the content of the session. The client has made progress on their goals. Leila Tucker presents with a none risk of suicide, none risk of self-harm, and none risk of harm to others. For any risk assessment that surpasses a "low" rating, a safety plan must be developed. A safety plan was indicated: no  If yes, describe in detail N/A never had history of suicidal thoughts, intent or plan.      PLAN: Between sessions, Leila Tucker will continue meeting with PCP for any medically related issues and with undersigned therapist for CBT and supportive counseling. At the next session, the therapist will use Cognitive Behavioral Therapy to address his current stressors.     Visit start and stop times:    08/29/23  Start Time: 0830  Stop Time: 0930  Total Visit Time: 60 minutes

## 2023-09-05 ENCOUNTER — SOCIAL WORK (OUTPATIENT)
Dept: BEHAVIORAL/MENTAL HEALTH CLINIC | Facility: CLINIC | Age: 83
End: 2023-09-05
Payer: COMMERCIAL

## 2023-09-05 DIAGNOSIS — F43.20 ADJUSTMENT DISORDER, UNSPECIFIED TYPE: Primary | ICD-10-CM

## 2023-09-05 PROCEDURE — 90837 PSYTX W PT 60 MINUTES: CPT | Performed by: SOCIAL WORKER

## 2023-09-06 NOTE — PSYCH
This note was not shared with the patient due to this is a psychotherapy note     Behavioral Health Psychotherapy Progress Note    Psychotherapy Provided: Individual Psychotherapy     1. Adjustment disorder, unspecified type            DATA: The undersigned therapist met with Diana Medrano for our scheduled session. Diana Medrano reports Jorge Davila continues to stay in the rehab center and discussed the chain of events that led her there. Diana Medrano expressed some concerns for Jorge Davila and was encouraged to bring them up with the supervisors at the rehab program. Diana Medrano misses Jorge Davila but states he's happy she's made improvement and continues to lose weight. Diana Medrano continues to work around [employer] and states having trouble sleeping. However, he refuses to take medication for back pain or sleep, and likes to be off medication. Diana Medrano continues to present to therapy to express some of his frustrations and overall get support. Diana Medrano is doing well and states he will work on finances as theyt had to pay for the roof to be completed. Diana Medrano denies suicidal intent, gesture or ideation at this time. During this session, this clinician used the following therapeutic modalities: Supportive Psychotherapy    Substance Abuse was not addressed during this session. ASSESSMENT:  Mark Glynn presents with a Euthymic/ normal mood. his affect is Normal range and intensity, which is congruent, with his mood and the content of the session. The client has made progress on their goals. Mark Glynn presents with a none risk of suicide, none risk of self-harm, and none risk of harm to others. For any risk assessment that surpasses a "low" rating, a safety plan must be developed. A safety plan was indicated: no  If yes, describe in detail N/A Diana Medrano denies suicidal intent, gesture or ideation at this time. PLAN: Between sessions, Mark Glynn will continue meeting with undersigned therapist as needed.  At the next session, the therapist will use Cognitive Behavioral Therapy and supportive therapy to address his current stressors .       Visit start and stop times:    08/22/23  Start Time: 1130  Stop Time: 1230  Total Visit Time: 60 minutes

## 2023-09-07 ENCOUNTER — OFFICE VISIT (OUTPATIENT)
Dept: OBGYN CLINIC | Facility: CLINIC | Age: 83
End: 2023-09-07
Payer: COMMERCIAL

## 2023-09-07 VITALS
HEIGHT: 67 IN | DIASTOLIC BLOOD PRESSURE: 77 MMHG | BODY MASS INDEX: 34.47 KG/M2 | SYSTOLIC BLOOD PRESSURE: 122 MMHG | HEART RATE: 64 BPM | WEIGHT: 219.6 LBS

## 2023-09-07 DIAGNOSIS — E11.22 TYPE 2 DIABETES MELLITUS WITH STAGE 3 CHRONIC KIDNEY DISEASE AND HYPERTENSION (HCC): ICD-10-CM

## 2023-09-07 DIAGNOSIS — G56.01 CARPAL TUNNEL SYNDROME ON RIGHT: Primary | ICD-10-CM

## 2023-09-07 DIAGNOSIS — I12.9 TYPE 2 DIABETES MELLITUS WITH STAGE 3 CHRONIC KIDNEY DISEASE AND HYPERTENSION (HCC): ICD-10-CM

## 2023-09-07 DIAGNOSIS — N18.30 TYPE 2 DIABETES MELLITUS WITH STAGE 3 CHRONIC KIDNEY DISEASE AND HYPERTENSION (HCC): ICD-10-CM

## 2023-09-07 PROCEDURE — 99213 OFFICE O/P EST LOW 20 MIN: CPT | Performed by: ORTHOPAEDIC SURGERY

## 2023-09-07 NOTE — PROGRESS NOTES
Assessment/Plan:  1. Carpal tunnel syndrome on right        2. Type 2 diabetes mellitus with stage 3 chronic kidney disease and hypertension ([address])          • The patient has had improvement of symptoms in his right hand following the injection. He has residual numbness at the thumb. • We discussed treatment options which included continued nonoperative measures versus consideration for surgical treatment. Timing for surgical treatment will depend on the need for full use of his hand when his wife returns from rehabilitation. • Nonoperative treatment includes splinting, therapy, and injection. Risk, benefits, and realistic expectations following nonoperative treatment were discussed. • The patient was given an opportunity to ask questions. Questions were answered to their satisfaction. • Through shared decision making, the patient decided to defer a corticosteroid injection until next month. • Follow up in 4 weeks for clinical evaluation and possible corticosteroid injection. cc: Follow-up for my right hand    Subjective:   [address] is a right hand dominant 82 y.o. male who presents for follow-up evaluation of right carpal tunnel syndrome. He was last seen on 7/13/2023 and received a corticosteroid injection that provided him some relief. Today, he is experiencing numbness in the right thumb, index, and long fingers. He notes difficulty with gripping and picking up objects. He recalls his right index and long fingers are significantly better than before the injection. He rates his symptoms in the thumb 4/10 and the index and long finger 2/10. He reports the symptoms in his thumb are constant, while the symptoms in his index and long fingers are intermittent. He denies wearing a nighttime brace. He takes baby aspirin daily. He denies any recent injuries to the right hand. His wife is currently in a rehab facility.   He is uncertain of the anticipated date for her return home, but anticipates that he will need full use of his hand to help her when she returns home. Review of Systems   Constitutional: Negative for chills and fever. HENT: Negative for ear pain and sore throat. Eyes: Negative for pain and visual disturbance. Respiratory: Negative for cough and shortness of breath. Cardiovascular: Negative for chest pain and palpitations. Gastrointestinal: Negative for abdominal pain and vomiting. Genitourinary: Negative for dysuria and hematuria. Musculoskeletal: Negative for arthralgias and back pain. Skin: Negative for color change and rash. Neurological: Positive for numbness (right hand). Negative for seizures and syncope. All other systems reviewed and are negative.       Past Medical History:   Diagnosis Date   • Abnormal blood chemistry     resolved: 11/9/16   • Abnormal glucose     last assessed: 9/24/14   • Arthritis    • CAD (coronary artery disease)    • Chronic kidney disease     STONES  & CKD   • Coronary atherosclerosis    • DM2 (diabetes mellitus, type 2) (HCC)    • Dyslipidemia    • Facial nerve disorder    • HTN (hypertension)    • Hyperlipidemia     last assessed: 1/13/17   • Kidney stones    • Lumbosacral radiculopathy    • Nerve root and plexus disorder    • Obesity    • Osteoarthritis    • Prostate asymmetry    • Pure hypercholesterolemia        Past Surgical History:   Procedure Laterality Date   • CORONARY ANGIOPLASTY      1 STENT    • PA UNLISTED PROCEDURE ESOPHAGUS N/A 9/30/2021    Procedure: EGD; DIVERTICULECTOMY ZENKERS ENDOSCOPIC;  Surgeon: Moises Marie MD;  Location: [facility];  Service: Thoracic   • UPPER GASTROINTESTINAL ENDOSCOPY         Family History   Problem Relation Age of Onset   • Cancer Mother    • Ovarian cancer Mother    • Hypertension Sister    • Lung disease Father         black   • Mental illness Neg Hx    • Substance Abuse Neg Hx        Social History     Occupational History   • Not on file   Tobacco Use   • Smoking status: Never • Smokeless tobacco: Never   Vaping Use   • Vaping Use: Never used   Substance and Sexual Activity   • Alcohol use: Not Currently   • Drug use: No   • Sexual activity: Not on file         Current Outpatient Medications:   •  aspirin 81 mg chewable tablet, Chew 81 mg daily  , Disp: , Rfl:   •  cyclobenzaprine (FLEXERIL) 5 mg tablet, Take 1 tablet (5 mg total) by mouth daily at bedtime as needed for muscle spasms, Disp: 30 tablet, Rfl: 0  •  ezetimibe (ZETIA) 10 mg tablet, Take 1 tablet (10 mg total) by mouth daily, Disp: 90 tablet, Rfl: 3  •  famotidine (PEPCID) 40 MG tablet, Take 1 tablet (40 mg total) by mouth daily, Disp: 90 tablet, Rfl: 1  •  Januvia 50 MG tablet, TAKE 1 TABLET BY MOUTH  DAILY, Disp: 90 tablet, Rfl: 3  •  metFORMIN (GLUCOPHAGE) 500 mg tablet, TAKE ONE TABLET BY MOUTH TWICE A DAY WITH MEALS (GLUCOPHAGE), Disp: 180 tablet, Rfl: 2  •  Multiple Vitamin (multivitamin) tablet, Take 1 tablet by mouth daily, Disp: , Rfl:   •  simvastatin (ZOCOR) 40 mg tablet, TAKE 1 TABLET BY MOUTH  DAILY AT BEDTIME, Disp: 90 tablet, Rfl: 3  •  tamsulosin (FLOMAX) 0.4 mg, Take 1 capsule (0.4 mg total) by mouth daily with dinner, Disp: 90 capsule, Rfl: 3  •  trandolapril (MAVIK) 1 MG tablet, TAKE ONE TABLET BY MOUTH EVERY DAY, Disp: 90 tablet, Rfl: 2    Allergies   Allergen Reactions   • Other Other (See Comments)     [facility] [address] [place] [address]   • Ciprofloxacin Hives       Objective:  Vitals:    09/07/23 1003   BP: 122/77   Pulse: 64       The patient was awake, alert, and oriented to person, place, and time. No acute distress. Normocephalic. EOMI. Mucous membranes moist.  Normal respiratory effort. Examination of the affected extremity was compared to the unaffected extremity. Skin was intact. No swelling or ecchymosis. No deformity. Hand and fingers were warm and well-perfused. Capillary refill was brisk. Full active range of motion of the elbows, forearms, wrists, and fingers. Sensation was decreased in the median nerve distribution. Sensation was not decreased in the ulnar nerve distribution. There was not APB atrophy. There was no intrinsic atrophy. Imaging/Diagnostic Studies:    No pertinent imaging to review today. Scribe Attestation    I,:  Sanjuanita Pyle am acting as a scribe while in the presence of the attending physician.:       I,:  Zach Gregory MD personally performed the services described in this documentation    as scribed in my presence.:         This document was created using speech voice recognition software. Grammatical errors, random word insertions, pronoun errors, and incomplete sentences are an occasional consequence of this system due to software limitations, ambient noise, and hardware issues. Any formal questions or concerns about content, text, or information contained within the body of this dictation should be directly addressed to the provider for clarification.

## 2023-09-12 ENCOUNTER — SOCIAL WORK (OUTPATIENT)
Dept: BEHAVIORAL/MENTAL HEALTH CLINIC | Facility: CLINIC | Age: 83
End: 2023-09-12
Payer: COMMERCIAL

## 2023-09-12 DIAGNOSIS — F43.20 ADJUSTMENT DISORDER, UNSPECIFIED TYPE: Primary | ICD-10-CM

## 2023-09-12 PROCEDURE — 90837 PSYTX W PT 60 MINUTES: CPT | Performed by: SOCIAL WORKER

## 2023-09-18 ENCOUNTER — TELEPHONE (OUTPATIENT)
Age: 83
End: 2023-09-18

## 2023-09-18 NOTE — PSYCH
This note was not shared with the patient due to this is a psychotherapy note     Behavioral Health Psychotherapy Progress Note    Psychotherapy Provided: Individual Psychotherapy     1. Adjustment disorder, unspecified type            DATA: The undersigned therapist met with the above patient for our scheduled session. Stefani Nelson reports Meenakshi Quinones is still at the nursing home and he's been trying to get her covered for more days through their insurance. Stefani Nelson states he appealed it and got approved for a couple of more days. Stefnai Nelson continues to go visit Meenakshi Quinones and maintain busy at home doing things. Stefani Nelson states his back has been hurting but was told it could be related to arthritis. Stefani Nelson has been coping well with Meenakshi Quinones being in his nursing home and overall doing well emotionally. Stefani Nelson denies suicidal intent, gesture or ideation at this time. During this session, this clinician used the following therapeutic modalities: Cognitive Behavioral Therapy    Substance Abuse was not addressed during this session. ASSESSMENT:  Nubia Caba presents with a Euthymic/ normal mood. his affect is Normal range and intensity, which is congruent, with his mood and the content of the session. The client has made progress on their goals. Nubia Caba presents with a none risk of suicide, none risk of self-harm, and none risk of harm to others. For any risk assessment that surpasses a "low" rating, a safety plan must be developed. A safety plan was indicated: no  If yes, describe in detail n/a Stefani Nelson denies suicidal intent, gesture or ideation at this time. PLAN: Between sessions, Nubia Caba will continue meeting with undersigned therapist as needed . At the next session, the therapist will use Cognitive Behavioral Therapy and Supportive Psychotherapy to address his current stressors.       Visit start and stop times:    09/05/23  Start Time: 1130  Stop Time: 1230  Total Visit Time: 60 minutes

## 2023-09-19 ENCOUNTER — SOCIAL WORK (OUTPATIENT)
Dept: BEHAVIORAL/MENTAL HEALTH CLINIC | Facility: CLINIC | Age: 83
End: 2023-09-19
Payer: COMMERCIAL

## 2023-09-19 DIAGNOSIS — F43.20 ADJUSTMENT DISORDER, UNSPECIFIED TYPE: Primary | ICD-10-CM

## 2023-09-19 PROCEDURE — 90837 PSYTX W PT 60 MINUTES: CPT | Performed by: SOCIAL WORKER

## 2023-09-20 NOTE — PSYCH
This note was not shared with the patient due to this is a psychotherapy note     Behavioral Health Psychotherapy Progress Note    Psychotherapy Provided: Individual Psychotherapy     1. Adjustment disorder, unspecified type            DATA: The undersigned therapist met with the above patient for our scheduled session. For today, Lisa Lowe states he had to go to the bank today because there was fraudulent activity on his account. Lisa Lowe bought himself new shoes and we discussed recent changes with Sanjuanita's rehab stay. Lisa Lowe states Breann Payne will no longer be covered by insurance and he will bring her home in two days. We discussed the importance of keeping a schedule for Breann Payne similar to the [facility] rehabilitation center to continue her progress. Lisa Lowe states he's been planning for her return and all is well with the roof. Lisa Lowe continues to care for Breann Payne and do his own household chores at home. Lisa Lowe denies suicidal intent, gesture or ideation at this time. During this session, this clinician used the following therapeutic modalities: Supportive Psychotherapy    Substance Abuse was not addressed during this session. ASSESSMENT:  Debbie Randall presents with a Euthymic/ normal mood. his affect is Normal range and intensity, which is congruent, with his mood and the content of the session. The client has made progress on their goals. Debbie Randall presents with a none risk of suicide, none risk of self-harm, and none risk of harm to others. For any risk assessment that surpasses a "low" rating, a safety plan must be developed. A safety plan was indicated: no  If yes, describe in detail N/A Lisa Lowe denies suicidal intent, gesture or ideation at this time. PLAN: Between sessions, Debbie Randall will continue meeting with undersigned therapist to discuss his current stressors. No medication at this time, continue seeing hand doctor.     Visit start and stop times:    09/12/23  Start Time: 1130  Stop Time: 1230  Total Visit Time: 60 minutes

## 2023-09-22 ENCOUNTER — APPOINTMENT (OUTPATIENT)
Dept: LAB | Facility: CLINIC | Age: 83
End: 2023-09-22
Payer: COMMERCIAL

## 2023-09-22 ENCOUNTER — OFFICE VISIT (OUTPATIENT)
Dept: FAMILY MEDICINE CLINIC | Facility: CLINIC | Age: 83
End: 2023-09-22
Payer: COMMERCIAL

## 2023-09-22 ENCOUNTER — TELEPHONE (OUTPATIENT)
Dept: ADMINISTRATIVE | Facility: OTHER | Age: 83
End: 2023-09-22

## 2023-09-22 VITALS
TEMPERATURE: 98.7 F | HEART RATE: 60 BPM | WEIGHT: 217 LBS | OXYGEN SATURATION: 98 % | DIASTOLIC BLOOD PRESSURE: 70 MMHG | HEIGHT: 67 IN | SYSTOLIC BLOOD PRESSURE: 130 MMHG | BODY MASS INDEX: 34.06 KG/M2 | RESPIRATION RATE: 14 BRPM

## 2023-09-22 DIAGNOSIS — N18.30 TYPE 2 DIABETES MELLITUS WITH STAGE 3 CHRONIC KIDNEY DISEASE AND HYPERTENSION (HCC): ICD-10-CM

## 2023-09-22 DIAGNOSIS — E11.22 TYPE 2 DIABETES MELLITUS WITH STAGE 3 CHRONIC KIDNEY DISEASE AND HYPERTENSION (HCC): ICD-10-CM

## 2023-09-22 DIAGNOSIS — I12.9 TYPE 2 DIABETES MELLITUS WITH STAGE 3 CHRONIC KIDNEY DISEASE AND HYPERTENSION (HCC): ICD-10-CM

## 2023-09-22 DIAGNOSIS — Z00.00 MEDICARE ANNUAL WELLNESS VISIT, SUBSEQUENT: Primary | ICD-10-CM

## 2023-09-22 DIAGNOSIS — Z23 NEED FOR VACCINATION: ICD-10-CM

## 2023-09-22 LAB
ALBUMIN SERPL BCP-MCNC: 4.2 G/DL (ref 3.5–5)
ALP SERPL-CCNC: 61 U/L (ref 34–104)
ALT SERPL W P-5'-P-CCNC: 23 U/L (ref 7–52)
ANION GAP SERPL CALCULATED.3IONS-SCNC: 9 MMOL/L
AST SERPL W P-5'-P-CCNC: 23 U/L (ref 13–39)
BILIRUB SERPL-MCNC: 0.83 MG/DL (ref 0.2–1)
BUN SERPL-MCNC: 29 MG/DL (ref 5–25)
CALCIUM SERPL-MCNC: 10 MG/DL (ref 8.4–10.2)
CHLORIDE SERPL-SCNC: 102 MMOL/L (ref 96–108)
CO2 SERPL-SCNC: 29 MMOL/L (ref 21–32)
CREAT SERPL-MCNC: 1.69 MG/DL (ref 0.6–1.3)
EST. AVERAGE GLUCOSE BLD GHB EST-MCNC: 160 MG/DL
GFR SERPL CREATININE-BSD FRML MDRD: 37 ML/MIN/1.73SQ M
GLUCOSE P FAST SERPL-MCNC: 126 MG/DL (ref 65–99)
HBA1C MFR BLD: 7.2 %
POTASSIUM SERPL-SCNC: 4.8 MMOL/L (ref 3.5–5.3)
PROT SERPL-MCNC: 7.1 G/DL (ref 6.4–8.4)
SODIUM SERPL-SCNC: 140 MMOL/L (ref 135–147)

## 2023-09-22 PROCEDURE — 80053 COMPREHEN METABOLIC PANEL: CPT

## 2023-09-22 PROCEDURE — 36415 COLL VENOUS BLD VENIPUNCTURE: CPT

## 2023-09-22 PROCEDURE — G0008 ADMIN INFLUENZA VIRUS VAC: HCPCS | Performed by: FAMILY MEDICINE

## 2023-09-22 PROCEDURE — G0439 PPPS, SUBSEQ VISIT: HCPCS | Performed by: FAMILY MEDICINE

## 2023-09-22 PROCEDURE — 90662 IIV NO PRSV INCREASED AG IM: CPT | Performed by: FAMILY MEDICINE

## 2023-09-22 PROCEDURE — 83036 HEMOGLOBIN GLYCOSYLATED A1C: CPT

## 2023-09-22 NOTE — PATIENT INSTRUCTIONS
Medicare Preventive Visit Patient Instructions  Thank you for completing your Welcome to Medicare Visit or Medicare Annual Wellness Visit today. Your next wellness visit will be due in one year (9/22/2024). The screening/preventive services that you may require over the next 5-10 years are detailed below. Some tests may not apply to you based off risk factors and/or age. Screening tests ordered at today's visit but not completed yet may show as past due. Also, please note that scanned in results may not display below. Preventive Screenings:  Service Recommendations Previous Testing/Comments   Colorectal Cancer Screening  · Colonoscopy    · Fecal Occult Blood Test (FOBT)/Fecal Immunochemical Test (FIT)  · Fecal DNA/Cologuard Test  · Flexible Sigmoidoscopy Age: 43-73 years old   Colonoscopy: every 10 years (May be performed more frequently if at higher risk)  OR  FOBT/FIT: every 1 year  OR  Cologuard: every 3 years  OR  Sigmoidoscopy: every 5 years  Screening may be recommended earlier than age 39 if at higher risk for colorectal cancer. Also, an individualized decision between you and your healthcare provider will decide whether screening between the ages of 77-80 would be appropriate.  Colonoscopy: Not on file  FOBT/FIT: Not on file  Cologuard: Not on file  Sigmoidoscopy: Not on file          Prostate Cancer Screening Individualized decision between patient and health care provider in men between ages of 53-66   Medicare will cover every 12 months beginning on the day after your 50th birthday PSA: 2.7 ng/mL     Screening Not Indicated     Hepatitis C Screening Once for adults born between 1945 and 1965  More frequently in patients at high risk for Hepatitis C Hep C Antibody: Not on file        Diabetes Screening 1-2 times per year if you're at risk for diabetes or have pre-diabetes Fasting glucose: 117 mg/dL (4/4/2023)  A1C: 6.6 (4/25/2023)  Screening Not Indicated  History Diabetes   Cholesterol Screening Once every 5 years if you don't have a lipid disorder. May order more often based on risk factors. Lipid panel: 04/04/2023  Screening Current      Other Preventive Screenings Covered by Medicare:  1. Abdominal Aortic Aneurysm (AAA) Screening: covered once if your at risk. You're considered to be at risk if you have a family history of AAA or a male between the age of 70-76 who smoking at least 100 cigarettes in your lifetime. 2. Lung Cancer Screening: covers low dose CT scan once per year if you meet all of the following conditions: (1) Age 48-67; (2) No signs or symptoms of lung cancer; (3) Current smoker or have quit smoking within the last 15 years; (4) You have a tobacco smoking history of at least 20 pack years (packs per day x number of years you smoked); (5) You get a written order from a healthcare provider. 3. Glaucoma Screening: covered annually if you're considered high risk: (1) You have diabetes OR (2) Family history of glaucoma OR (3)  aged 48 and older OR (3)  American aged 72 and older  3. Osteoporosis Screening: covered every 2 years if you meet one of the following conditions: (1) Have a vertebral abnormality; (2) On glucocorticoid therapy for more than 3 months; (3) Have primary hyperparathyroidism; (4) On osteoporosis medications and need to assess response to drug therapy. 5. HIV Screening: covered annually if you're between the age of 14-79. Also covered annually if you are younger than 13 and older than 72 with risk factors for HIV infection. For pregnant patients, it is covered up to 3 times per pregnancy.     Immunizations:  Immunization Recommendations   Influenza Vaccine Annual influenza vaccination during flu season is recommended for all persons aged >= 6 months who do not have contraindications   Pneumococcal Vaccine   * Pneumococcal conjugate vaccine = PCV13 (Prevnar 13), PCV15 (Vaxneuvance), PCV20 (Prevnar 20)  * Pneumococcal polysaccharide vaccine = PPSV23 (Pneumovax) Adults 2364 years old: 1-3 doses may be recommended based on certain risk factors  Adults 72 years old: 1-2 doses may be recommended based off what pneumonia vaccine you previously received   Hepatitis B Vaccine 3 dose series if at intermediate or high risk (ex: diabetes, end stage renal disease, liver disease)   Tetanus (Td) Vaccine - COST NOT COVERED BY MEDICARE PART B Following completion of primary series, a booster dose should be given every 10 years to maintain immunity against tetanus. Td may also be given as tetanus wound prophylaxis. Tdap Vaccine - COST NOT COVERED BY MEDICARE PART B Recommended at least once for all adults. For pregnant patients, recommended with each pregnancy. Shingles Vaccine (Shingrix) - COST NOT COVERED BY MEDICARE PART B  2 shot series recommended in those aged 48 and above     Health Maintenance Due:  There are no preventive care reminders to display for this patient. Immunizations Due:      Topic Date Due   • Influenza Vaccine (1) 09/01/2023     Advance Directives   What are advance directives? Advance directives are legal documents that state your wishes and plans for medical care. These plans are made ahead of time in case you lose your ability to make decisions for yourself. Advance directives can apply to any medical decision, such as the treatments you want, and if you want to donate organs. What are the types of advance directives? There are many types of advance directives, and each state has rules about how to use them. You may choose a combination of any of the following:  · Living will: This is a written record of the treatment you want. You can also choose which treatments you do not want, which to limit, and which to stop at a certain time. This includes surgery, medicine, IV fluid, and tube feedings. · Durable power of  for healthcare [facility]):   This is a written record that states who you want to make healthcare choices for you when you are unable to make them for yourself. This person, called a proxy, is usually a family member or a friend. You may choose more than 1 proxy. · Do not resuscitate (DNR) order:  A DNR order is used in case your heart stops beating or you stop breathing. It is a request not to have certain forms of treatment, such as CPR. A DNR order may be included in other types of advance directives. · Medical directive: This covers the care that you want if you are in a coma, near death, or unable to make decisions for yourself. You can list the treatments you want for each condition. Treatment may include pain medicine, surgery, blood transfusions, dialysis, IV or tube feedings, and a ventilator (breathing machine). · Values history: This document has questions about your views, beliefs, and how you feel and think about life. This information can help others choose the care that you would choose. Why are advance directives important? An advance directive helps you control your care. Although spoken wishes may be used, it is better to have your wishes written down. Spoken wishes can be misunderstood, or not followed. Treatments may be given even if you do not want them. An advance directive may make it easier for your family to make difficult choices about your care. Weight Management   Why it is important to manage your weight:  Being overweight increases your risk of health conditions such as heart disease, high blood pressure, type 2 diabetes, and certain types of cancer. It can also increase your risk for osteoarthritis, sleep apnea, and other respiratory problems. Aim for a slow, steady weight loss. Even a small amount of weight loss can lower your risk of health problems. How to lose weight safely:  A safe and healthy way to lose weight is to eat fewer calories and get regular exercise. You can lose up about 1 pound a week by decreasing the number of calories you eat by 500 calories each day.    Healthy meal plan for weight management:  A healthy meal plan includes a variety of foods, contains fewer calories, and helps you stay healthy. A healthy meal plan includes the following:  · Eat whole-grain foods more often. A healthy meal plan should contain fiber. Fiber is the part of grains, fruits, and vegetables that is not broken down by your body. Whole-grain foods are healthy and provide extra fiber in your diet. Some examples of whole-grain foods are whole-wheat breads and pastas, oatmeal, brown rice, and bulgur. · Eat a variety of vegetables every day. Include dark, leafy greens such as spinach, kale, nino greens, and mustard greens. Eat yellow and orange vegetables such as carrots, sweet potatoes, and winter squash. · Eat a variety of fruits every day. Choose fresh or canned fruit (canned in its own juice or light syrup) instead of juice. Fruit juice has very little or no fiber. · Eat low-fat dairy foods. Drink fat-free (skim) milk or 1% milk. Eat fat-free yogurt and low-fat cottage cheese. Try low-fat cheeses such as mozzarella and other reduced-fat cheeses. · Choose meat and other protein foods that are low in fat. Choose beans or other legumes such as split peas or lentils. Choose fish, skinless poultry (chicken or turkey), or lean cuts of red meat (beef or pork). Before you cook meat or poultry, cut off any visible fat. · Use less fat and oil. Try baking foods instead of frying them. Add less fat, such as margarine, sour cream, regular salad dressing and mayonnaise to foods. Eat fewer high-fat foods. Some examples of high-fat foods include french fries, doughnuts, ice cream, and cakes. · Eat fewer sweets. Limit foods and drinks that are high in sugar. This includes candy, cookies, regular soda, and sweetened drinks. Exercise:  Exercise at least 30 minutes per day on most days of the week. Some examples of exercise include walking, biking, dancing, and swimming.  You can also fit in more physical activity by taking the stairs instead of the elevator or parking farther away from stores. Ask your healthcare provider about the best exercise plan for you. © Copyright [employer] 2018 Information is for End User's use only and may not be sold, redistributed or otherwise used for commercial purposes.  All illustrations and images included in CareNotes® are the copyrighted property of [employer] or [address] [place] [address]

## 2023-09-22 NOTE — LETTER
Diabetic Eye Exam Form    Date Requested: 23  Patient: Odilon Sigala  Patient : 1940   Referring Provider: Duncan Mcpherson MD      DIABETIC Eye Exam Date _______________________________      Type of Exam MUST be documented for Diabetic Eye Exams. Please CHECK ONE. Retinal Exam       Dilated Retinal Exam       OCT       Optomap-Iris Exam      Fundus Photography       Left Eye - Please check Retinopathy or No Retinopathy        Exam did show retinopathy    Exam did not show retinopathy       Right Eye - Please check Retinopathy or No Retinopathy       Exam did show retinopathy    Exam did not show retinopathy       Comments __________________________________________________________    Practice Providing Exam ______________________________________________    Exam Performed By (print name) _______________________________________      Provider Signature ___________________________________________________      These reports are needed for  compliance. Please fax this completed form and a copy of the Diabetic Eye Exam report to our office located at [address] [place] [address] as soon as possible via Fax [phone] [place]: Phone [phone]  We thank you for your assistance in treating our mutual patient.

## 2023-09-22 NOTE — TELEPHONE ENCOUNTER
----- Message from Claudell Mulders, MD sent at 9/22/2023 10:27 AM EDT -----  Regarding: care gap request  09/22/23 10:27 AM    Hello, our patient attached above has had Diabetic Eye Exam completed/performed.  Please assist in updating the patient chart by making an External outreach to Dr. Hema Almaraz 1/2024   Thank you,  Mary Flores  [facility]

## 2023-09-22 NOTE — TELEPHONE ENCOUNTER
Upon review of the In Basket request and the patient's chart, initial outreach has been made via fax to facility. Please see Contacts section for details.      Thank you  Мария Shields MA

## 2023-09-22 NOTE — Clinical Note
Forgot to see if the patient has seen an eye doctor given his history of diabetes?   If so please collect information if not please place him on the nurses schedule for diabetic eye exam that is free from [place]

## 2023-09-22 NOTE — PROGRESS NOTES
Assessment and Plan:     Problem List Items Addressed This Visit        Endocrine    Type 2 diabetes mellitus with stage 3 chronic kidney disease and hypertension ([address])    Relevant Orders    Hemoglobin A1C (Completed)    Comprehensive metabolic panel (Completed)   Other Visit Diagnoses     Medicare annual wellness visit, subsequent    -  Primary    Need for vaccination        Relevant Orders    FLUZONE HIGH-DOSE: influenza vaccine, high-dose, preservative-free 0.7 mL (Completed)      Medicare annual wellness visit today patient is doing well. Recommend patient to establish care again with dermatology. Patient with ongoing back pains recommend continuing physical therapy  Type 2 diabetes) A1c/CMP  Flu vaccine given to the patient today       Preventive health issues were discussed with patient, and age appropriate screening tests were ordered as noted in patient's After Visit Summary. Personalized health advice and appropriate referrals for health education or preventive services given if needed, as noted in patient's After Visit Summary. History of Present Illness:     Patient presents for a Medicare Wellness Visit. 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 Patient states that he has been doing a lot better patient has been seeing a specialist.  Denies any other acute concerns today    HPI   Patient Care Team:  Keren Horne MD as PCP - General (Family Medicine)  MD Kenzie Dupree MD (Urology)  Baltazar Brown MD (Ophthalmology)     Review of Systems:     Review of Systems   Constitutional: Negative for activity change, appetite change, chills, fatigue and fever. HENT: Negative for congestion. Respiratory: Negative for cough, chest tightness and shortness of breath. Cardiovascular: Negative for chest pain and leg swelling. Gastrointestinal: Negative for abdominal distention, abdominal pain, constipation, diarrhea, nausea and vomiting.    All other systems reviewed and are negative.        Problem List:     Patient Active Problem List   Diagnosis   • 2-vessel coronary artery disease   • Benign hypertension   • Type 2 diabetes mellitus with stage 3 chronic kidney disease and hypertension ([facility])   • Dyslipidemia   • Moderate obesity   • Dysphagia   • Shortness of breath   • Zenker's diverticulum   • Chronic kidney disease   • Benign hypertension with CKD (chronic kidney disease) stage III ([facility])   • Right hand pain   • Carpal tunnel syndrome of right wrist      Past Medical and Surgical History:     Past Medical History:   Diagnosis Date   • Abnormal blood chemistry     resolved: 11/9/16   • Abnormal glucose     last assessed: 9/24/14   • Arthritis    • CAD (coronary artery disease)    • Chronic kidney disease     STONES  & CKD   • Coronary atherosclerosis    • DM2 (diabetes mellitus, type 2) ([facility])    • Dyslipidemia    • Facial nerve disorder    • HTN (hypertension)    • Hyperlipidemia     last assessed: 1/13/17   • Kidney stones    • Lumbosacral radiculopathy    • Nerve root and plexus disorder    • Obesity    • Osteoarthritis    • Prostate asymmetry    • Pure hypercholesterolemia      Past Surgical History:   Procedure Laterality Date   • CORONARY ANGIOPLASTY      1 STENT    • MA UNLISTED PROCEDURE ESOPHAGUS N/A 9/30/2021    Procedure: EGD; DIVERTICULECTOMY ZENNADIAS ENDOSCOPIC;  Surgeon: Chiara Pathak MD;  Location: [facility];  Service: Thoracic   • UPPER GASTROINTESTINAL ENDOSCOPY        Family History:     Family History   Problem Relation Age of Onset   • Cancer Mother    • Ovarian cancer Mother    • Hypertension Sister    • Lung disease Father         black   • Mental illness Neg Hx    • Substance Abuse Neg Hx       Social History:     Social History     Socioeconomic History   • Marital status: /Civil Union     Spouse name: None   • Number of children: None   • Years of education: None   • Highest education level: None   Occupational History   • None Tobacco Use   • Smoking status: Never   • Smokeless tobacco: Never   Vaping Use   • Vaping Use: Never used   Substance and Sexual Activity   • Alcohol use: Not Currently   • Drug use: No   • Sexual activity: None   Other Topics Concern   • None   Social History Narrative   • None     Social Determinants of Health     Financial Resource Strain: Not on file   Food Insecurity: Not on file   Transportation Needs: Not on file   Physical Activity: Not on file   Stress: Not on file   Social Connections: Not on file   Intimate Partner Violence: Not on file   Housing Stability: Not on file      Medications and Allergies:     Current Outpatient Medications   Medication Sig Dispense Refill   • aspirin 81 mg chewable tablet Chew 81 mg daily       • cyclobenzaprine (FLEXERIL) 5 mg tablet Take 1 tablet (5 mg total) by mouth daily at bedtime as needed for muscle spasms 30 tablet 0   • ezetimibe (ZETIA) 10 mg tablet Take 1 tablet (10 mg total) by mouth daily 90 tablet 3   • famotidine (PEPCID) 40 MG tablet Take 1 tablet (40 mg total) by mouth daily 90 tablet 1   • Januvia 50 MG tablet TAKE 1 TABLET BY MOUTH  DAILY 90 tablet 3   • metFORMIN (GLUCOPHAGE) 500 mg tablet TAKE ONE TABLET BY MOUTH TWICE A DAY WITH MEALS (GLUCOPHAGE) 180 tablet 2   • Multiple Vitamin (multivitamin) tablet Take 1 tablet by mouth daily     • simvastatin (ZOCOR) 40 mg tablet TAKE 1 TABLET BY MOUTH  DAILY AT BEDTIME 90 tablet 3   • tamsulosin (FLOMAX) 0.4 mg Take 1 capsule (0.4 mg total) by mouth daily with dinner 90 capsule 3   • trandolapril (MAVIK) 1 MG tablet TAKE ONE TABLET BY MOUTH EVERY DAY 90 tablet 2     No current facility-administered medications for this visit.      Allergies   Allergen Reactions   • Other Other (See Comments)     [facility]- [facility]   • Ciprofloxacin Hives      Immunizations:     Immunization History   Administered Date(s) Administered   • COVID-19 MODERNA VACC 0.25 ML IM BOOSTER 12/14/2021   • COVID-19 MODERNA VACC 0.5 ML IM 02/12/2021, 03/12/2021   • Influenza Split High Dose Preservative Free IM 10/10/2013, 09/24/2014, 09/14/2015, 10/25/2016, 10/18/2017   • Influenza, high dose seasonal 0.7 mL 10/05/2018, 09/20/2019, 10/06/2020, 10/05/2021, 10/25/2022, 09/22/2023   • Influenza, seasonal, injectable 10/05/2011, 10/12/2012, 11/01/2016   • Pneumococcal Conjugate 13-Valent 09/14/2015   • Pneumococcal Polysaccharide PPV23 10/05/2011   • Zoster Vaccine Recombinant 02/04/2019      Health Maintenance: There are no preventive care reminders to display for this patient. There are no preventive care reminders to display for this patient. Medicare Screening Tests and Risk Assessments:     Gonzales is here for his Subsequent Wellness visit. Health Risk Assessment:   Patient rates overall health as good. Patient feels that their physical health rating is same. Patient is satisfied with their life. Eyesight was rated as same. Hearing was rated as same. Patient feels that their emotional and mental health rating is same. Patients states they are sometimes angry. Patient states they are often unusually tired/fatigued. Pain experienced in the last 7 days has been some. Patient's pain rating has been 6/10. Patient states that he has experienced no weight loss or gain in last 6 months. Depression Screening:   PHQ-2 Score: 0      Fall Risk Screening: In the past year, patient has experienced: no history of falling in past year      Home Safety:  Patient does not have trouble with stairs inside or outside of their home. Patient has working smoke alarms and has working carbon monoxide detector. Home safety hazards include: none. Nutrition:   Current diet is Regular and Limited junk food. Medications:   Patient is currently taking over-the-counter supplements. OTC medications include: see medication list. Patient is able to manage medications.      Activities of Daily Living (ADLs)/Instrumental Activities of Daily Living (IADLs):   Walk and transfer into and out of bed and chair?: Yes  Dress and groom yourself?: Yes    Bathe or shower yourself?: Yes    Feed yourself? Yes  Do your laundry/housekeeping?: Yes  Manage your money, pay your bills and track your expenses?: Yes  Make your own meals?: Yes    Do your own shopping?: Yes    Previous Hospitalizations:   Any hospitalizations or ED visits within the last 12 months?: No      Advance Care Planning:   Living will: Yes    Durable POA for healthcare: Yes    Advanced directive: Yes      Cognitive Screening:   Provider or family/friend/caregiver concerned regarding cognition?: No    PREVENTIVE SCREENINGS      Cardiovascular Screening:    General: Screening Current      Diabetes Screening:     General: Screening Not Indicated and History Diabetes      Prostate Cancer Screening:    General: Screening Not Indicated      Osteoporosis Screening:    General: Risks and Benefits Discussed      Abdominal Aortic Aneurysm (AAA) Screening:        General: Risks and Benefits Discussed      Lung Cancer Screening:     General: Screening Not Indicated      Hepatitis C Screening:    General: Risks and Benefits Discussed    Screening, Brief Intervention, and Referral to Treatment (SBIRT)    Screening  Typical number of drinks in a day: 0  Typical number of drinks in a week: 0  Interpretation: Low risk drinking behavior. Single Item Drug Screening:  How often have you used an illegal drug (including marijuana) or a prescription medication for non-medical reasons in the past year? never    Single Item Drug Screen Score: 0  Interpretation: Negative screen for possible drug use disorder    No results found. Physical Exam:     /70 (BP Location: Left arm, Patient Position: Sitting, Cuff Size: Standard)   Pulse 60   Temp 98.7 °F (37.1 °C) (Temporal)   Resp 14   Ht 5' 7" (1.702 m)   Wt 98.4 kg (217 lb)   SpO2 98%   BMI 33.99 kg/m²     Physical Exam  Vitals reviewed. Constitutional:       Appearance: He is well-developed. HENT:      Head: Normocephalic and atraumatic. Right Ear: External ear normal.      Left Ear: External ear normal.      Nose: Nose normal.   Eyes:      Conjunctiva/sclera: Conjunctivae normal.      Pupils: Pupils are equal, round, and reactive to light. Cardiovascular:      Rate and Rhythm: Normal rate and regular rhythm. Pulses: no weak pulses          Dorsalis pedis pulses are 2+ on the right side and 2+ on the left side. Posterior tibial pulses are 2+ on the right side and 2+ on the left side. Heart sounds: Normal heart sounds. Pulmonary:      Effort: Pulmonary effort is normal.      Breath sounds: Normal breath sounds. No wheezing. Abdominal:      General: Bowel sounds are normal. There is no distension. Palpations: Abdomen is soft. There is no mass. Tenderness: There is no abdominal tenderness. Genitourinary:     Penis: Normal.    Musculoskeletal:         General: No tenderness. Normal range of motion. Cervical back: Normal range of motion and neck supple. Feet:      Right foot:      Skin integrity: No ulcer, skin breakdown, erythema, warmth, callus or dry skin. Left foot:      Skin integrity: No ulcer, skin breakdown, erythema, warmth, callus or dry skin. Skin:     General: Skin is warm. Capillary Refill: Capillary refill takes less than 2 seconds. Neurological:      Mental Status: He is alert and oriented to person, place, and time. Cranial Nerves: No cranial nerve deficit. Sensory: No sensory deficit. Motor: No abnormal muscle tone. Coordination: Coordination normal.      Deep Tendon Reflexes: Reflexes normal.   Psychiatric:         Behavior: Behavior normal.         Thought Content: Thought content normal.         Judgment: Judgment normal.          Diabetic Foot Exam    Patient's shoes and socks removed.     Right Foot/Ankle   Right Foot Inspection  Skin Exam: skin normal and skin intact. No dry skin, no warmth, no callus, no erythema, no maceration, no abnormal color, no pre-ulcer, no ulcer and no callus. Toe Exam: ROM and strength within normal limits. No swelling    Sensory   Vibration: intact  Proprioception: intact  Monofilament testing: diminished    Vascular  Capillary refills: < 3 seconds  The right DP pulse is 2+. The right PT pulse is 2+. Left Foot/Ankle  Left Foot Inspection  Skin Exam: skin normal and skin intact. No dry skin, no warmth, no erythema, no maceration, normal color, no pre-ulcer, no ulcer and no callus. Toe Exam: ROM and strength within normal limits. No swelling. Sensory   Vibration: intact  Proprioception: intact  Monofilament testing: diminished    Vascular  Capillary refills: < 3 seconds  The left DP pulse is 2+. The left PT pulse is 2+.      Assign Risk Category  No deformity present  No loss of protective sensation  No weak pulses  Risk: 0      David Young MD

## 2023-09-26 ENCOUNTER — SOCIAL WORK (OUTPATIENT)
Dept: BEHAVIORAL/MENTAL HEALTH CLINIC | Facility: CLINIC | Age: 83
End: 2023-09-26
Payer: COMMERCIAL

## 2023-09-26 ENCOUNTER — TELEPHONE (OUTPATIENT)
Age: 83
End: 2023-09-26

## 2023-09-26 ENCOUNTER — TELEPHONE (OUTPATIENT)
Dept: NEPHROLOGY | Facility: CLINIC | Age: 83
End: 2023-09-26

## 2023-09-26 DIAGNOSIS — F43.20 ADJUSTMENT DISORDER, UNSPECIFIED TYPE: Primary | ICD-10-CM

## 2023-09-26 PROCEDURE — 90837 PSYTX W PT 60 MINUTES: CPT | Performed by: SOCIAL WORKER

## 2023-09-26 NOTE — TELEPHONE ENCOUNTER
LM for pt to schedule feb f/u with Dr. Syl Qureshi or AP in [facility]

## 2023-09-26 NOTE — PSYCH
This note was not shared with the patient due to this is a psychotherapy note     Behavioral Health Psychotherapy Progress Note    Psychotherapy Provided: Individual Psychotherapy     1. Adjustment disorder, unspecified type            DATA: The undersigned therapist met with the above patient for our scheduled session. Venita Chou states Demario Kim has been back at home and things are going well. Venita Chou has been making sure Demario Kim has been walking. Venita Chou states she's been sleeping a lot and is sometimes concerned for her but states he will take her to Dr. Justus Conway next week. Venita Chou states he's been feeling tired and having some shoulder pain but overall trying to do well. Venita Chou states Ritu Negron had recommended an in home doctor but unsure of how it works, encouraged he ask during her next visit. Venita Chou continues to cope well with the current changes. Venita Chou denies suicidal intent, gesture or ideation at this time. During this session, this clinician used the following therapeutic modalities: Cognitive Behavioral Therapy and Supportive Psychotherapy    Substance Abuse was not addressed during this session. ASSESSMENT:  Anni Shahid presents with a Euthymic/ normal mood. his affect is Normal range and intensity, which is congruent, with his mood and the content of the session. The client has made progress on their goals. Anni Shahid presents with a none risk of suicide, none risk of self-harm, and none risk of harm to others. For any risk assessment that surpasses a "low" rating, a safety plan must be developed. A safety plan was indicated: no  If yes, describe in detail N/A Venita Chou denies suicidal intent, gesture or ideation at this time. PLAN: Between sessions, Anni Shahid will continue meeting with undersigned therapist weekly. At the next session, the therapist will use Cognitive Behavioral Therapy to address his current stressors.     Visit start and stop times:    09/19/23  Start Time: 1130  Stop Time: 5937  Total Visit Time: 60 minutes

## 2023-09-26 NOTE — TELEPHONE ENCOUNTER
Upon review of the In Basket request we were able to locate, review, and update the patient chart as requested for Diabetic Eye Exam.    Any additional questions or concerns should be emailed to the Practice Liaisons via the appropriate education email address, please do not reply via In Basket.     Thank you  Vannesa Mason MA

## 2023-09-26 NOTE — TELEPHONE ENCOUNTER
Swati walked into the [address] office. He wanted to transfer his medical records from his previous dermatologist, Dr. Analisa Casarez, located in [address]. He signed the [facility] Records form. The form was faxed to Dr. Gregg Segura office at [phone]. Fax will be scanned into the patient's chart. Also wanted to be placed on a cancellation list and on the list to be called when the January 2024 schedules become available. I placed him on my lists to be called.

## 2023-09-28 ENCOUNTER — TELEPHONE (OUTPATIENT)
Age: 83
End: 2023-09-28

## 2023-09-28 NOTE — TELEPHONE ENCOUNTER
Need more information. Where was his last Colonoscopy did it have polyps if yes, then he should have a repeat . They do this every 3 years.  But his GI doctor would be the one to make the decision

## 2023-09-28 NOTE — TELEPHONE ENCOUNTER
Pt. Called and said he has been receiving notices saying he is due for a colonoscopy and that it has been 3 years. The pt. Wants to know if Dr. Matteo Jansen agrees that he should get one at this time. Pt. Would like a return call.   Ty

## 2023-09-28 NOTE — TELEPHONE ENCOUNTER
I Left a detailed message for Patient and I also left a vm Re: his lab results as well. I asked for a call back.

## 2023-10-03 ENCOUNTER — SOCIAL WORK (OUTPATIENT)
Dept: BEHAVIORAL/MENTAL HEALTH CLINIC | Facility: CLINIC | Age: 83
End: 2023-10-03
Payer: COMMERCIAL

## 2023-10-03 DIAGNOSIS — F43.20 ADJUSTMENT DISORDER, UNSPECIFIED TYPE: Primary | ICD-10-CM

## 2023-10-03 PROCEDURE — 90837 PSYTX W PT 60 MINUTES: CPT | Performed by: SOCIAL WORKER

## 2023-10-05 ENCOUNTER — OFFICE VISIT (OUTPATIENT)
Age: 83
End: 2023-10-05
Payer: COMMERCIAL

## 2023-10-05 VITALS — HEIGHT: 67 IN | WEIGHT: 216 LBS | TEMPERATURE: 97.4 F | BODY MASS INDEX: 33.9 KG/M2

## 2023-10-05 DIAGNOSIS — Z85.828 HISTORY OF BASAL CELL CARCINOMA: ICD-10-CM

## 2023-10-05 DIAGNOSIS — L57.0 KERATOSIS, ACTINIC: Primary | ICD-10-CM

## 2023-10-05 PROCEDURE — 17003 DESTRUCT PREMALG LES 2-14: CPT | Performed by: DERMATOLOGY

## 2023-10-05 PROCEDURE — 17000 DESTRUCT PREMALG LESION: CPT | Performed by: DERMATOLOGY

## 2023-10-05 PROCEDURE — 99204 OFFICE O/P NEW MOD 45 MIN: CPT | Performed by: DERMATOLOGY

## 2023-10-05 RX ORDER — FLUOROURACIL 50 MG/G
CREAM TOPICAL 2 TIMES DAILY
Qty: 40 G | Refills: 1 | Status: SHIPPED | OUTPATIENT
Start: 2023-10-05

## 2023-10-05 NOTE — PSYCH
This note was not shared with the patient due to this is a psychotherapy note     Behavioral Health Psychotherapy Progress Note    Psychotherapy Provided: Individual Psychotherapy     1. Adjustment disorder, unspecified type          DATA: The undersigned therapist met with the above patient for his scheduled session. For today, the undersigned therapist assisted Otilia Pierre process his feelings about caring for Rosanna Daugherty. Otilia Pierre states Rosanna Daugherty came to see Dr. Nina Rosa in person and it was difficult to have Rosanna Daugherty get back into the home. Otilia Pierre states Rosanna Daugherty continues to have speech and physical therapy in home and Mark recommended an in home doctor. Otilia Pierre states his left shoulder hurts down his arm and was able to take an Aleve. Recommended he bring his symptoms to his doctor. Otilia Pierre denies suicidal intent, gesture or ideation at this time. During this session, this clinician used the following therapeutic modalities: Supportive Counseling    Substance Abuse was not addressed during this session. ASSESSMENT:  Hiram Sahni presents with a Euthymic/ normal mood. his affect is Normal range and intensity, which is congruent, with his mood and the content of the session. The client has made progress on their goals. Hiram Sahni presents with a none risk of suicide, none risk of self-harm, and none risk of harm to others. For any risk assessment that surpasses a "low" rating, a safety plan must be developed. A safety plan was indicated: no  If yes, describe in detail N/A Otilia Pierre denies suicidal intent, gesture or ideation at this time. PLAN: Between sessions, Hiram Sahni will continue meeting with undersigned therapist and caring for Rosanna Daugherty. At the next session, the therapist will use Cognitive Behavioral Therapy and Supportive Psychotherapy to address his current stressors.       Visit start and stop times:    09/26/23  Start Time: 1130  Stop Time: 1230  Total Visit Time: 60 minutes

## 2023-10-05 NOTE — PATIENT INSTRUCTIONS
ACTINIC KERATOSIS      Assessment and Plan:  Based on a thorough discussion of this condition and the management approach to it (including a comprehensive discussion of the known risks, side effects and potential benefits of treatment), the patient (family) agrees to implement the following specific plan:  When outside we recommend using a wide brim hat, sunglasses, long sleeve and pants, sunscreen with SPF 92+ with reapplication every 2 hours, or SPF specific clothing   liquid nitrogen to treat areas. Consent obtained. Expect area to blister, crust, and then fall off within 2 weeks. Please use vaseline. Recommend follow up in 3 months. PROCEDURE:  DESTRUCTION OF PRE-MALIGNANT LESIONS  After a thorough discussion of treatment options and risk/benefits/alternatives (including but not limited to local pain, scarring, dyspigmentation, blistering, and possible superinfection), verbal and written consent were obtained and the aforementioned lesions were treated on with cryotherapy using liquid nitrogen x 1 cycle for 5-10 seconds. Please wait 4 weeks after liquid nitrogen until well healed. Then apply topical chemotherapy cream 2 times daily for 2 weeks. To the left angle of jaw, scalp, cheek. This medication will cause extreme redness, inflammation, open sores. You can apply plain Vaseline 20-30 minutes after applying Efudex to help with the redness, inflammation and blistering. If it is very uncomfortable, please contact Dr. Pam Dean and she may order topical steroids. You can also take a break and try to resume and complete the 14 day course when able to.

## 2023-10-05 NOTE — PROGRESS NOTES
[facility] Note     Patient Name: Julia Cline  Encounter Date: 10/5/23     Have you been cared for by a [facility]ologist in the last 3 years and, if so, which description applies to you? NO. I am considered a "new" patient and must complete all patient intake questions. I am MALE/not capable of bearing children. REVIEW OF SYSTEMS:  Have you recently had or currently have any of the following? Recent fever or chills? No  Any non-healing wound? No   PAST MEDICAL HISTORY:  Have you personally ever had or currently have any of the following? If "YES," then please provide more detail. Skin cancer (such as Melanoma, Basal Cell Carcinoma, Squamous Cell Carcinoma? YES, BCC's  Tuberculosis, HIV/AIDS, Hepatitis B or C: No  Systemic Immunosuppression such as Diabetes, Biologic or Immunotherapy, Chemotherapy, Organ Transplantation, Bone Marrow Transplantation No  Radiation Treatment No   FAMILY HISTORY:  Any "first degree relatives" (parent, brother, sister, or child) with the following? Skin Cancer, Pancreatic or Other Cancer? YES, mother Ovarian cancer   PATIENT EXPERIENCE:    Do you want the Dermatologist to perform a COMPLETE skin exam today including a clinical examination under the "bra and underwear" areas? Yes  If necessary, do we have your permission to call and leave a detailed message on your Preferred Phone number that includes your specific medical information?   Yes      Allergies   Allergen Reactions   • Other Other (See Comments)     REAPRO- SEVERELY LOWERED RBCS REQUIRING HOSPITAL   • Ciprofloxacin Hives      Current Outpatient Medications:   •  aspirin 81 mg chewable tablet, Chew 81 mg daily  , Disp: , Rfl:   •  cyclobenzaprine (FLEXERIL) 5 mg tablet, Take 1 tablet (5 mg total) by mouth daily at bedtime as needed for muscle spasms, Disp: 30 tablet, Rfl: 0  •  ezetimibe (ZETIA) 10 mg tablet, Take 1 tablet (10 mg total) by mouth daily, Disp: 90 tablet, Rfl: 3  • famotidine (PEPCID) 40 MG tablet, Take 1 tablet (40 mg total) by mouth daily, Disp: 90 tablet, Rfl: 1  •  fluorouracil (EFUDEX) 5 % cream, Apply topically 2 (two) times a day For 2 weeks. , Disp: 40 g, Rfl: 1  •  Januvia 50 MG tablet, TAKE 1 TABLET BY MOUTH  DAILY, Disp: 90 tablet, Rfl: 3  •  metFORMIN (GLUCOPHAGE) 500 mg tablet, TAKE ONE TABLET BY MOUTH TWICE A DAY WITH MEALS (GLUCOPHAGE), Disp: 180 tablet, Rfl: 2  •  Multiple Vitamin (multivitamin) tablet, Take 1 tablet by mouth daily, Disp: , Rfl:   •  simvastatin (ZOCOR) 40 mg tablet, TAKE 1 TABLET BY MOUTH  DAILY AT BEDTIME, Disp: 90 tablet, Rfl: 3  •  tamsulosin (FLOMAX) 0.4 mg, Take 1 capsule (0.4 mg total) by mouth daily with dinner, Disp: 90 capsule, Rfl: 3  •  trandolapril (MAVIK) 1 MG tablet, TAKE ONE TABLET BY MOUTH EVERY DAY, Disp: 90 tablet, Rfl: 2          Whom besides the patient is providing clinical information about today's encounter? NO ADDITIONAL HISTORIAN (patient alone provided history)    Physical Exam and Assessment/Plan by Diagnosis:    HISTORY OF BASAL CELL CARCINOMA    Physical Exam:  Anatomic Location Affected:  ears  Morphological Description of scar:  Well healed scar  Suspected Recurrence: No  Pertinent Positives:  Pertinent Negatives: Additional History of Present Condition:  History of basal cell carcinoma with no sign of recurrence    Assessment and Plan:  Based on a thorough discussion of this condition and the management approach to it (including a comprehensive discussion of the known risks, side effects and potential benefits of treatment), the patient (family) agrees to implement the following specific plan:  When outside we recommend using a wide brim hat, sunglasses, long sleeve and pants, sunscreen with SPF 74+ with reapplication every 2 hours, or SPF specific clothing     How can basal cell carcinoma be prevented? The most important way to prevent BCC is to avoid sunburn.  This is especially important in childhood and early life. Fair skinned individuals and those with a personal or family history of BCC should protect their skin from sun exposure daily, year-round and lifelong. Stay indoors or under the shade in the middle of the day   Wear covering clothing   Apply high protection factor SPF50+ broad-spectrum sunscreens generously to exposed skin if outdoors   Avoid indoor tanning (sun beds, solaria)  Oral nicotinamide (vitamin B3) in a dose of 500 mg twice daily may reduce the number and severity of BCCs. What is the outlook for basal cell carcinoma? Most BCCs are cured by treatment. Cure is most likely if treatment is undertaken when the lesion is small. About 50% of people with BCC develop a second one within 3 years of the first. They are also at increased risk of other skin cancers, especially melanoma. Regular self-skin examinations and long-term annual skin checks by an experienced health professional are recommended. ACTINIC KERATOSIS    Physical Exam:  Anatomic Location Affected:  scalp, forehead, cheeks, right forearm. Morphological Description:  Scaly pink papules  Pertinent Positives:  Pertinent Negatives:      Assessment and Plan: 3 larger ones ant vertex, R forearm and L angle of jaw to follow with tx with 5FU  Based on a thorough discussion of this condition and the management approach to it (including a comprehensive discussion of the known risks, side effects and potential benefits of treatment), the patient (family) agrees to implement the following specific plan:  When outside we recommend using a wide brim hat, sunglasses, long sleeve and pants, sunscreen with SPF 05+ with reapplication every 2 hours, or SPF specific clothing   liquid nitrogen to treat areas. Consent obtained. Expect area to blister, crust, and then fall off within 2 weeks. Please use vaseline. Recommend follow up in 3 months.                                         PROCEDURE:  DESTRUCTION OF PRE-MALIGNANT LESIONS  After a thorough discussion of treatment options and risk/benefits/alternatives (including but not limited to local pain, scarring, dyspigmentation, blistering, and possible superinfection), verbal and written consent were obtained and the aforementioned lesions were treated on with cryotherapy using liquid nitrogen x 1 cycle for 5-10 seconds. TOTAL NUMBER of 10 pre-malignant lesions were treated today on the ANATOMIC LOCATION: scalp, forehead, cheeks, right forearm. Please wait 4 weeks after liquid nitrogen until well healed. Then apply topical chemotherapy cream 2 times daily for 2 weeks. To the left angle of jaw, scalp, cheek. This medication will cause extreme redness, inflammation, open sores. You can apply plain Vaseline 20-30 minutes after applying Efudex to help with the redness, inflammation and blistering. If it is very uncomfortable, please contact Dr. Dulce Olivares and she may order topical steroids. You can also take a break and try to resume and complete the 14 day course when able to.      Scribe Attestation    I,:  Luz Stacy am acting as a scribe while in the presence of the attending physician.:       I,:  Cole Shelley MD personally performed the services described in this documentation    as scribed in my presence.:

## 2023-10-10 ENCOUNTER — SOCIAL WORK (OUTPATIENT)
Dept: BEHAVIORAL/MENTAL HEALTH CLINIC | Facility: CLINIC | Age: 83
End: 2023-10-10
Payer: COMMERCIAL

## 2023-10-10 DIAGNOSIS — F43.20 ADJUSTMENT DISORDER, UNSPECIFIED TYPE: Primary | ICD-10-CM

## 2023-10-10 PROCEDURE — 90837 PSYTX W PT 60 MINUTES: CPT | Performed by: SOCIAL WORKER

## 2023-10-11 ENCOUNTER — TELEPHONE (OUTPATIENT)
Age: 83
End: 2023-10-11

## 2023-10-11 ENCOUNTER — APPOINTMENT (EMERGENCY)
Dept: RADIOLOGY | Facility: HOSPITAL | Age: 83
End: 2023-10-11
Payer: COMMERCIAL

## 2023-10-11 ENCOUNTER — HOSPITAL ENCOUNTER (EMERGENCY)
Facility: HOSPITAL | Age: 83
Discharge: HOME | End: 2023-10-11
Attending: EMERGENCY MEDICINE | Admitting: EMERGENCY MEDICINE
Payer: COMMERCIAL

## 2023-10-11 ENCOUNTER — NURSE TRIAGE (OUTPATIENT)
Age: 83
End: 2023-10-11

## 2023-10-11 VITALS
WEIGHT: 216 LBS | TEMPERATURE: 98.1 F | BODY MASS INDEX: 33.83 KG/M2 | HEART RATE: 64 BPM | SYSTOLIC BLOOD PRESSURE: 142 MMHG | DIASTOLIC BLOOD PRESSURE: 95 MMHG | OXYGEN SATURATION: 96 % | RESPIRATION RATE: 18 BRPM

## 2023-10-11 DIAGNOSIS — M79.661 RIGHT CALF PAIN: Primary | ICD-10-CM

## 2023-10-11 PROCEDURE — 93971 EXTREMITY STUDY: CPT | Performed by: SURGERY

## 2023-10-11 PROCEDURE — 99284 EMERGENCY DEPT VISIT MOD MDM: CPT | Performed by: EMERGENCY MEDICINE

## 2023-10-11 PROCEDURE — 99283 EMERGENCY DEPT VISIT LOW MDM: CPT

## 2023-10-11 PROCEDURE — 93971 EXTREMITY STUDY: CPT

## 2023-10-11 RX ORDER — ACETAMINOPHEN 325 MG/1
650 TABLET ORAL ONCE
Status: COMPLETED | OUTPATIENT
Start: 2023-10-11 | End: 2023-10-11

## 2023-10-11 RX ADMIN — ACETAMINOPHEN 650 MG: 325 TABLET ORAL at 11:11

## 2023-10-11 NOTE — TELEPHONE ENCOUNTER
Patient called and stated this morning, after coming out of the bathroom, he walked over to his side of the bed, reached for a remote on his nightstand and felt and heard what sounded like a tear in his right calf. He wrapped it with an Ace bandage and is using a cane to walk. He wanted to schedule an appointment. Warm transferred patient to clinical to be triaged.

## 2023-10-11 NOTE — PSYCH
This note was not shared with the patient due to this is a psychotherapy note  Behavioral Health Psychotherapy Progress Note    Psychotherapy Provided: Individual Psychotherapy     1. Adjustment disorder, unspecified type          DATA: The undersigned therapist met with the above patient for our scheduled session. For today's session, Rafaela Ramsey discussed the recent updates with Bobbi Lower and the progress in her walking through the house. In addition, Sanjuanita's son Mercedes Aldridge went by this weekend and Rafaela Ramsey was able to make Chili to feed the family. Rafaela Ramsey reports increased pain on left side of his shoulder and hand and we discussed letting his doctor know and seeing if another injection will help. Rafaela Ramsey states he went to the dermatologist and had 10 skin blemishes burned as they were pre-cancerous. He's also recommended to put on chemotherapy cream and he states he will start that in 4 weeks. Rafaela Ramsey discussed his weekly updates with undersigned therapist and denies suicidal intent, gesture or ideation at this time. During this session, this clinician used the following therapeutic modalities: Cognitive Behavioral Therapy and Supportive Psychotherapy    Substance Abuse was not addressed during this session. ASSESSMENT:  Atha Dakin presents with a Euthymic/ normal mood. his affect is Normal range and intensity, which is congruent, with his mood and the content of the session. The client has made progress on their goals. Atha Dakin presents with a none risk of suicide, none risk of self-harm, and none risk of harm to others. For any risk assessment that surpasses a "low" rating, a safety plan must be developed. A safety plan was indicated: no  If yes, describe in detail N/A Rafaela Ramsey denies suicidal intent, gesture or ideation at this time. PLAN: Between sessions, Atha Dakin will continue meeting with undersigned therapist as needed.  At the next session, the therapist will use Cognitive Behavioral Therapy and Supportive Psychotherapy to address his current stressors.     Visit start and stop times:    10/10/23  Start Time: 1130  Stop Time: 1230  Total Visit Time: 60 minutes

## 2023-10-11 NOTE — ED PROVIDER NOTES
History  Chief Complaint   Patient presents with    Leg Pain     C/o R calf pain this AM after reaching for something. No pain at rest but pain with straightening out leg. Arrives with ace wrap is place. 27-year-old male with past history of CAD, hypertension, type 2 diabetes, dyslipidemia, obesity, osteoarthritis, kidney stones, presents to the ED for evaluation of right calf pain. Earlier this morning patient stretched over to reach something and he felt a popping sensation in his right calf region. Patient has had pain to the region since that time. Patient is having difficulty walking however he is able to ambulate with a walker. Patient called his doctor's office and was told to come to the ED to rule out DVT. Patient denies any trauma, falls, or any other injuries. History provided by:  Patient      Prior to Admission Medications   Prescriptions Last Dose Informant Patient Reported? Taking? Januvia 50 MG tablet  Self No No   Sig: TAKE 1 TABLET BY MOUTH  DAILY   Multiple Vitamin (multivitamin) tablet  Self Yes No   Sig: Take 1 tablet by mouth daily   aspirin 81 mg chewable tablet  Self Yes No   Sig: Chew 81 mg daily     cyclobenzaprine (FLEXERIL) 5 mg tablet  Self No No   Sig: Take 1 tablet (5 mg total) by mouth daily at bedtime as needed for muscle spasms   ezetimibe (ZETIA) 10 mg tablet  Self No No   Sig: Take 1 tablet (10 mg total) by mouth daily   famotidine (PEPCID) 40 MG tablet  Self No No   Sig: Take 1 tablet (40 mg total) by mouth daily   fluorouracil (EFUDEX) 5 % cream   No No   Sig: Apply topically 2 (two) times a day For 2 weeks.    metFORMIN (GLUCOPHAGE) 500 mg tablet  Self No No   Sig: TAKE ONE TABLET BY MOUTH TWICE A DAY WITH MEALS (GLUCOPHAGE)   simvastatin (ZOCOR) 40 mg tablet  Self No No   Sig: TAKE 1 TABLET BY MOUTH  DAILY AT BEDTIME   tamsulosin (FLOMAX) 0.4 mg   No No   Sig: Take 1 capsule (0.4 mg total) by mouth daily with dinner   trandolapril (MAVIK) 1 MG tablet   No No Sig: TAKE ONE TABLET BY MOUTH EVERY DAY      Facility-Administered Medications: None       Past Medical History:   Diagnosis Date    Abnormal blood chemistry     resolved: 11/9/16    Abnormal glucose     last assessed: 9/24/14    Arthritis     CAD (coronary artery disease)     Chronic kidney disease     STONES  & CKD    Coronary atherosclerosis     DM2 (diabetes mellitus, type 2) (HCC)     Dyslipidemia     Facial nerve disorder     HTN (hypertension)     Hyperlipidemia     last assessed: 1/13/17    Kidney stones     Lumbosacral radiculopathy     Nerve root and plexus disorder     Obesity     Osteoarthritis     Prostate asymmetry     Pure hypercholesterolemia        Past Surgical History:   Procedure Laterality Date    CORONARY ANGIOPLASTY      1 STENT     PA UNLISTED PROCEDURE ESOPHAGUS N/A 9/30/2021    Procedure: EGD; DIVERTICULECTOMY ZENKERS ENDOSCOPIC;  Surgeon: Km Fleming MD;  Location: [facility];  Service: Thoracic    UPPER GASTROINTESTINAL ENDOSCOPY         Family History   Problem Relation Age of Onset    Cancer Mother     Ovarian cancer Mother     Hypertension Sister     Lung disease Father         black    Mental illness Neg Hx     Substance Abuse Neg Hx      I have reviewed and agree with the history as documented. E-Cigarette/Vaping    E-Cigarette Use Never User      E-Cigarette/Vaping Substances    Nicotine No     THC No     CBD No     Flavoring No     Other No     Unknown No      Social History     Tobacco Use    Smoking status: Never    Smokeless tobacco: Never   Vaping Use    Vaping Use: Never used   Substance Use Topics    Alcohol use: Not Currently    Drug use: No       Review of Systems   Constitutional:  Negative for chills and fever. HENT:  Negative for ear pain and sore throat. Eyes:  Negative for pain and visual disturbance. Respiratory:  Negative for cough and shortness of breath. Cardiovascular:  Negative for chest pain and palpitations.    Gastrointestinal:  Negative for abdominal pain and vomiting. Genitourinary:  Negative for dysuria and hematuria. Musculoskeletal:  Positive for myalgias. Negative for arthralgias and back pain. Skin:  Negative for color change and rash. Neurological:  Negative for seizures and syncope. All other systems reviewed and are negative. Physical Exam  Physical Exam  Vitals and nursing note reviewed. Constitutional:       General: He is not in acute distress. Appearance: He is well-developed. HENT:      Head: Normocephalic and atraumatic. Eyes:      Conjunctiva/sclera: Conjunctivae normal.   Cardiovascular:      Rate and Rhythm: Normal rate and regular rhythm. Heart sounds: No murmur heard. Pulmonary:      Effort: Pulmonary effort is normal. No respiratory distress. Breath sounds: Normal breath sounds. Abdominal:      Palpations: Abdomen is soft. Tenderness: There is no abdominal tenderness. Musculoskeletal:         General: No swelling. Cervical back: Neck supple. Comments: Pulses intact to right lower extremities. Tenderness to palpation noted to the mid and proximal calf region on the right side. No edema, erythema, or warmth to touch noted on examination of right lower extremity. No tenderness noted to the knee. Range of motion of right knee is otherwise intact. Skin:     General: Skin is warm and dry. Capillary Refill: Capillary refill takes less than 2 seconds. Neurological:      Mental Status: He is alert.    Psychiatric:         Mood and Affect: Mood normal.         Vital Signs  ED Triage Vitals [10/11/23 0958]   Temperature Pulse Respirations Blood Pressure SpO2   97.6 °F (36.4 °C) 69 18 (!) 174/89 97 %      Temp Source Heart Rate Source Patient Position - Orthostatic VS BP Location FiO2 (%)   Oral Monitor Sitting Left arm --      Pain Score       No Pain           Vitals:    10/11/23 0958   BP: (!) 174/89   Pulse: 69   Patient Position - Orthostatic VS: Sitting         Visual Acuity      ED Medications  Medications   acetaminophen (TYLENOL) tablet 650 mg (650 mg Oral Given 10/11/23 1111)       Diagnostic Studies  Results Reviewed       None                   VAS lower limb venous duplex study, unilateral/limited    (Results Pending)              Procedures  Procedures         ED Course  ED Course as of 10/11/23 1204   Wed Oct 11, 2023   1153 Spoke with vascular tech who notes patient is negative for any DVT, cysts, or any muscle tears on venous duplex of right lower extremity. SBIRT 22yo+      Flowsheet Row Most Recent Value   Initial Alcohol Screen: US AUDIT-C     1. How often do you have a drink containing alcohol? 0 Filed at: 10/11/2023 1200   2. How many drinks containing alcohol do you have on a typical day you are drinking? 0 Filed at: 10/11/2023 1200   3a. Male UNDER 65: How often do you have five or more drinks on one occasion? 0 Filed at: 10/11/2023 1200   Audit-C Score 0 Filed at: 10/11/2023 1200   SAUL: How many times in the past year have you. .. Used an illegal drug or used a prescription medication for non-medical reasons? Never Filed at: 10/11/2023 1200                      Medical Decision Making  Check venous duplex of right lower extremity  Give Tylenol for pain    Venous duplex of right lower extremities negative for DVT, cyst, or any other obvious muscle injuries. At this time patient's calf pain is most likely musculoskeletal in origin. Patient is discharged home with over-the-counter Tylenol as needed for pain and follow-up to PCP as well as orthopedic surgery for any worsening pains. Close return instructions given to return to the ER for any worsening symptoms. Patient agrees with discharge plan. Patient well appearing at time of discharge. Please Note: Fluency Direct voice recognition software may have been used in the creation of this document.  Wrong words or sound a like substitutions may have occurred due to the inherent limitations of the voice software. Risk  OTC drugs. Disposition  Final diagnoses:   Right calf pain     Time reflects when diagnosis was documented in both MDM as applicable and the Disposition within this note       Time User Action Codes Description Comment    10/11/2023 11:58 AM Angy Ware Add [G86.944] Right calf pain           ED Disposition       ED Disposition   Discharge    Condition   Stable    Date/Time   Wed Oct 11, 2023 1158    Comment   Gonzales Sears discharge to home/self care. Follow-up Information       Follow up With Specialties Details Why Contact Info Additional Information    Evonne Melendrez MD Family Medicine Schedule an appointment as soon as possible for a visit  If symptoms worsen [address] [facility] [address]  [address] [facility] Orthopedic Surgery Schedule an appointment as soon as possible for a visit  If symptoms worsen [address], [place] [address] [facility] [address] [address], [address], [address] [facility], [address] [phone]            Patient's Medications   Discharge Prescriptions    No medications on file       No discharge procedures on file.     PDMP Review       None            ED Provider  Electronically Signed by             Angy Ware DO  10/11/23 3831

## 2023-10-11 NOTE — PSYCH
This note was not shared with the patient due to this is a psychotherapy note    Behavioral Health Psychotherapy Progress Note    Psychotherapy Provided: Individual Psychotherapy     1. Adjustment disorder, unspecified type          DATA: The undersigned therapist met with the above patient for our scheduled session. For today's session, Salinas Orourke processed his feelings about caring for Chilango Phelps and the difficulties he encounters in changing her diapers. Salinas Orourke discussed his week chores and expressed concern over his neighbor, Melina Buitrago and her daughter-in-law possibly dying. Salinas Orourke continues to feel pain on his shoulder and arm, and discussed his recent appointment with Dr. Ryley Santoyo. Salinas Orourke states she wants him to see a dermatologist due to some marks on his head and skin, and he made an appointment with [facility] for next week. Salinas Orourke continues to do well emotionally and refuse any medication. Salinas Orourke does struggle at times with sleeping through the night, but not open to medication or natural remedies. Salinas Orourke denies suicidal intent, gesture or ideation at this time. During this session, this clinician used the following therapeutic modalities: Cognitive Behavioral Therapy and Supportive Psychotherapy    Substance Abuse was not addressed during this session. Salinas Orourke does not use mind altering substances. ASSESSMENT:  Dilcia Blair presents with a Euthymic/ normal mood. his affect is Normal range and intensity, which is congruent, with his mood and the content of the session. The client has made progress on their goals. Dilcia Blair presents with a none risk of suicide, none risk of self-harm, and none risk of harm to others. For any risk assessment that surpasses a "low" rating, a safety plan must be developed.     A safety plan was indicated: no  If yes, describe in detail N.A     PLAN: Between sessions, Dilcia Blair will continue meeting with undersigned therapist. At the next session, the therapist will use Cognitive Behavioral Therapy to address current stressors.       Visit start and stop times:    10/03/23  Start Time: 1130  Stop Time: 1230  Total Visit Time: 60 minutes

## 2023-10-12 ENCOUNTER — OFFICE VISIT (OUTPATIENT)
Dept: FAMILY MEDICINE CLINIC | Facility: CLINIC | Age: 83
End: 2023-10-12
Payer: COMMERCIAL

## 2023-10-12 VITALS
SYSTOLIC BLOOD PRESSURE: 142 MMHG | DIASTOLIC BLOOD PRESSURE: 70 MMHG | RESPIRATION RATE: 14 BRPM | BODY MASS INDEX: 33.9 KG/M2 | HEIGHT: 67 IN | WEIGHT: 216 LBS | HEART RATE: 64 BPM | TEMPERATURE: 97.9 F | OXYGEN SATURATION: 97 %

## 2023-10-12 DIAGNOSIS — I10 BENIGN ESSENTIAL HTN: ICD-10-CM

## 2023-10-12 DIAGNOSIS — E11.22 TYPE 2 DIABETES MELLITUS WITH STAGE 3 CHRONIC KIDNEY DISEASE AND HYPERTENSION (HCC): ICD-10-CM

## 2023-10-12 DIAGNOSIS — S86.811A STRAIN OF RIGHT CALF MUSCLE: Primary | ICD-10-CM

## 2023-10-12 DIAGNOSIS — N18.30 TYPE 2 DIABETES MELLITUS WITH STAGE 3 CHRONIC KIDNEY DISEASE AND HYPERTENSION (HCC): ICD-10-CM

## 2023-10-12 DIAGNOSIS — I12.9 TYPE 2 DIABETES MELLITUS WITH STAGE 3 CHRONIC KIDNEY DISEASE AND HYPERTENSION (HCC): ICD-10-CM

## 2023-10-12 PROCEDURE — 99214 OFFICE O/P EST MOD 30 MIN: CPT | Performed by: FAMILY MEDICINE

## 2023-10-12 NOTE — PROGRESS NOTES
Miri White 1940 male MRN: [id number]    FAMILY PRACTICE OFFICE VISIT  [facility] - [address]      ASSESSMENT/PLAN  Gonzales Aden is a 82 y.o. male presents to the office for    Problem List Items Addressed This Visit        Endocrine    Type 2 diabetes mellitus with stage 3 chronic kidney disease and hypertension ([address])   Other Visit Diagnoses     Strain of right calf muscle    -  Primary    Benign essential HTN             Strain of the right calf; exercises discussed with the patient. If no improvement within 3 to 6 weeks recommend seeing an orthopedic for evaluation. Did review  ultrasound Doppler.   With the patient  Hypertension currently continue as prescribed on medication list.  Type 2 diabetes continue on 500 mg twice a day not due for an A1c check for couple months    Patient is to notify us if he needs a sooner appointment           Future Appointments   Date Time Provider [address]   10/17/2023 11:30 AM Mechele Corydon, LCSW Psych [facility]   10/17/2023  1:15 PM Ankit Mcduffie PA-C [facility]TH[facility]   10/24/2023 11:30 AM Mechele Corydon, LCSW Psych [facility]   10/31/2023 11:30 AM Mechele Corydon, LCSW Psych [facility]   11/7/2023 11:30 AM Mechele Corydon, LCSW Psych [facility]   11/14/2023 11:30 AM Mechele Corydon, LCSW Psych [facility]   11/14/2023  2:40 PM Paula Solares MD CARD [facility]   11/21/2023 11:30 AM Mechele Corydon, LCSW Psych [facility]   11/28/2023 11:30 AM Mechele Corydon, LCSW Psych [facility]   12/5/2023 11:30 AM Mechele Corydon, LCSW Psych [facility]   12/12/2023 11:30 AM Mechele Corydon, LCSW Psych [facility]   12/19/2023 11:30 AM Mechele Corydon, LCSW Psych [facility]   12/26/2023 11:30 AM Mechele Corydon, LCSW Psych [facility]          SUBJECTIVE  CC: Follow-up (ER rt calf pain)      HPI:  Justyn Fink is a 82 y.o. male who presents for an acute appointment. Patient was recently seen in the emergency room for right calf pain. States in the middle the night he went to find the remote did not do anything drastic but felt significant sharp right calf pain continues to have tenderness and was advised to go the emergency room for Doppler. Patient states that the Doppler came back negative he was surprised to see that it did not show a tear or he still continues to have problems but wears an Ace bandage around it. Is taking all his diabetic and his blood pressure medications as prescribed without any problems  Review of Systems   Constitutional:  Negative for activity change, appetite change, chills, fatigue and fever. HENT:  Negative for congestion. Respiratory:  Negative for cough, chest tightness and shortness of breath. Cardiovascular:  Negative for chest pain and leg swelling. Gastrointestinal:  Negative for abdominal distention, abdominal pain, constipation, diarrhea, nausea and vomiting. Musculoskeletal:  Positive for arthralgias. All other systems reviewed and are negative.       Historical Information   The patient history was reviewed as follows:  Past Medical History:   Diagnosis Date   • Abnormal blood chemistry     resolved: 11/9/16   • Abnormal glucose     last assessed: 9/24/14   • Arthritis    • CAD (coronary artery disease)    • Chronic kidney disease     STONES  & CKD   • Coronary atherosclerosis    • DM2 (diabetes mellitus, type 2) ([facility])    • Dyslipidemia    • Facial nerve disorder    • HTN (hypertension)    • Hyperlipidemia     last assessed: 1/13/17   • Kidney stones    • Lumbosacral radiculopathy    • Nerve root and plexus disorder    • Obesity    • Osteoarthritis    • Prostate asymmetry    • Pure hypercholesterolemia          Medications:     Current Outpatient Medications:   •  aspirin 81 mg chewable tablet, Chew 81 mg daily  , Disp: , Rfl: •  cyclobenzaprine (FLEXERIL) 5 mg tablet, Take 1 tablet (5 mg total) by mouth daily at bedtime as needed for muscle spasms, Disp: 30 tablet, Rfl: 0  •  ezetimibe (ZETIA) 10 mg tablet, Take 1 tablet (10 mg total) by mouth daily, Disp: 90 tablet, Rfl: 3  •  famotidine (PEPCID) 40 MG tablet, Take 1 tablet (40 mg total) by mouth daily, Disp: 90 tablet, Rfl: 1  •  fluorouracil (EFUDEX) 5 % cream, Apply topically 2 (two) times a day For 2 weeks. , Disp: 40 g, Rfl: 1  •  Januvia 50 MG tablet, TAKE 1 TABLET BY MOUTH  DAILY, Disp: 90 tablet, Rfl: 3  •  metFORMIN (GLUCOPHAGE) 500 mg tablet, TAKE ONE TABLET BY MOUTH TWICE A DAY WITH MEALS (GLUCOPHAGE), Disp: 180 tablet, Rfl: 2  •  Multiple Vitamin (multivitamin) tablet, Take 1 tablet by mouth daily, Disp: , Rfl:   •  simvastatin (ZOCOR) 40 mg tablet, TAKE 1 TABLET BY MOUTH  DAILY AT BEDTIME, Disp: 90 tablet, Rfl: 3  •  tamsulosin (FLOMAX) 0.4 mg, Take 1 capsule (0.4 mg total) by mouth daily with dinner, Disp: 90 capsule, Rfl: 3  •  trandolapril (MAVIK) 1 MG tablet, TAKE ONE TABLET BY MOUTH EVERY DAY, Disp: 90 tablet, Rfl: 2    Allergies   Allergen Reactions   • Other Other (See Comments)     REAPRO- SEVERELY LOWERED RBCS [facility] HOSPITAL   • Ciprofloxacin Hives       OBJECTIVE  Vitals:   Vitals:    10/12/23 1201   BP: 142/70   BP Location: Left arm   Patient Position: Sitting   Cuff Size: Standard   Pulse: 64   Resp: 14   Temp: 97.9 °F (36.6 °C)   TempSrc: Temporal   SpO2: 97%   Weight: 98 kg (216 lb)   Height: 5' 7" (1.702 m)         Physical Exam  Constitutional:       Appearance: Normal appearance. Pulmonary:      Effort: Pulmonary effort is normal.   Musculoskeletal:         General: Normal range of motion. Skin:     Comments: Calf tenderness over the right. Pain gait   Neurological:      General: No focal deficit present. Mental Status: He is alert and oriented to person, place, and time.    Psychiatric:         Mood and Affect: Mood normal.         Behavior: Behavior normal.         Thought Content:  Thought content normal.         Judgment: Judgment normal.                    Evonne Melendrez MD,   [facility]  10/12/2023

## 2023-10-13 ENCOUNTER — TELEPHONE (OUTPATIENT)
Dept: ADMINISTRATIVE | Facility: OTHER | Age: 83
End: 2023-10-13

## 2023-10-13 ENCOUNTER — TELEPHONE (OUTPATIENT)
Dept: OTHER | Facility: HOSPITAL | Age: 83
End: 2023-10-13

## 2023-10-13 NOTE — LETTER
Diabetic Eye Exam Form    Date Requested: 10/13/23  Patient: Estefani Woodruff  Patient : 1940   Referring Provider: Srinivasan Cerna MD      DIABETIC Eye Exam Date _______________________________      Type of Exam MUST be documented for Diabetic Eye Exams. Please CHECK ONE. Retinal Exam       Dilated Retinal Exam       OCT       Optomap-Iris Exam      Fundus Photography       Left Eye - Please check Retinopathy or No Retinopathy        Exam did show retinopathy    Exam did not show retinopathy       Right Eye - Please check Retinopathy or No Retinopathy       Exam did show retinopathy    Exam did not show retinopathy       Comments __________________________________________________________    Practice Providing Exam ______________________________________________    Exam Performed By (print name) _______________________________________      Provider Signature ___________________________________________________      These reports are needed for  compliance. Please fax this completed form and a copy of the Diabetic Eye Exam report to our office located at [address] [place] [address] as soon as possible via Fax [phone] [place]: Phone [phone]  We thank you for your assistance in treating our mutual patient.

## 2023-10-13 NOTE — TELEPHONE ENCOUNTER
Patient called in he wanted me to send a message to Dr. Jerrod De La Garza to say    Thank you for taking such good care of Aleena Sargent

## 2023-10-13 NOTE — TELEPHONE ENCOUNTER
Upon review of the In Basket request and the patient's chart, initial outreach has been made via fax to facility. Please see Contacts section for details.      Thank you  Selena Guillaume MA

## 2023-10-13 NOTE — TELEPHONE ENCOUNTER
----- Message from Corin Garcia MD sent at 10/12/2023 12:19 PM EDT -----  Regarding: care gap request  10/12/23 12:19 PM    Hello, our patient attached above has had Diabetic Eye Exam completed/performed.  Please assist in updating the patient chart by making an External outreach to Dr. Claudia Campos  Thank you,  Mary FLANNERY

## 2023-10-16 ENCOUNTER — TELEPHONE (OUTPATIENT)
Dept: PSYCHIATRY | Facility: CLINIC | Age: 83
End: 2023-10-16

## 2023-10-16 NOTE — TELEPHONE ENCOUNTER
PSR left message in regards to cancelling session for 10/17/2023 @11:30am due to therapist taking a sick day.

## 2023-10-17 NOTE — TELEPHONE ENCOUNTER
Upon review of the In Basket request we were able to note that no further action is required. The patient chart is up to date as a result of a previous request.     Last DM eye exam from Dr. Elizabeth Murguia received is from  9/22 , already in the chart. Any additional questions or concerns should be emailed to the Practice Liaisons via the appropriate education email address, please do not reply via In Basket.     Thank you  Aydee Kim MA

## 2023-10-22 DIAGNOSIS — E11.9 TYPE 2 DIABETES MELLITUS WITHOUT COMPLICATION, WITHOUT LONG-TERM CURRENT USE OF INSULIN (HCC): ICD-10-CM

## 2023-10-22 DIAGNOSIS — I25.10 2-VESSEL CORONARY ARTERY DISEASE: ICD-10-CM

## 2023-10-22 DIAGNOSIS — E78.5 DYSLIPIDEMIA: ICD-10-CM

## 2023-10-23 RX ORDER — SITAGLIPTIN 50 MG/1
TABLET, FILM COATED ORAL
Qty: 90 TABLET | Refills: 1 | Status: SHIPPED | OUTPATIENT
Start: 2023-10-23

## 2023-10-23 RX ORDER — SIMVASTATIN 40 MG
TABLET ORAL
Qty: 90 TABLET | Refills: 3 | Status: SHIPPED | OUTPATIENT
Start: 2023-10-23

## 2023-10-31 ENCOUNTER — SOCIAL WORK (OUTPATIENT)
Dept: BEHAVIORAL/MENTAL HEALTH CLINIC | Facility: CLINIC | Age: 83
End: 2023-10-31
Payer: COMMERCIAL

## 2023-10-31 DIAGNOSIS — F43.20 ADJUSTMENT DISORDER, UNSPECIFIED TYPE: Primary | ICD-10-CM

## 2023-10-31 PROCEDURE — 90837 PSYTX W PT 60 MINUTES: CPT | Performed by: SOCIAL WORKER

## 2023-11-07 ENCOUNTER — SOCIAL WORK (OUTPATIENT)
Dept: BEHAVIORAL/MENTAL HEALTH CLINIC | Facility: CLINIC | Age: 83
End: 2023-11-07
Payer: COMMERCIAL

## 2023-11-07 DIAGNOSIS — F43.20 ADJUSTMENT DISORDER, UNSPECIFIED TYPE: Primary | ICD-10-CM

## 2023-11-07 PROCEDURE — 90837 PSYTX W PT 60 MINUTES: CPT | Performed by: SOCIAL WORKER

## 2023-11-07 NOTE — PSYCH
This note was not shared with the patient due to this is a psychotherapy note    Behavioral Health Psychotherapy Progress Note    Psychotherapy Provided: Individual Psychotherapy     1. Adjustment disorder, unspecified type            Goals addressed in session: Goal 1     DATA: The undersigned therapist met with the above patient for our scheduled therapuetic session. Amauri Henderson states he has been thinking of getting Royce Krishna a dog and will be going to a dog rescue place to see what's available. We discussed how good of the idea was, as Royce Krishna would have a  and he would have a reason to walk and be more active. We discussed Sanjuanita's health and Amauri Henderson states her legs and feet are looking less swollen but states Royce Krishna has been incontinent at times. Amauri Henderson discussed his feelings over his friend Treva Fox who is sick with dementia, and states he will be giving him a call sometime this week. Amauri Henderson states Sanjuanita's chair sore has healed and he's overall doing well. Amauri Henderson denies suicidal intent, gesture or ideation at this time. During this session, this clinician used the following therapeutic modalities: Cognitive Behavioral Therapy and Supportive Psychotherapy Substance Abuse was not addressed during this session because he does not use substances. ASSESSMENT:  Stacy Carranza presents with a Euthymic/ normal mood. his affect is Normal range and intensity, which is congruent, with his mood and the content of the session. The client has made progress on their goals. Stacy Carranza presents with a none risk of suicide, none risk of self-harm, and none risk of harm to others. For any risk assessment that surpasses a "low" rating, a safety plan must be developed.     A safety plan was indicated: no  If yes, describe in detail N/A     PLAN: Between sessions, Stacy Carranza will will continue to take medications as prescribed by their primary care physician or psychiatrist and meet individually for therapy as needed. At the next session, the therapist will use Cognitive Behavioral Therapy and Supportive Psychotherapy to address current stressors. Behavioral Health Treatment Plan and Discharge Planning: Padmini Diaz is aware of and agrees to continue to work on their treatment plan. They have identified and are working toward their discharge goals.  yes    Visit start and stop times:    11/07/23  Start Time: 1130  Stop Time: 1230  Total Visit Time: 60 minutes

## 2023-11-07 NOTE — BH TREATMENT PLAN
[facility] Integration Treatment Plan    Gonzales Sears  1940  Date of Treatment Plan: 11/07/2023    Treatment Goals (after each item selected, indicate outcome measures; (ie: as evidenced by)    [] Reduce Risk Factors of:    [x] Reduce Major Symptoms of: stress from care giving for his wife   [] Ameliorate Functional Impairments of:    [x] Develop Coping Strategies to Address Stress of: being his wife's caregiver   [] Stabilize (short term) Crisis of:   [] Maintain (long term) Stabilization of Symptoms of   [] Medication Referral to:   [] Maintain Sobriety:     Planned Interventions- Patient Participation (must be consistent with treatment goals):    [] Assertiveness Training [] Problem Solving Skills Training   [] Anger Management [] Solution Focused Techniques   [] Affect Identification and Expression [x] Stress Management   [] Cognitive Restructuring [x] Supportive Therapy   [] Communication Training  [] Self/Other [place] Training    [] Grief Work  [] Decision Options Exploration   [] Imagery/Relaxation Training [] Decision Option Exploration   [] Parent Training  [] Pattern Identification and Interruption       [] Engage Significant Others in Treatment:   [] Facilitate Decision Making Regarding:   [] Explore/Monitor:    [x] Teach Skills of: coping with the stress and his thoughts of death due to old age. [] Educate Regarding:   [] Assign Readings:   [] Referrals Planned:   [] Use of Resources/Strengths:   [] Preventative Strategies:   [] Obstacles to Change:     Estimated Time Frames:     Goal 1: To help Ernesto cope and express his frustrations and stress from caring for his wife who has multiple health issues. Goal 2:  Learn and use effective communication strategies. I have been provided education on my primary diagnosis of: Adjustment Disorder, unspecified type. My therapist and I have developed this plan together, and I am in agreement to working on these issues and goals.     I understand the plan that has been developed for my treatment. [] Patient Declined copy of treatment plan.

## 2023-11-08 ENCOUNTER — OFFICE VISIT (OUTPATIENT)
Dept: OBGYN CLINIC | Facility: CLINIC | Age: 83
End: 2023-11-08
Payer: COMMERCIAL

## 2023-11-08 VITALS — BODY MASS INDEX: 33.9 KG/M2 | WEIGHT: 216 LBS | HEIGHT: 67 IN

## 2023-11-08 DIAGNOSIS — G56.01 CARPAL TUNNEL SYNDROME OF RIGHT WRIST: Primary | ICD-10-CM

## 2023-11-08 PROCEDURE — 99214 OFFICE O/P EST MOD 30 MIN: CPT | Performed by: STUDENT IN AN ORGANIZED HEALTH CARE EDUCATION/TRAINING PROGRAM

## 2023-11-08 RX ORDER — CHLORHEXIDINE GLUCONATE ORAL RINSE 1.2 MG/ML
15 SOLUTION DENTAL ONCE
OUTPATIENT
Start: 2023-11-08 | End: 2023-11-08

## 2023-11-08 RX ORDER — ACETAMINOPHEN 325 MG/1
975 TABLET ORAL EVERY 8 HOURS
OUTPATIENT
Start: 2023-11-08

## 2023-11-08 RX ORDER — TRAMADOL HYDROCHLORIDE 50 MG/1
50 TABLET ORAL EVERY 6 HOURS SCHEDULED
OUTPATIENT
Start: 2023-11-09

## 2023-11-08 RX ORDER — ONDANSETRON 2 MG/ML
4 INJECTION INTRAMUSCULAR; INTRAVENOUS EVERY 6 HOURS PRN
OUTPATIENT
Start: 2023-11-08

## 2023-11-08 NOTE — PROGRESS NOTES
ASSESSMENT/PLAN:    Assessment:   Right carpal tunnel syndrome    2. DM  - Last A1C 7.2    3. Atraumatic Left shoulder pain    Plan:   - I had an extensive discussion with aLni Ramsey about a possible open carpal tunnel release. Lani Ramsey reports that his symptoms have been progressing and they now wake him up in the middle the night despite nighttime splinting. The corticosteroid injection provided by Dr. Otilio Zapata did provide him symptomatic relief for about 2 months. He reports that his symptoms returned and are progressing. His social situation is somewhat complicated as he is one of the primary caregivers for his wife who is bedbound. I discussed with him that I would not recommend a carpal tunnel release if he was doing any heavy lifting pushing or pulling which included helping his wife. I also discussed that the postsurgical open carpal tunnel incision must remain clean and this may be difficult if he is caring for his wife. Lani Ramsey states that he has full-time help at home for his wife and would be able to have an appropriate postoperative recovery with the restrictions we discussed. He would like to move forward with an open carpal tunnel release as his symptoms are now more bothersome. I discussed the risks and benefits of an open carpal tunnel release which included infection, damage to surrounding structures including the median nerve, poor wound healing, pillar pain and persistent symptoms. Lani Ramsey wanted to move forward with the procedure knowing all the risks and benefits and signed the consent. I also discussed with Lani Ramsey that if he was unable to follow the postoperative protocol because his social situation at home became more complex that I would recommend canceling the surgery. He understands.     -Patient has had progressive left shoulder pain. He reports most of his pain is activity related and denies any trauma.   I would like you to follow-up with my colleague Dr. Lazaro Hernández for further evaluation and possible physical therapy. Follow Up: After Surgery    General Discussions:     Carpal Tunnel Syndrome: The anatomy and physiology of carpal tunnel syndrome was discussed with the patient today. Increase pressure localized under the transverse carpal ligament can cause pain, numbness, tingling, or dysesthesias within the median nerve distribution as well as feelings of fatigue, clumsiness, or awkwardness. These symptoms typically occur at night and worse in the morning upon waking. Eventually, untreated carpal tunnel syndrome can result in weakness and permanent loss of muscle within the thenar compartment of the hand. Treatment options were discussed with the patient. Conservative treatment includes nocturnal resting splints to keep the nerve in a neutral position, ergonomic changes within the work or home environment, activity modification, and tendon gliding exercises. Steroid injections within the carpal canal can help a majority of patients, however this is often self-limited in a majority of patients. Surgical intervention to divide the transverse carpal ligament typically results in a long-lasting relief of the patient's complaints, with the recurrence rate of less than 1%.         _____________________________________________________  CHIEF COMPLAINT:  Chief Complaint   Patient presents with   • Right Wrist - Numbness         SUBJECTIVE:  Gonzales Sears is a right hand dominant 82 y.o. male who presents for follow-up evaluation of right carpal tunnel syndrome. He was last seen on 9/7/2023 by my colleague Dr. Uvaldo Morales. Dr. Uvaldo Morales was treating [place] for his carpal tunnel syndrome with conservative management which included nighttime splinting and a previous corticosteroid injection which provided him significant amount of symptomatic relief.   He is here today because his symptoms have progressed and he now has persistent numbness in the median nerve distribution and symptoms that wake him up in the middle the night. He is interested in having an open carpal tunnel release and wanted to discuss this further versus having a repeat corticosteroid injection. Of note, he is a primary caregiver for his wife. He reports that he does have to do some heavy lifting with his wife for cleaning and changing her diaper. He also reports that he has a significant amount of help at home to help care for her if he needed to move forward with a carpal tunnel release. His wife is currently bedbound.           PAST MEDICAL HISTORY:  Past Medical History:   Diagnosis Date   • Abnormal blood chemistry     resolved: 11/9/16   • Abnormal glucose     last assessed: 9/24/14   • Arthritis    • CAD (coronary artery disease)    • Chronic kidney disease     STONES  & CKD   • Coronary atherosclerosis    • DM2 (diabetes mellitus, type 2) ([facility])    • Dyslipidemia    • Facial nerve disorder    • HTN (hypertension)    • Hyperlipidemia     last assessed: 1/13/17   • Kidney stones    • Lumbosacral radiculopathy    • Nerve root and plexus disorder    • Obesity    • Osteoarthritis    • Prostate asymmetry    • Pure hypercholesterolemia        PAST SURGICAL HISTORY:  Past Surgical History:   Procedure Laterality Date   • CORONARY ANGIOPLASTY      1 STENT    • DE UNLISTED PROCEDURE ESOPHAGUS N/A 9/30/2021    Procedure: EGD; DIVERTICULECTOMY ZENKERS ENDOSCOPIC;  Surgeon: Cooper Nieves MD;  Location: [facility];  Service: Thoracic   • UPPER GASTROINTESTINAL ENDOSCOPY         FAMILY HISTORY:  Family History   Problem Relation Age of Onset   • Cancer Mother    • Ovarian cancer Mother    • Hypertension Sister    • Lung disease Father         black   • Mental illness Neg Hx    • Substance Abuse Neg Hx        SOCIAL HISTORY:  Social History     Tobacco Use   • Smoking status: Never   • Smokeless tobacco: Never   Vaping Use   • Vaping Use: Never used   Substance Use Topics   • Alcohol use: Not Currently   • Drug use: No       MEDICATIONS:    Current Outpatient Medications:   •  aspirin 81 mg chewable tablet, Chew 81 mg daily  , Disp: , Rfl:   •  cyclobenzaprine (FLEXERIL) 5 mg tablet, Take 1 tablet (5 mg total) by mouth daily at bedtime as needed for muscle spasms, Disp: 30 tablet, Rfl: 0  •  ezetimibe (ZETIA) 10 mg tablet, Take 1 tablet (10 mg total) by mouth daily, Disp: 90 tablet, Rfl: 3  •  famotidine (PEPCID) 40 MG tablet, Take 1 tablet (40 mg total) by mouth daily, Disp: 90 tablet, Rfl: 1  •  fluorouracil (EFUDEX) 5 % cream, Apply topically 2 (two) times a day For 2 weeks. , Disp: 40 g, Rfl: 1  •  metFORMIN (GLUCOPHAGE) 500 mg tablet, TAKE ONE TABLET BY MOUTH TWICE A DAY WITH MEALS (GLUCOPHAGE), Disp: 180 tablet, Rfl: 2  •  Multiple Vitamin (multivitamin) tablet, Take 1 tablet by mouth daily, Disp: , Rfl:   •  simvastatin (ZOCOR) 40 mg tablet, TAKE 1 TABLET BY MOUTH DAILY AT  BEDTIME, Disp: 90 tablet, Rfl: 3  •  sitaGLIPtin (Januvia) 50 mg tablet, TAKE 1 TABLET BY MOUTH DAILY, Disp: 90 tablet, Rfl: 1  •  tamsulosin (FLOMAX) 0.4 mg, Take 1 capsule (0.4 mg total) by mouth daily with dinner, Disp: 90 capsule, Rfl: 3  •  trandolapril (MAVIK) 1 MG tablet, TAKE ONE TABLET BY MOUTH EVERY DAY, Disp: 90 tablet, Rfl: 2    ALLERGIES:  Allergies   Allergen Reactions   • Other Other (See Comments)     REAPRO- SEVERELY LOWERED RBCS [facility]   • Ciprofloxacin Hives       REVIEW OF SYSTEMS:  Pertinent items are noted in HPI. A comprehensive review of systems was negative.     LABS:  HgA1c:   Lab Results   Component Value Date    HGBA1C 7.2 (H) 09/22/2023     BMP:   Lab Results   Component Value Date    GLUCOSE 125 (H) 02/17/2017    CALCIUM 10.0 09/22/2023     02/17/2017    K 4.8 09/22/2023    CO2 29 09/22/2023     09/22/2023    BUN 29 (H) 09/22/2023    CREATININE 1.69 (H) 09/22/2023         _____________________________________________________  PHYSICAL EXAMINATION:  Vital signs: Ht 5' 7" (1.702 m)   Wt 98 kg (216 lb)   BMI 33.83 kg/m²   General: well developed and well nourished, alert, oriented times 3, and appears comfortable  Psychiatric: Normal  HEENT: Trachea Midline, No torticollis  Cardiovascular: No discernable arrhythmia  Pulmonary: No wheezing or stridor  Abdomen: No rebound or guarding  Extremities: No peripheral edema  Skin: No masses, erythema, lacerations, fluctation, ulcerations  Neurovascular: Sensation Intact to the Median, Ulnar, Radial Nerve, Motor Intact to the Median, Ulnar, Radial Nerve, and Pulses Intact    MUSCULOSKELETAL EXAMINATION:  RIGHT SIDE:  Carpal tunnel:  Weakness APB, Atrophy to thenar muscles, Postive Tinel's, Positive Derkin's Compression Test, Positive Phalan's Test, and Loss of two point discrimination to 5mm in the median nerve distribution but intact to the ulnar nerve distribution    _____________________________________________________  STUDIES REVIEWED:  EMG/NCS 7/22 Right UE: shows mild-moderate carpal tunnel syndrome to right UE, Sensory latency 7.3ms and motor latency normal across wrist to APB    Impression:   Abnormal electro diagnostic study. There is evidence of right sided sensorimotor demyelinating and axonal moderate median mono neuropathy at wrist, known as carpal tunnel syndrome. 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 There is no evidence of cervical radiculopathy.        PROCEDURES PERFORMED:  Procedures  No Procedures performed today

## 2023-11-08 NOTE — PSYCH
This note was not shared with the patient due to this is a psychotherapy note    Behavioral Health Psychotherapy Progress Note    Psychotherapy Provided: Individual Psychotherapy     1. Adjustment disorder, unspecified type            Goals addressed in session: Goal 1 and Goal 2     DATA: The undersigned therapist met with the above patient for our scheduled session. Casie Braxton states he strained his leg muscle and discussed his recent hospital visit over hurting his ligaments. Casie Braxton states he's been addressing his skin cancer medication and discussed Sanjuanita's health. Casie Braxton states he's been feeling more tired lately, and shared information on Hydrolloic Bandages for Sanjuanita's bed sores. Casie Braxton talked about his football experience, history of getting in trouble and also talked about his friend Dari Haider. Casie Braxton continues to process his history with undersigned therapist.  Casie Braxton denies suicidal intent, gesture or ideation at this time. During this session, this clinician used the following therapeutic modalities: Cognitive Behavioral Therapy and Supportive Psychotherapy    Substance Abuse was not addressed during this session. Arsenio denies using any substances at this time. ASSESSMENT:  Padmini Diaz presents with a Euthymic/ normal mood. his affect is Normal range and intensity, which is congruent, with his mood and the content of the session. The client has made progress on their goals. Padmini Diaz presents with a none risk of suicide, none risk of self-harm, and none risk of harm to others. For any risk assessment that surpasses a "low" rating, a safety plan must be developed. A safety plan was indicated: no  If yes, describe in detail N/A Casie Braxton denies suicidal intent, gesture or ideation at this time. PLAN: Between sessions, Padmini Diaz will will continue to take medications as prescribed by their primary care physician or psychiatrist and meet individually for therapy as needed.   At the next session, the therapist will use Cognitive Behavioral Therapy and Supportive Psychotherapy to address his current stressors. Behavioral Health Treatment Plan and Discharge Planning: Majo Mitchell is aware of and agrees to continue to work on their treatment plan. They have identified and are working toward their discharge goals.  yes    Visit start and stop times:    10/31/2023  Start Time: 1130  Stop Time: 1230  Total Visit Time: 60 minutes

## 2023-11-09 NOTE — H&P
ASSESSMENT/PLAN:    Assessment:   Right carpal tunnel syndrome    2. DM  - Last A1C 7.2    3. Atraumatic Left shoulder pain    Plan:   - I had an extensive discussion with Stephenie Cleaning about a possible open carpal tunnel release. Stephenie Cleaning reports that his symptoms have been progressing and they now wake him up in the middle the night despite nighttime splinting. The corticosteroid injection provided by Dr. Shanna Thompson did provide him symptomatic relief for about 2 months. He reports that his symptoms returned and are progressing. His social situation is somewhat complicated as he is one of the primary caregivers for his wife who is bedbound. I discussed with him that I would not recommend a carpal tunnel release if he was doing any heavy lifting pushing or pulling which included helping his wife. I also discussed that the postsurgical open carpal tunnel incision must remain clean and this may be difficult if he is caring for his wife. Stephenie Cleaning states that he has full-time help at home for his wife and would be able to have an appropriate postoperative recovery with the restrictions we discussed. He would like to move forward with an open carpal tunnel release as his symptoms are now more bothersome. I discussed the risks and benefits of an open carpal tunnel release which included infection, damage to surrounding structures including the median nerve, poor wound healing, pillar pain and persistent symptoms. Stephenie Cleaning wanted to move forward with the procedure knowing all the risks and benefits and signed the consent. I also discussed with Stephenie Cleaning that if he was unable to follow the postoperative protocol because his social situation at home became more complex that I would recommend canceling the surgery. He understands.     -Patient has had progressive left shoulder pain. He reports most of his pain is activity related and denies any trauma.   I would like you to follow-up with my colleague Dr. Mikayla Montiel for further evaluation and possible physical therapy. Follow Up: After Surgery    General Discussions:     Carpal Tunnel Syndrome: The anatomy and physiology of carpal tunnel syndrome was discussed with the patient today. Increase pressure localized under the transverse carpal ligament can cause pain, numbness, tingling, or dysesthesias within the median nerve distribution as well as feelings of fatigue, clumsiness, or awkwardness. These symptoms typically occur at night and worse in the morning upon waking. Eventually, untreated carpal tunnel syndrome can result in weakness and permanent loss of muscle within the thenar compartment of the hand. Treatment options were discussed with the patient. Conservative treatment includes nocturnal resting splints to keep the nerve in a neutral position, ergonomic changes within the work or home environment, activity modification, and tendon gliding exercises. Steroid injections within the carpal canal can help a majority of patients, however this is often self-limited in a majority of patients. Surgical intervention to divide the transverse carpal ligament typically results in a long-lasting relief of the patient's complaints, with the recurrence rate of less than 1%.         _____________________________________________________  CHIEF COMPLAINT:  Chief Complaint   Patient presents with   • Right Wrist - Numbness         SUBJECTIVE:  Gonzales Sears is a right hand dominant 82 y.o. male who presents for follow-up evaluation of right carpal tunnel syndrome. He was last seen on 9/7/2023 by my colleague Dr. Stephanie Daugherty. Dr. Stephanie Daugherty was treating Kevin Lucas for his carpal tunnel syndrome with conservative management which included nighttime splinting and a previous corticosteroid injection which provided him significant amount of symptomatic relief.   He is here today because his symptoms have progressed and he now has persistent numbness in the median nerve distribution and symptoms that wake him up in the middle the night. He is interested in having an open carpal tunnel release and wanted to discuss this further versus having a repeat corticosteroid injection. Of note, he is a primary caregiver for his wife. He reports that he does have to do some heavy lifting with his wife for cleaning and changing her diaper. He also reports that he has a significant amount of help at home to help care for her if he needed to move forward with a carpal tunnel release. His wife is currently bedbound.           PAST MEDICAL HISTORY:  Past Medical History:   Diagnosis Date   • Abnormal blood chemistry     resolved: 11/9/16   • Abnormal glucose     last assessed: 9/24/14   • Arthritis    • CAD (coronary artery disease)    • Chronic kidney disease     STONES  & CKD   • Coronary atherosclerosis    • DM2 (diabetes mellitus, type 2) (HCC)    • Dyslipidemia    • Facial nerve disorder    • HTN (hypertension)    • Hyperlipidemia     last assessed: 1/13/17   • Kidney stones    • Lumbosacral radiculopathy    • Nerve root and plexus disorder    • Obesity    • Osteoarthritis    • Prostate asymmetry    • Pure hypercholesterolemia        PAST SURGICAL HISTORY:  Past Surgical History:   Procedure Laterality Date   • CORONARY ANGIOPLASTY      1 STENT    • [place] UNLISTED PROCEDURE ESOPHAGUS N/A 9/30/2021    Procedure: EGD; DIVERTICULECTOMY ZENKERS ENDOSCOPIC;  Surgeon: Katie Almonte MD;  Location: [facility];  Service: Thoracic   • UPPER GASTROINTESTINAL ENDOSCOPY         FAMILY HISTORY:  Family History   Problem Relation Age of Onset   • Cancer Mother    • Ovarian cancer Mother    • Hypertension Sister    • Lung disease Father         black   • Mental illness Neg Hx    • Substance Abuse Neg Hx        SOCIAL HISTORY:  Social History     Tobacco Use   • Smoking status: Never   • Smokeless tobacco: Never   Vaping Use   • Vaping Use: Never used   Substance Use Topics   • Alcohol use: Not Currently   • Drug use: No       MEDICATIONS:    Current Outpatient Medications:   •  aspirin 81 mg chewable tablet, Chew 81 mg daily  , Disp: , Rfl:   •  cyclobenzaprine (FLEXERIL) 5 mg tablet, Take 1 tablet (5 mg total) by mouth daily at bedtime as needed for muscle spasms, Disp: 30 tablet, Rfl: 0  •  ezetimibe (ZETIA) 10 mg tablet, Take 1 tablet (10 mg total) by mouth daily, Disp: 90 tablet, Rfl: 3  •  famotidine (PEPCID) 40 MG tablet, Take 1 tablet (40 mg total) by mouth daily, Disp: 90 tablet, Rfl: 1  •  fluorouracil (EFUDEX) 5 % cream, Apply topically 2 (two) times a day For 2 weeks. , Disp: 40 g, Rfl: 1  •  metFORMIN (GLUCOPHAGE) 500 mg tablet, TAKE ONE TABLET BY MOUTH TWICE A DAY WITH MEALS (GLUCOPHAGE), Disp: 180 tablet, Rfl: 2  •  Multiple Vitamin (multivitamin) tablet, Take 1 tablet by mouth daily, Disp: , Rfl:   •  simvastatin (ZOCOR) 40 mg tablet, TAKE 1 TABLET BY MOUTH DAILY AT  BEDTIME, Disp: 90 tablet, Rfl: 3  •  sitaGLIPtin (Januvia) 50 mg tablet, TAKE 1 TABLET BY MOUTH DAILY, Disp: 90 tablet, Rfl: 1  •  tamsulosin (FLOMAX) 0.4 mg, Take 1 capsule (0.4 mg total) by mouth daily with dinner, Disp: 90 capsule, Rfl: 3  •  trandolapril (MAVIK) 1 MG tablet, TAKE ONE TABLET BY MOUTH EVERY DAY, Disp: 90 tablet, Rfl: 2    ALLERGIES:  Allergies   Allergen Reactions   • Other Other (See Comments)     REAPRO- SEVERELY LOWERED RBCS REQUIRING HOSPITAL   • Ciprofloxacin Hives       REVIEW OF SYSTEMS:  Pertinent items are noted in HPI. A comprehensive review of systems was negative.     LABS:  HgA1c:   Lab Results   Component Value Date    HGBA1C 7.2 (H) 09/22/2023     BMP:   Lab Results   Component Value Date    GLUCOSE 125 (H) 02/17/2017    CALCIUM 10.0 09/22/2023     02/17/2017    K 4.8 09/22/2023    CO2 29 09/22/2023     09/22/2023    BUN 29 (H) 09/22/2023    CREATININE 1.69 (H) 09/22/2023         _____________________________________________________  PHYSICAL EXAMINATION:  Vital signs: Ht 5' 7" (1.702 m)   Wt 98 kg (216 lb)   BMI 33.83 kg/m²   General: well developed and well nourished, alert, oriented times 3, and appears comfortable  Psychiatric: Normal  HEENT: Trachea Midline, No torticollis  Cardiovascular: No discernable arrhythmia  Pulmonary: No wheezing or stridor  Abdomen: No rebound or guarding  Extremities: No peripheral edema  Skin: No masses, erythema, lacerations, fluctation, ulcerations  Neurovascular: Sensation Intact to the Median, Ulnar, Radial Nerve, Motor Intact to the Median, Ulnar, Radial Nerve, and Pulses Intact    MUSCULOSKELETAL EXAMINATION:  RIGHT SIDE:  Carpal tunnel:  Weakness APB, Atrophy to thenar muscles, Postive Tinel's, Positive Derkin's Compression Test, Positive Phalan's Test, and Loss of two point discrimination to 5mm in the median nerve distribution but intact to the ulnar nerve distribution    _____________________________________________________  STUDIES REVIEWED:  EMG/NCS 7/22 Right UE: shows mild-moderate carpal tunnel syndrome to right UE, Sensory latency 7.3ms and motor latency normal across wrist to APB    Impression:   Abnormal electro diagnostic study. There is evidence of right sided sensorimotor demyelinating and axonal moderate median mono neuropathy at wrist, known as carpal tunnel syndrome. 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 There is no evidence of cervical radiculopathy.        PROCEDURES PERFORMED:  Procedures  No Procedures performed today

## 2023-11-14 ENCOUNTER — SOCIAL WORK (OUTPATIENT)
Dept: BEHAVIORAL/MENTAL HEALTH CLINIC | Facility: CLINIC | Age: 83
End: 2023-11-14
Payer: COMMERCIAL

## 2023-11-14 ENCOUNTER — TELEPHONE (OUTPATIENT)
Dept: OBGYN CLINIC | Facility: HOSPITAL | Age: 83
End: 2023-11-14

## 2023-11-14 DIAGNOSIS — F43.20 ADJUSTMENT DISORDER, UNSPECIFIED TYPE: Primary | ICD-10-CM

## 2023-11-14 PROCEDURE — 90837 PSYTX W PT 60 MINUTES: CPT | Performed by: SOCIAL WORKER

## 2023-11-14 NOTE — TELEPHONE ENCOUNTER
Caller: Saira Schulz     Doctor: Emelina Mcknight / Slime Rondon     Reason for call: Patient is scheduled for Carpal Tunnel Release on 11/30. Patient is asking if he will be able to drive himself home after procedure. 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       Please advise      Call back#: [phone]

## 2023-11-15 ENCOUNTER — OFFICE VISIT (OUTPATIENT)
Dept: OBGYN CLINIC | Facility: CLINIC | Age: 83
End: 2023-11-15
Payer: COMMERCIAL

## 2023-11-15 ENCOUNTER — TELEPHONE (OUTPATIENT)
Age: 83
End: 2023-11-15

## 2023-11-15 ENCOUNTER — APPOINTMENT (OUTPATIENT)
Dept: RADIOLOGY | Facility: CLINIC | Age: 83
End: 2023-11-15
Payer: COMMERCIAL

## 2023-11-15 VITALS
WEIGHT: 216 LBS | HEART RATE: 76 BPM | SYSTOLIC BLOOD PRESSURE: 148 MMHG | DIASTOLIC BLOOD PRESSURE: 85 MMHG | BODY MASS INDEX: 33.9 KG/M2 | HEIGHT: 67 IN

## 2023-11-15 DIAGNOSIS — M25.512 LEFT SHOULDER PAIN, UNSPECIFIED CHRONICITY: ICD-10-CM

## 2023-11-15 DIAGNOSIS — M25.512 LEFT SHOULDER PAIN, UNSPECIFIED CHRONICITY: Primary | ICD-10-CM

## 2023-11-15 PROCEDURE — 99214 OFFICE O/P EST MOD 30 MIN: CPT | Performed by: ORTHOPAEDIC SURGERY

## 2023-11-15 PROCEDURE — 73030 X-RAY EXAM OF SHOULDER: CPT

## 2023-11-15 NOTE — PROGRESS NOTES
Assessment/Plan:  1. Left shoulder pain, unspecified chronicity  XR shoulder 2+ vw left          Kevin Lucas has left-sided shoulder pain and evidence of glenohumeral osteoarthritis on x-ray. I discussed with him that we could consider formal physical therapy or a localized glenohumeral shoulder cortisone injection. He states the shoulder does not bother him enough to have an injection and he does not have much free time to undergo physical therapy as he is primary caretaker for his wife. He will consider an ultrasound-guided glenohumeral injection in the future if his pain persists or worsens. For now he will continue with conservative measures only. Subjective:   Zayra Louie is a 82 y.o. male who presents to the office for evaluation for left-sided shoulder pain. He states that he has been having increased discomfort in his left shoulder for the past several months. He denies any injury or trauma. He has been told he has arthritis in the past.  He does do a lot of caretaking for his wife who requires a lot of lifting and care at home. He states this can aggravate his shoulder and he has increased pain when sleeping. He denies any fall or lifting injury. He denies any feeling of a popping sensation in the shoulder. Denies any weakness when lifting. Today he states there is a dull aching pain in the shoulder that can worsen with more activity. The pain does seem to come and go. Review of Systems   Constitutional:  Negative for chills, fever and unexpected weight change. HENT:  Negative for hearing loss, nosebleeds and sore throat. Eyes:  Negative for pain, redness and visual disturbance. Respiratory:  Negative for cough, shortness of breath and wheezing. Cardiovascular:  Negative for chest pain, palpitations and leg swelling. Gastrointestinal:  Negative for abdominal pain, nausea and vomiting. Endocrine: Negative for polyphagia and polyuria.    Genitourinary:  Negative for dysuria and hematuria. Musculoskeletal:         See HPI   Skin:  Negative for rash and wound. Neurological:  Negative for dizziness, numbness and headaches. Psychiatric/Behavioral:  Negative for decreased concentration and suicidal ideas. The patient is not nervous/anxious.           Past Medical History:   Diagnosis Date    Abnormal blood chemistry     resolved: 11/9/16    Abnormal glucose     last assessed: 9/24/14    Arthritis     CAD (coronary artery disease)     Chronic kidney disease     STONES  & CKD    Coronary atherosclerosis     DM2 (diabetes mellitus, type 2) (HCC)     Dyslipidemia     Facial nerve disorder     HTN (hypertension)     Hyperlipidemia     last assessed: 1/13/17    Kidney stones     Lumbosacral radiculopathy     Nerve root and plexus disorder     Obesity     Osteoarthritis     Prostate asymmetry     Pure hypercholesterolemia        Past Surgical History:   Procedure Laterality Date    CORONARY ANGIOPLASTY      1 STENT     [place] UNLISTED PROCEDURE ESOPHAGUS N/A 9/30/2021    Procedure: EGD; DIVERTICULECTOMY ZENKERS ENDOSCOPIC;  Surgeon: Zach Schulte MD;  Location: [facility];  Service: Thoracic    UPPER GASTROINTESTINAL ENDOSCOPY         Family History   Problem Relation Age of Onset    Cancer Mother     Ovarian cancer Mother     Hypertension Sister     Lung disease Father         black    Mental illness Neg Hx     Substance Abuse Neg Hx        Social History     Occupational History    Not on file   Tobacco Use    Smoking status: Never    Smokeless tobacco: Never   Vaping Use    Vaping Use: Never used   Substance and Sexual Activity    Alcohol use: Not Currently    Drug use: No    Sexual activity: Not on file         Current Outpatient Medications:     aspirin 81 mg chewable tablet, Chew 81 mg daily  , Disp: , Rfl:     cyclobenzaprine (FLEXERIL) 5 mg tablet, Take 1 tablet (5 mg total) by mouth daily at bedtime as needed for muscle spasms, Disp: 30 tablet, Rfl: 0    ezetimibe (ZETIA) 10 mg tablet, Take 1 tablet (10 mg total) by mouth daily, Disp: 90 tablet, Rfl: 3    famotidine (PEPCID) 40 MG tablet, Take 1 tablet (40 mg total) by mouth daily, Disp: 90 tablet, Rfl: 1    fluorouracil (EFUDEX) 5 % cream, Apply topically 2 (two) times a day For 2 weeks. , Disp: 40 g, Rfl: 1    metFORMIN (GLUCOPHAGE) 500 mg tablet, TAKE ONE TABLET BY MOUTH TWICE A DAY WITH MEALS (GLUCOPHAGE), Disp: 180 tablet, Rfl: 2    Multiple Vitamin (multivitamin) tablet, Take 1 tablet by mouth daily, Disp: , Rfl:     simvastatin (ZOCOR) 40 mg tablet, TAKE 1 TABLET BY MOUTH DAILY AT  BEDTIME, Disp: 90 tablet, Rfl: 3    sitaGLIPtin (Januvia) 50 mg tablet, TAKE 1 TABLET BY MOUTH DAILY, Disp: 90 tablet, Rfl: 1    tamsulosin (FLOMAX) 0.4 mg, Take 1 capsule (0.4 mg total) by mouth daily with dinner, Disp: 90 capsule, Rfl: 3    trandolapril (MAVIK) 1 MG tablet, TAKE ONE TABLET BY MOUTH EVERY DAY, Disp: 90 tablet, Rfl: 2    Allergies   Allergen Reactions    Other Other (See Comments)     REAPRO- SEVERELY LOWERED RBCS [facility]    Ciprofloxacin Hives       Objective:  Vitals:    11/15/23 1022   BP: 148/85   Pulse: 76     Pain Score:   1      Left Shoulder Exam     Tenderness   Left shoulder tenderness location: Anterior glenohumeral joint. Range of Motion   Active abduction:  140 abnormal   Passive abduction:  140 abnormal   Forward flexion:  120 abnormal   Internal rotation 0 degrees:  Lumbar abnormal     Muscle Strength   Abduction: 5/5   Internal rotation: 5/5   External rotation: 5/5   Supraspinatus: 5/5   Subscapularis: 5/5   Biceps: 5/5     Tests   Cervantes test: negative  Impingement: negative    Other   Erythema: absent  Sensation: normal  Pulse: present             Physical Exam  Vitals reviewed. Constitutional:       Appearance: He is well-developed. HENT:      Head: Normocephalic and atraumatic. Eyes:      Conjunctiva/sclera: Conjunctivae normal.      Pupils: Pupils are equal, round, and reactive to light.    Cardiovascular: Rate and Rhythm: Normal rate. Pulses: Normal pulses. Pulmonary:      Effort: Pulmonary effort is normal. No respiratory distress. Musculoskeletal:      Cervical back: Normal range of motion and neck supple. Comments: As noted in HPI   Skin:     General: Skin is warm and dry. Neurological:      General: No focal deficit present. Mental Status: He is alert and oriented to person, place, and time. Psychiatric:         Mood and Affect: Mood normal.         Behavior: Behavior normal.         I have personally reviewed pertinent films in PACS and my interpretation is as follows:  X-rays of the left shoulder demonstrate mild to moderate glenohumeral osteoarthritis. No evidence of acute fracture. This document was created using speech voice recognition software. Grammatical errors, random word insertions, pronoun errors, and incomplete sentences are an occasional consequence of this system due to software limitations, ambient noise, and hardware issues. Any formal questions or concerns about content, text, or information contained within the body of this dictation should be directly addressed to the provider for clarification.

## 2023-11-20 ENCOUNTER — TELEPHONE (OUTPATIENT)
Dept: OBGYN CLINIC | Facility: HOSPITAL | Age: 83
End: 2023-11-20

## 2023-11-20 NOTE — TELEPHONE ENCOUNTER
Caller: Patient    Doctor: Dr. Ana Laura Cameron    Reason for call: Patient has surgery scheduled with Dr. Ana Laura Cameron that he would like to reschedule to after the new year. Please call.      Call back#: [phone]

## 2023-11-20 NOTE — TELEPHONE ENCOUNTER
Attempted to return patients call to reschedule his surgery. He did not answer and I was unable to leave a message the mailbox is full. Will continue to try.

## 2023-11-21 ENCOUNTER — SOCIAL WORK (OUTPATIENT)
Dept: BEHAVIORAL/MENTAL HEALTH CLINIC | Facility: CLINIC | Age: 83
End: 2023-11-21
Payer: COMMERCIAL

## 2023-11-21 DIAGNOSIS — F43.20 ADJUSTMENT DISORDER, UNSPECIFIED TYPE: Primary | ICD-10-CM

## 2023-11-21 PROCEDURE — 90837 PSYTX W PT 60 MINUTES: CPT | Performed by: SOCIAL WORKER

## 2023-11-22 NOTE — PSYCH
This note was not shared with the patient due to this is a psychotherapy note    Behavioral Health Psychotherapy Progress Note    Psychotherapy Provided: Individual Psychotherapy     1. Adjustment disorder, unspecified type            Goals addressed in session: Goal 1     DATA: The undersigned therapist met with the above patient for our scheduled follow-up session. Eustacio Hodgkin states he has a carpels tunnel surgery scheduled for November 30th. The undersigned therapist assisted Eustacio Hodgkin process his feelings about this surgery and discussed some risks with how he cares for Terry Jha and his hand won't be available. Eustacio Hodgkin states he's holding off on the idea of adopting a puppy because he went to [employer] and saw how filthy it was, but also feels there is no space for the dog. Eustacio Hodgkin states he has an appointment for his left shoulder, and continues to experience pain. Therefore he will have an x-ray sometime next week. Eustacio Hodgkin discussed his thanksgiving plans and continues to care for Terry Jha. Eustacio Hodgkin denies suicidal intent, gesture or ideation at this time. During this session, this clinician used the following therapeutic modalities: Cognitive Behavioral Therapy and Supportive Psychotherapy    Substance Abuse was not addressed during this session. ASSESSMENT:  Davina Foote presents with a Euthymic/ normal mood. his affect is Normal range and intensity, which is congruent, with his mood and the content of the session. The client has made progress on their goals. Davina Fotoe presents with a none risk of suicide, none risk of self-harm, and none risk of harm to others. For any risk assessment that surpasses a "low" rating, a safety plan must be developed. A safety plan was indicated: no  If yes, describe in detail N/A Eustacio Hodgkin denies suicidal intent, gesture or ideation at this time.         PLAN: Between sessions, Davina Foote will continue to meet with undersigned therapist for supportive therapy and to help Ernesto process his stressors in caring for his sick wife. At the next session, the therapist will use Supportive Psychotherapy to address his current stressors. Behavioral Health Treatment Plan and Discharge Planning: Nafisa Merida is aware of and agrees to continue to work on their treatment plan. They have identified and are working toward their discharge goals.  yes    Visit start and stop times:    11/14/23  Start Time: 1130  Stop Time: 1230  Total Visit Time: 60 minutes

## 2023-11-24 ENCOUNTER — TELEPHONE (OUTPATIENT)
Dept: OBGYN CLINIC | Facility: CLINIC | Age: 83
End: 2023-11-24

## 2023-11-24 NOTE — TELEPHONE ENCOUNTER
Patient would like to reschedule procedure. Called and unable to leave a message as mailbox is full.

## 2023-11-28 ENCOUNTER — SOCIAL WORK (OUTPATIENT)
Dept: BEHAVIORAL/MENTAL HEALTH CLINIC | Facility: CLINIC | Age: 83
End: 2023-11-28
Payer: COMMERCIAL

## 2023-11-28 DIAGNOSIS — F43.20 ADJUSTMENT DISORDER, UNSPECIFIED TYPE: Primary | ICD-10-CM

## 2023-11-28 PROCEDURE — 90837 PSYTX W PT 60 MINUTES: CPT | Performed by: SOCIAL WORKER

## 2023-11-29 ENCOUNTER — TELEPHONE (OUTPATIENT)
Dept: OBGYN CLINIC | Facility: CLINIC | Age: 83
End: 2023-11-29

## 2023-11-29 NOTE — TELEPHONE ENCOUNTER
Patient has decided to postpone surgery. He is the primary caregiver of his wife and has concerns he will not be able to heal well due to the pulling and lifting.   Will call back to reschedule

## 2023-11-29 NOTE — PSYCH
This note was not shared with the patient due to this is a psychotherapy note    Behavioral Health Psychotherapy Progress Note    Psychotherapy Provided: Individual Psychotherapy     1. Adjustment disorder, unspecified type            Goals addressed in session: Goal 1 and Goal 2     DATA:  The undersigned therapist met with the above patient for our scheduled session. During this session, this clinician used the following therapeutic modalities: Cognitive Behavioral Therapy and Supportive Psychotherapy. Venita Chou states he had his shoulder x-rayed and was told that it's arthritic but he does not want to get an injection and states he will wait off until he can't handle it. In addition, he cancelled his carpal tunnel surgery and states he can't get it done if he can't care for [facility]. He states he will wait until after the holidays. Venita Chou is encouraged to get his procedures done and follow doctor's recommendations, but he seems hesitant and this time. Venita Chou states he's been doing things around the house and continues to fix things, but did state he's been worried about [facility] as she seems to be dreaming at times and saying things that don't make sense. Venita Chou denies suicidal intent, gesture or ideation at this time. Substance Abuse was not addressed during this session. ASSESSMENT:  Anni Shahid presents with a Anxious mood. his affect is Normal range and intensity, which is congruent, with his mood and the content of the session. The client has made progress on their goals. Anni Shahid presents with a none risk of suicide, none risk of self-harm, and none risk of harm to others. For any risk assessment that surpasses a "low" rating, a safety plan must be developed. A safety plan was indicated: no  If yes, describe in detail N/A Venita Chou denies suicidal intent, gesture or ideation at this time.         PLAN: Between sessions, Anni Shahid will continue meeting with undersigned therapist weekly, continue to see doctor's for their appointments. At the next session, the therapist will use Cognitive Behavioral Therapy and Supportive Psychotherapy to address his current stressors.       Visit start and stop times:    11/21/23  Start Time: 1130  Stop Time: 1230  Total Visit Time: 60 minutes

## 2023-12-04 ENCOUNTER — TELEPHONE (OUTPATIENT)
Dept: PSYCHIATRY | Facility: CLINIC | Age: 83
End: 2023-12-04

## 2023-12-04 NOTE — TELEPHONE ENCOUNTER
PSR followed up and confirmed canceled session for 12/05/2023  due to therapist being out for morning sessions PTO.

## 2023-12-04 NOTE — PSYCH
This note was not shared with the patient due to this is a psychotherapy note    Behavioral Health Psychotherapy Progress Note    Psychotherapy Provided: Individual Psychotherapy     1. Adjustment disorder, unspecified type            Goals addressed in session: Goal 1 and Goal 2     DATA:  The undersigned therapist met with the above patient for our scheduled session. For today's session, Gala Abdullahi states Deborah Krishna was not in a good mental state recently and when he contacted her PCP it appeared Deborah Krishna had a UTI which caused the bizarre behaviors. Gala Abdullahi states Deborah Krishna was also not eating, and we discussed the importance of knowing the cues in her behaviors. Gala Abdullahi states that he's been receiving support from Negro Brizuela who cares for Deborah Krishna when Gala Abdullahi goes to appointments. Gala Abdullahi discussed current updates with the magazines and newspaper clippings. Gala Abdullahi is doing well, but continues to have pain on his left shoulder with the idea of accepting the injection recommendations sometime next month. Gala Abdullahi denies suicidal intent, gesture or ideation at this time. During this session, this clinician used the following therapeutic modalities: Cognitive Behavioral Therapy and Supportive Psychotherapy    Substance Abuse was not addressed during this session. Gala Abdullahi denies using and mind-altering substance at this time. ASSESSMENT:  Lito Guerra presents with a Euthymic/ normal mood. his affect is Normal range and intensity, which is congruent, with his mood and the content of the session. The client has made progress on their goals. Lito Guerra presents with a none risk of suicide, none risk of self-harm, and none risk of harm to others. For any risk assessment that surpasses a "low" rating, a safety plan must be developed. A safety plan was indicated: no  If yes, describe in detail N/A Gala Abdullahi denies suicidal intent, gesture or ideation at this time.         PLAN: Between sessions, Lito Guerra will will continue to take medications as prescribed by their primary care physicianand meet individually for therapy as needed. . At the next session, the therapist will use Cognitive Behavioral Therapy, Motivational Interviewing, and Supportive Psychotherapy to address current stressors. Behavioral Health Treatment Plan and Discharge Planning: Emiliano Burkett is aware of and agrees to continue to work on their treatment plan. They have identified and are working toward their discharge goals.  yes    Visit start and stop times:    11/28/23  Start Time: 1130  Stop Time: 1230  Total Visit Time: 60 minutes

## 2023-12-12 ENCOUNTER — SOCIAL WORK (OUTPATIENT)
Dept: BEHAVIORAL/MENTAL HEALTH CLINIC | Facility: CLINIC | Age: 83
End: 2023-12-12
Payer: COMMERCIAL

## 2023-12-12 DIAGNOSIS — F43.20 ADJUSTMENT DISORDER, UNSPECIFIED TYPE: Primary | ICD-10-CM

## 2023-12-12 PROCEDURE — 90837 PSYTX W PT 60 MINUTES: CPT | Performed by: SOCIAL WORKER

## 2023-12-19 ENCOUNTER — TELEPHONE (OUTPATIENT)
Dept: PSYCHIATRY | Facility: CLINIC | Age: 83
End: 2023-12-19

## 2023-12-19 NOTE — TELEPHONE ENCOUNTER
Attempted to call patient and inform of appointment cancellation, VM box is full could not leave a message

## 2023-12-21 NOTE — PSYCH
"This note was not shared with the patient due to this is a psychotherapy note  Behavioral Health Psychotherapy Progress Note    Psychotherapy Provided: Individual Psychotherapy     1. Adjustment disorder, unspecified type            Goals addressed in session: Goal 1 and Goal 2     DATA: The undersigned therapist met with the above patient for our scheduled session.  Ernesto discussed his continued stressors in caring for Sanjuanita and the stressors that are tied to it.  Ernesto states he was recommended to pay someone to assist him change Sanjuanita's diaper as he's struggling with it. Ernesto states Uma continues to help them and Sanjuanita would like Uma to have a necklace when she passes.  Ernesto states he tried to decorate a bit for Sharita.  Ernesto continues to express some discomfort on his shoulder and hand, but states he's coping well.  Ernesto denies suicidal intent, gesture or ideation at this time.      During this session, this clinician used the following therapeutic modalities: Supportive Psychotherapy    Substance Abuse was not addressed during this session. denies using and mind-altering substance at this time.     ASSESSMENT:  Ernesto Sears presents with a Euthymic/ normal mood.     his affect is Normal range and intensity, which is congruent, with his mood and the content of the session. The client has made progress on their goals.    Ernesto Sears presents with a none risk of suicide, none risk of self-harm, and none risk of harm to others.    For any risk assessment that surpasses a \"low\" rating, a safety plan must be developed.    A safety plan was indicated: no  If yes, describe in detail n/a    PLAN: Between sessions, Ernesto Sears will continue meeting with undersigned therapist weekly for supportive counseling. At the next session, the therapist will use Cognitive Behavioral Therapy, Motivational Interviewing, and Supportive Psychotherapy to address his current stressors.    Behavioral Health Treatment Plan and " Discharge Planning: Ernesto Sears is aware of and agrees to continue to work on their treatment plan. They have identified and are working toward their discharge goals. yes    Visit start and stop times:    12/12/23  Start Time: 1130  Stop Time: 1230  Total Visit Time: 60 minutes

## 2023-12-26 ENCOUNTER — SOCIAL WORK (OUTPATIENT)
Dept: BEHAVIORAL/MENTAL HEALTH CLINIC | Facility: CLINIC | Age: 83
End: 2023-12-26
Payer: COMMERCIAL

## 2023-12-26 DIAGNOSIS — F43.20 ADJUSTMENT DISORDER, UNSPECIFIED TYPE: Primary | ICD-10-CM

## 2023-12-26 PROCEDURE — 90837 PSYTX W PT 60 MINUTES: CPT | Performed by: SOCIAL WORKER

## 2023-12-30 DIAGNOSIS — R05.9 COUGH, UNSPECIFIED TYPE: ICD-10-CM

## 2024-01-02 RX ORDER — FAMOTIDINE 40 MG/1
40 TABLET, FILM COATED ORAL DAILY
Qty: 90 TABLET | Refills: 1 | Status: SHIPPED | OUTPATIENT
Start: 2024-01-02

## 2024-01-03 ENCOUNTER — SOCIAL WORK (OUTPATIENT)
Dept: BEHAVIORAL/MENTAL HEALTH CLINIC | Facility: CLINIC | Age: 84
End: 2024-01-03
Payer: COMMERCIAL

## 2024-01-03 DIAGNOSIS — F43.20 ADJUSTMENT DISORDER, UNSPECIFIED TYPE: Primary | ICD-10-CM

## 2024-01-03 PROCEDURE — 90834 PSYTX W PT 45 MINUTES: CPT | Performed by: SOCIAL WORKER

## 2024-01-05 NOTE — PSYCH
"This note was not shared with the patient due to this is a psychotherapy note  Behavioral Health Psychotherapy Progress Note    Psychotherapy Provided: Individual Psychotherapy     1. Adjustment disorder, unspecified type            Goals addressed in session: Goal 1     DATA: The undersigned therapist met with the above patient for our scheduled session. For today's session Ernesto discussed his arsenio festivities and how it all went with caring for Sanjuanita.  Ernesto states Sanjuanita cried from all the gifts he gave her and he's been happy with her reaction. Ernesto continues to care for Sanjuanita and states Uma has been extremely helpful in caring for her when asked.  Ernesto continues to share news articles and information with undersigned therapist.  Ernesto expressed feeling pain on his right hand and left shoulder and we discussed the importance of following through with the doctor's recommendations. However, due to his care giving role he doesn't seem motivated at this time as he's worried he wont' be able to care for Sanjuanita. Ernesto denies suicidal intent, gesture or ideation at this time.      During this session, this clinician used the following therapeutic modalities: Cognitive Behavioral Therapy and Supportive Psychotherapy    Substance Abuse was not addressed during this session.     ASSESSMENT:  Ernesto Sears presents with a Anxious mood.     his affect is Normal range and intensity, which is congruent, with his mood and the content of the session. The client has made progress on their goals.    Ernesto Sears presents with a none risk of suicide, none risk of self-harm, and none risk of harm to others.    For any risk assessment that surpasses a \"low\" rating, a safety plan must be developed.    A safety plan was indicated: no  If yes, describe in detail n/a Ernesto denies suicidal intent, gesture or ideation at this time.        PLAN: Between sessions, Ernesto Sears will continue meeting with undersigned therapist for " supportive and CBT counseling. Ernesto is not interested in any medication at this time. At the next session, the therapist will use Cognitive Behavioral Therapy and Supportive Psychotherapy to address his current stressors.    Behavioral Health Treatment Plan and Discharge Planning: Ernesto MOULTON Renu is aware of and agrees to continue to work on their treatment plan. They have identified and are working toward their discharge goals. yes    Visit start and stop times:    12/26/23  Start Time: 1130  Stop Time: 1230  Total Visit Time: 60 minutes

## 2024-01-10 ENCOUNTER — SOCIAL WORK (OUTPATIENT)
Dept: BEHAVIORAL/MENTAL HEALTH CLINIC | Facility: CLINIC | Age: 84
End: 2024-01-10
Payer: COMMERCIAL

## 2024-01-10 DIAGNOSIS — F43.20 ADJUSTMENT DISORDER, UNSPECIFIED TYPE: Primary | ICD-10-CM

## 2024-01-10 PROCEDURE — 90837 PSYTX W PT 60 MINUTES: CPT | Performed by: SOCIAL WORKER

## 2024-01-17 ENCOUNTER — SOCIAL WORK (OUTPATIENT)
Dept: BEHAVIORAL/MENTAL HEALTH CLINIC | Facility: CLINIC | Age: 84
End: 2024-01-17
Payer: COMMERCIAL

## 2024-01-17 DIAGNOSIS — F43.20 ADJUSTMENT DISORDER, UNSPECIFIED TYPE: Primary | ICD-10-CM

## 2024-01-17 PROCEDURE — 90837 PSYTX W PT 60 MINUTES: CPT | Performed by: SOCIAL WORKER

## 2024-01-17 NOTE — PSYCH
"This note was not shared with the patient due to this is a psychotherapy note    Behavioral Health Psychotherapy Progress Note    Psychotherapy Provided: Individual Psychotherapy     1. Adjustment disorder, unspecified type            Goals addressed in session: Goal 1 and Goal 2     DATA:  The undersigned therapist met with the above patient for our scheduled session.  During today's session, Ernesto discussed the overwhelming work he's experienced in her bowel movements and struggling with changing her diapers. Ernesto continues to worry that she will fall when changing her diaper but continues to keep her at home as he's promised he wouldn't put her in a nursing home.  Ernesto states Sanjuaniat continues to see a speech therapist and physical therapist and it's been helpful for him. Ernesto's hand and shoulder continues to hurt and we discussed the importance of following doctor's recommendations.  The undersigned therapist continues to encourage he work on accepting if Sanjuanita is too much to handle that he can transfer her or get a home health aide. Ernesto denies suicidal intent, gesture or ideation at this time.      During this session, this clinician used the following therapeutic modalities: Cognitive Behavioral Therapy, Motivational Interviewing, and Supportive Psychotherapy    Substance Abuse was not addressed during this session. Ernesto denies using and mind-altering substance at this time.    ASSESSMENT:  Ernesto Sears presents with a Euthymic/ normal mood.     his affect is Normal range and intensity, which is congruent, with his mood and the content of the session. The client has made progress on their goals.    Ernesto Sears presents with a none risk of suicide, none risk of self-harm, and none risk of harm to others.    For any risk assessment that surpasses a \"low\" rating, a safety plan must be developed.    A safety plan was indicated: no  If yes, describe in detail N/A    PLAN: Between sessions, Ernesto Sears will meet " individually for therapy as needed.  At the next session, the therapist will use Cognitive Behavioral Therapy, Motivational Interviewing, and Supportive Psychotherapy to address his current stressors.    Behavioral Health Treatment Plan and Discharge Planning: Ernesto Sears is aware of and agrees to continue to work on their treatment plan. They have identified and are working toward their discharge goals. yes    Visit start and stop times:    01/03/24  Start Time: 1030  Stop Time: 1122  Total Visit Time: 52 minutes

## 2024-01-18 ENCOUNTER — OFFICE VISIT (OUTPATIENT)
Dept: OBGYN CLINIC | Facility: CLINIC | Age: 84
End: 2024-01-18
Payer: COMMERCIAL

## 2024-01-18 VITALS
HEIGHT: 67 IN | HEART RATE: 61 BPM | DIASTOLIC BLOOD PRESSURE: 83 MMHG | WEIGHT: 216 LBS | BODY MASS INDEX: 33.9 KG/M2 | SYSTOLIC BLOOD PRESSURE: 144 MMHG

## 2024-01-18 DIAGNOSIS — M19.012 PRIMARY OSTEOARTHRITIS OF LEFT SHOULDER: Primary | ICD-10-CM

## 2024-01-18 PROCEDURE — 20611 DRAIN/INJ JOINT/BURSA W/US: CPT | Performed by: ORTHOPAEDIC SURGERY

## 2024-01-18 PROCEDURE — 99213 OFFICE O/P EST LOW 20 MIN: CPT | Performed by: ORTHOPAEDIC SURGERY

## 2024-01-18 RX ORDER — DEXAMETHASONE SODIUM PHOSPHATE 10 MG/ML
40 INJECTION, SOLUTION INTRAMUSCULAR; INTRAVENOUS
Status: COMPLETED | OUTPATIENT
Start: 2024-01-18 | End: 2024-01-18

## 2024-01-18 RX ORDER — LIDOCAINE HYDROCHLORIDE 10 MG/ML
4 INJECTION, SOLUTION INFILTRATION; PERINEURAL
Status: COMPLETED | OUTPATIENT
Start: 2024-01-18 | End: 2024-01-18

## 2024-01-18 RX ADMIN — DEXAMETHASONE SODIUM PHOSPHATE 40 MG: 10 INJECTION, SOLUTION INTRAMUSCULAR; INTRAVENOUS at 11:30

## 2024-01-18 RX ADMIN — LIDOCAINE HYDROCHLORIDE 4 ML: 10 INJECTION, SOLUTION INFILTRATION; PERINEURAL at 11:30

## 2024-01-18 NOTE — PROGRESS NOTES
"Assessment/Plan:  1. Primary osteoarthritis of left shoulder  Large joint arthrocentesis: L glenohumeral          Ernesto has ongoing left shoulder pain consistent with glenohumeral osteoarthritis.  He tolerated an ultrasound-guided glenohumeral injection today and did experience relief after the injection.  Hopefully this gives him longstanding relief of his symptoms over the next few weeks to months.  We could repeat this injection in 4 months if clinically indicated.    Large joint arthrocentesis: L glenohumeral  Universal Protocol:  Consent given by: patient  Time out: Immediately prior to procedure a \"time out\" was called to verify the correct patient, procedure, equipment, support staff and site/side marked as required.  Site marked: the operative site was marked  Supporting Documentation  Indications: pain   Procedure Details  Location: shoulder - L glenohumeral  Preparation: Patient was prepped and draped in the usual sterile fashion  Needle size: 25 G  Ultrasound guidance: yes  Approach: posterior  Medications administered: 40 mg dexamethasone 100 mg/10 mL; 4 mL lidocaine 1 %    Patient tolerance: patient tolerated the procedure well with no immediate complications  Dressing:  Sterile dressing applied              Subjective:   Gonzales Sears is a 83 y.o. male who presents to the office for an ultrasound-guided glenohumeral injection in the left shoulder.  He was last in the office 9 weeks ago with ongoing discomfort in the left shoulder and no acute injury.  We discussed possibility of a ultrasound-guided injection at that time.  He was not sure if he wanted to proceed with that.  The pain has been persistent and giving a lot of trouble with raising his arm.  He is requesting an ultrasound-guided injection today.        Review of Systems   Constitutional:  Negative for chills, fever and unexpected weight change.   HENT:  Negative for hearing loss, nosebleeds and sore throat.    Eyes:  Negative for pain, " redness and visual disturbance.   Respiratory:  Negative for cough, shortness of breath and wheezing.    Cardiovascular:  Negative for chest pain, palpitations and leg swelling.   Gastrointestinal:  Negative for abdominal pain, nausea and vomiting.   Endocrine: Negative for polyphagia and polyuria.   Genitourinary:  Negative for dysuria and hematuria.   Musculoskeletal:         See HPI   Skin:  Negative for rash and wound.   Neurological:  Negative for dizziness, numbness and headaches.   Psychiatric/Behavioral:  Negative for decreased concentration and suicidal ideas. The patient is not nervous/anxious.          Past Medical History:   Diagnosis Date    Abnormal blood chemistry     resolved: 11/9/16    Abnormal glucose     last assessed: 9/24/14    Arthritis     CAD (coronary artery disease)     Chronic kidney disease     STONES  & CKD    Coronary atherosclerosis     DM2 (diabetes mellitus, type 2) (HCC)     Dyslipidemia     Facial nerve disorder     HTN (hypertension)     Hyperlipidemia     last assessed: 1/13/17    Kidney stones     Lumbosacral radiculopathy     Nerve root and plexus disorder     Obesity     Osteoarthritis     Prostate asymmetry     Pure hypercholesterolemia        Past Surgical History:   Procedure Laterality Date    CORONARY ANGIOPLASTY      1 STENT     [place] UNLISTED PROCEDURE ESOPHAGUS N/A 9/30/2021    Procedure: EGD; DIVERTICULECTOMY ZENKERS ENDOSCOPIC;  Surgeon: Humble Franco MD;  Location: [facility];  Service: Thoracic    UPPER GASTROINTESTINAL ENDOSCOPY         Family History   Problem Relation Age of Onset    Cancer Mother     Ovarian cancer Mother     Hypertension Sister     Lung disease Father         black    Mental illness Neg Hx     Substance Abuse Neg Hx        Social History     Occupational History    Not on file   Tobacco Use    Smoking status: Never    Smokeless tobacco: Never   Vaping Use    Vaping status: Never Used   Substance and Sexual Activity    Alcohol use: Not  Currently    Drug use: No    Sexual activity: Not on file         Current Outpatient Medications:     aspirin 81 mg chewable tablet, Chew 81 mg daily  , Disp: , Rfl:     cyclobenzaprine (FLEXERIL) 5 mg tablet, Take 1 tablet (5 mg total) by mouth daily at bedtime as needed for muscle spasms, Disp: 30 tablet, Rfl: 0    ezetimibe (ZETIA) 10 mg tablet, Take 1 tablet (10 mg total) by mouth daily, Disp: 90 tablet, Rfl: 3    famotidine (PEPCID) 40 MG tablet, TAKE ONE TABLET BY MOUTH EVERY DAY, Disp: 90 tablet, Rfl: 1    fluorouracil (EFUDEX) 5 % cream, Apply topically 2 (two) times a day For 2 weeks., Disp: 40 g, Rfl: 1    metFORMIN (GLUCOPHAGE) 500 mg tablet, TAKE ONE TABLET BY MOUTH TWICE A DAY WITH MEALS (GLUCOPHAGE), Disp: 180 tablet, Rfl: 2    Multiple Vitamin (multivitamin) tablet, Take 1 tablet by mouth daily, Disp: , Rfl:     simvastatin (ZOCOR) 40 mg tablet, TAKE 1 TABLET BY MOUTH DAILY AT  BEDTIME, Disp: 90 tablet, Rfl: 3    sitaGLIPtin (Januvia) 50 mg tablet, TAKE 1 TABLET BY MOUTH DAILY, Disp: 90 tablet, Rfl: 1    tamsulosin (FLOMAX) 0.4 mg, Take 1 capsule (0.4 mg total) by mouth daily with dinner, Disp: 90 capsule, Rfl: 3    trandolapril (MAVIK) 1 MG tablet, TAKE ONE TABLET BY MOUTH EVERY DAY, Disp: 90 tablet, Rfl: 2    Allergies   Allergen Reactions    Other Other (See Comments)     REAPRO- SEVERELY LOWERED RBCS [facility]    Ciprofloxacin Hives       Objective:  Vitals:    01/18/24 1129   BP: 144/83   Pulse: 61     Pain Score:   3      Left Shoulder Exam     Tenderness   Left shoulder tenderness location: Left glenohumeral joint.    Range of Motion   Active abduction:  160 abnormal   Extension:  normal   External rotation:  normal   Forward flexion:  160 abnormal   Internal rotation 0 degrees:  Sacrum abnormal     Muscle Strength   Abduction: 5/5   Internal rotation: 5/5   External rotation: 5/5   Supraspinatus: 5/5   Subscapularis: 5/5   Biceps: 5/5     Tests   Cervantes test: positive  Impingement:  positive  Drop arm: negative    Other   Erythema: absent  Sensation: normal  Pulse: present             Physical Exam  Vitals reviewed.   Constitutional:       Appearance: He is well-developed.   HENT:      Head: Normocephalic and atraumatic.   Eyes:      Conjunctiva/sclera: Conjunctivae normal.      Pupils: Pupils are equal, round, and reactive to light.   Cardiovascular:      Rate and Rhythm: Normal rate.      Pulses: Normal pulses.   Pulmonary:      Effort: Pulmonary effort is normal. No respiratory distress.   Musculoskeletal:      Cervical back: Normal range of motion and neck supple.      Comments: As noted in HPI   Skin:     General: Skin is warm and dry.   Neurological:      General: No focal deficit present.      Mental Status: He is alert and oriented to person, place, and time.   Psychiatric:         Mood and Affect: Mood normal.         Behavior: Behavior normal.           This document was created using speech voice recognition software.   Grammatical errors, random word insertions, pronoun errors, and incomplete sentences are an occasional consequence of this system due to software limitations, ambient noise, and hardware issues.   Any formal questions or concerns about content, text, or information contained within the body of this dictation should be directly addressed to the provider for clarification.

## 2024-01-19 NOTE — TELEPHONE ENCOUNTER
----- Message from Cary Miller MD sent at 1/19/2024  8:22 AM CST -----  Her iron is slightly low   She can eat more greens ( which will help her constipation )   Rest stable    Patient called in stated that he was coughing this morning and he cleared his throat and he could feel a pouch that was placed a year ago come up  Patient states that they can swallow liquids but has not tried to swallow any whole foods

## 2024-01-24 ENCOUNTER — SOCIAL WORK (OUTPATIENT)
Dept: BEHAVIORAL/MENTAL HEALTH CLINIC | Facility: CLINIC | Age: 84
End: 2024-01-24
Payer: COMMERCIAL

## 2024-01-24 DIAGNOSIS — F43.20 ADJUSTMENT DISORDER, UNSPECIFIED TYPE: Primary | ICD-10-CM

## 2024-01-24 PROCEDURE — 90837 PSYTX W PT 60 MINUTES: CPT | Performed by: SOCIAL WORKER

## 2024-01-24 NOTE — PSYCH
"This note was not shared with the patient due to this is a psychotherapy note  Behavioral Health Psychotherapy Progress Note    Psychotherapy Provided: Individual Psychotherapy     1. Adjustment disorder, unspecified type            Goals addressed in session: Goal 1 and Goal 2     DATA:  The undersigned therapist met with the above patient for our scheduled session.  During today's session, Ernesto states he has an appointment with for shoulder on Thursday for an ultrasound injection.  Ernesto continues to struggle with Sanjuanita's diaper changes and states her legs have been oozing. Ernesto states he will contact Dr. Durbin to see what's going on.  The undersigned therapist continues to encourage the thought of a nursing home for Sanjuanita or a home health aide. Ernesto was encouraged to call and figure out coverage. Ernesto denies suicidal intent, gesture or ideation at this time.      During this session, this clinician used the following therapeutic modalities: Cognitive Behavioral Therapy, Motivational Interviewing, and Supportive Psychotherapy    Substance Abuse was not addressed during this session.      ASSESSMENT:  Ernesto Sears presents with a Euthymic/ normal mood.     his affect is Normal range and intensity, which is congruent, with his mood and the content of the session. The client has made progress on their goals.    Ernesto Sears presents with a none risk of suicide, none risk of self-harm, and none risk of harm to others.    For any risk assessment that surpasses a \"low\" rating, a safety plan must be developed.    A safety plan was indicated: no  If yes, describe in detail N/A Ernesto denies suicidal intent, gesture or ideation at this time.        PLAN: Between sessions, Ernesto Sears will continue meeting with undersigned therapist as needed for supportive counseling. At the next session, the therapist will use Cognitive Behavioral Therapy, Motivational Interviewing, and Supportive Psychotherapy to address his current " stressors.    Behavioral Health Treatment Plan and Discharge Planning: Ernesto Sears is aware of and agrees to continue to work on their treatment plan. They have identified and are working toward their discharge goals. yes    Visit start and stop times:    01/10/24  Start Time: 1030  Stop Time: 1130  Total Visit Time: 60 minutes
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
## 2024-01-30 NOTE — PSYCH
"This note was not shared with the patient due to this is a psychotherapy note    Behavioral Health Psychotherapy Progress Note    Psychotherapy Provided: Individual Psychotherapy     1. Adjustment disorder, unspecified type            Goals addressed in session: Goal 1 and Goal 2     DATA:  The undersigned therapist met with the above patient for our scheduled session. For today's session, Ernesto's birthday is today and we discussed his plans.  The undersigned therapist assisted Ernesto process his feelings about Sanjuanita's recent appointment with Dr. Durbin and his feelings about Sanjuanita's health. Ernesto continues to struggle caring for Sanjuanita but not open to sending her to a nursing home at this time. Ernesto states he got the ultrasound shoulder injection and has been feeling better. Ernesto is recommended to call [employer] and question home health aide coverage as he is also struggling with his right hand pain. Ernesto denies suicidal intent, gesture or ideation at this time.      During this session, this clinician used the following therapeutic modalities: Cognitive Behavioral Therapy, Motivational Interviewing, and Supportive Psychotherapy    Substance Abuse was not addressed during this session. Ernesto denies using and mind-altering substance at this time.      ASSESSMENT:  Ernesto Sears presents with a Euthymic/ normal mood.     his affect is Normal range and intensity, which is congruent, with his mood and the content of the session. The client has made progress on their goals.    Ernesto Sears presents with a none risk of suicide, none risk of self-harm, and none risk of harm to others.    For any risk assessment that surpasses a \"low\" rating, a safety plan must be developed.    A safety plan was indicated: no  If yes, describe in detail N/A    PLAN: Between sessions, Ernesto Sears will will continue to meet individually for therapy as needed.  . At the next session, the therapist will use Cognitive Behavioral Therapy, " Motivational Interviewing, and Supportive Psychotherapy to address his current stressors.    Behavioral Health Treatment Plan and Discharge Planning: Ernesto Sears is aware of and agrees to continue to work on their treatment plan. They have identified and are working toward their discharge goals. yes    Visit start and stop times:    01/24/24  Start Time: 1030  Stop Time: 1130  Total Visit Time: 60 minutes

## 2024-01-31 ENCOUNTER — SOCIAL WORK (OUTPATIENT)
Dept: BEHAVIORAL/MENTAL HEALTH CLINIC | Facility: CLINIC | Age: 84
End: 2024-01-31
Payer: COMMERCIAL

## 2024-01-31 DIAGNOSIS — F43.20 ADJUSTMENT DISORDER, UNSPECIFIED TYPE: Primary | ICD-10-CM

## 2024-01-31 PROCEDURE — 90837 PSYTX W PT 60 MINUTES: CPT | Performed by: SOCIAL WORKER

## 2024-02-07 ENCOUNTER — SOCIAL WORK (OUTPATIENT)
Dept: BEHAVIORAL/MENTAL HEALTH CLINIC | Facility: CLINIC | Age: 84
End: 2024-02-07
Payer: COMMERCIAL

## 2024-02-07 DIAGNOSIS — F43.20 ADJUSTMENT DISORDER, UNSPECIFIED TYPE: Primary | ICD-10-CM

## 2024-02-07 PROCEDURE — 90837 PSYTX W PT 60 MINUTES: CPT | Performed by: SOCIAL WORKER

## 2024-02-07 NOTE — PSYCH
"This note was not shared with the patient due to this is a psychotherapy note  Behavioral Health Psychotherapy Progress Note    Psychotherapy Provided: Individual Psychotherapy     1. Adjustment disorder, unspecified type            Goals addressed in session: Goal 1     DATA: During today's session, Ernesto discussed his current stressors in caring for Sanjuanita. Ernesto states Sanjuanita hasn't had a bowel movement and he's been worried about her legs.  Ernesto states Dr. Durbin stopped by to see Sanjuanita and had expressed concern for her kidneys and encouraged she take more of Lasik medication.  Ernesto states her legs look better since she's been pedaling and hoping it gets better from her. Ernesto continues to discuss his day to day living struggles and seek emotional support in therapy.  Ernesto denies suicidal intent, gesture or ideation at this time.    During this session, this clinician used the following therapeutic modalities: Cognitive Behavioral Therapy, Motivational Interviewing, and Supportive Psychotherapy    Substance Abuse was not addressed during this session. Ernesto denies using and mind-altering substance at this time.      ASSESSMENT:  Ernesto Sears presents with a Euthymic/ normal mood.     his affect is Normal range and intensity, which is congruent, with his mood and the content of the session. The client has made progress on their goals.    Ernesto Sears presents with a none risk of suicide, none risk of self-harm, and none risk of harm to others.    For any risk assessment that surpasses a \"low\" rating, a safety plan must be developed.    A safety plan was indicated: no  If yes, describe in detail N/A     PLAN: Between sessions, Ernesto Sears will continue meeting with undersigned therapist until 02/21/2024 and then transfer to a new therapist. At the next session, the therapist will use Cognitive Behavioral Therapy to address his current stressors.    Behavioral Health Treatment Plan and Discharge Planning: Ernesto MOULTON" Renu is aware of and agrees to continue to work on their treatment plan. They have identified and are working toward their discharge goals. yes    Visit start and stop times:    02/07/24  Start Time: 1030  Stop Time: 1130  Total Visit Time: 60 minutes

## 2024-02-14 ENCOUNTER — SOCIAL WORK (OUTPATIENT)
Dept: BEHAVIORAL/MENTAL HEALTH CLINIC | Facility: CLINIC | Age: 84
End: 2024-02-14

## 2024-02-14 DIAGNOSIS — F43.20 ADJUSTMENT DISORDER, UNSPECIFIED TYPE: Primary | ICD-10-CM

## 2024-02-14 NOTE — PSYCH
"This note was not shared with the patient due to this is a psychotherapy note    Behavioral Health Psychotherapy Progress Note    Psychotherapy Provided: Individual Psychotherapy     1. Adjustment disorder, unspecified type            Goals addressed in session: Goal 1 and Goal 2     DATA: The undersigned therapist met with the above patient for our scheduled session.  During today's session, the undersigned therapist assisted Ernesto process his feelings about his caring for Sanjuanita.  Ernesto states Sanjuanita couldn't physically get up this week and he had to call the squad.  The undersigned therapist encouraged Ernesto to have a home health aide or nursing home as he's struggling to care for her. However, Ernesto is not open to the idea at this time.  Ernesto continues to experience pain on his right hand, but unsure of getting the surgery at this time due to his caretaking role.  Ernesto denies suicidal intent, gesture or ideation at this time.      During this session, this clinician used the following therapeutic modalities: Cognitive Behavioral Therapy, Motivational Interviewing, and Supportive Psychotherapy    Substance Abuse was not addressed during this session.  Ernesto denies using and mind-altering substance at this time.      ASSESSMENT:  Ernesto Sears presents with a Euthymic/ normal mood.     his affect is Normal range and intensity, which is congruent, with his mood and the content of the session. The client has made progress on their goals.    Ernesto Sears presents with a none risk of suicide, none risk of self-harm, and none risk of harm to others.    For any risk assessment that surpasses a \"low\" rating, a safety plan must be developed.    A safety plan was indicated: no  If yes, describe in detail N/A     PLAN: Between sessions, Ernesto Sears will be transferred to another therapist as writer will be leaving the practice. At the next session, the therapist will use Cognitive Behavioral Therapy, Motivational Interviewing, " and Supportive Psychotherapy to address his current stressors.    Behavioral Health Treatment Plan and Discharge Planning: Ernesto Sears is aware of and agrees to continue to work on their treatment plan. They have identified and are working toward their discharge goals. yes    Visit start and stop times:    02/14/24  Start Time: 1030  Stop Time: 1130  Total Visit Time: 60 minutes

## 2024-02-15 NOTE — PSYCH
"This note was not shared with the patient due to this is a psychotherapy note    Behavioral Health Psychotherapy Progress Note    Psychotherapy Provided: Individual Psychotherapy     1. Adjustment disorder, unspecified type            Goals addressed in session: Goal 1 and Goal 2     DATA: The undersigned therapist met with the above patient for our scheduled weekly session. For today's session, Ernesto discussed his birthday gifts and shared history he's had with some colleagues in a picture he brought in today.  Ernesto continues to care for Sanjuanita and states his shoulder continues to feel better since the injection but his right thumb hurts.  Ernesto states he's been struggling with changing Sanjuanita's diaper but not open to a home health aide or sending her to a nursing home at this time. Ernesto continues to put aside the carpel tunnel surgery due to him caretaking for Sanjuanita and we continue to discuss the importance of getting himself better for her. Ernesto is doing well emotionally.  During this session, this clinician used the following therapeutic modalities: Cognitive Behavioral Therapy, Motivational Interviewing, and Supportive Psychotherapy    Substance Abuse was not addressed during this session.   Ernesto denies using and mind-altering substance at this time.    ASSESSMENT:  Ernesto Sears presents with a Euthymic/ normal mood.     his affect is Normal range and intensity, which is congruent, with his mood and the content of the session. The client has made progress on their goals.    Ernesto Sears presents with a none risk of suicide, none risk of self-harm, and none risk of harm to others.    For any risk assessment that surpasses a \"low\" rating, a safety plan must be developed.    A safety plan was indicated: no  If yes, describe in detail N/A    PLAN: Between sessions, Ernesto Sears will continue meeting with undersigned therapist as needed . At the next session, the therapist will use Cognitive Behavioral Therapy, " Motivational Interviewing, and Supportive Psychotherapy to address his current stressors.    Behavioral Health Treatment Plan and Discharge Planning: Ernesto Sears is aware of and agrees to continue to work on their treatment plan. They have identified and are working toward their discharge goals. yes    Visit start and stop times:  1030 am to 1130 am  60 minutes total  01/31/2024

## 2024-02-21 ENCOUNTER — SOCIAL WORK (OUTPATIENT)
Dept: BEHAVIORAL/MENTAL HEALTH CLINIC | Facility: CLINIC | Age: 84
End: 2024-02-21
Payer: COMMERCIAL

## 2024-02-21 DIAGNOSIS — F43.20 ADJUSTMENT DISORDER, UNSPECIFIED TYPE: Primary | ICD-10-CM

## 2024-02-21 PROCEDURE — 90837 PSYTX W PT 60 MINUTES: CPT | Performed by: SOCIAL WORKER

## 2024-02-22 ENCOUNTER — TELEPHONE (OUTPATIENT)
Age: 84
End: 2024-02-22

## 2024-02-22 NOTE — TELEPHONE ENCOUNTER
Attempted to return patient call.  VM is full and can not leave a messages.  Will try again later in the day.

## 2024-02-22 NOTE — TELEPHONE ENCOUNTER
Caller: Patient    Doctor: Michi    Reason for call: Patient stated his wife is in a rehab facility and he would like to proceed w/his sx. Questioned if Dr Borden would like to see him in office prior to scheduling the sx? Please call the patient to advise    Call back#: [phone]

## 2024-02-26 ENCOUNTER — TELEPHONE (OUTPATIENT)
Dept: OBGYN CLINIC | Facility: CLINIC | Age: 84
End: 2024-02-26

## 2024-02-29 NOTE — PSYCH
"This note was not shared with the patient due to this is a psychotherapy note    Behavioral Health Psychotherapy Progress Note    Psychotherapy Provided: Individual Psychotherapy     1. Adjustment disorder, unspecified type            Goals addressed in session: Goal 1 and Goal 2     DATA: The undersigned therapist met with the above patient for our scheduled last session.  Ernesto states Sanjuanita had to be taken to the hospital via. Ambulance because she wasn't feeling well and couldn't get up.  Ernesto states she's been recommended to go to rehab and discussed his feelings about it.  Ernesto states he's been feeling content, tired but open to having his carpal tunnel surgery now that Sanjuanita will be in rehab. The undersigned therapist encouraged continued rehab for Sanjuanita and a home health aide if she comes back home. However, Ernesto appears uninterested at this time to the idea. Ernesto believes he will try to care for her as much as he can. Ernesto denies suicidal intent, gesture or ideation at this time.  Ernesto is aware that sessions will be ending and case transferred.    During this session, this clinician used the following therapeutic modalities: Cognitive Behavioral Therapy and Motivational Interviewing    Substance Abuse was not addressed during this session.     ASSESSMENT:  Ernesto Sears presents with a Anxious mood.     his affect is Normal range and intensity, which is congruent, with his mood and the content of the session. The client has made progress on their goals.    Ernesto Sears presents with a none risk of suicide, none risk of self-harm, and none risk of harm to others.    For any risk assessment that surpasses a \"low\" rating, a safety plan must be developed.    A safety plan was indicated: no  If yes, describe in detail N/A     PLAN: Between sessions, Ernesto Sears will meet with a new therapist as needed.     Behavioral Health Treatment Plan and Discharge Planning: Ernesto Sears is aware of and agrees to " continue to work on their treatment plan. They have identified and are working toward their discharge goals. yes    Visit start and stop times:    02/20/24  Start Time: 1030  Stop Time: 1130  Total Visit Time: 60 minutes

## 2024-03-05 ENCOUNTER — PREP FOR PROCEDURE (OUTPATIENT)
Dept: OBGYN CLINIC | Facility: CLINIC | Age: 84
End: 2024-03-05

## 2024-03-05 DIAGNOSIS — G56.01 CARPAL TUNNEL SYNDROME ON RIGHT: Primary | ICD-10-CM

## 2024-03-18 ENCOUNTER — OFFICE VISIT (OUTPATIENT)
Dept: OBGYN CLINIC | Facility: CLINIC | Age: 84
End: 2024-03-18
Payer: COMMERCIAL

## 2024-03-18 ENCOUNTER — TELEPHONE (OUTPATIENT)
Dept: CARDIOLOGY CLINIC | Facility: CLINIC | Age: 84
End: 2024-03-18

## 2024-03-18 ENCOUNTER — OFFICE VISIT (OUTPATIENT)
Dept: CARDIOLOGY CLINIC | Facility: CLINIC | Age: 84
End: 2024-03-18
Payer: COMMERCIAL

## 2024-03-18 VITALS
HEART RATE: 72 BPM | SYSTOLIC BLOOD PRESSURE: 148 MMHG | DIASTOLIC BLOOD PRESSURE: 88 MMHG | HEIGHT: 67 IN | WEIGHT: 221 LBS | BODY MASS INDEX: 34.69 KG/M2 | OXYGEN SATURATION: 94 %

## 2024-03-18 VITALS
HEIGHT: 67 IN | BODY MASS INDEX: 33.9 KG/M2 | SYSTOLIC BLOOD PRESSURE: 199 MMHG | HEART RATE: 52 BPM | WEIGHT: 216 LBS | DIASTOLIC BLOOD PRESSURE: 69 MMHG

## 2024-03-18 DIAGNOSIS — I49.9 IRREGULAR HEARTBEAT: ICD-10-CM

## 2024-03-18 DIAGNOSIS — E11.22 TYPE 2 DIABETES MELLITUS WITH STAGE 3 CHRONIC KIDNEY DISEASE AND HYPERTENSION (HCC): ICD-10-CM

## 2024-03-18 DIAGNOSIS — I11.9 HYPERTENSIVE HEART DISEASE WITHOUT HEART FAILURE: ICD-10-CM

## 2024-03-18 DIAGNOSIS — E66.9 OBESITY (BMI 30-39.9): ICD-10-CM

## 2024-03-18 DIAGNOSIS — N18.30 TYPE 2 DIABETES MELLITUS WITH STAGE 3 CHRONIC KIDNEY DISEASE AND HYPERTENSION (HCC): ICD-10-CM

## 2024-03-18 DIAGNOSIS — M65.342 TRIGGER RING FINGER OF LEFT HAND: Primary | ICD-10-CM

## 2024-03-18 DIAGNOSIS — I25.10 2-VESSEL CORONARY ARTERY DISEASE: ICD-10-CM

## 2024-03-18 DIAGNOSIS — R06.02 SHORTNESS OF BREATH: ICD-10-CM

## 2024-03-18 DIAGNOSIS — I12.9 TYPE 2 DIABETES MELLITUS WITH STAGE 3 CHRONIC KIDNEY DISEASE AND HYPERTENSION (HCC): ICD-10-CM

## 2024-03-18 DIAGNOSIS — E78.5 DYSLIPIDEMIA: ICD-10-CM

## 2024-03-18 PROCEDURE — 20550 NJX 1 TENDON SHEATH/LIGAMENT: CPT | Performed by: STUDENT IN AN ORGANIZED HEALTH CARE EDUCATION/TRAINING PROGRAM

## 2024-03-18 PROCEDURE — 99214 OFFICE O/P EST MOD 30 MIN: CPT | Performed by: INTERNAL MEDICINE

## 2024-03-18 PROCEDURE — 99213 OFFICE O/P EST LOW 20 MIN: CPT | Performed by: STUDENT IN AN ORGANIZED HEALTH CARE EDUCATION/TRAINING PROGRAM

## 2024-03-18 RX ORDER — METOPROLOL SUCCINATE 25 MG/1
25 TABLET, EXTENDED RELEASE ORAL DAILY
Qty: 90 TABLET | Refills: 1 | Status: SHIPPED | OUTPATIENT
Start: 2024-03-18

## 2024-03-18 RX ORDER — BETAMETHASONE SODIUM PHOSPHATE AND BETAMETHASONE ACETATE 3; 3 MG/ML; MG/ML
3 INJECTION, SUSPENSION INTRA-ARTICULAR; INTRALESIONAL; INTRAMUSCULAR; SOFT TISSUE
Status: COMPLETED | OUTPATIENT
Start: 2024-03-18 | End: 2024-03-18

## 2024-03-18 RX ORDER — AMLODIPINE BESYLATE 2.5 MG/1
2.5 TABLET ORAL DAILY
Qty: 90 TABLET | Refills: 1 | Status: SHIPPED | OUTPATIENT
Start: 2024-03-18 | End: 2024-03-18

## 2024-03-18 RX ORDER — LIDOCAINE HYDROCHLORIDE 10 MG/ML
1 INJECTION, SOLUTION INFILTRATION; PERINEURAL
Status: COMPLETED | OUTPATIENT
Start: 2024-03-18 | End: 2024-03-18

## 2024-03-18 RX ADMIN — BETAMETHASONE SODIUM PHOSPHATE AND BETAMETHASONE ACETATE 3 MG: 3; 3 INJECTION, SUSPENSION INTRA-ARTICULAR; INTRALESIONAL; INTRAMUSCULAR; SOFT TISSUE at 11:00

## 2024-03-18 RX ADMIN — LIDOCAINE HYDROCHLORIDE 1 ML: 10 INJECTION, SOLUTION INFILTRATION; PERINEURAL at 11:00

## 2024-03-18 NOTE — H&P (VIEW-ONLY)
ASSESSMENT/PLAN:    Assessment:   Left ring finger, trigger finger    Plan:   A left ring trigger finger corticosteroid injection was offered, accepted, and administered in clinic today. Procedure tolerated well, post injection protocol advised.   Patient had repeat blood pressure 199/98 in clinic, no chest pain or blurry vision, the clinic helped make appointment with his cardiologist today for further evaluation   Has elective CTR scheduled for this week, may need to postpone for blood pressure    Follow Up:      To Do Next Visit:  Repeat evaluation      _____________________________________________________  CHIEF COMPLAINT:  Chief Complaint   Patient presents with   • Left Wrist - Pain         SUBJECTIVE:  Gonzales Sears is a 84 y.o. male who presents for initial evaluation of left hand. Patient is currently scheduled for a right carpal tunnel release 3/21/2024. Today he notes pain diffuse to the left hand/fingers. He states he can not make a fist. History of locking/catching. He denies any acute injury or trauma. Denies numbness or paresthesias.     PAST MEDICAL HISTORY:  Past Medical History:   Diagnosis Date   • Abnormal blood chemistry     resolved: 11/9/16   • Abnormal glucose     last assessed: 9/24/14   • Arthritis    • CAD (coronary artery disease)    • Chronic kidney disease     STONES  & CKD   • Coronary atherosclerosis    • DM2 (diabetes mellitus, type 2) ([facility])    • Dyslipidemia    • Facial nerve disorder    • HTN (hypertension)    • Hyperlipidemia     last assessed: 1/13/17   • Kidney stones    • Lumbosacral radiculopathy    • Nerve root and plexus disorder    • Obesity    • Osteoarthritis    • Prostate asymmetry    • Pure hypercholesterolemia        PAST SURGICAL HISTORY:  Past Surgical History:   Procedure Laterality Date   • CORONARY ANGIOPLASTY      1 STENT    • OK UNLISTED PROCEDURE ESOPHAGUS N/A 9/30/2021    Procedure: EGD; DIVERTICULECTOMY ZENKERS ENDOSCOPIC;  Surgeon: Humble Franco MD;   Location: [facility];  Service: Thoracic   • UPPER GASTROINTESTINAL ENDOSCOPY         FAMILY HISTORY:  Family History   Problem Relation Age of Onset   • Cancer Mother    • Ovarian cancer Mother    • Hypertension Sister    • Lung disease Father         black   • Mental illness Neg Hx    • Substance Abuse Neg Hx        SOCIAL HISTORY:  Social History     Tobacco Use   • Smoking status: Never   • Smokeless tobacco: Never   Vaping Use   • Vaping status: Never Used   Substance Use Topics   • Alcohol use: Not Currently   • Drug use: No       MEDICATIONS:    Current Outpatient Medications:   •  aspirin 81 mg chewable tablet, Chew 81 mg daily  , Disp: , Rfl:   •  cyclobenzaprine (FLEXERIL) 5 mg tablet, Take 1 tablet (5 mg total) by mouth daily at bedtime as needed for muscle spasms, Disp: 30 tablet, Rfl: 0  •  ezetimibe (ZETIA) 10 mg tablet, Take 1 tablet (10 mg total) by mouth daily, Disp: 90 tablet, Rfl: 3  •  famotidine (PEPCID) 40 MG tablet, TAKE ONE TABLET BY MOUTH EVERY DAY, Disp: 90 tablet, Rfl: 1  •  fluorouracil (EFUDEX) 5 % cream, Apply topically 2 (two) times a day For 2 weeks., Disp: 40 g, Rfl: 1  •  metFORMIN (GLUCOPHAGE) 500 mg tablet, TAKE ONE TABLET BY MOUTH TWICE A DAY WITH MEALS (GLUCOPHAGE), Disp: 180 tablet, Rfl: 2  •  Multiple Vitamin (multivitamin) tablet, Take 1 tablet by mouth daily, Disp: , Rfl:   •  simvastatin (ZOCOR) 40 mg tablet, TAKE 1 TABLET BY MOUTH DAILY AT  BEDTIME, Disp: 90 tablet, Rfl: 3  •  sitaGLIPtin (Januvia) 50 mg tablet, TAKE 1 TABLET BY MOUTH DAILY, Disp: 90 tablet, Rfl: 1  •  tamsulosin (FLOMAX) 0.4 mg, Take 1 capsule (0.4 mg total) by mouth daily with dinner, Disp: 90 capsule, Rfl: 3  •  trandolapril (MAVIK) 1 MG tablet, TAKE ONE TABLET BY MOUTH EVERY DAY, Disp: 90 tablet, Rfl: 2  No current facility-administered medications for this visit.    ALLERGIES:  Allergies   Allergen Reactions   • Other Other (See Comments)     REAPRO- SEVERELY LOWERED RBCS REQUIRING HOSPITAL   •  "Ciprofloxacin Hives       REVIEW OF SYSTEMS:  Pertinent items are noted in HPI.  A comprehensive review of systems was negative.    LABS:  HgA1c:   Lab Results   Component Value Date    HGBA1C 7.2 (H) 09/22/2023     BMP:   Lab Results   Component Value Date    GLUCOSE 125 (H) 02/17/2017    CALCIUM 10.0 09/22/2023     02/17/2017    K 4.8 09/22/2023    CO2 29 09/22/2023     09/22/2023    BUN 29 (H) 09/22/2023    CREATININE 1.69 (H) 09/22/2023         _____________________________________________________  PHYSICAL EXAMINATION:  Vital signs: BP (!) 199/69   Pulse (!) 52   Ht 5' 7\" (1.702 m)   Wt 98 kg (216 lb)   BMI 33.83 kg/m²   General: well developed and well nourished, alert, oriented times 3, and appears comfortable  Psychiatric: Normal  HEENT: Trachea Midline, No torticollis  Cardiovascular: No discernable arrhythmia  Pulmonary: No wheezing or stridor  Abdomen: No rebound or guarding  Extremities: No peripheral edema  Skin: No masses, erythema, lacerations, fluctation, ulcerations  Neurovascular: Sensation Intact to the Median, Ulnar, Radial Nerve, Motor Intact to the Median, Ulnar, Radial Nerve, and Pulses Intact    MUSCULOSKELETAL EXAMINATION:  left Hand -    Patient presents with  (no) obvious anatomical deformity  Skin is warm and dry to touch with no signs of erythema, ecchymosis, or infection  No soft tissue swelling or effusion noted  Full FDS, FDP, extensor mechanisms are intact  No rotational deformity with composite finger flexion  TTP A1 pulley ring finger  Pain with making a fist   Demonstrates normal wrist, elbow, and shoulder motion  Forearm compartments are soft and supple  2+ distal radial pulse with brisk capillary refill to the fingers  Radial, median, and ulnar motor and sensory distribution intact  Sensations light to touch intact distally      _____________________________________________________  STUDIES REVIEWED:    No new studies obtained today    PROCEDURES " PERFORMED:  Hand/upper extremity injection: L ring A1  Universal Protocol:  Consent: Verbal consent obtained.  Risks and benefits: risks, benefits and alternatives were discussed  Consent given by: patient  Timeout called at: 3/18/2024 11:26 AM.  Patient understanding: patient states understanding of the procedure being performed  Patient identity confirmed: verbally with patient  Supporting Documentation  Indications: diagnostic, pain and therapeutic   Procedure Details  Condition:trigger finger Location: ring finger - L ring A1   Preparation: Patient was prepped and draped in the usual sterile fashion  Needle size: 25 G  Ultrasound guidance: no  Medications administered: 1 mL lidocaine 1 %; 3 mg betamethasone acetate-betamethasone sodium phosphate 6 (3-3) mg/mL  Patient tolerance: patient tolerated the procedure well with no immediate complications  Dressing:  Sterile dressing applied             Scribe Attestation    I,:  Devora Foley am acting as a scribe while in the presence of the attending physician.:       I,:  Vivian Borden MD personally performed the services described in this documentation    as scribed in my presence.:

## 2024-03-18 NOTE — PROGRESS NOTES
ASSESSMENT/PLAN:    Assessment:   Left ring finger, trigger finger    Plan:   A left ring trigger finger corticosteroid injection was offered, accepted, and administered in clinic today. Procedure tolerated well, post injection protocol advised.   Patient had repeat blood pressure 199/98 in clinic, no chest pain or blurry vision, the clinic helped make appointment with his cardiologist today for further evaluation   Has elective CTR scheduled for this week, may need to postpone for blood pressure    Follow Up:      To Do Next Visit:  Repeat evaluation      _____________________________________________________  CHIEF COMPLAINT:  Chief Complaint   Patient presents with   • Left Wrist - Pain         SUBJECTIVE:  Gonzales Sears is a 84 y.o. male who presents for initial evaluation of left hand. Patient is currently scheduled for a right carpal tunnel release 3/21/2024. Today he notes pain diffuse to the left hand/fingers. He states he can not make a fist. History of locking/catching. He denies any acute injury or trauma. Denies numbness or paresthesias.     PAST MEDICAL HISTORY:  Past Medical History:   Diagnosis Date   • Abnormal blood chemistry     resolved: 11/9/16   • Abnormal glucose     last assessed: 9/24/14   • Arthritis    • CAD (coronary artery disease)    • Chronic kidney disease     STONES  & CKD   • Coronary atherosclerosis    • DM2 (diabetes mellitus, type 2) ([facility])    • Dyslipidemia    • Facial nerve disorder    • HTN (hypertension)    • Hyperlipidemia     last assessed: 1/13/17   • Kidney stones    • Lumbosacral radiculopathy    • Nerve root and plexus disorder    • Obesity    • Osteoarthritis    • Prostate asymmetry    • Pure hypercholesterolemia        PAST SURGICAL HISTORY:  Past Surgical History:   Procedure Laterality Date   • CORONARY ANGIOPLASTY      1 STENT    • [place] UNLISTED PROCEDURE ESOPHAGUS N/A 9/30/2021    Procedure: EGD; DIVERTICULECTOMY [facility]EN[facility]ERS ENDOSCOPIC;  Surgeon: Humble Franco MD;   Location: [facility];  Service: Thoracic   • UPPER GASTROINTESTINAL ENDOSCOPY         FAMILY HISTORY:  Family History   Problem Relation Age of Onset   • Cancer Mother    • Ovarian cancer Mother    • Hypertension Sister    • Lung disease Father         black   • Mental illness Neg Hx    • Substance Abuse Neg Hx        SOCIAL HISTORY:  Social History     Tobacco Use   • Smoking status: Never   • Smokeless tobacco: Never   Vaping Use   • Vaping status: Never Used   Substance Use Topics   • Alcohol use: Not Currently   • Drug use: No       MEDICATIONS:    Current Outpatient Medications:   •  aspirin 81 mg chewable tablet, Chew 81 mg daily  , Disp: , Rfl:   •  cyclobenzaprine (FLEXERIL) 5 mg tablet, Take 1 tablet (5 mg total) by mouth daily at bedtime as needed for muscle spasms, Disp: 30 tablet, Rfl: 0  •  ezetimibe (ZETIA) 10 mg tablet, Take 1 tablet (10 mg total) by mouth daily, Disp: 90 tablet, Rfl: 3  •  famotidine (PEPCID) 40 MG tablet, TAKE ONE TABLET BY MOUTH EVERY DAY, Disp: 90 tablet, Rfl: 1  •  fluorouracil (EFUDEX) 5 % cream, Apply topically 2 (two) times a day For 2 weeks., Disp: 40 g, Rfl: 1  •  metFORMIN (GLUCOPHAGE) 500 mg tablet, TAKE ONE TABLET BY MOUTH TWICE A DAY WITH MEALS (GLUCOPHAGE), Disp: 180 tablet, Rfl: 2  •  Multiple Vitamin (multivitamin) tablet, Take 1 tablet by mouth daily, Disp: , Rfl:   •  simvastatin (ZOCOR) 40 mg tablet, TAKE 1 TABLET BY MOUTH DAILY AT  BEDTIME, Disp: 90 tablet, Rfl: 3  •  sitaGLIPtin (Januvia) 50 mg tablet, TAKE 1 TABLET BY MOUTH DAILY, Disp: 90 tablet, Rfl: 1  •  tamsulosin (FLOMAX) 0.4 mg, Take 1 capsule (0.4 mg total) by mouth daily with dinner, Disp: 90 capsule, Rfl: 3  •  trandolapril (MAVIK) 1 MG tablet, TAKE ONE TABLET BY MOUTH EVERY DAY, Disp: 90 tablet, Rfl: 2  No current facility-administered medications for this visit.    ALLERGIES:  Allergies   Allergen Reactions   • Other Other (See Comments)     REAPRO- SEVERELY LOWERED RBCS REQU[facility]ING HOSPITAL   •  "Ciprofloxacin Hives       REVIEW OF SYSTEMS:  Pertinent items are noted in HPI.  A comprehensive review of systems was negative.    LABS:  HgA1c:   Lab Results   Component Value Date    HGBA1C 7.2 (H) 09/22/2023     BMP:   Lab Results   Component Value Date    GLUCOSE 125 (H) 02/17/2017    CALCIUM 10.0 09/22/2023     02/17/2017    K 4.8 09/22/2023    CO2 29 09/22/2023     09/22/2023    BUN 29 (H) 09/22/2023    CREATININE 1.69 (H) 09/22/2023         _____________________________________________________  PHYSICAL EXAMINATION:  Vital signs: BP (!) 199/69   Pulse (!) 52   Ht 5' 7\" (1.702 m)   Wt 98 kg (216 lb)   BMI 33.83 kg/m²   General: well developed and well nourished, alert, oriented times 3, and appears comfortable  Psychiatric: Normal  HEENT: Trachea Midline, No torticollis  Cardiovascular: No discernable arrhythmia  Pulmonary: No wheezing or stridor  Abdomen: No rebound or guarding  Extremities: No peripheral edema  Skin: No masses, erythema, lacerations, fluctation, ulcerations  Neurovascular: Sensation Intact to the Median, Ulnar, Radial Nerve, Motor Intact to the Median, Ulnar, Radial Nerve, and Pulses Intact    MUSCULOSKELETAL EXAMINATION:  left Hand -    Patient presents with  (no) obvious anatomical deformity  Skin is warm and dry to touch with no signs of erythema, ecchymosis, or infection  No soft tissue swelling or effusion noted  Full FDS, FDP, extensor mechanisms are intact  No rotational deformity with composite finger flexion  TTP A1 pulley ring finger  Pain with making a fist   Demonstrates normal wrist, elbow, and shoulder motion  Forearm compartments are soft and supple  2+ distal radial pulse with brisk capillary refill to the fingers  Radial, median, and ulnar motor and sensory distribution intact  Sensations light to touch intact distally      _____________________________________________________  STUDIES REVIEWED:    No new studies obtained today    PROCEDURES " PERFORMED:  Hand/upper extremity injection: L ring A1  Universal Protocol:  Consent: Verbal consent obtained.  Risks and benefits: risks, benefits and alternatives were discussed  Consent given by: patient  Timeout called at: 3/18/2024 11:26 AM.  Patient understanding: patient states understanding of the procedure being performed  Patient identity confirmed: verbally with patient  Supporting Documentation  Indications: diagnostic, pain and therapeutic   Procedure Details  Condition:trigger finger Location: ring finger - L ring A1   Preparation: Patient was prepped and draped in the usual sterile fashion  Needle size: 25 G  Ultrasound guidance: no  Medications administered: 1 mL lidocaine 1 %; 3 mg betamethasone acetate-betamethasone sodium phosphate 6 (3-3) mg/mL  Patient tolerance: patient tolerated the procedure well with no immediate complications  Dressing:  Sterile dressing applied             Scribe Attestation    I,:  Devora Foley am acting as a scribe while in the presence of the attending physician.:       I,:  Vivian Borden MD personally performed the services described in this documentation    as scribed in my presence.:

## 2024-03-18 NOTE — TELEPHONE ENCOUNTER
Orthopedicis called states pt was there for steroid injection today. Bp before injection was 176/121 hr 54 after hand steroid injection 199/100.  has agreed to see pt today at 3 after his meeting.

## 2024-03-18 NOTE — PROGRESS NOTES
Progress Note - Cardiology Office  Gonzales Sears 84 y.o. male MRN: [id number]   Encounter: [id number]     1. 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 2-vessel coronary artery disease    3. Shortness of breath    4. Dyslipidemia    5. Irregular heartbeat    6. Obesity (BMI 30-39.9)    7. Type 2 diabetes mellitus with stage 3 chronic kidney disease and hypertension ([facility])        Assessment/Plan      1. Coronary artery disease with a remote history of angioplasty of LAD with a drug-eluting stent and known RPDA 100% with grade 3 collaterals.  He has no symptoms of angina.  He has decently active.  His blood test from August 2019 reviewed.   He has nonischemic nuclear in September of 2018. Exercise stress test shows no exercise induced ischemia.  Will continue to monitor.    2. Hypertension.  Patient blood pressure has been elevated.  He had frequent PACs will add Toprol-XL 25 mg daily.  He has frequent PACs it will also help with the PACs.    3. Dyslipidemia.  Continue statins.  Patient is on simvastatin and Zetia.  He seems to be tolerating it very well.    4. Diabetes mellitus. Patient is on metformin and Januvia follow-up by PMD.  Last HbA1c 6.5.  He follows up with endocrinology [facility].   Currently stable.    5. Obesity.  He has class II obesity BMI 34.6.  Encouraged him to lose weight.    6. Premature atrial contractions.  Patient has history of frequent PACs he had PACs they are around 12.4% of the total beats.  He would benefit from beta-blockers.  Added Toprol-XL 25 mg daily.    Continue other Rx as before.  Patient may be had partially anxiety driven.  Blood pressure has slowly improved.  He can have his procedure done.                                              Follow-up in 6 months.  All these issues discussed with patient's wife at length.    Counseling :  A description of the counseling.  Spoke to patient but high intensity statins.  Lipitor side effects discussed.  He  is willing to try it  Goals and Barriers.  Patient want to lose weight which he gain during winter months.  Little under stress due to wife's condition.  Patient's ability to self care: Yes  Medication side effect reviewed with patient in detail and all their questions answered to their satisfaction.         Gonzales Sears is a 84 y.o. year old male who came for follow up. Patient has a past medical history significant for coronary artery disease status post angioplasty in 2008 off large size LAD with a known RPDA, hypertension, diabetes mellitus, dyslipidemia and moderate obesity who came for regular follow-up. Patient's last nuclear was in 2014 which was nonischemic. He denies any chest pain or any shortness of breath. No fever no chills no other significant complaint.7/18/2017Patient came for follow-up for coronary artery disease. A status post angioplasty of his large LAD and has known RPDA occlusion. His sugar is well controlled. He is trying to lose weight. Blood pressures acceptable. Denies any chest pain. No PND no orthopnea no nausea no vomiting no other significant complaint.        08/03/2022.    Above reviewed.  Patient came for follow-up he is doing well.  He has recently cardiac workup done in the form of echo and exercise stress test.  Echo shows his EF is now 45 50% with mild valvular disease.  His exercise stress test was acceptable.  He has medical history significant for hypertension, diabetes mellitus, few PACs but today found to have irregular heartbeat.  Initial EKG shows it could be sinus with frequent PACs.  A repeat rhythm strip was done which clearly shows ectopic atrial rhythm with frequent PACs and PVCs.  Patient does not feel any change in symptoms he is doing well.  Though he is under stress due to his wife's illness.  His stress test and echo reviewed with him.    4/3/2023.    Above reviewed.  Patient came for follow-up he is doing well.  He denies any new complaints.  He has recent  cardiac work-up done in the form of echo and exercise stress test.  Echo shows EF 4050% with mild valvular disease.  His exercise stress test was acceptable.  He had a recent blood test done which shows his LDL is now 108.  Used to be around 70-80 in 2021.  His EKG shows sinus them with short atrial runs.  No nausea no vomiting no fever no chills.  He has been under a lot of stress due to his wife's illness.  He says he has been compliant with his medication but did have some dietary indiscretions.  He has been compliant with his metformin.  He has some tingling sensation in his hand.  He is scheduled to see orthopedics.  Occasional back pain but no other cardiovascular issues.  He may not be taking aspirin but he will restart it.    3/18/2024.    Above reviewed.  Patient who has medical history significant for hypertensive heart disease, normal to mildly decreased LV systolic function around 45 to 50%, dyslipidemia, history of irregular heartbeat, history of frequent PACs they were around 13% of the total beats on last ambulatory monitoring in 2022 was in orthopedics office today where he had ring finger corticosteroid injection and blood pressure was found to be around systolic of 200 and sent to us.  Patient has been on Mavik 1 mg every day along with Flomax.  He is also on Zetia and simvastatin.  At the time his heart rate was 72 bpm and his blood pressure today now is 148/88.  He has gained some weight since last visit.  He has been under a lot of stress.  His wife has been in rehab.  EKG shows sinus tach with a heart rate 72 bpm.  Blood pressure checked by me was around 142/80 systolic.  He feels now calmer.    Review of Systems   Constitutional:  Negative for activity change, chills, diaphoresis, fever and unexpected weight change.        Has gained about 7 to 8 pounds since last visit   HENT:  Negative for congestion.    Eyes:  Negative for discharge and redness.   Respiratory:  Negative for cough, chest  tightness, shortness of breath and wheezing.    Cardiovascular: Negative.  Negative for chest pain, palpitations and leg swelling.   Gastrointestinal:  Negative for abdominal pain, diarrhea and nausea.   Endocrine: Negative.    Genitourinary:  Negative for decreased urine volume and urgency.   Musculoskeletal:  Positive for arthralgias and back pain. Negative for gait problem.        Ring finger s/p corticosteroid injection with elevated blood pressure   Skin:  Negative for rash and wound.   Allergic/Immunologic: Negative.    Neurological:  Negative for dizziness, seizures, syncope, weakness, light-headedness and headaches.   Hematological: Negative.    Psychiatric/Behavioral:  Negative for agitation and confusion. The patient is nervous/anxious.        Historical Information   Past Medical History:   Diagnosis Date   • Abnormal blood chemistry     resolved: 11/9/16   • Abnormal glucose     last assessed: 9/24/14   • Arthritis    • CAD (coronary artery disease)    • Chronic kidney disease     STONES  & CKD   • Coronary atherosclerosis    • DM2 (diabetes mellitus, type 2) (HCC)    • Dyslipidemia    • Facial nerve disorder    • HTN (hypertension)    • Hyperlipidemia     last assessed: 1/13/17   • Kidney stones    • Lumbosacral radiculopathy    • Nerve root and plexus disorder    • Obesity    • Osteoarthritis    • Prostate asymmetry    • Pure hypercholesterolemia      Past Surgical History:   Procedure Laterality Date   • CORONARY ANGIOPLASTY      1 STENT    • [place] UNLISTED PROCEDURE ESOPHAGUS N/A 9/30/2021    Procedure: EGD; DIVERTICULECTOMY [facility]DIAS ENDOSCOPIC;  Surgeon: Humble Franco MD;  Location[facility];  Service: Thoracic   • UPPER GASTROINTESTINAL ENDOSCOPY       Social History     Substance and Sexual Activity   Alcohol Use Not Currently     Social History     Substance and Sexual Activity   Drug Use No     Social History     Tobacco Use   Smoking Status Never   Smokeless Tobacco Never     Family History:  "  Family History   Problem Relation Age of Onset   • Cancer Mother    • Ovarian cancer Mother    • Hypertension Sister    • Lung disease Father         black   • Mental illness Neg Hx    • Substance Abuse Neg Hx        Meds/Allergies     Allergies   Allergen Reactions   • Other Other (See Comments)     REAPRO- SEVERELY LOWERED RBCS [facility]   • Ciprofloxacin Hives       Current Outpatient Medications:   •  aspirin 81 mg chewable tablet, Chew 81 mg daily  , Disp: , Rfl:   •  cyclobenzaprine (FLEXERIL) 5 mg tablet, Take 1 tablet (5 mg total) by mouth daily at bedtime as needed for muscle spasms, Disp: 30 tablet, Rfl: 0  •  ezetimibe (ZETIA) 10 mg tablet, Take 1 tablet (10 mg total) by mouth daily, Disp: 90 tablet, Rfl: 3  •  famotidine (PEPCID) 40 MG tablet, TAKE ONE TABLET BY MOUTH EVERY DAY, Disp: 90 tablet, Rfl: 1  •  fluorouracil (EFUDEX) 5 % cream, Apply topically 2 (two) times a day For 2 weeks., Disp: 40 g, Rfl: 1  •  metFORMIN (GLUCOPHAGE) 500 mg tablet, TAKE ONE TABLET BY MOUTH TWICE A DAY WITH MEALS (GLUCOPHAGE), Disp: 180 tablet, Rfl: 2  •  metoprolol succinate (TOPROL-XL) 25 mg 24 hr tablet, Take 1 tablet (25 mg total) by mouth daily, Disp: 90 tablet, Rfl: 1  •  Multiple Vitamin (multivitamin) tablet, Take 1 tablet by mouth daily, Disp: , Rfl:   •  simvastatin (ZOCOR) 40 mg tablet, TAKE 1 TABLET BY MOUTH DAILY AT  BEDTIME, Disp: 90 tablet, Rfl: 3  •  sitaGLIPtin (Januvia) 50 mg tablet, TAKE 1 TABLET BY MOUTH DAILY, Disp: 90 tablet, Rfl: 1  •  tamsulosin (FLOMAX) 0.4 mg, Take 1 capsule (0.4 mg total) by mouth daily with dinner, Disp: 90 capsule, Rfl: 3  •  trandolapril (MAVIK) 1 MG tablet, TAKE ONE TABLET BY MOUTH EVERY DAY, Disp: 90 tablet, Rfl: 2  No current facility-administered medications for this visit.    Vitals: Blood pressure 148/88, pulse 72, height 5' 7\" (1.702 m), weight 100 kg (221 lb), SpO2 94%.  ?  Body mass index is 34.61 kg/m².    BP Readings from Last 3 Encounters:   03/18/24 " 148/88   03/18/24 (!) 199/69   01/18/24 144/83       Physical Exam:  Physical Exam  Constitutional:       General: He is not in acute distress.     Appearance: He is well-developed. He is not diaphoretic.   Neck:      Thyroid: No thyromegaly.      Vascular: No JVD.      Trachea: No tracheal deviation.   Cardiovascular:      Rate and Rhythm: Normal rate and regular rhythm.      Heart sounds: S1 normal and S2 normal. Heart sounds not distant. Murmur heard.      Systolic (ejection) murmur is present with a grade of 2/6.      No friction rub. No gallop. No S3 or S4 sounds.   Pulmonary:      Effort: Pulmonary effort is normal. No respiratory distress.      Breath sounds: Normal breath sounds. No wheezing or rales.   Chest:      Chest wall: No tenderness.   Abdominal:      General: Bowel sounds are normal. There is no distension.      Palpations: Abdomen is soft.      Tenderness: There is no abdominal tenderness.   Musculoskeletal:         General: No deformity.      Cervical back: Neck supple.   Skin:     General: Skin is warm and dry.      Coloration: Skin is not pale.      Findings: No rash.   Neurological:      Mental Status: He is alert and oriented to person, place, and time.   Psychiatric:         Behavior: Behavior normal.         Judgment: Judgment normal.         Diagnostic Studies Review Cardio:  Stress Test: Nuclear stress test in October 2014 shows no ischemia EF 70%.     Nuclear stress test done on 09/26/2018 was normal with EF around 70%.    Echocardiogram/ROCK: Echo Doppler done in March 2015 shows borderline concentric left hypertrophy EF 55-60%, after reminding dilated, trace MR, trace TR PA pressure 35 mmHg.     Echo Doppler done 09/26/2018.  Echo Doppler shows normal LV systolic function grade 1 diastolic dysfunction, right ventricle mildly dilated, left atrium mild-to-moderate dilated, mild MR, mild TR PA pressure 30 mm of mercury.  No change from old echo.    Catheterization: His cardiac catheter  patient done in 2008 shows critical stenosis of LAD which was successfully stented with a drug-eluting stent. He had nonobstructive disease in the circumflex and right coronary artery and RPDA is 100% occluded with grade 2 collaterals.     Vascular imaging: In October of 2014 abdominal aortogram study shows no aneurysm.     Holter monitor.  Holter monitor done in March 2019 shows few PACs and PVCs.  Most of the PVCs were single.  There were 12 beat NSVT though it was irregular so we could not rule out SVT with aberration    ECG Report:   Comparison to prior ECGs: no interval change. 7/18/2017. Normal sinus rhythm 1  with no significant ST changes. Heart rate was 68 bpm     Repeat EKG done 08/14/2018 shows normal sinus rhythm heart rate 73 beats per minute.  No change from old EKG.    Twelve lead EKG in our office done on 02/14/2018 shows normal sinus rhythm heart rate 72 beats per minute few PACs were noted.  As compared to old EKG PACs are newly reported.    Twelve lead EKG 09/18/2019 shows normal sinus rhythm rare PACs heart rate 71 beats per minute no other significant ST changes.      Twelve lead EKG 03/18/2020 shows normal sinus rhythm rare PACs heart rate 67 beats per minute no change from old EKG.    Twelve lead EKG 07/26/2021 shows normal sinus rhythm heart rate 77 beats per minute no change from previous EKG    Twelve lead EKG 02/10/2022 shows normal sinus with sinus arrhythmia heart rate 66 beats per minute no change from previous EKG.    Twelve lead EKG initially on arrival shows most likely sinus rhythm with frequent PACs.  Repeat rhythm strip clearly shows ectopic atrial rhythm with heart rate 72 beats per minute.    4/3/2023 12 EKG was sinus, with marked sinus arrhythmia heart rate 61 bpm no change from previous EKG.    Twelve-lead EKG done 3/18/2024 shows sinus rhythm with a heart rate 72 bpm.  Not much change from previous EKG.    Blood test from July 2021 reviewed  Lab Review     Lab Results  "  Component Value Date     02/17/2017    K 4.8 09/22/2023     09/22/2023    CO2 29 09/22/2023    BUN 29 (H) 09/22/2023    CREATININE 1.69 (H) 09/22/2023    GLUCOSE 125 (H) 02/17/2017    GLUF 126 (H) 09/22/2023    CALCIUM 10.0 09/22/2023    CORRECTEDCA 9.7 01/13/2022    AST 23 09/22/2023    ALT 23 09/22/2023    ALKPHOS 61 09/22/2023    PROT 6.5 02/17/2017    BILITOT 0.4 02/17/2017    EGFR 37 09/22/2023     Lab Results   Component Value Date    GLUCOSE 125 (H) 02/17/2017    CALCIUM 10.0 09/22/2023     02/17/2017    K 4.8 09/22/2023    CO2 29 09/22/2023     09/22/2023    BUN 29 (H) 09/22/2023    CREATININE 1.69 (H) 09/22/2023       Lab Results   Component Value Date    HGBA1C 7.2 (H) 09/22/2023     Lipid Profile:   Lab Results   Component Value Date    CHOL 173 02/17/2017    HDL 56 04/04/2023    LDLCALC 72 04/04/2023    TRIG 93 04/04/2023     Lab Results   Component Value Date    CHOL 173 02/17/2017    CHOL 151 11/21/2016       Dr. Venkat Rinaldi MD [facility]      \"This note has been constructed using a voice recognition system.Therefore there may be syntax, spelling, and/or grammatical errors. Please call if you have any questions. \"  "

## 2024-03-19 ENCOUNTER — DOCUMENTATION (OUTPATIENT)
Dept: NEPHROLOGY | Facility: CLINIC | Age: 84
End: 2024-03-19

## 2024-03-19 DIAGNOSIS — E11.9 TYPE 2 DIABETES MELLITUS WITHOUT COMPLICATION, WITHOUT LONG-TERM CURRENT USE OF INSULIN (HCC): ICD-10-CM

## 2024-03-19 RX ORDER — SITAGLIPTIN 50 MG/1
TABLET, FILM COATED ORAL
Qty: 90 TABLET | Refills: 1 | Status: SHIPPED | OUTPATIENT
Start: 2024-03-19

## 2024-03-19 NOTE — PROGRESS NOTES
Message left on patient's VM to have labs/urine done today.  Pt has f/u visit scheduled tomorrow with Dr. Quiroz.

## 2024-03-21 ENCOUNTER — HOSPITAL ENCOUNTER (OUTPATIENT)
Facility: HOSPITAL | Age: 84
Setting detail: OUTPATIENT SURGERY
Discharge: HOME | End: 2024-03-21
Attending: STUDENT IN AN ORGANIZED HEALTH CARE EDUCATION/TRAINING PROGRAM | Admitting: STUDENT IN AN ORGANIZED HEALTH CARE EDUCATION/TRAINING PROGRAM
Payer: COMMERCIAL

## 2024-03-21 VITALS
RESPIRATION RATE: 18 BRPM | WEIGHT: 219.58 LBS | SYSTOLIC BLOOD PRESSURE: 173 MMHG | HEART RATE: 65 BPM | TEMPERATURE: 97.1 F | DIASTOLIC BLOOD PRESSURE: 84 MMHG | BODY MASS INDEX: 34.39 KG/M2 | OXYGEN SATURATION: 96 %

## 2024-03-21 DIAGNOSIS — G56.01 CARPAL TUNNEL SYNDROME OF RIGHT WRIST: Primary | ICD-10-CM

## 2024-03-21 PROCEDURE — 64721 CARPAL TUNNEL SURGERY: CPT | Performed by: STUDENT IN AN ORGANIZED HEALTH CARE EDUCATION/TRAINING PROGRAM

## 2024-03-21 PROCEDURE — 64721 CARPAL TUNNEL SURGERY: CPT | Performed by: PHYSICIAN ASSISTANT

## 2024-03-21 RX ORDER — SENNOSIDES 8.6 MG
650 CAPSULE ORAL EVERY 8 HOURS PRN
Qty: 30 TABLET | Refills: 0 | Status: SHIPPED | OUTPATIENT
Start: 2024-03-21

## 2024-03-21 RX ORDER — MAGNESIUM HYDROXIDE 1200 MG/15ML
LIQUID ORAL AS NEEDED
Status: DISCONTINUED | OUTPATIENT
Start: 2024-03-21 | End: 2024-03-21 | Stop reason: HOSPADM

## 2024-03-21 RX ORDER — NAPROXEN SODIUM 220 MG
220 TABLET ORAL 2 TIMES DAILY WITH MEALS
Qty: 60 TABLET | Refills: 0 | Status: SHIPPED | OUTPATIENT
Start: 2024-03-21

## 2024-03-21 NOTE — OP NOTE
OPERATIVE REPORT  PATIENT NAME: Gonzales Sears    :  1940  MRN: [id number]  Pt Location: [facility] OR ROOM 03    SURGERY DATE: 3/21/2024    Surgeons and Role:     * Vivian Borden MD - Primary     * Jose Guadalupe Enrique PA-C - Assisting    Preop Diagnosis:  Carpal tunnel syndrome on right [G56.01]    Post-Op Diagnosis Codes:     * Carpal tunnel syndrome on right [G56.01]    Procedure(s):  Right - RELEASE CARPAL TUNNEL    Specimen(s):  * No specimens in log *    Estimated Blood Loss:   Minimal    Drains:  * No LDAs found *    Anesthesia Type:   Local    Operative Indications:  Carpal tunnel syndrome on right [G56.01]    Ernesto is a very pleasant 84-year-old male with past medical history of hypertension who presents to me for right carpal tunnel symptoms.  These symptoms were present for the past 3 years, numbness in the median nerve distribution.  He previously had a corticosteroid injection by my partner Dr. Calle for his right carpal tunnel symptoms which provided some relief.  He had tried nighttime splinting without any relief.  On exam he had weakness to APB, thenar atrophy, positive Tinel's and Durkan's compression test at the carpal tunnel.  He also had loss of 2-point discrimination in the median nerve distribution to 5 mm.  He had an EMG which  indicated median nerve compression at the carpal tunnel.  We had an extensive discussion with the risk and benefits of a carpal tunnel release.  The risks included persistent symptoms, infection, pillar pain, damage to the median nerve.  The benefits included resolution of symptoms.  Knowing all the risk and benefits of the procedure run decided to move forward with a local carpal tunnel release.    Operative Findings:  Complete release of the right transverse carpal ligament    Complications:   None    Procedure and Technique:    In preop local anesthetic was given . A surgical pause was performed to confirm the correct patient by name birth date and MRN  as well as the correct laterality of the right hand prior to local anesthesia administration.      10cc, 50/50 mixture of 1% lidocaine and 25% marcaine with 1: 200,000 epinephrine was given to subcutaneously over the carpal tunnel, right.  Patient tolerated well     Patient was brought back to the operative room.  A standard hand table was placed on the right  side of the patient.     Patient was prepped and draped in the usual sterile fashion to the right upper extremity. Surgical pause was performed to confirm the correct patient by name birthdate and MRN as well as correct laterality left upper extremity.      An anterior approach to the carpal tunnel was undertaken. A  2.5cm incision was made in line with the fourth digit, proximal to Shepherd's line.  With care taken to identify and protect superficial neurovascular structures, the skin, subcutaneous fat, and palmar fascia were divided in line with the incision.  Transverse carpal ligament was identified and incised using a 15 blade. A freer elevator was inserted superficial to the median nerve for protection but deep to the transverse carpal ligament along the ulnar aspect of the carpal tunnel.  Under direct visualization, the transverse carpal ligament was divided from proximal to distal.  We confirmed complete release under direct visualization and released distally to the level of the palmar fat.  Care was taken to protect the recurrent branch of the median nerve.  We were able to identify the median nerve which was intact in its entirety as well as the flexor tendons within the canal.  We then divided the antebrachial fascia and a proximal and ulnar based direction for approximately 2 cm.     Hemostasis achieved. We closed with skin with 3-0 nylon suture. A sterile dressing was applied.     Post operatively the patient will be allowed weight bearing on the operative extremity for light activities. They will follow up as planned within 2 weeks.I was present  for the entire procedure.      I was present for the entire procedure., A qualified resident physician was not available., and A physician assistant was required during the procedure for retraction, tissue handling, dissection and suturing.    Patient Disposition:  PACU         SIGNATURE: Vivian Borden MD  DATE: March 21, 2024  TIME: 2:22 PM

## 2024-03-21 NOTE — INTERVAL H&P NOTE
H&P reviewed. After examining the patient I find no changes in the patients condition since the H&P had been written.    Vitals:    03/21/24 1028   BP: (!) 193/88   Pulse: 65   Resp: 16   Temp: (!) 97.1 °F (36.2 °C)   SpO2: 96%

## 2024-03-21 NOTE — DISCHARGE INSTR - AVS FIRST PAGE
Post Operative Instructions    You have had surgery on your arm today, please read and follow the information below:  Elevate your hand above your elbow during the next 24-48 hours to help with swelling.  Place your hand and arm over your head with motion at your shoulder three times a day.  Do not apply any cream/ointment/oil to your incisions including antibiotics.  Do not soak your hands in standing water (dishwater, tubs, Jacuzzi's, pools, etc.) until given permission (typically 2-3 weeks after injury)    Call the office if you notice any:  Increased numbness or tingling of your hand or fingers that is not relieved with elevation.  Increasing pain that is not controlled with medication.  Difficulty chewing, breathing, swallowing.  Chest pains or shortness of breath.  Fever over 101.4 degrees.    Bandage: Remove bandage after 2 days.    Motion: Move fingers into a fist 5 times a day, DO NOT move any splinted fingers.    Weight bearing status: Avoid heavy lifting (>5 pounds) with the extremity that was operated on until follow up appointment. Normal activities of daily living are OK.    Ice: Ice for 10 minutes every hour as needed for swelling x 24 hours.    Sling: No sling necessary.    Medications:   Naproxen 220 mg two times a day   Tylenol Extended Release 650 mg every 8 hours    After surgery, we would like you to take naproxen 220 mg one tablet by mouth every 12 hours with food  AND Tylenol 650 mg one tablet by mouth every 8 hours  (at breakfast, lunch and dinner) for 3-5 days after your surgery.  Please take these medication EVERYDAY after surgery for 3-5 days, and not just as needed. You can take these medications at the same time.  Taking these medications after surgery will limit your need for prescription pain medication.       Follow-up Appointment: 10-14 days.      Please call the office if you have any questions or concerns regarding your post-operative care.

## 2024-03-22 ENCOUNTER — TELEPHONE (OUTPATIENT)
Dept: CARDIOLOGY CLINIC | Facility: CLINIC | Age: 84
End: 2024-03-22

## 2024-03-22 DIAGNOSIS — I11.9 HYPERTENSIVE HEART DISEASE WITHOUT HEART FAILURE: Primary | ICD-10-CM

## 2024-03-22 RX ORDER — SPIRONOLACTONE AND HYDROCHLOROTHIAZIDE 25; 25 MG/1; MG/1
0.5 TABLET ORAL EVERY OTHER DAY
Qty: 15 TABLET | Refills: 1 | Status: SHIPPED | OUTPATIENT
Start: 2024-03-22

## 2024-03-22 NOTE — TELEPHONE ENCOUNTER
----- Message from Venkat Rinaldi MD sent at 3/22/2024 10:37 AM EDT -----  Regarding: RE: Hypertensive patient    Please call this patient to add Aldactazide half tablet daily and he should come back next week to check blood pressure and get BMP in about 10 days  ----- Message -----  From: Vivian Borden MD  Sent: 3/22/2024   9:24 AM EDT  To: Venkat Rinaldi MD  Subject: RE: Hypertensive patient                         Cinthia Rinaldi,    I do not feel comfortable prescribing these medications as I do not specialize in cardiology. Please contact him with your recommendations.       Thank you,    Vivian Borden MD  ----- Message -----  From: Venkat Rinaldi MD  Sent: 3/22/2024   8:19 AM EDT  To: Vivian Borden MD  Subject: RE: Hypertensive patient                         [place]    You can add Aldactazide half tablet daily and get a BMP in about 10 days.  Aldactazide, and 25/25 mg tablet and we can use half tablet daily  ----- Message -----  From: Vivian Borden MD  Sent: 3/21/2024   2:33 PM EDT  To: Venkat Rinaldi MD  Subject: Hypertensive patient                             Cinthia Rinaldi,    We share a mutual patient, Ernesto.  I know that he was seen by you earlier this week for hypertension and he was placed on a beta-blocker.  Unfortunately, today he showed up for his carpal tunnel release still hypertensive.  He is not symptomatic, no chest pain or blurry vision.  I proceeded with a carpal tunnel release under local however I just wanted to let you know that his blood pressure was 190/95 when I took it manually. Not sure if he should have his medication adjusted?    Thank you,     Vivian Borden MD

## 2024-04-02 ENCOUNTER — OFFICE VISIT (OUTPATIENT)
Dept: OBGYN CLINIC | Facility: CLINIC | Age: 84
End: 2024-04-02

## 2024-04-02 ENCOUNTER — TELEPHONE (OUTPATIENT)
Age: 84
End: 2024-04-02

## 2024-04-02 VITALS
HEIGHT: 67 IN | HEART RATE: 53 BPM | SYSTOLIC BLOOD PRESSURE: 133 MMHG | BODY MASS INDEX: 34.37 KG/M2 | WEIGHT: 219 LBS | DIASTOLIC BLOOD PRESSURE: 82 MMHG

## 2024-04-02 DIAGNOSIS — Z09 POSTOP CHECK: Primary | ICD-10-CM

## 2024-04-02 PROCEDURE — 99024 POSTOP FOLLOW-UP VISIT: CPT | Performed by: STUDENT IN AN ORGANIZED HEALTH CARE EDUCATION/TRAINING PROGRAM

## 2024-04-02 RX ORDER — AMLODIPINE BESYLATE 2.5 MG/1
TABLET ORAL
COMMUNITY
Start: 2024-03-18

## 2024-04-02 NOTE — PROGRESS NOTES
ASSESSMENT/PLAN:    Assessment:   S/P right carpal tunnel release, DOS 3/21/24  Left ring finger A1 pulley pain s/p x1 CSI  Left hand dupuytren diease s/p Xialfex by outside provider    Plan:   Sutures removed in clinic. Incision is clean, dry, and intact. No signs of infection. Patient may get wet, pat dry, do not scrub, do not soak.   Continue to modify activities for at least 2 more weeks, no heavy lifting, pushing, or pulling.   F/u for left ring finger pain in one month    Follow Up:  One month    _____________________________________________________  CHIEF COMPLAINT:  Chief Complaint   Patient presents with   • Right Wrist - Post-op         SUBJECTIVE:  Gonzales Sears is a 84 y.o. male who presents for follow up 2 weeks s/p right carpal tunnel release. DOS 3/21/24. Patient states he is doing very well post operatively. He notes some mild pain the first 48 hours after surgery this has resolved. Numbness and paresthesias have improved, night time symptoms have reduced.     PAST MEDICAL HISTORY:  Past Medical History:   Diagnosis Date   • Abnormal blood chemistry     resolved: 11/9/16   • Abnormal glucose     last assessed: 9/24/14   • Arthritis    • CAD (coronary artery disease)    • Chronic kidney disease     STONES  & CKD   • Coronary atherosclerosis    • DM2 (diabetes mellitus, type 2) ([facility])    • Dyslipidemia    • Facial nerve disorder    • HTN (hypertension)    • Hyperlipidemia     last assessed: 1/13/17   • Kidney stones    • Lumbosacral radiculopathy    • Nerve root and plexus disorder    • Obesity    • Osteoarthritis    • Prostate asymmetry    • Pure hypercholesterolemia        PAST SURGICAL HISTORY:  Past Surgical History:   Procedure Laterality Date   • CORONARY ANGIOPLASTY      1 STENT    • AK NEUROPLASTY &/TRANSPOS MEDIAN NRV CARPAL TUNNE Right 3/21/2024    Procedure: RELEASE CARPAL TUNNEL;  Surgeon: Vivian Borden MD;  Location: [facility];  Service: Orthopedics   • AK UNLISTED PROCEDURE  ESOPHAGUS N/A 9/30/2021    Procedure: EGD; DIVERTICULECTOMY ZENKERS ENDOSCOPIC;  Surgeon: Humble Franco MD;  Location: [facility];  Service: Thoracic   • UPPER GASTROINTESTINAL ENDOSCOPY         FAMILY HISTORY:  Family History   Problem Relation Age of Onset   • Cancer Mother    • Ovarian cancer Mother    • Hypertension Sister    • Lung disease Father         black   • Mental illness Neg Hx    • Substance Abuse Neg Hx        SOCIAL HISTORY:  Social History     Tobacco Use   • Smoking status: Never   • Smokeless tobacco: Never   Vaping Use   • Vaping status: Never Used   Substance Use Topics   • Alcohol use: Not Currently   • Drug use: No       MEDICATIONS:    Current Outpatient Medications:   •  acetaminophen (TYLENOL) 650 mg CR tablet, Take 1 tablet (650 mg total) by mouth every 8 (eight) hours as needed for mild pain, Disp: 30 tablet, Rfl: 0  •  amLODIPine (NORVASC) 2.5 mg tablet, , Disp: , Rfl:   •  aspirin 81 mg chewable tablet, Chew 81 mg daily  , Disp: , Rfl:   •  ezetimibe (ZETIA) 10 mg tablet, Take 1 tablet (10 mg total) by mouth daily, Disp: 90 tablet, Rfl: 3  •  famotidine (PEPCID) 40 MG tablet, TAKE ONE TABLET BY MOUTH EVERY DAY, Disp: 90 tablet, Rfl: 1  •  fluorouracil (EFUDEX) 5 % cream, Apply topically 2 (two) times a day For 2 weeks., Disp: 40 g, Rfl: 1  •  metFORMIN (GLUCOPHAGE) 500 mg tablet, TAKE ONE TABLET BY MOUTH TWICE A DAY WITH MEALS (GLUCOPHAGE), Disp: 180 tablet, Rfl: 2  •  metoprolol succinate (TOPROL-XL) 25 mg 24 hr tablet, Take 1 tablet (25 mg total) by mouth daily, Disp: 90 tablet, Rfl: 1  •  Multiple Vitamin (multivitamin) tablet, Take 1 tablet by mouth daily, Disp: , Rfl:   •  naproxen sodium (ALEVE) 220 MG tablet, Take 1 tablet (220 mg total) by mouth 2 (two) times a day with meals, Disp: 60 tablet, Rfl: 0  •  simvastatin (ZOCOR) 40 mg tablet, TAKE 1 TABLET BY MOUTH DAILY AT  BEDTIME, Disp: 90 tablet, Rfl: 3  •  sitaGLIPtin (Januvia) 50 mg tablet, TAKE 1 TABLET BY MOUTH DAILY,  "Disp: 90 tablet, Rfl: 1  •  spironolactone-hydrochlorothiazide (ALDACTAZIDE) 25-25 mg per tablet, Take 0.5 tablets by mouth every other day, Disp: 15 tablet, Rfl: 1  •  tamsulosin (FLOMAX) 0.4 mg, Take 1 capsule (0.4 mg total) by mouth daily with dinner, Disp: 90 capsule, Rfl: 3  •  trandolapril (MAVIK) 1 MG tablet, TAKE ONE TABLET BY MOUTH EVERY DAY, Disp: 90 tablet, Rfl: 2    ALLERGIES:  Allergies   Allergen Reactions   • Other Other (See Comments)     REAPRO- SEVERELY LOWERED RBCS RE[facility] HOSP[facility]   • Ciprofloxacin Hives       REVIEW OF SYSTEMS:  Pertinent items are noted in HPI.  A comprehensive review of systems was negative.    LABS:  HgA1c:   Lab Results   Component Value Date    HGBA1C 7.2 (H) 09/22/2023     BMP:   Lab Results   Component Value Date    GLUCOSE 125 (H) 02/17/2017    CALCIUM 10.0 09/22/2023     02/17/2017    K 4.8 09/22/2023    CO2 29 09/22/2023     09/22/2023    BUN 29 (H) 09/22/2023    CREATININE 1.69 (H) 09/22/2023           _____________________________________________________  PHYSICAL EXAMINATION:  Vital signs: /82   Pulse (!) 53   Ht 5' 7\" (1.702 m)   Wt 99.3 kg (219 lb)   BMI 34.30 kg/m²   General: well developed and well nourished, alert, oriented times 3, and appears comfortable  Psychiatric: Normal  HEENT: Trachea Midline, No torticollis  Cardiovascular: No discernable arrhythmia  Pulmonary: No wheezing or stridor  Abdomen: No rebound or guarding  Extremities: No peripheral edema  Skin: No masses, erythema, lacerations, fluctation, ulcerations  Neurovascular: Sensation Intact to the Median, Ulnar, Radial Nerve, Motor Intact to the Median, Ulnar, Radial Nerve, and Pulses Intact    MUSCULOSKELETAL EXAMINATION:    right Hand -    Incision is clean, dry, and intact  No sign of infection   Patient presents with no obvious anatomical deformity  Skin is warm and dry to touch with no signs of erythema, ecchymosis, or infection  No soft tissue swelling or effusion " noted  Demonstrates normal wrist, elbow, and shoulder motion  Forearm compartments are soft and supple  2+ distal radial pulse with brisk capillary refill to the fingers  Radial, median, and ulnar motor and sensory distribution intact  Sensations light to touch intact distally    Left ring finger  TTP over A1 pulley, can not bring finer to palm due to pain  Dupuytren's nodules just proximal to small and ring finger and small cord to middle finger without any flexion contractures    _____________________________________________________  STUDIES REVIEWED:  No new images obtained today       PROCEDURES PERFORMED:  Procedures  No Procedures performed today    Scribe Attestation    I,:  Devora Foley am acting as a scribe while in the presence of the attending physician.:       I,:  Vivian Borden MD personally performed the services described in this documentation    as scribed in my presence.:

## 2024-04-02 NOTE — TELEPHONE ENCOUNTER
Called pt to let him know his appt on 4/25 had to be canceled due to Dr. Angulo not being in the office. At this time I don't have any other appt to offer the pt and he was added to the recall list. When called there was n/a and mailbox is full. Sent my chart massage to pt.

## 2024-04-30 ENCOUNTER — OFFICE VISIT (OUTPATIENT)
Dept: OBGYN CLINIC | Facility: CLINIC | Age: 84
End: 2024-04-30

## 2024-04-30 VITALS
SYSTOLIC BLOOD PRESSURE: 167 MMHG | BODY MASS INDEX: 34.37 KG/M2 | HEIGHT: 67 IN | DIASTOLIC BLOOD PRESSURE: 91 MMHG | WEIGHT: 219 LBS

## 2024-04-30 DIAGNOSIS — M65.342 TRIGGER RING FINGER OF LEFT HAND: Primary | ICD-10-CM

## 2024-04-30 DIAGNOSIS — Z09 POSTOP CHECK: ICD-10-CM

## 2024-04-30 PROCEDURE — 99024 POSTOP FOLLOW-UP VISIT: CPT | Performed by: STUDENT IN AN ORGANIZED HEALTH CARE EDUCATION/TRAINING PROGRAM

## 2024-04-30 NOTE — PROGRESS NOTES
ASSESSMENT/PLAN:    Assessment:   S/P right carpal tunnel release, DOS 3/21/24  Left ring finger A1 pulley pain s/p x1 CSI performed 3/18/2024 complicated by Left ring finger palmar dupuytrens cord   Right hand dupuytren diease s/p Xialfex by outside provider    Plan:   Discussed that left ring trigger finger release could potentially cause a flare of Dupuytren's  Continue activities as tolerated  May follow up as needed for left ring A1 pulley cortisone injection    _____________________________________________________  CHIEF COMPLAINT:  Chief Complaint   Patient presents with   • Right Wrist - Post-op         SUBJECTIVE:  Gonzales Sears is a 84 y.o. male who presents for follow up 6 weeks s/p right carpal tunnel release. DOS 3/21/24. Patient states he is doing very well post operatively. He notes some mild tenderness that persists in the area of his incision, and this improves daily. Numbness and paresthesias have improved, night time symptoms have reduced. He is performing scar tissue massage with Vaseline once daily. Left ring triggering continues, mostly at rest, and he notes continued tenderness in the area of the A1 pulley.     PAST MEDICAL HISTORY:  Past Medical History:   Diagnosis Date   • Abnormal blood chemistry     resolved: 11/9/16   • Abnormal glucose     last assessed: 9/24/14   • Arthritis    • CAD (coronary artery disease)    • Chronic kidney disease     STONES  & CKD   • Coronary atherosclerosis    • DM2 (diabetes mellitus, type 2) ([facility])    • Dyslipidemia    • Facial nerve disorder    • HTN (hypertension)    • Hyperlipidemia     last assessed: 1/13/17   • Kidney stones    • Lumbosacral radiculopathy    • Nerve root and plexus disorder    • Obesity    • Osteoarthritis    • Prostate asymmetry    • Pure hypercholesterolemia        PAST SURGICAL HISTORY:  Past Surgical History:   Procedure Laterality Date   • CORONARY ANGIOPLASTY      1 STENT    • [place] NEUROPLASTY &/TRANSPOS MEDIAN NRV CARPAL TUNNE  Right 3/21/2024    Procedure: RELEASE CARPAL TUNNEL;  Surgeon: Vivian Borden MD;  Location: [facility];  Service: Orthopedics   • [place] UNLISTED PROCEDURE ESOPHAGUS N/A 9/30/2021    Procedure: EGD; DIVERTICULECTOMY ZENKERS ENDOSCOPIC;  Surgeon: Humble Franco MD;  Location:  MAIN OR;  Service: Thoracic   • UPPER GASTROINTESTINAL ENDOSCOPY         FAMILY HISTORY:  Family History   Problem Relation Age of Onset   • Cancer Mother    • Ovarian cancer Mother    • Hypertension Sister    • Lung disease Father         black   • Mental illness Neg Hx    • Substance Abuse Neg Hx        SOCIAL HISTORY:  Social History     Tobacco Use   • Smoking status: Never   • Smokeless tobacco: Never   Vaping Use   • Vaping status: Never Used   Substance Use Topics   • Alcohol use: Not Currently   • Drug use: No       MEDICATIONS:    Current Outpatient Medications:   •  acetaminophen (TYLENOL) 650 mg CR tablet, Take 1 tablet (650 mg total) by mouth every 8 (eight) hours as needed for mild pain, Disp: 30 tablet, Rfl: 0  •  amLODIPine (NORVASC) 2.5 mg tablet, , Disp: , Rfl:   •  aspirin 81 mg chewable tablet, Chew 81 mg daily  , Disp: , Rfl:   •  ezetimibe (ZETIA) 10 mg tablet, Take 1 tablet (10 mg total) by mouth daily, Disp: 90 tablet, Rfl: 3  •  famotidine (PEPCID) 40 MG tablet, TAKE ONE TABLET BY MOUTH EVERY DAY, Disp: 90 tablet, Rfl: 1  •  fluorouracil (EFUDEX) 5 % cream, Apply topically 2 (two) times a day For 2 weeks., Disp: 40 g, Rfl: 1  •  metFORMIN (GLUCOPHAGE) 500 mg tablet, TAKE ONE TABLET BY MOUTH TWICE A DAY WITH MEALS (GLUCOPHAGE), Disp: 180 tablet, Rfl: 2  •  metoprolol succinate (TOPROL-XL) 25 mg 24 hr tablet, Take 1 tablet (25 mg total) by mouth daily, Disp: 90 tablet, Rfl: 1  •  Multiple Vitamin (multivitamin) tablet, Take 1 tablet by mouth daily, Disp: , Rfl:   •  naproxen sodium (ALEVE) 220 MG tablet, Take 1 tablet (220 mg total) by mouth 2 (two) times a day with meals, Disp: 60 tablet, Rfl: 0  •  simvastatin  "(ZOCOR) 40 mg tablet, TAKE 1 TABLET BY MOUTH DAILY AT  BEDTIME, Disp: 90 tablet, Rfl: 3  •  sitaGLIPtin (Januvia) 50 mg tablet, TAKE 1 TABLET BY MOUTH DAILY, Disp: 90 tablet, Rfl: 1  •  spironolactone-hydrochlorothiazide (ALDACTAZIDE) 25-25 mg per tablet, Take 0.5 tablets by mouth every other day, Disp: 15 tablet, Rfl: 1  •  tamsulosin (FLOMAX) 0.4 mg, Take 1 capsule (0.4 mg total) by mouth daily with dinner, Disp: 90 capsule, Rfl: 3  •  trandolapril (MAVIK) 1 MG tablet, TAKE ONE TABLET BY MOUTH EVERY DAY, Disp: 90 tablet, Rfl: 2    ALLERGIES:  Allergies   Allergen Reactions   • Other Other (See Comments)     REAPRO- SEVERELY LOWERED RBCS [facility]   • Ciprofloxacin Hives       REVIEW OF SYSTEMS:  Pertinent items are noted in HPI.  A comprehensive review of systems was negative.    LABS:  HgA1c:   Lab Results   Component Value Date    HGBA1C 7.2 (H) 09/22/2023     BMP:   Lab Results   Component Value Date    GLUCOSE 125 (H) 02/17/2017    CALCIUM 10.0 09/22/2023     02/17/2017    K 4.8 09/22/2023    CO2 29 09/22/2023     09/22/2023    BUN 29 (H) 09/22/2023    CREATININE 1.69 (H) 09/22/2023           _____________________________________________________  PHYSICAL EXAMINATION:  Vital signs: /91 (BP Location: Left arm, Patient Position: Sitting)   Ht 5' 7\" (1.702 m)   Wt 99.3 kg (219 lb)   BMI 34.30 kg/m²   General: well developed and well nourished, alert, oriented times 3, and appears comfortable  Psychiatric: Normal  HEENT: Trachea Midline, No torticollis  Cardiovascular: No discernable arrhythmia  Pulmonary: No wheezing or stridor  Abdomen: No rebound or guarding  Extremities: No peripheral edema  Skin: No masses, erythema, lacerations, fluctation, ulcerations  Neurovascular: Sensation Intact to the Median, Ulnar, Radial Nerve, Motor Intact to the Median, Ulnar, Radial Nerve, and Pulses Intact    MUSCULOSKELETAL EXAMINATION:    right Hand -    Incision is clean, dry, and intact  No sign " of infection   Can make a full fist   Patient presents with no obvious anatomical deformity  Skin is warm and dry to touch with no signs of erythema, ecchymosis, or infection  No soft tissue swelling or effusion noted  Demonstrates normal wrist, elbow, and shoulder motion  Forearm compartments are soft and supple  2+ distal radial pulse with brisk capillary refill to the fingers  Radial, median, and ulnar motor and sensory distribution intact  Sensations light to touch intact distally    Left ring finger  TTP over A1 pulley, full ROM without any noticeable catching or clicking on exam   Dupuytren's nodules just proximal to small and ring finger and small cord to middle finger without any flexion contractures    _____________________________________________________  STUDIES REVIEWED:  No new images obtained today       PROCEDURES PERFORMED:  Procedures  No Procedures performed today    Scribe Attestation    I,:  Alexia Davidson am acting as a scribe while in the presence of the attending physician.:       I,:  Vivian Borden MD personally performed the services described in this documentation    as scribed in my presence.:

## 2024-05-10 ENCOUNTER — RA CDI HCC (OUTPATIENT)
Dept: OTHER | Facility: HOSPITAL | Age: 84
End: 2024-05-10

## 2024-05-13 ENCOUNTER — OFFICE VISIT (OUTPATIENT)
Dept: FAMILY MEDICINE CLINIC | Facility: CLINIC | Age: 84
End: 2024-05-13
Payer: COMMERCIAL

## 2024-05-13 VITALS
TEMPERATURE: 97.5 F | BODY MASS INDEX: 33.97 KG/M2 | WEIGHT: 216.4 LBS | HEART RATE: 74 BPM | SYSTOLIC BLOOD PRESSURE: 138 MMHG | HEIGHT: 67 IN | RESPIRATION RATE: 16 BRPM | DIASTOLIC BLOOD PRESSURE: 70 MMHG | OXYGEN SATURATION: 96 %

## 2024-05-13 DIAGNOSIS — N18.30 TYPE 2 DIABETES MELLITUS WITH STAGE 3 CHRONIC KIDNEY DISEASE AND HYPERTENSION (HCC): Primary | ICD-10-CM

## 2024-05-13 DIAGNOSIS — I12.9 TYPE 2 DIABETES MELLITUS WITH STAGE 3 CHRONIC KIDNEY DISEASE AND HYPERTENSION (HCC): Primary | ICD-10-CM

## 2024-05-13 DIAGNOSIS — E11.22 TYPE 2 DIABETES MELLITUS WITH STAGE 3 CHRONIC KIDNEY DISEASE AND HYPERTENSION (HCC): Primary | ICD-10-CM

## 2024-05-13 DIAGNOSIS — M54.50 LUMBAR PAIN: ICD-10-CM

## 2024-05-13 DIAGNOSIS — M54.16 LUMBAR RADICULOPATHY: ICD-10-CM

## 2024-05-13 DIAGNOSIS — I10 BENIGN ESSENTIAL HTN: ICD-10-CM

## 2024-05-13 LAB — SL AMB POCT HEMOGLOBIN AIC: 6.7 (ref ?–6.5)

## 2024-05-13 PROCEDURE — 99214 OFFICE O/P EST MOD 30 MIN: CPT | Performed by: FAMILY MEDICINE

## 2024-05-13 PROCEDURE — 83036 HEMOGLOBIN GLYCOSYLATED A1C: CPT | Performed by: FAMILY MEDICINE

## 2024-05-13 NOTE — PROGRESS NOTES
Gonzales Sears 1940 male MRN: [id number]    [facility] PRACTICE OFFICE VISIT  [facility] - [facility]      ASSESSMENT/PLAN  Gonzales Sears is a 84 y.o. male presents to the office for    Diagnoses and all orders for this visit:    Type 2 diabetes mellitus with stage 3 chronic kidney disease and hypertension (HCC)  -     POCT hemoglobin A1c  -     Albumin / creatinine urine ratio; Future    Lumbar pain  -     XR spine lumbar minimum 4 views non injury; Future  -     MRI lumbar spine w wo contrast; Future  -     Ambulatory Referral to Physical Therapy; Future    Lumbar radiculopathy  -     XR spine lumbar minimum 4 views non injury; Future  -     MRI lumbar spine w wo contrast; Future  -     Ambulatory Referral to Physical Therapy; Future    Benign essential HTN       I am very concerned for possible spinal stenosis given that the patient was unable to bend backwards.  Will need to obtain an x-ray but more further imaging of an MRI.  Did advise the patient that I would like him to trial physical therapy as well.  I am a little hesitant of him starting PT until we have an MRI just to be sure the severity of his stenosis if it is truly present  Type 2 diabetes continue lifestyle modifications no changes to be made on medications           Future Appointments   Date Time Provider Department Center   5/22/2024 11:45 AM [place] 1 [place] [facility]   6/10/2024  8:00 AM [place] Quiroz [place] [facility] [place]/16/2024  7:30 AM Mary Ulloa MD [facility]          SUBJECTIVE  CC: Back Pain (Lower back pain and it feels like someone is squeezing his ribs , it comes and goes quickly . The pain level is a 10 when it happens)      HPI:  Gonzales Sears is a 84 y.o. male who presents for an acute appointment.  On and off the patient has had lower back pain feels like someone is squeezing his ribs it sometimes comes and goes very quickly.  When it is present it is about a level  of a 10.  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  Currently right now the patient states he is unable to do any help for her ADLs.  Taking his diabetic medications as prescribed without any side effects  Review of Systems   Constitutional:  Negative for activity change, appetite change, chills, fatigue and fever.   HENT:  Negative for congestion.    Respiratory:  Negative for cough, chest tightness and shortness of breath.    Cardiovascular:  Negative for chest pain and leg swelling.   Gastrointestinal:  Negative for abdominal distention, abdominal pain, constipation, diarrhea, nausea and vomiting.   Musculoskeletal:  Positive for arthralgias, back pain and gait problem.   All other systems reviewed and are negative.      Historical Information   The patient history was reviewed as follows:  Past Medical History:   Diagnosis Date   • Abnormal blood chemistry     resolved: 11/9/16   • Abnormal glucose     last assessed: 9/24/14   • Arthritis    • CAD (coronary artery disease)    • Chronic kidney disease     STONES  & CKD   • Coronary atherosclerosis    • Diabetes mellitus (HCC) ?   • DM2 (diabetes mellitus, type 2) (HCC)    • Dyslipidemia    • Facial nerve disorder    • Heart disease ,10, 12.,uesrs sgo,,?   • HTN (hypertension)    • Hyperlipidemia     last assessed: 1/13/17   • Hypertension ?   • Kidney stones    • Lumbosacral radiculopathy    • Nerve root and plexus disorder    • Obesity    • Osteoarthritis    • Prostate asymmetry    • Pure hypercholesterolemia          Medications:     Current Outpatient Medications:   •  acetaminophen (TYLENOL) 650 mg CR tablet, Take 1 tablet (650 mg total) by mouth every 8 (eight) hours as needed for mild pain, Disp: 30 tablet, Rfl: 0  •  aspirin 81 mg chewable tablet, Chew 81 mg daily  , Disp: , Rfl:   •  ezetimibe (ZETIA) 10 mg tablet, Take 1 tablet (10 mg total) by mouth daily, Disp: 90 tablet, Rfl: 3  •  famotidine (PEPCID) 40 MG tablet, TAKE ONE TABLET BY  "MOUTH EVERY DAY, Disp: 90 tablet, Rfl: 1  •  fluorouracil (EFUDEX) 5 % cream, Apply topically 2 (two) times a day For 2 weeks., Disp: 40 g, Rfl: 1  •  metFORMIN (GLUCOPHAGE) 500 mg tablet, TAKE ONE TABLET BY MOUTH TWICE A DAY WITH MEALS (GLUCOPHAGE), Disp: 180 tablet, Rfl: 2  •  metoprolol succinate (TOPROL-XL) 25 mg 24 hr tablet, Take 1 tablet (25 mg total) by mouth daily, Disp: 90 tablet, Rfl: 1  •  Multiple Vitamin (multivitamin) tablet, Take 1 tablet by mouth daily, Disp: , Rfl:   •  simvastatin (ZOCOR) 40 mg tablet, TAKE 1 TABLET BY MOUTH DAILY AT  BEDTIME, Disp: 90 tablet, Rfl: 3  •  sitaGLIPtin (Januvia) 50 mg tablet, TAKE 1 TABLET BY MOUTH DAILY, Disp: 90 tablet, Rfl: 1  •  spironolactone-hydrochlorothiazide (ALDACTAZIDE) 25-25 mg per tablet, Take 0.5 tablets by mouth every other day, Disp: 15 tablet, Rfl: 1  •  tamsulosin (FLOMAX) 0.4 mg, Take 1 capsule (0.4 mg total) by mouth daily with dinner, Disp: 90 capsule, Rfl: 3  •  trandolapril (MAVIK) 1 MG tablet, TAKE ONE TABLET BY MOUTH EVERY DAY, Disp: 90 tablet, Rfl: 2  •  amLODIPine (NORVASC) 2.5 mg tablet, , Disp: , Rfl:   •  naproxen sodium (ALEVE) 220 MG tablet, Take 1 tablet (220 mg total) by mouth 2 (two) times a day with meals (Patient not taking: Reported on 5/13/2024), Disp: 60 tablet, Rfl: 0  No current facility-administered medications for this visit.    Allergies   Allergen Reactions   • Other Other (See Comments)     REAPRO- SEVERELY LOWERED RBCS REQUIRING HOSPITAL   • Ciprofloxacin Hives       OBJECTIVE  Vitals:   Vitals:    05/13/24 0957   BP: 138/70   BP Location: Left arm   Patient Position: Sitting   Cuff Size: Large   Pulse: 74   Resp: 16   Temp: 97.5 °F (36.4 °C)   SpO2: 96%   Weight: 98.2 kg (216 lb 6.4 oz)   Height: 5' 7\" (1.702 m)         Physical Exam  Constitutional:       Appearance: Normal appearance.   Pulmonary:      Effort: Pulmonary effort is normal.   Musculoskeletal:         General: Normal range of motion.      Comments: " Significant tenderness over the lower spine unable to bend backwards.  But is able to lean forward without any discomfort   Neurological:      General: No focal deficit present.      Mental Status: He is alert and oriented to person, place, and time.   Psychiatric:         Mood and Affect: Mood normal.         Behavior: Behavior normal.         Thought Content: Thought content normal.         Judgment: Judgment normal.                    Mary Flores MD,   [facility]  5/15/2024

## 2024-05-14 ENCOUNTER — TELEPHONE (OUTPATIENT)
Dept: ADMINISTRATIVE | Facility: OTHER | Age: 84
End: 2024-05-14

## 2024-05-14 NOTE — TELEPHONE ENCOUNTER
----- Message from Amelia Haines MA sent at 5/13/2024 10:01 AM EDT -----  Regarding: care gap request  05/13/24 10:01 AM    Hello, our patient attached above has had Diabetic Eye Exam completed/performed. Please assist in updating the patient chart by making an External outreach to Dr Petersen  facility located in [place]. The date of service is 2024.    Thank you,  Amelia FLANNERY

## 2024-05-14 NOTE — TELEPHONE ENCOUNTER
Upon review of the In Basket request and the patient's chart, initial outreach has been made via fax to facility. Please see Contacts section for details.     Thank you  Angeles Woodard MA

## 2024-05-14 NOTE — LETTER
Diabetic Eye Exam Form    Date Requested: 24  Patient: Gonzales Sears  Patient : 1940   Referring Provider: Mary Flores MD      DIABETIC Eye Exam Date _______________________________      Type of Exam MUST be documented for Diabetic Eye Exams. Please CHECK ONE.     Retinal Exam       Dilated Retinal Exam       OCT       Optomap-Iris Exam      Fundus Photography       Left Eye - Please check Retinopathy or No Retinopathy        Exam did show retinopathy    Exam did not show retinopathy       Right Eye - Please check Retinopathy or No Retinopathy       Exam did show retinopathy    Exam did not show retinopathy       Comments __________________________________________________________    Practice Providing Exam ______________________________________________    Exam Performed By (print name) _______________________________________      Provider Signature ___________________________________________________      These reports are needed for  compliance.  Please fax this completed form and a copy of the Diabetic Eye Exam report to our office located at [address] [place] [address] as soon as possible via Fax [phone] [place]: Phone [phone]  We thank you for your assistance in treating our mutual patient.

## 2024-05-15 ENCOUNTER — OFFICE VISIT (OUTPATIENT)
Dept: OBGYN CLINIC | Facility: CLINIC | Age: 84
End: 2024-05-15
Payer: COMMERCIAL

## 2024-05-15 VITALS
SYSTOLIC BLOOD PRESSURE: 135 MMHG | HEIGHT: 67 IN | DIASTOLIC BLOOD PRESSURE: 88 MMHG | WEIGHT: 216 LBS | HEART RATE: 65 BPM | BODY MASS INDEX: 33.9 KG/M2

## 2024-05-15 DIAGNOSIS — M65.342 TRIGGER RING FINGER OF LEFT HAND: Primary | ICD-10-CM

## 2024-05-15 DIAGNOSIS — M72.0 DUPUYTREN'S CONTRACTURE: ICD-10-CM

## 2024-05-15 DIAGNOSIS — Z98.890 S/P CARPAL TUNNEL RELEASE: ICD-10-CM

## 2024-05-15 DIAGNOSIS — M65.321 TRIGGER FINGER, RIGHT INDEX FINGER: ICD-10-CM

## 2024-05-15 PROCEDURE — 99024 POSTOP FOLLOW-UP VISIT: CPT | Performed by: STUDENT IN AN ORGANIZED HEALTH CARE EDUCATION/TRAINING PROGRAM

## 2024-05-15 PROCEDURE — 20550 NJX 1 TENDON SHEATH/LIGAMENT: CPT | Performed by: STUDENT IN AN ORGANIZED HEALTH CARE EDUCATION/TRAINING PROGRAM

## 2024-05-15 RX ORDER — BETAMETHASONE SODIUM PHOSPHATE AND BETAMETHASONE ACETATE 3; 3 MG/ML; MG/ML
3 INJECTION, SUSPENSION INTRA-ARTICULAR; INTRALESIONAL; INTRAMUSCULAR; SOFT TISSUE
Status: COMPLETED | OUTPATIENT
Start: 2024-05-15 | End: 2024-05-15

## 2024-05-15 RX ORDER — LIDOCAINE HYDROCHLORIDE 10 MG/ML
1 INJECTION, SOLUTION INFILTRATION; PERINEURAL
Status: COMPLETED | OUTPATIENT
Start: 2024-05-15 | End: 2024-05-15

## 2024-05-15 RX ADMIN — LIDOCAINE HYDROCHLORIDE 1 ML: 10 INJECTION, SOLUTION INFILTRATION; PERINEURAL at 09:45

## 2024-05-15 RX ADMIN — BETAMETHASONE SODIUM PHOSPHATE AND BETAMETHASONE ACETATE 3 MG: 3; 3 INJECTION, SUSPENSION INTRA-ARTICULAR; INTRALESIONAL; INTRAMUSCULAR; SOFT TISSUE at 09:45

## 2024-05-15 NOTE — PROGRESS NOTES
ASSESSMENT/PLAN:    Assessment:   Left ring trigger finger  Right index trigger finger with dupuytren's cord   Bilateral dupuytren's   Right CTS s/p CTR with imprved symptoms     Plan:   A repeat left ring trigger finger corticosteroid injection was administered in clinic today. Procedure tolerated well, post injection protocol advised.   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 Will call for referral if needed.   Continue activities as tolerated     Follow Up:  PRN    To Do Next Visit:  Repeat evaluation     _____________________________________________________  CHIEF COMPLAINT:  Chief Complaint   Patient presents with    Left Hand - Locking         SUBJECTIVE:  Gonzales Sears is a 84 y.o. male who presents for follow up evaluation of left ring trigger finger. CSI performed 3/18/2024. Patient also has bilateral dupuytren's disease. S/P carpal tunnel release right 3/21/2024. Today patients primary concern is his left ring trigger finger.         PAST MEDICAL HISTORY:  Past Medical History:   Diagnosis Date    Abnormal blood chemistry     resolved: 11/9/16    Abnormal glucose     last assessed: 9/24/14    Arthritis     CAD (coronary artery disease)     Chronic kidney disease     STONES  & CKD    Coronary atherosclerosis     Diabetes mellitus (HCC) ?    DM2 (diabetes mellitus, type 2) (HCC)     Dyslipidemia     Facial nerve disorder     Heart disease ,10, 12.,uesrs sgo,,?    HTN (hypertension)     Hyperlipidemia     last assessed: 1/13/17    Hypertension ?    Kidney stones     Lumbosacral radiculopathy     Nerve root and plexus disorder     Obesity     Osteoarthritis     Prostate asymmetry     Pure hypercholesterolemia        PAST SURGICAL HISTORY:  Past Surgical History:   Procedure Laterality Date     CORONARY ANGIOPLASTY      1 STENT     HAND SURGERY  3/21/24    AK NEUROPLASTY &/TRANSPOS MEDIAN NRV CARPAL TUNNE Right 03/21/2024    Procedure: RELEASE CARPAL TUNNEL;  Surgeon: Vivian Borden MD;  Location: [facility];  Service: Orthopedics    AK UNLISTED PROCEDURE ESOPHAGUS N/A 09/30/2021    Procedure: EGD; DIVERTICULECTOMY ZENKERS ENDOSCOPIC;  Surgeon: Humble Franco MD;  Location:  MAIN OR;  Service: Thoracic    UPPER GASTROINTESTINAL ENDOSCOPY         FAMILY HISTORY:  Family History   Problem Relation Age of Onset    Cancer Mother     Ovarian cancer Mother     Hypertension Sister     Lung disease Father         black    Mental illness Neg Hx     Substance Abuse Neg Hx        SOCIAL HISTORY:  Social History     Tobacco Use    Smoking status: Never    Smokeless tobacco: Never   Vaping Use    Vaping status: Never Used   Substance Use Topics    Alcohol use: Not Currently    Drug use: Never       MEDICATIONS:    Current Outpatient Medications:     aspirin 81 mg chewable tablet, Chew 81 mg daily  , Disp: , Rfl:     ezetimibe (ZETIA) 10 mg tablet, Take 1 tablet (10 mg total) by mouth daily, Disp: 90 tablet, Rfl: 3    famotidine (PEPCID) 40 MG tablet, TAKE ONE TABLET BY MOUTH EVERY DAY, Disp: 90 tablet, Rfl: 1    metFORMIN (GLUCOPHAGE) 500 mg tablet, TAKE ONE TABLET BY MOUTH TWICE A DAY WITH MEALS (GLUCOPHAGE), Disp: 180 tablet, Rfl: 2    metoprolol succinate (TOPROL-XL) 25 mg 24 hr tablet, Take 1 tablet (25 mg total) by mouth daily, Disp: 90 tablet, Rfl: 1    Multiple Vitamin (multivitamin) tablet, Take 1 tablet by mouth daily, Disp: , Rfl:     simvastatin (ZOCOR) 40 mg tablet, TAKE 1 TABLET BY MOUTH DAILY AT  BEDTIME, Disp: 90 tablet, Rfl: 3    sitaGLIPtin (Januvia) 50 mg tablet, TAKE 1 TABLET BY MOUTH DAILY, Disp: 90 tablet, Rfl: 1    spironolactone-hydrochlorothiazide (ALDACTAZIDE) 25-25 mg per tablet, Take 0.5 tablets by mouth every other day, Disp: 15 tablet, Rfl: 1    tamsulosin (FLOMAX) 0.4 mg, Take 1  "capsule (0.4 mg total) by mouth daily with dinner, Disp: 90 capsule, Rfl: 3    trandolapril (MAVIK) 1 MG tablet, TAKE ONE TABLET BY MOUTH EVERY DAY, Disp: 90 tablet, Rfl: 2    acetaminophen (TYLENOL) 650 mg CR tablet, Take 1 tablet (650 mg total) by mouth every 8 (eight) hours as needed for mild pain, Disp: 30 tablet, Rfl: 0    amLODIPine (NORVASC) 2.5 mg tablet, , Disp: , Rfl:     fluorouracil (EFUDEX) 5 % cream, Apply topically 2 (two) times a day For 2 weeks., Disp: 40 g, Rfl: 1    naproxen sodium (ALEVE) 220 MG tablet, Take 1 tablet (220 mg total) by mouth 2 (two) times a day with meals (Patient not taking: Reported on 5/13/2024), Disp: 60 tablet, Rfl: 0  No current facility-administered medications for this visit.    ALLERGIES:  Allergies   Allergen Reactions    Other Other (See Comments)     REAPRO- SEVERELY LOWERED RBCS [facility]OSP[facility]    Ciprofloxacin Hives       REVIEW OF SYSTEMS:  Pertinent items are noted in HPI.  A comprehensive review of systems was negative.    LABS:  HgA1c:   Lab Results   Component Value Date    HGBA1C 6.7 (A) 05/13/2024     BMP:   Lab Results   Component Value Date    GLUCOSE 125 (H) 02/17/2017    CALCIUM 10.0 09/22/2023     02/17/2017    K 4.8 09/22/2023    CO2 29 09/22/2023     09/22/2023    BUN 29 (H) 09/22/2023    CREATININE 1.69 (H) 09/22/2023         _____________________________________________________  PHYSICAL EXAMINATION:  Vital signs: /88   Pulse 65   Ht 5' 7\" (1.702 m)   Wt 98 kg (216 lb)   BMI 33.83 kg/m²   General: well developed and well nourished, alert, oriented times 3, and appears comfortable  Psychiatric: Normal  HEENT: Trachea Midline, No torticollis  Cardiovascular: No discernable arrhythmia  Pulmonary: No wheezing or stridor  Abdomen: No rebound or guarding  Extremities: No peripheral edema  Skin: No masses, erythema, lacerations, fluctation, ulcerations  Neurovascular: Sensation Intact to the Median, Ulnar, Radial Nerve, Motor Intact " to the Median, Ulnar, Radial Nerve, and Pulses Intact    MUSCULOSKELETAL EXAMINATION:    left Hand -    Patient presents with no obvious anatomical deformity  Skin is warm and dry to touch with no signs of erythema, ecchymosis, or infection  No soft tissue swelling or effusion noted  Full FDS, FDP, extensor mechanisms are intact  No rotational deformity with composite finger flexion  TTP with palpable nodule in the ring finger flexor tendon local to A1 Pulley  NO triggering on exam  Dupuytren's nodules just proximal to small and ring finger and small cord to middle finger without any flexion contractures   Demonstrates normal wrist, elbow, and shoulder motion  Forearm compartments are soft and supple  2+ distal radial pulse with brisk capillary refill to the fingers  Radial, median, and ulnar motor and sensory distribution intact  Sensations light to touch intact distally    _____________________________________________________  STUDIES REVIEWED:  No Studies to review      PROCEDURES PERFORMED:  Hand/upper extremity injection: L ring A1  Universal Protocol:  Consent: Verbal consent obtained.  Risks and benefits: risks, benefits and alternatives were discussed  Consent given by: patient  Timeout called at: 5/15/2024 9:56 AM.  Patient understanding: patient states understanding of the procedure being performed  Patient identity confirmed: verbally with patient  Supporting Documentation  Indications: diagnostic, tendon swelling and pain   Procedure Details  Condition:trigger finger Location: ring finger - L ring A1   Preparation: Patient was prepped and draped in the usual sterile fashion  Needle size: 25 G  Ultrasound guidance: no  Medications administered: 1 mL lidocaine 1 %; 3 mg betamethasone acetate-betamethasone sodium phosphate 6 (3-3) mg/mL  Patient tolerance: patient tolerated the procedure well with no immediate complications  Dressing:  Sterile dressing applied           Scribe Attestation      I,:  Devora  Og am acting as a scribe while in the presence of the attending physician.:       I,:  Vivian Borden MD personally performed the services described in this documentation    as scribed in my presence.:

## 2024-05-15 NOTE — TELEPHONE ENCOUNTER
Upon review of the In Basket request we were able to locate, review, and update the patient chart as requested for Diabetic Eye Exam.    Any additional questions or concerns should be emailed to the Practice Liaisons via the appropriate education email address, please do not reply via In Basket.    Thank you  Angeles Wooadrd MA

## 2024-05-15 NOTE — PROGRESS NOTES
ASSESSMENT/PLAN:    Assessment:   Left ring trigger finger  Right index trigger finger with dupuytren's cord   Bilateral dupuytren's   Right CTS s/p CTR with imprved symptoms     Plan:   A repeat left ring trigger finger corticosteroid injection was administered in clinic today. Procedure tolerated well, post injection protocol advised.   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 Will call for referral if needed.   Continue activities as tolerated     Follow Up:  PRN    To Do Next Visit:  Repeat evaluation     _____________________________________________________  CHIEF COMPLAINT:  Chief Complaint   Patient presents with   • Left Hand - Locking         SUBJECTIVE:  Gonzales Sears is a 84 y.o. male who presents for follow up evaluation of left ring trigger finger. CSI performed 3/18/2024. Patient also has bilateral dupuytren's disease. S/P carpal tunnel release right 3/21/2024. Today patients primary concern is his left ring trigger finger.         PAST MEDICAL HISTORY:  Past Medical History:   Diagnosis Date   • Abnormal blood chemistry     resolved: 11/9/16   • Abnormal glucose     last assessed: 9/24/14   • Arthritis    • CAD (coronary artery disease)    • Chronic kidney disease     STONES  & CKD   • Coronary atherosclerosis    • Diabetes mellitus (HCC) ?   • DM2 (diabetes mellitus, type 2) ([facility])    • Dyslipidemia    • Facial nerve disorder    • Heart disease ,10, 12.,uesrs sgo,,?   • HTN (hypertension)    • Hyperlipidemia     last assessed: 1/13/17   • Hypertension ?   • Kidney stones    • Lumbosacral radiculopathy    • Nerve root and plexus disorder    • Obesity    • Osteoarthritis    • Prostate asymmetry    • Pure hypercholesterolemia        PAST SURGICAL HISTORY:  Past Surgical History:   Procedure  Laterality Date   • CORONARY ANGIOPLASTY      1 STENT    • HAND SURGERY  3/21/24   • AR NEUROPLASTY &/TRANSPOS MEDIAN NRV CARPAL TUNNE Right 03/21/2024    Procedure: RELEASE CARPAL TUNNEL;  Surgeon: Vivian Borden MD;  Location: [facility];  Service: Orthopedics   • AR UNLISTED PROCEDURE ESOPHAGUS N/A 09/30/2021    Procedure: EGD; DIVERTICULECTOMY ZENKERS ENDOSCOPIC;  Surgeon: Humble Franco MD;  Location:  MAIN OR;  Service: Thoracic   • UPPER GASTROINTESTINAL ENDOSCOPY         FAMILY HISTORY:  Family History   Problem Relation Age of Onset   • Cancer Mother    • Ovarian cancer Mother    • Hypertension Sister    • Lung disease Father         black   • Mental illness Neg Hx    • Substance Abuse Neg Hx        SOCIAL HISTORY:  Social History     Tobacco Use   • Smoking status: Never   • Smokeless tobacco: Never   Vaping Use   • Vaping status: Never Used   Substance Use Topics   • Alcohol use: Not Currently   • Drug use: Never       MEDICATIONS:    Current Outpatient Medications:   •  aspirin 81 mg chewable tablet, Chew 81 mg daily  , Disp: , Rfl:   •  ezetimibe (ZETIA) 10 mg tablet, Take 1 tablet (10 mg total) by mouth daily, Disp: 90 tablet, Rfl: 3  •  famotidine (PEPCID) 40 MG tablet, TAKE ONE TABLET BY MOUTH EVERY DAY, Disp: 90 tablet, Rfl: 1  •  metFORMIN (GLUCOPHAGE) 500 mg tablet, TAKE ONE TABLET BY MOUTH TWICE A DAY WITH MEALS (GLUCOPHAGE), Disp: 180 tablet, Rfl: 2  •  metoprolol succinate (TOPROL-XL) 25 mg 24 hr tablet, Take 1 tablet (25 mg total) by mouth daily, Disp: 90 tablet, Rfl: 1  •  Multiple Vitamin (multivitamin) tablet, Take 1 tablet by mouth daily, Disp: , Rfl:   •  simvastatin (ZOCOR) 40 mg tablet, TAKE 1 TABLET BY MOUTH DAILY AT  BEDTIME, Disp: 90 tablet, Rfl: 3  •  sitaGLIPtin (Januvia) 50 mg tablet, TAKE 1 TABLET BY MOUTH DAILY, Disp: 90 tablet, Rfl: 1  •  spironolactone-hydrochlorothiazide (ALDACTAZIDE) 25-25 mg per tablet, Take 0.5 tablets by mouth every other day, Disp: 15 tablet,  "Rfl: 1  •  tamsulosin (FLOMAX) 0.4 mg, Take 1 capsule (0.4 mg total) by mouth daily with dinner, Disp: 90 capsule, Rfl: 3  •  trandolapril (MAVIK) 1 MG tablet, TAKE ONE TABLET BY MOUTH EVERY DAY, Disp: 90 tablet, Rfl: 2  •  acetaminophen (TYLENOL) 650 mg CR tablet, Take 1 tablet (650 mg total) by mouth every 8 (eight) hours as needed for mild pain, Disp: 30 tablet, Rfl: 0  •  amLODIPine (NORVASC) 2.5 mg tablet, , Disp: , Rfl:   •  fluorouracil (EFUDEX) 5 % cream, Apply topically 2 (two) times a day For 2 weeks., Disp: 40 g, Rfl: 1  •  naproxen sodium (ALEVE) 220 MG tablet, Take 1 tablet (220 mg total) by mouth 2 (two) times a day with meals (Patient not taking: Reported on 5/13/2024), Disp: 60 tablet, Rfl: 0  No current facility-administered medications for this visit.    ALLERGIES:  Allergies   Allergen Reactions   • Other Other (See Comments)     REAPRO- SEVERELY LOWERED RBCS [facility] HOSP[facility]   • Ciprofloxacin Hives       REVIEW OF SYSTEMS:  Pertinent items are noted in HPI.  A comprehensive review of systems was negative.    LABS:  HgA1c:   Lab Results   Component Value Date    HGBA1C 6.7 (A) 05/13/2024     BMP:   Lab Results   Component Value Date    GLUCOSE 125 (H) 02/17/2017    CALCIUM 10.0 09/22/2023     02/17/2017    K 4.8 09/22/2023    CO2 29 09/22/2023     09/22/2023    BUN 29 (H) 09/22/2023    CREATININE 1.69 (H) 09/22/2023         _____________________________________________________  PHYSICAL EXAMINATION:  Vital signs: /88   Pulse 65   Ht 5' 7\" (1.702 m)   Wt 98 kg (216 lb)   BMI 33.83 kg/m²   General: well developed and well nourished, alert, oriented times 3, and appears comfortable  Psychiatric: Normal  HEENT: Trachea Midline, No torticollis  Cardiovascular: No discernable arrhythmia  Pulmonary: No wheezing or stridor  Abdomen: No rebound or guarding  Extremities: No peripheral edema  Skin: No masses, erythema, lacerations, fluctation, ulcerations  Neurovascular: Sensation " Intact to the Median, Ulnar, Radial Nerve, Motor Intact to the Median, Ulnar, Radial Nerve, and Pulses Intact    MUSCULOSKELETAL EXAMINATION:    left Hand -    Patient presents with no obvious anatomical deformity  Skin is warm and dry to touch with no signs of erythema, ecchymosis, or infection  No soft tissue swelling or effusion noted  Full FDS, FDP, extensor mechanisms are intact  No rotational deformity with composite finger flexion  TTP with palpable nodule in the ring finger flexor tendon local to A1 Pulley  NO triggering on exam  Dupuytren's nodules just proximal to small and ring finger and small cord to middle finger without any flexion contractures   Demonstrates normal wrist, elbow, and shoulder motion  Forearm compartments are soft and supple  2+ distal radial pulse with brisk capillary refill to the fingers  Radial, median, and ulnar motor and sensory distribution intact  Sensations light to touch intact distally    _____________________________________________________  STUDIES REVIEWED:  No Studies to review      PROCEDURES PERFORMED:  Hand/upper extremity injection: L ring A1  Universal Protocol:  Consent: Verbal consent obtained.  Risks and benefits: risks, benefits and alternatives were discussed  Consent given by: patient  Timeout called at: 5/15/2024 9:56 AM.  Patient understanding: patient states understanding of the procedure being performed  Patient identity confirmed: verbally with patient  Supporting Documentation  Indications: diagnostic, tendon swelling and pain   Procedure Details  Condition:trigger finger Location: ring finger - L ring A1   Preparation: Patient was prepped and draped in the usual sterile fashion  Needle size: 25 G  Ultrasound guidance: no  Medications administered: 1 mL lidocaine 1 %; 3 mg betamethasone acetate-betamethasone sodium phosphate 6 (3-3) mg/mL  Patient tolerance: patient tolerated the procedure well with no immediate complications  Dressing:  Sterile dressing  applied           Scribe Attestation    I,:  Devora Foley am acting as a scribe while in the presence of the attending physician.:       I,:  Vivian Borden MD personally performed the services described in this documentation    as scribed in my presence.:

## 2024-05-16 ENCOUNTER — APPOINTMENT (OUTPATIENT)
Dept: RADIOLOGY | Facility: CLINIC | Age: 84
End: 2024-05-16
Payer: COMMERCIAL

## 2024-05-16 DIAGNOSIS — M54.50 LUMBAR PAIN: ICD-10-CM

## 2024-05-16 DIAGNOSIS — M54.16 LUMBAR RADICULOPATHY: ICD-10-CM

## 2024-05-16 PROCEDURE — 72110 X-RAY EXAM L-2 SPINE 4/>VWS: CPT

## 2024-05-17 ENCOUNTER — TELEPHONE (OUTPATIENT)
Age: 84
End: 2024-05-17

## 2024-05-17 DIAGNOSIS — E11.22 TYPE 2 DIABETES MELLITUS WITH STAGE 3 CHRONIC KIDNEY DISEASE AND HYPERTENSION (HCC): ICD-10-CM

## 2024-05-17 DIAGNOSIS — N18.31 STAGE 3A CHRONIC KIDNEY DISEASE (HCC): Primary | ICD-10-CM

## 2024-05-17 DIAGNOSIS — N18.30 TYPE 2 DIABETES MELLITUS WITH STAGE 3 CHRONIC KIDNEY DISEASE AND HYPERTENSION (HCC): ICD-10-CM

## 2024-05-17 DIAGNOSIS — I12.9 TYPE 2 DIABETES MELLITUS WITH STAGE 3 CHRONIC KIDNEY DISEASE AND HYPERTENSION (HCC): ICD-10-CM

## 2024-05-29 ENCOUNTER — APPOINTMENT (OUTPATIENT)
Dept: LAB | Facility: CLINIC | Age: 84
End: 2024-05-29
Payer: COMMERCIAL

## 2024-05-29 DIAGNOSIS — I12.9 TYPE 2 DIABETES MELLITUS WITH STAGE 3 CHRONIC KIDNEY DISEASE AND HYPERTENSION (HCC): ICD-10-CM

## 2024-05-29 DIAGNOSIS — I11.9 HYPERTENSIVE HEART DISEASE WITHOUT HEART FAILURE: ICD-10-CM

## 2024-05-29 DIAGNOSIS — N18.31 STAGE 3A CHRONIC KIDNEY DISEASE (HCC): ICD-10-CM

## 2024-05-29 DIAGNOSIS — E11.22 TYPE 2 DIABETES MELLITUS WITH STAGE 3 CHRONIC KIDNEY DISEASE AND HYPERTENSION (HCC): ICD-10-CM

## 2024-05-29 DIAGNOSIS — N18.30 TYPE 2 DIABETES MELLITUS WITH STAGE 3 CHRONIC KIDNEY DISEASE AND HYPERTENSION (HCC): ICD-10-CM

## 2024-05-29 LAB
ALBUMIN SERPL BCP-MCNC: 4.2 G/DL (ref 3.5–5)
ALP SERPL-CCNC: 52 U/L (ref 34–104)
ALT SERPL W P-5'-P-CCNC: 26 U/L (ref 7–52)
ANION GAP SERPL CALCULATED.3IONS-SCNC: 10 MMOL/L (ref 4–13)
AST SERPL W P-5'-P-CCNC: 24 U/L (ref 13–39)
BASOPHILS # BLD AUTO: 0.08 THOUSANDS/ÂΜL (ref 0–0.1)
BASOPHILS NFR BLD AUTO: 1 % (ref 0–1)
BILIRUB SERPL-MCNC: 0.77 MG/DL (ref 0.2–1)
BUN SERPL-MCNC: 28 MG/DL (ref 5–25)
CALCIUM SERPL-MCNC: 9.3 MG/DL (ref 8.4–10.2)
CHLORIDE SERPL-SCNC: 104 MMOL/L (ref 96–108)
CO2 SERPL-SCNC: 25 MMOL/L (ref 21–32)
CREAT SERPL-MCNC: 1.48 MG/DL (ref 0.6–1.3)
CREAT UR-MCNC: 158 MG/DL
EOSINOPHIL # BLD AUTO: 0.38 THOUSAND/ÂΜL (ref 0–0.61)
EOSINOPHIL NFR BLD AUTO: 6 % (ref 0–6)
ERYTHROCYTE [DISTWIDTH] IN BLOOD BY AUTOMATED COUNT: 13.9 % (ref 11.6–15.1)
EST. AVERAGE GLUCOSE BLD GHB EST-MCNC: 166 MG/DL
GFR SERPL CREATININE-BSD FRML MDRD: 43 ML/MIN/1.73SQ M
GLUCOSE P FAST SERPL-MCNC: 140 MG/DL (ref 65–99)
HBA1C MFR BLD: 7.4 %
HCT VFR BLD AUTO: 47.9 % (ref 36.5–49.3)
HGB BLD-MCNC: 15.7 G/DL (ref 12–17)
IMM GRANULOCYTES # BLD AUTO: 0.02 THOUSAND/UL (ref 0–0.2)
IMM GRANULOCYTES NFR BLD AUTO: 0 % (ref 0–2)
LYMPHOCYTES # BLD AUTO: 1.47 THOUSANDS/ÂΜL (ref 0.6–4.47)
LYMPHOCYTES NFR BLD AUTO: 23 % (ref 14–44)
MCH RBC QN AUTO: 31.5 PG (ref 26.8–34.3)
MCHC RBC AUTO-ENTMCNC: 32.8 G/DL (ref 31.4–37.4)
MCV RBC AUTO: 96 FL (ref 82–98)
MICROALBUMIN UR-MCNC: 45.3 MG/L
MICROALBUMIN/CREAT 24H UR: 29 MG/G CREATININE (ref 0–30)
MONOCYTES # BLD AUTO: 0.62 THOUSAND/ÂΜL (ref 0.17–1.22)
MONOCYTES NFR BLD AUTO: 10 % (ref 4–12)
NEUTROPHILS # BLD AUTO: 3.75 THOUSANDS/ÂΜL (ref 1.85–7.62)
NEUTS SEG NFR BLD AUTO: 60 % (ref 43–75)
NRBC BLD AUTO-RTO: 0 /100 WBCS
PLATELET # BLD AUTO: 199 THOUSANDS/UL (ref 149–390)
PMV BLD AUTO: 11.3 FL (ref 8.9–12.7)
POTASSIUM SERPL-SCNC: 4.7 MMOL/L (ref 3.5–5.3)
PROT SERPL-MCNC: 6.7 G/DL (ref 6.4–8.4)
RBC # BLD AUTO: 4.99 MILLION/UL (ref 3.88–5.62)
SODIUM SERPL-SCNC: 139 MMOL/L (ref 135–147)
WBC # BLD AUTO: 6.32 THOUSAND/UL (ref 4.31–10.16)

## 2024-05-29 PROCEDURE — 82610 CYSTATIN C: CPT

## 2024-05-29 PROCEDURE — 83036 HEMOGLOBIN GLYCOSYLATED A1C: CPT

## 2024-05-29 PROCEDURE — 85025 COMPLETE CBC W/AUTO DIFF WBC: CPT

## 2024-05-29 PROCEDURE — 36415 COLL VENOUS BLD VENIPUNCTURE: CPT

## 2024-05-29 PROCEDURE — 82043 UR ALBUMIN QUANTITATIVE: CPT

## 2024-05-29 PROCEDURE — 80053 COMPREHEN METABOLIC PANEL: CPT

## 2024-05-29 PROCEDURE — 82570 ASSAY OF URINE CREATININE: CPT

## 2024-05-30 ENCOUNTER — OFFICE VISIT (OUTPATIENT)
Dept: FAMILY MEDICINE CLINIC | Facility: CLINIC | Age: 84
End: 2024-05-30
Payer: COMMERCIAL

## 2024-05-30 ENCOUNTER — TELEPHONE (OUTPATIENT)
Dept: CARDIOLOGY CLINIC | Facility: CLINIC | Age: 84
End: 2024-05-30

## 2024-05-30 VITALS
RESPIRATION RATE: 18 BRPM | SYSTOLIC BLOOD PRESSURE: 136 MMHG | HEART RATE: 64 BPM | WEIGHT: 217.6 LBS | HEIGHT: 67 IN | DIASTOLIC BLOOD PRESSURE: 82 MMHG | TEMPERATURE: 98.3 F | BODY MASS INDEX: 34.15 KG/M2 | OXYGEN SATURATION: 97 %

## 2024-05-30 DIAGNOSIS — T14.8XXA BRUISING: Primary | ICD-10-CM

## 2024-05-30 DIAGNOSIS — M54.50 LUMBAR PAIN: ICD-10-CM

## 2024-05-30 DIAGNOSIS — N18.31 STAGE 3A CHRONIC KIDNEY DISEASE (HCC): ICD-10-CM

## 2024-05-30 DIAGNOSIS — E11.9 TYPE 2 DIABETES MELLITUS WITHOUT COMPLICATION, WITHOUT LONG-TERM CURRENT USE OF INSULIN (HCC): ICD-10-CM

## 2024-05-30 PROCEDURE — 99214 OFFICE O/P EST MOD 30 MIN: CPT | Performed by: FAMILY MEDICINE

## 2024-05-30 PROCEDURE — G2211 COMPLEX E/M VISIT ADD ON: HCPCS | Performed by: FAMILY MEDICINE

## 2024-05-30 NOTE — TELEPHONE ENCOUNTER
----- Message from Venkat Rinaldi MD sent at 5/30/2024 10:00 AM EDT -----  Patient labs are stable creatinine 1.48 which is close to baseline continue same Rx.

## 2024-05-30 NOTE — PROGRESS NOTES
Gonzales Sears 1940 male MRN: [id number]    [facility] PRACTICE OFFICE VISIT  [facility]      ASSESSMENT/PLAN  Gonzales Sears is a 84 y.o. male presents to the office for    Diagnoses and all orders for this visit:    Bruising    Type 2 diabetes mellitus without complication, without long-term current use of insulin (HCC)  -     sitaGLIPtin (Januvia) 50 mg tablet; Take 1 tablet (50 mg total) by mouth daily    Stage 3a chronic kidney disease (HCC)    Lumbar pain       Type 2 diabetes currently patient's A1c was reviewed with him.  At this time he would like to continue trying making lifestyle modifications and not make any changes to his medications.  Patient states he feels that he can control it with his diet if possible.  Patient's bruising is likely consistent with his baby aspirin.  Will send for blood work just to be sure.  Chronic kidney disease currently stable being managed by nephrology has an upcoming appointment next month.  Lumbar back pain unfortunately has not improved.  Unfortunately MRI was denied patient will be seen by physical therapy to see if there is improvement in his spine.  He will be going to another physical therapy outside of [facility]   Patient is to continue seeing the dermatologist every year             Future Appointments   Date Time Provider Department Center   6/10/2024  8:00 AM Fredrick Quiroz MD [facility]   8/23/2024  8:30 AM Mary Ulloa MD [facility]          SUBJECTIVE  CC: Bleeding/Bruising and Follow-up (MRI not approved//needs Physical Therapy for 6 weeks)      HPI:  Gonzales Sears is a 84 y.o. male who presents for an follow-up on recent x-ray.  Patient states that he is aware that his MRI was denied and would like to start physical therapy if possible in [place].  Patient states that he has been having significant bruising of bilateral arms and wants to be sure it is normal.  Patient has  been on a baby aspirin secondary to his cardiologist given his history.  Patient is seeing his nephrologist.  Patient sees that his A1c is elevated and at this time the patient would like to try lifestyle modifications prior to making any changes.  Currently denies any other symptoms at this time  Review of Systems   Constitutional:  Negative for activity change, appetite change, chills, fatigue and fever.   HENT:  Negative for congestion.    Respiratory:  Negative for cough, chest tightness and shortness of breath.    Cardiovascular:  Negative for chest pain and leg swelling.   Gastrointestinal:  Negative for abdominal distention, abdominal pain, constipation, diarrhea, nausea and vomiting.   All other systems reviewed and are negative.      Historical Information   The patient history was reviewed as follows:  Past Medical History:   Diagnosis Date   • Abnormal blood chemistry     resolved: 11/9/16   • Abnormal glucose     last assessed: 9/24/14   • Arthritis    • CAD (coronary artery disease)    • Chronic kidney disease     STONES  & CKD   • Coronary atherosclerosis    • Diabetes mellitus (HCC) ?   • DM2 (diabetes mellitus, type 2) (HCC)    • Dyslipidemia    • Facial nerve disorder    • Heart disease ,10, 12.,uesrs sgo,,?   • HTN (hypertension)    • Hyperlipidemia     last assessed: 1/13/17   • Hypertension ?   • Kidney stones    • Lumbosacral radiculopathy    • Nerve root and plexus disorder    • Obesity    • Osteoarthritis    • Prostate asymmetry    • Pure hypercholesterolemia          Medications:     Current Outpatient Medications:   •  aspirin 81 mg chewable tablet, Chew 81 mg daily  , Disp: , Rfl:   •  ezetimibe (ZETIA) 10 mg tablet, Take 1 tablet (10 mg total) by mouth daily, Disp: 90 tablet, Rfl: 3  •  famotidine (PEPCID) 40 MG tablet, TAKE ONE TABLET BY MOUTH EVERY DAY, Disp: 90 tablet, Rfl: 1  •  metFORMIN (GLUCOPHAGE) 500 mg tablet, TAKE ONE TABLET BY MOUTH TWICE A DAY WITH MEALS (GLUCOPHAGE), Disp:  "180 tablet, Rfl: 2  •  metoprolol succinate (TOPROL-XL) 25 mg 24 hr tablet, Take 1 tablet (25 mg total) by mouth daily, Disp: 90 tablet, Rfl: 1  •  Multiple Vitamin (multivitamin) tablet, Take 1 tablet by mouth daily, Disp: , Rfl:   •  simvastatin (ZOCOR) 40 mg tablet, TAKE 1 TABLET BY MOUTH DAILY AT  BEDTIME, Disp: 90 tablet, Rfl: 3  •  sitaGLIPtin (Januvia) 50 mg tablet, Take 1 tablet (50 mg total) by mouth daily, Disp: 90 tablet, Rfl: 1  •  spironolactone-hydrochlorothiazide (ALDACTAZIDE) 25-25 mg per tablet, Take 0.5 tablets by mouth every other day, Disp: 15 tablet, Rfl: 1  •  tamsulosin (FLOMAX) 0.4 mg, Take 1 capsule (0.4 mg total) by mouth daily with dinner, Disp: 90 capsule, Rfl: 3  •  trandolapril (MAVIK) 1 MG tablet, TAKE ONE TABLET BY MOUTH EVERY DAY, Disp: 90 tablet, Rfl: 2    Allergies   Allergen Reactions   • Other Other (See Comments)     REAPRO- SEVERELY LOWERED RBCS [facility]   • Ciprofloxacin Hives       OBJECTIVE  Vitals:   Vitals:    05/30/24 0955   BP: 136/82   BP Location: Left arm   Patient Position: Sitting   Cuff Size: Standard   Pulse: 64   Resp: 18   Temp: 98.3 °F (36.8 °C)   TempSrc: Temporal   SpO2: 97%   Weight: 98.7 kg (217 lb 9.6 oz)   Height: 5' 7\" (1.702 m)         Physical Exam  Vitals reviewed.   Constitutional:       Appearance: He is well-developed.   HENT:      Head: Normocephalic and atraumatic.   Eyes:      Extraocular Movements: EOM normal.      Conjunctiva/sclera: Conjunctivae normal.      Pupils: Pupils are equal, round, and reactive to light.   Cardiovascular:      Rate and Rhythm: Normal rate and regular rhythm.      Heart sounds: Normal heart sounds, S1 normal and S2 normal. No murmur heard.  Pulmonary:      Effort: Pulmonary effort is normal. No respiratory distress.      Breath sounds: Normal breath sounds. No wheezing.   Musculoskeletal:         General: No edema. Normal range of motion.      Cervical back: Normal range of motion and neck supple.   Skin:    "  General: Skin is warm.      Comments: Bruising on bilateral arms   Neurological:      Mental Status: He is alert and oriented to person, place, and time.   Psychiatric:         Mood and Affect: Mood and affect normal.         Speech: Speech normal.         Behavior: Behavior normal.         Thought Content: Thought content normal.         Judgment: Judgment normal.                    Mary Flores MD,   [facility]  5/31/2024

## 2024-06-01 LAB
CYSTATIN C SERPL-MCNC: 1.62 MG/L (ref 0.87–1.12)
GFR/BSA.PRED SERPLBLD CYS-BASED-ARV: 38 ML/MIN/1.73

## 2024-06-03 DIAGNOSIS — E78.5 DYSLIPIDEMIA: ICD-10-CM

## 2024-06-04 RX ORDER — EZETIMIBE 10 MG/1
10 TABLET ORAL DAILY
Qty: 90 TABLET | Refills: 0 | Status: SHIPPED | OUTPATIENT
Start: 2024-06-04

## 2024-06-10 ENCOUNTER — OFFICE VISIT (OUTPATIENT)
Dept: NEPHROLOGY | Facility: CLINIC | Age: 84
End: 2024-06-10
Payer: COMMERCIAL

## 2024-06-10 VITALS
HEIGHT: 67 IN | HEART RATE: 60 BPM | OXYGEN SATURATION: 97 % | DIASTOLIC BLOOD PRESSURE: 84 MMHG | SYSTOLIC BLOOD PRESSURE: 132 MMHG | WEIGHT: 217.8 LBS | BODY MASS INDEX: 34.18 KG/M2

## 2024-06-10 DIAGNOSIS — N18.30 TYPE 2 DIABETES MELLITUS WITH STAGE 3 CHRONIC KIDNEY DISEASE AND HYPERTENSION (HCC): Primary | ICD-10-CM

## 2024-06-10 DIAGNOSIS — I12.9 TYPE 2 DIABETES MELLITUS WITH STAGE 3 CHRONIC KIDNEY DISEASE AND HYPERTENSION (HCC): Primary | ICD-10-CM

## 2024-06-10 DIAGNOSIS — E11.22 TYPE 2 DIABETES MELLITUS WITH STAGE 3 CHRONIC KIDNEY DISEASE AND HYPERTENSION (HCC): Primary | ICD-10-CM

## 2024-06-10 DIAGNOSIS — E66.8 MODERATE OBESITY: ICD-10-CM

## 2024-06-10 PROCEDURE — 99214 OFFICE O/P EST MOD 30 MIN: CPT | Performed by: INTERNAL MEDICINE

## 2024-06-10 NOTE — PROGRESS NOTES
NEPHROLOGY OFFICE VISIT   Gonzales Sears 84 y.o. male MRN: [id number]  6/10/2024    Reason for Visit: Chronic kidney disease stage III    History of Present Illness (HPI):    I had the pleasure of seeing Ernesto today in the renal clinic for the continued management of chronic kidney disease stage III. Since our last visit, there has been no ER visits or hospitilizations. He currently has no complaints at this time and is feeling well. Patient denies any chest pain, shortness of breath and swelling. The last blood work was done on May 29 which included CMP, Cystatin C, hemoglobin A1c, microalbumin/creatinine ratio, which we have reviewed together.    Currently undergoing physical therapy at this time.    No chest pain or shortness of breath.    We reviewed his medications.    He does not check his blood pressure at home I asked him to monitor his blood pressure at home a couple of times.    His weight is up to 4 pound since her last visit in May.    He has been started on spironolactone-hydrochlorothiazide and metoprolol XL by cardiology.    ASSESSMENT and PLAN:    Chronic Kidney Disease Stage IIIB  --Imaging:  Renal ultrasound from 2018 showed no evidence of hydronephrosis and no renal mass.  --Urinalysis:  Analysis from July was bland  --Proteinuria: Microalbumin/creatinine ratio is normal at 0.02  --Baseline creatinine: mid 1's  --Etiology:  Presumed to be secondary to diabetic kidney disease along with hypertensive nephrosclerosis  --Biopsy Proven:  No  --Status: Renal function remained stable with a creatinine 1.4 mg/dL.  Combined Cystatin C EGFR with creatinine and EGFR is slightly below 45 mL/min.  Acid-base status stable with a bicarbonate level of 25.  Potassium is normal at 4.7.  Sodium normal at 139.  --Management: Needs slightly better diabetic control.  Blood pressure at target.  Continue dual diuretic regiment with an ACE inhibitor.  Weight loss recommended.  --Reducing Cardiovascular Risk Factors:   Simvastatin+ Ezetimibe reduced atherosclerotic events in CKD (SHARP trial); Low dose aspirin safe (if no contraindications exist); smoking is an independent risk factor for Chronic Kidney Disease and progression, strongly recommend smoking cessation     Hypertension  -- Stage I (American College of Cardiology/American Heart Association)  -- with underlying chronic kidney disease  -- Body mass index is 34.1 kg/m².  -- Volume status: Euvolemic  -- Etiology:  Essential hypertension and obesity  -- Secondary Work-Up:  Not clinically indicated  -- Target Goal: < 130/80 (ACC/AHA, CKD with proteinuria, CKD in diabetics) ; if tolerated can target SBP < 120 mmHg in high cardiovascular risk ( Age > 75, CKD, CVD, No Diabetics SPRINT)  -- Lifestyle Modifications: DASH Diet, Weight Loss for ideal body weight, 45 mins of cardiovascular exercise 3 times a week as tolerated, and if no contraindications exist, smoking cessation, limit alcohol use, avoid NSAIDS, monitor blood pressure at home  -- Status: Blood pressure at target for his age  -- Current antihypertensive regiment: Trandolaprill 1 mg daily, spironolactone-hydrochlorothiazide 12.5-12.5 mg daily, metoprolol 25 mg daily  -- Changes: Can continue current regiment at this time recommend home blood pressure monitoring weight loss     Zenker's diverticulum     Diabetes mellitus type 2  -hemoglobin A1c: 7.4 (A1C values may be falsely elevated or decreased in those with CKD, assay method should be certified by National glycohemoglobin standardization Program). Target A1C between 7-8.5 (will need to be individualized for each patient), and would utilize A1C  in conjunction with continuous glucose monitoring (CGM).  It should also be noted that the A1c with more advanced kidney disease would be inaccurate due to decreased red blood cell life along with anemia.  -current medications:  Metformin, Januvia  -proteinuria: Microalbumin/creatinine ratio is currently normal at  0.02  -retinopathy:  Not reported  -neuropathy:  Not reported  -please follow with an ophthalmologist and podiatrist every year  -continued self monitoring of blood glucose at home  -Management in CKD Recommendations:   Metformin:  Avoid if creatinine clearance is less than 30 cc/min (concern for lactic acidosis)  Sodium-Glucose Cotransporter-2 (SGLT2) Inhibitors:  May see an acute drop in the eGFR initially when starting the medication but then a stabilization of the renal function, with slower loss of renal function as compared to placebo.  Relative risk of end-stage renal disease, doubling of serum creatinine or death from renal causes were also found to be lower as compared to placebo. ([facility]).  DAPA-CKD study, showed that patients with chronic kidney disease regardless of the presence or absence of diabetes the risk of composite of a sustained decline in the estimated GFR of at least 50%, end-stage renal disease or death from renal or cardiovascular causes was significantly lower with Dapagliflozin than with placebo. EMPEROR-Reduced study also showed beneficial effects.   If no contraindications exist I would recommend this medication added to the diabetic regiment, if further optimal diabetic control is required. If eGFR < 60 cc/min (avoid ertugliflozin); If eGFR < 45 cc/min (caution with or avoid dapagliflozin, emphagliflozin), if eGFR  < 30 cc/min (caution or avoid all including canagliflozin, more for efficacy)  Sulfonylureas:  Preferred short-acting (glipizide, glimepiride, repaglinide), relatively safe in patients with non dialysis CKD  Thiazolidinedones/Alpha-Gluosidase inhibitors/Dipeptidyl peptidase-4 inhibitors:  Generally not considered first-line agents in CKD (limited data in long-term safety and efficacy)  Insulin:  Starting dose may need to be lower than what would ordinarily be used, as there is a decrease metabolism of insulin (no dose adjustment if the GFR > 50 mL/min, dose should be  reduced to 75% of baseline if GFR 10-50 mL/min, and 50% baseline if GFR < 10 mL/min)     Obesity  -- BMI 34.1, this is worsened  --Recommend decreasing portion sizes, encouraging healthy choices of fruits and vegetables, consuming healthier snacks and moderation in carbohydrate intake. Exercise recommendations; 3-5 times a week, the American Heart Association recommends 150 minutes a week moderate exercise  --Strongly recommend evaluation by nutritionist  --Overweight and obesity have been associated with increased mortality and morbidity.  Associated with a reduction in life expectancy, associated with increased all cause and cardiovascular mortality  --Metabolic risks, including increased risk of diabetes type 2, insulin resistance with hyperinsulinemia (weight loss of 5 to 7% associated with a decreased risk of type 2 diabetes among individuals with prediabetes and weight loss of 15% can lead to dramatic improvement of diabetes in nearly half of people).  Dyslipidemia, hypertension and heart disease are all increased in patients with obesity.  Along with increased risk of stroke increased risk of multiple cancer types.  Can also be associated with increased renal dysfunction, strongest risk factor for new onset chronic kidney disease.  There is also a degree of compensatory hyperfiltration to meet high in the metabolic demands of increased body weight.  This can also result in increased increased risk for nephrolithiasis.     Mineral bone disorder-chronic kidney disease  -- Calcium level is normal, 9.3 mg/dL  --Check phosphorus and PTH at the next visit      PATIENT INSTRUCTIONS:    Patient Instructions   1.)  Low 2 g sodium diet    2.)  Monitor weights at home    3.)  Avoid NSAIDs (ibuprofen, Motrin, Advil, Aleve, naproxen)    4.)  Monitor blood pressure at home, call if blood pressure greater than 150/90 persistently    5.) I will plan to discuss all results including blood work, and/or imaging at our next  "visit, unless there is an urgent indication, in which case I will call you earlier. If you have any questions or concerns about your results, please feel free to call our office.            Orders Placed This Encounter   Procedures    Albumin / creatinine urine ratio     Standing Status:   Future     Standing Expiration Date:   6/10/2025    Comprehensive metabolic panel     This is a patient instruction: Patient fasting for 8 hours or longer recommended.     Standing Status:   Future     Standing Expiration Date:   6/10/2025    Hemoglobin A1C     Standing Status:   Future     Standing Expiration Date:   6/10/2025    CBC and Platelet     Standing Status:   Future     Standing Expiration Date:   6/10/2025    PTH, intact     Standing Status:   Future     Standing Expiration Date:   6/10/2025    Phosphorus     Standing Status:   Future     Standing Expiration Date:   6/10/2025       OBJECTIVE:  Current Weight: Weight - Scale: 98.8 kg (217 lb 12.8 oz)  Vitals:    06/10/24 0800   BP: 132/84   BP Location: Left arm   Patient Position: Sitting   Cuff Size: Standard   Pulse: 60   SpO2: 97%   Weight: 98.8 kg (217 lb 12.8 oz)   Height: 5' 7\" (1.702 m)    Body mass index is 34.11 kg/m².      REVIEW OF SYSTEMS:    Review of Systems   Constitutional:  Negative for activity change and fever.   Respiratory:  Negative for cough, chest tightness, shortness of breath and wheezing.    Cardiovascular:  Negative for chest pain and leg swelling.   Gastrointestinal:  Negative for abdominal pain, diarrhea, nausea and vomiting.   Endocrine: Negative for polyuria.   Genitourinary:  Negative for difficulty urinating, dysuria, flank pain, frequency and urgency.   Skin:  Negative for rash.   Neurological:  Negative for dizziness, syncope, light-headedness and headaches.       PHYSICAL EXAM:      Physical Exam  Vitals and nursing note reviewed.   Constitutional:       General: He is not in acute distress.     Appearance: He is well-developed. He " is obese.   HENT:      Head: Normocephalic and atraumatic.   Eyes:      General: No scleral icterus.     Conjunctiva/sclera: Conjunctivae normal.      Pupils: Pupils are equal, round, and reactive to light.   Cardiovascular:      Rate and Rhythm: Normal rate and regular rhythm.      Heart sounds: S1 normal and S2 normal. No murmur heard.     No friction rub. No gallop.   Pulmonary:      Effort: Pulmonary effort is normal. No respiratory distress.      Breath sounds: Normal breath sounds. No wheezing or rales.   Abdominal:      General: Bowel sounds are normal.      Palpations: Abdomen is soft.      Tenderness: There is no abdominal tenderness. There is no rebound.   Musculoskeletal:         General: Normal range of motion.      Cervical back: Normal range of motion and neck supple.   Skin:     Findings: No rash.   Neurological:      Mental Status: He is alert and oriented to person, place, and time.   Psychiatric:         Behavior: Behavior normal.         Medications:    Current Outpatient Medications:     aspirin 81 mg chewable tablet, Chew 81 mg daily  , Disp: , Rfl:     ezetimibe (ZETIA) 10 mg tablet, Take 1 tablet (10 mg total) by mouth daily, Disp: 90 tablet, Rfl: 0    famotidine (PEPCID) 40 MG tablet, TAKE ONE TABLET BY MOUTH EVERY DAY, Disp: 90 tablet, Rfl: 1    metFORMIN (GLUCOPHAGE) 500 mg tablet, TAKE ONE TABLET BY MOUTH TWICE A DAY WITH MEALS (GLUCOPHAGE), Disp: 180 tablet, Rfl: 2    metoprolol succinate (TOPROL-XL) 25 mg 24 hr tablet, Take 1 tablet (25 mg total) by mouth daily, Disp: 90 tablet, Rfl: 1    Multiple Vitamin (multivitamin) tablet, Take 1 tablet by mouth daily, Disp: , Rfl:     simvastatin (ZOCOR) 40 mg tablet, TAKE 1 TABLET BY MOUTH DAILY AT  BEDTIME, Disp: 90 tablet, Rfl: 3    sitaGLIPtin (Januvia) 50 mg tablet, Take 1 tablet (50 mg total) by mouth daily, Disp: 90 tablet, Rfl: 1    spironolactone-hydrochlorothiazide (ALDACTAZIDE) 25-25 mg per tablet, Take 0.5 tablets by mouth every other  "day, Disp: 15 tablet, Rfl: 1    tamsulosin (FLOMAX) 0.4 mg, Take 1 capsule (0.4 mg total) by mouth daily with dinner, Disp: 90 capsule, Rfl: 3    trandolapril (MAVIK) 1 MG tablet, TAKE ONE TABLET BY MOUTH EVERY DAY, Disp: 90 tablet, Rfl: 2    Laboratory Results:        Invalid input(s): \"ALBUMIN\"    Results for orders placed or performed in visit on 05/29/24   Albumin / creatinine urine ratio   Result Value Ref Range    Creatinine, Ur 158.0 Reference range not established. mg/dL    Albumin,U,Random 45.3 (H) <20.0 mg/L    Albumin Creat Ratio 29 0 - 30 mg/g creatinine   Comprehensive metabolic panel   Result Value Ref Range    Sodium 139 135 - 147 mmol/L    Potassium 4.7 3.5 - 5.3 mmol/L    Chloride 104 96 - 108 mmol/L    CO2 25 21 - 32 mmol/L    ANION GAP 10 4 - 13 mmol/L    BUN 28 (H) 5 - 25 mg/dL    Creatinine 1.48 (H) 0.60 - 1.30 mg/dL    Glucose, Fasting 140 (H) 65 - 99 mg/dL    Calcium 9.3 8.4 - 10.2 mg/dL    AST 24 13 - 39 U/L    ALT 26 7 - 52 U/L    Alkaline Phosphatase 52 34 - 104 U/L    Total Protein 6.7 6.4 - 8.4 g/dL    Albumin 4.2 3.5 - 5.0 g/dL    Total Bilirubin 0.77 0.20 - 1.00 mg/dL    eGFR 43 ml/min/1.73sq m   Hemoglobin A1C   Result Value Ref Range    Hemoglobin A1C 7.4 (H) Normal 4.0-5.6%; PreDiabetic 5.7-6.4%; Diabetic >=6.5%; Glycemic control for adults with diabetes <7.0% %     mg/dl   Cystatin C With eGFR   Result Value Ref Range    CYSTATIN C 1.62 (H) 0.87 - 1.12 mg/L    eGFR 38 (L) >59 mL/min/1.73   CBC and differential   Result Value Ref Range    WBC 6.32 4.31 - 10.16 Thousand/uL    RBC 4.99 3.88 - 5.62 Million/uL    Hemoglobin 15.7 12.0 - 17.0 g/dL    Hematocrit 47.9 36.5 - 49.3 %    MCV 96 82 - 98 fL    MCH 31.5 26.8 - 34.3 pg    MCHC 32.8 31.4 - 37.4 g/dL    RDW 13.9 11.6 - 15.1 %    MPV 11.3 8.9 - 12.7 fL    Platelets 199 149 - 390 Thousands/uL    nRBC 0 /100 WBCs    Segmented % 60 43 - 75 %    Immature Grans % 0 0 - 2 %    Lymphocytes % 23 14 - 44 %    Monocytes % 10 4 - 12 %    " Eosinophils Relative 6 0 - 6 %    Basophils Relative 1 0 - 1 %    Absolute Neutrophils 3.75 1.85 - 7.62 Thousands/µL    Absolute Immature Grans 0.02 0.00 - 0.20 Thousand/uL    Absolute Lymphocytes 1.47 0.60 - 4.47 Thousands/µL    Absolute Monocytes 0.62 0.17 - 1.22 Thousand/µL    Eosinophils Absolute 0.38 0.00 - 0.61 Thousand/µL    Basophils Absolute 0.08 0.00 - 0.10 Thousands/µL

## 2024-06-13 ENCOUNTER — TELEPHONE (OUTPATIENT)
Age: 84
End: 2024-06-13

## 2024-06-13 NOTE — TELEPHONE ENCOUNTER
Caller: Ernesto    Doctor: Michi    Reason for call:calling to schedule a f/u with Dr. Borden- patient aware she is leaving, Will call back to schedule with JULIA Enrique after he looks at his schedule    Call back#: [phone]

## 2024-06-29 DIAGNOSIS — I10 BENIGN ESSENTIAL HTN: ICD-10-CM

## 2024-06-29 DIAGNOSIS — R05.9 COUGH, UNSPECIFIED TYPE: ICD-10-CM

## 2024-06-29 RX ORDER — FAMOTIDINE 40 MG/1
40 TABLET, FILM COATED ORAL DAILY
Qty: 90 TABLET | Refills: 1 | Status: SHIPPED | OUTPATIENT
Start: 2024-06-29

## 2024-06-29 RX ORDER — TRANDOLAPRIL TABLETS 1 MG/1
TABLET ORAL
Qty: 90 TABLET | Refills: 1 | Status: SHIPPED | OUTPATIENT
Start: 2024-06-29

## 2024-08-13 ENCOUNTER — RA CDI HCC (OUTPATIENT)
Dept: OTHER | Facility: HOSPITAL | Age: 84
End: 2024-08-13

## 2024-08-14 ENCOUNTER — TELEPHONE (OUTPATIENT)
Age: 84
End: 2024-08-14

## 2024-08-14 ENCOUNTER — OFFICE VISIT (OUTPATIENT)
Dept: FAMILY MEDICINE CLINIC | Facility: CLINIC | Age: 84
End: 2024-08-14
Payer: COMMERCIAL

## 2024-08-14 VITALS
HEIGHT: 67 IN | BODY MASS INDEX: 34.28 KG/M2 | SYSTOLIC BLOOD PRESSURE: 132 MMHG | HEART RATE: 66 BPM | DIASTOLIC BLOOD PRESSURE: 72 MMHG | TEMPERATURE: 96.7 F | RESPIRATION RATE: 16 BRPM | OXYGEN SATURATION: 97 % | WEIGHT: 218.4 LBS

## 2024-08-14 DIAGNOSIS — M54.16 LUMBAR RADICULOPATHY: ICD-10-CM

## 2024-08-14 DIAGNOSIS — R10.9 LEFT FLANK PAIN: ICD-10-CM

## 2024-08-14 DIAGNOSIS — I10 BENIGN ESSENTIAL HTN: ICD-10-CM

## 2024-08-14 DIAGNOSIS — R07.81 RIB PAIN ON RIGHT SIDE: Primary | ICD-10-CM

## 2024-08-14 DIAGNOSIS — E78.5 DYSLIPIDEMIA: ICD-10-CM

## 2024-08-14 DIAGNOSIS — E11.9 TYPE 2 DIABETES MELLITUS WITHOUT COMPLICATION, WITHOUT LONG-TERM CURRENT USE OF INSULIN (HCC): ICD-10-CM

## 2024-08-14 LAB
SL AMB  POCT GLUCOSE, UA: NORMAL
SL AMB LEUKOCYTE ESTERASE,UA: NORMAL
SL AMB POCT BILIRUBIN,UA: NORMAL
SL AMB POCT BLOOD,UA: NORMAL
SL AMB POCT CLARITY,UA: CLEAR
SL AMB POCT COLOR,UA: YELLOW
SL AMB POCT KETONES,UA: NORMAL
SL AMB POCT NITRITE,UA: NORMAL
SL AMB POCT PH,UA: 5
SL AMB POCT SPECIFIC GRAVITY,UA: 1.02
SL AMB POCT URINE PROTEIN: NORMAL
SL AMB POCT UROBILINOGEN: NORMAL

## 2024-08-14 PROCEDURE — 81003 URINALYSIS AUTO W/O SCOPE: CPT | Performed by: FAMILY MEDICINE

## 2024-08-14 PROCEDURE — 99214 OFFICE O/P EST MOD 30 MIN: CPT | Performed by: FAMILY MEDICINE

## 2024-08-14 NOTE — TELEPHONE ENCOUNTER
Caller: Patient     Doctor:      Reason for call: Please mail np packet to patient for upcoming appointment.    Confirmed address on file is correct     Call back#: [phone]

## 2024-08-14 NOTE — PROGRESS NOTES
Gonzales Sears 1940 male MRN: [id number]    [facility] PRACTICE OFFICE VISIT  [facility]      ASSESSMENT/PLAN  Gonzales Sears is a 84 y.o. male presents to the office for    Diagnoses and all orders for this visit:    Rib pain on right side  -     XR ribs bilateral 3 vw; Future  -     Ambulatory referral to Spine & Pain Management; Future  -     POCT urine dip auto non-scope    Left flank pain  -     XR ribs bilateral 3 vw; Future  -     Ambulatory referral to Spine & Pain Management; Future  -     POCT urine dip auto non-scope    Lumbar radiculopathy  -     XR ribs bilateral 3 vw; Future  -     Ambulatory referral to Spine & Pain Management; Future    Benign essential HTN    Type 2 diabetes mellitus without complication, without long-term current use of insulin (HCC)  -     Comprehensive metabolic panel; Future  -     CBC and Platelet; Future  -     Albumin / creatinine urine ratio; Future    Dyslipidemia  -     Lipid panel; Future       Recent x-ray showed progressive degenerative disease  Given that patient is not able to tolerate physical therapy anymore with severe tenderness of the left flank and right rib pain I recommend the patient establish care with pain management for possible trigger injections.  I do not know if patient would like epidurals given that currently his wife was just placed under hospice care.  Flexeril was making the patient too sedated if he would does not want any muscle relaxant at this time  UA was within normal limits  Patient is due for blood work.  Orders have been placed for the future.  Consider bone density at her next appointment if x-rays are within normal limits         Future Appointments   Date Time Provider Department Center   9/5/2024  7:30 AM Mary Ulloa MD [facility]          SUBJECTIVE  CC: Rib Pain (Left and right  side tenderness for the pasted 2 month . Right side is worse  )      HPI:  Gonzales Sears is a 84 y.o. male who presents for an acute appointment.  Patient has been having point tenderness of the left flank and right rib for the past 2 months.  States that the right side is only worsening and was advised by physical therapist to come in for an evaluation.  Patient states that physical therapy did not help.  When he is laying down unfortunately the pain is worse with the pressure and then when it gradually goes away he is unable to go back to sleep given the pain he was feeling prior.    Recent x-ray demonstrated progressive degenerative disease.  Has been following nephrology.  Patient takes all his medications compliantly for all his chronic conditions  Review of Systems   Constitutional:  Negative for activity change, appetite change, chills, fatigue and fever.   HENT:  Negative for congestion.    Respiratory:  Negative for cough, chest tightness and shortness of breath.    Cardiovascular:  Negative for chest pain and leg swelling.   Gastrointestinal:  Negative for abdominal distention, abdominal pain, constipation, diarrhea, nausea and vomiting.   Musculoskeletal:  Positive for arthralgias and back pain.   All other systems reviewed and are negative.      Historical Information   The patient history was reviewed as follows:  Past Medical History:   Diagnosis Date   • Abnormal blood chemistry     resolved: 11/9/16   • Abnormal glucose     last assessed: 9/24/14   • Arthritis    • CAD (coronary artery disease)    • Chronic kidney disease     STONES  & CKD   • Coronary atherosclerosis    • Diabetes mellitus (HCC) ?   • DM2 (diabetes mellitus, type 2) (HCC)    • Dyslipidemia    • Facial nerve disorder    • Heart disease ,10, 12.,uesrs sgo,,?   • HTN (hypertension)    • Hyperlipidemia     last assessed: 1/13/17   • Hypertension ?   • Kidney stones    • Lumbosacral radiculopathy    • Nerve root and plexus disorder    • Obesity    • Osteoarthritis    • Prostate asymmetry    •  "Pure hypercholesterolemia          Medications:     Current Outpatient Medications:   •  aspirin 81 mg chewable tablet, Chew 81 mg daily  , Disp: , Rfl:   •  ezetimibe (ZETIA) 10 mg tablet, Take 1 tablet (10 mg total) by mouth daily, Disp: 90 tablet, Rfl: 0  •  famotidine (PEPCID) 40 MG tablet, TAKE ONE TABLET BY MOUTH EVERY DAY, Disp: 90 tablet, Rfl: 1  •  metFORMIN (GLUCOPHAGE) 500 mg tablet, TAKE ONE TABLET BY MOUTH TWICE A DAY WITH MEALS (GLUCOPHAGE), Disp: 180 tablet, Rfl: 2  •  metoprolol succinate (TOPROL-XL) 25 mg 24 hr tablet, Take 1 tablet (25 mg total) by mouth daily, Disp: 90 tablet, Rfl: 1  •  Multiple Vitamin (multivitamin) tablet, Take 1 tablet by mouth daily, Disp: , Rfl:   •  simvastatin (ZOCOR) 40 mg tablet, TAKE 1 TABLET BY MOUTH DAILY AT  BEDTIME, Disp: 90 tablet, Rfl: 3  •  sitaGLIPtin (Januvia) 50 mg tablet, Take 1 tablet (50 mg total) by mouth daily, Disp: 90 tablet, Rfl: 1  •  spironolactone-hydrochlorothiazide (ALDACTAZIDE) 25-25 mg per tablet, Take 0.5 tablets by mouth every other day, Disp: 15 tablet, Rfl: 1  •  tamsulosin (FLOMAX) 0.4 mg, Take 1 capsule (0.4 mg total) by mouth daily with dinner, Disp: 90 capsule, Rfl: 3  •  trandolapril (MAVIK) 1 MG tablet, TAKE ONE TABLET BY MOUTH EVERY DAY, Disp: 90 tablet, Rfl: 1    Allergies   Allergen Reactions   • Ciprofloxacin Hives       OBJECTIVE  Vitals:   Vitals:    08/14/24 0731   BP: 132/72   BP Location: Left arm   Patient Position: Sitting   Cuff Size: Standard   Pulse: 66   Resp: 16   Temp: (!) 96.7 °F (35.9 °C)   SpO2: 97%   Weight: 99.1 kg (218 lb 6.4 oz)   Height: 5' 7\" (1.702 m)         Physical Exam  Constitutional:       Appearance: Normal appearance.   Pulmonary:      Effort: Pulmonary effort is normal.   Musculoskeletal:         General: Normal range of motion.      Comments: Severe tenderness over the left flank with deep/minimal muscle pressure      Right rib discomfort, when palp.    Neurological:      General: No focal deficit " present.      Mental Status: He is alert and oriented to person, place, and time.   Psychiatric:         Mood and Affect: Mood normal.         Behavior: Behavior normal.         Thought Content: Thought content normal.         Judgment: Judgment normal.                    Mary Flores MD,   [facility]  8/14/2024

## 2024-08-15 ENCOUNTER — APPOINTMENT (OUTPATIENT)
Dept: LAB | Facility: CLINIC | Age: 84
End: 2024-08-15
Payer: COMMERCIAL

## 2024-08-15 ENCOUNTER — TELEPHONE (OUTPATIENT)
Dept: FAMILY MEDICINE CLINIC | Facility: CLINIC | Age: 84
End: 2024-08-15

## 2024-08-15 DIAGNOSIS — E78.5 DYSLIPIDEMIA: ICD-10-CM

## 2024-08-15 DIAGNOSIS — E11.9 TYPE 2 DIABETES MELLITUS WITHOUT COMPLICATION, WITHOUT LONG-TERM CURRENT USE OF INSULIN (HCC): Primary | ICD-10-CM

## 2024-08-15 DIAGNOSIS — E11.9 TYPE 2 DIABETES MELLITUS WITHOUT COMPLICATION, WITHOUT LONG-TERM CURRENT USE OF INSULIN (HCC): ICD-10-CM

## 2024-08-15 LAB
ALBUMIN SERPL BCG-MCNC: 4 G/DL (ref 3.5–5)
ALP SERPL-CCNC: 63 U/L (ref 34–104)
ALT SERPL W P-5'-P-CCNC: 30 U/L (ref 7–52)
ANION GAP SERPL CALCULATED.3IONS-SCNC: 11 MMOL/L (ref 4–13)
AST SERPL W P-5'-P-CCNC: 30 U/L (ref 13–39)
BILIRUB SERPL-MCNC: 0.5 MG/DL (ref 0.2–1)
BUN SERPL-MCNC: 30 MG/DL (ref 5–25)
CALCIUM SERPL-MCNC: 9 MG/DL (ref 8.4–10.2)
CHLORIDE SERPL-SCNC: 105 MMOL/L (ref 96–108)
CHOLEST SERPL-MCNC: 142 MG/DL
CO2 SERPL-SCNC: 24 MMOL/L (ref 21–32)
CREAT SERPL-MCNC: 1.67 MG/DL (ref 0.6–1.3)
ERYTHROCYTE [DISTWIDTH] IN BLOOD BY AUTOMATED COUNT: 13.5 % (ref 11.6–15.1)
EST. AVERAGE GLUCOSE BLD GHB EST-MCNC: 157 MG/DL
GFR SERPL CREATININE-BSD FRML MDRD: 37 ML/MIN/1.73SQ M
GLUCOSE P FAST SERPL-MCNC: 117 MG/DL (ref 65–99)
HBA1C MFR BLD: 7.1 %
HCT VFR BLD AUTO: 44.6 % (ref 36.5–49.3)
HDLC SERPL-MCNC: 46 MG/DL
HGB BLD-MCNC: 14.5 G/DL (ref 12–17)
LDLC SERPL CALC-MCNC: 82 MG/DL (ref 0–100)
MCH RBC QN AUTO: 31.5 PG (ref 26.8–34.3)
MCHC RBC AUTO-ENTMCNC: 32.5 G/DL (ref 31.4–37.4)
MCV RBC AUTO: 97 FL (ref 82–98)
NONHDLC SERPL-MCNC: 96 MG/DL
PLATELET # BLD AUTO: 211 THOUSANDS/UL (ref 149–390)
PMV BLD AUTO: 11.4 FL (ref 8.9–12.7)
POTASSIUM SERPL-SCNC: 4.7 MMOL/L (ref 3.5–5.3)
PROT SERPL-MCNC: 6.9 G/DL (ref 6.4–8.4)
RBC # BLD AUTO: 4.6 MILLION/UL (ref 3.88–5.62)
SODIUM SERPL-SCNC: 140 MMOL/L (ref 135–147)
TRIGL SERPL-MCNC: 71 MG/DL
WBC # BLD AUTO: 6.56 THOUSAND/UL (ref 4.31–10.16)

## 2024-08-15 PROCEDURE — 83036 HEMOGLOBIN GLYCOSYLATED A1C: CPT

## 2024-08-15 PROCEDURE — 36415 COLL VENOUS BLD VENIPUNCTURE: CPT

## 2024-08-15 PROCEDURE — 85027 COMPLETE CBC AUTOMATED: CPT

## 2024-08-15 PROCEDURE — 80061 LIPID PANEL: CPT

## 2024-08-15 PROCEDURE — 80053 COMPREHEN METABOLIC PANEL: CPT

## 2024-08-15 NOTE — TELEPHONE ENCOUNTER
Dr Richie marieed A1c lab for patient - but expected date is 4/2025. Per your discussions with patient. He is requesting you place lab order for A1c to be done now.

## 2024-08-22 ENCOUNTER — OFFICE VISIT (OUTPATIENT)
Dept: FAMILY MEDICINE CLINIC | Facility: CLINIC | Age: 84
End: 2024-08-22
Payer: COMMERCIAL

## 2024-08-22 VITALS
OXYGEN SATURATION: 95 % | DIASTOLIC BLOOD PRESSURE: 70 MMHG | RESPIRATION RATE: 18 BRPM | HEIGHT: 67 IN | TEMPERATURE: 97.6 F | SYSTOLIC BLOOD PRESSURE: 110 MMHG | WEIGHT: 215.4 LBS | BODY MASS INDEX: 33.81 KG/M2 | HEART RATE: 75 BPM

## 2024-08-22 DIAGNOSIS — R07.81 RIB PAIN ON RIGHT SIDE: Primary | ICD-10-CM

## 2024-08-22 DIAGNOSIS — M54.16 LUMBAR RADICULOPATHY: ICD-10-CM

## 2024-08-22 PROCEDURE — G2211 COMPLEX E/M VISIT ADD ON: HCPCS | Performed by: FAMILY MEDICINE

## 2024-08-22 PROCEDURE — 99213 OFFICE O/P EST LOW 20 MIN: CPT | Performed by: FAMILY MEDICINE

## 2024-08-22 NOTE — PROGRESS NOTES
Gonzales Sears 1940 male MRN: [id number]    FAMILY PRACTICE OFFICE VISIT  [facility] [place]      ASSESSMENT/PLAN  Gonzales Sears is a 84 y.o. male presents to the office for    Diagnoses and all orders for this visit:    Rib pain on right side  -     Ambulatory Referral to Physical Therapy; Future    Lumbar radiculopathy  -     Ambulatory Referral to Physical Therapy; Future       Ongoing right-sided rib pain likely costochondritis recommend x-rays to be obtained.  Lumbar radiculopathy already establishing care with pain management.  Patient is to establish care with physical therapy referral placed today    Recommend Voltaren gel to be applied           Future Appointments   Date Time Provider Department Center   8/27/2024  1:30 PM Jose Ramirez MD [facility]   9/5/2024  7:30 AM Mary Ulloa MD [facility]          SUBJECTIVE  CC: Follow-up (Ongoing rib pain//No injury)      HPI:  Gonzales Sears is a 84 y.o. male who presents for an acute appointment.  Patient states that the rib pain has been worsening unable to bear any weight on it when rotating in bed at nighttime.  Patient states that he does not recall any trauma.  But recently has had 2 falls but has been feeling the pain since then.  States that the falls were accidental does feel steady on his feet.  But does have significant lumbar pain that he is can to be seeing the pain management specialist for  Review of Systems   Constitutional:  Negative for activity change, appetite change, chills, fatigue and fever.   HENT:  Negative for congestion.    Respiratory:  Negative for cough, chest tightness and shortness of breath.    Cardiovascular:  Negative for chest pain and leg swelling.   Gastrointestinal:  Negative for abdominal distention, abdominal pain, constipation, diarrhea, nausea and vomiting.   Musculoskeletal:  Positive for arthralgias.   All other systems reviewed and  are negative.      Historical Information   The patient history was reviewed as follows:  Past Medical History:   Diagnosis Date   • Abnormal blood chemistry     resolved: 11/9/16   • Abnormal glucose     last assessed: 9/24/14   • Arthritis    • CAD (coronary artery disease)    • Chronic kidney disease     STONES  & CKD   • Coronary atherosclerosis    • Diabetes mellitus (HCC) ?   • DM2 (diabetes mellitus, type 2) (HCC)    • Dyslipidemia    • Facial nerve disorder    • Heart disease ,10, 12.,uesrs sgo,,?   • HTN (hypertension)    • Hyperlipidemia     last assessed: 1/13/17   • Hypertension ?   • Kidney stones    • Lumbosacral radiculopathy    • Nerve root and plexus disorder    • Obesity    • Osteoarthritis    • Prostate asymmetry    • Pure hypercholesterolemia          Medications:     Current Outpatient Medications:   •  aspirin 81 mg chewable tablet, Chew 81 mg daily  , Disp: , Rfl:   •  ezetimibe (ZETIA) 10 mg tablet, Take 1 tablet (10 mg total) by mouth daily, Disp: 90 tablet, Rfl: 0  •  famotidine (PEPCID) 40 MG tablet, TAKE ONE TABLET BY MOUTH EVERY DAY, Disp: 90 tablet, Rfl: 1  •  metFORMIN (GLUCOPHAGE) 500 mg tablet, TAKE ONE TABLET BY MOUTH TWICE A DAY WITH MEALS (GLUCOPHAGE), Disp: 180 tablet, Rfl: 2  •  metoprolol succinate (TOPROL-XL) 25 mg 24 hr tablet, Take 1 tablet (25 mg total) by mouth daily, Disp: 90 tablet, Rfl: 1  •  Multiple Vitamin (multivitamin) tablet, Take 1 tablet by mouth daily, Disp: , Rfl:   •  simvastatin (ZOCOR) 40 mg tablet, TAKE 1 TABLET BY MOUTH DAILY AT  BEDTIME, Disp: 90 tablet, Rfl: 3  •  sitaGLIPtin (Januvia) 50 mg tablet, Take 1 tablet (50 mg total) by mouth daily, Disp: 90 tablet, Rfl: 1  •  spironolactone-hydrochlorothiazide (ALDACTAZIDE) 25-25 mg per tablet, Take 0.5 tablets by mouth every other day, Disp: 15 tablet, Rfl: 1  •  tamsulosin (FLOMAX) 0.4 mg, Take 1 capsule (0.4 mg total) by mouth daily with dinner, Disp: 90 capsule, Rfl: 3  •  trandolapril (MAVIK) 1 MG  "tablet, TAKE ONE TABLET BY MOUTH EVERY DAY, Disp: 90 tablet, Rfl: 1    Allergies   Allergen Reactions   • Ciprofloxacin Hives       OBJECTIVE  Vitals:   Vitals:    08/22/24 1017   BP: 110/70   BP Location: Left arm   Patient Position: Sitting   Cuff Size: Standard   Pulse: 75   Resp: 18   Temp: 97.6 °F (36.4 °C)   TempSrc: Temporal   SpO2: 95%   Weight: 97.7 kg (215 lb 6.4 oz)   Height: 5' 7\" (1.702 m)         Physical Exam  Constitutional:       Appearance: Normal appearance.   Pulmonary:      Effort: Pulmonary effort is normal.   Musculoskeletal:         General: Normal range of motion.   Skin:     Comments: Tenderness over the right rib cage   Neurological:      General: No focal deficit present.      Mental Status: He is alert and oriented to person, place, and time.   Psychiatric:         Mood and Affect: Mood normal.         Behavior: Behavior normal.         Thought Content: Thought content normal.         Judgment: Judgment normal.                    Mary Flores MD,   [facility]  8/22/2024      "

## 2024-08-27 ENCOUNTER — CONSULT (OUTPATIENT)
Age: 84
End: 2024-08-27
Payer: COMMERCIAL

## 2024-08-27 ENCOUNTER — APPOINTMENT (OUTPATIENT)
Dept: RADIOLOGY | Facility: CLINIC | Age: 84
End: 2024-08-27
Payer: COMMERCIAL

## 2024-08-27 ENCOUNTER — RA CDI HCC (OUTPATIENT)
Dept: OTHER | Facility: HOSPITAL | Age: 84
End: 2024-08-27

## 2024-08-27 VITALS
HEIGHT: 67 IN | BODY MASS INDEX: 33.68 KG/M2 | SYSTOLIC BLOOD PRESSURE: 122 MMHG | WEIGHT: 214.6 LBS | DIASTOLIC BLOOD PRESSURE: 82 MMHG

## 2024-08-27 DIAGNOSIS — M54.16 LUMBAR RADICULOPATHY: ICD-10-CM

## 2024-08-27 DIAGNOSIS — R07.81 RIB PAIN ON RIGHT SIDE: ICD-10-CM

## 2024-08-27 DIAGNOSIS — M79.18 MYOFASCIAL PAIN SYNDROME: Primary | ICD-10-CM

## 2024-08-27 DIAGNOSIS — R10.9 LEFT FLANK PAIN: ICD-10-CM

## 2024-08-27 PROCEDURE — 99214 OFFICE O/P EST MOD 30 MIN: CPT | Performed by: STUDENT IN AN ORGANIZED HEALTH CARE EDUCATION/TRAINING PROGRAM

## 2024-08-27 PROCEDURE — 71110 X-RAY EXAM RIBS BIL 3 VIEWS: CPT

## 2024-08-27 NOTE — PROGRESS NOTES
Assessment  1. Myofascial pain syndrome    2. Left flank pain    3. Rib pain on right side    4. Lumbar radiculopathy      Patient is a pleasant 84-year-old male presenting with rib pain and lower left side back pain over the past couple of months.  Over the past month he and his pains been moderate and he rates the pain as severe only when touched.  The pain occurs occasionally and there is no prodromal symptoms are better worse.  Describes pain as a pressure-like sensation with no associated weakness and does not use any assistive devices.  Activities that increase the pain include lying down. Patient does have x-ray of his ribs along with MRI of the lumbar spine currently ordered not yet completed.  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  In regards to medications patient is currently not taking anything specifically for pain.  Past medical history send for reflux, high cholesterol, kidney disease.  Physical therapy has provided no relief.    On exam patient complaining mainly of left-sided low back and flank pain.  Patient with tenderness to palpation in the left lower thoracic and upper lumbar paraspinal muscles with taut bands appreciated positive jump sign.  Patient symptoms likely secondary to myofascial pain syndrome but could also be related to his osteophytic changes in the lower thoracic spine.  To further evaluate patient has an x-ray of his bilateral ribs already ordered.  Additionally patient has an MRI of his thoracic spine ordered.  Per patient myofascial complaints will schedule for trigger point injection of the left thoracic paraspinal muscles with local anesthetic and steroid.  If no benefit can consider, pending imaging findings, intercostal nerve block likely targeting T12 which I think is the worst of his symptoms.      Plan  XR bilateral ribs  MRI  Thoracic spine  Start lidocaine patch 4% to left flank  TPI of left thoracic paraspinal muscles with local anesthetic and steroid       Complete risks and benefits including bleeding, infection, tissue reaction, nerve injury and allergic reaction were discussed. The approach was demonstrated using models and literature was provided. Verbal and written consent was obtained.    My impressions and treatment recommendations were discussed in detail with the patient who verbalized understanding and had no further questions.  Discharge instructions were provided. I personally saw and examined the patient and I agree with the above discussed plan of care.    No orders of the defined types were placed in this encounter.    No orders of the defined types were placed in this encounter.      History of Present Illness    Gonzales Sears is a 84 y.o. male   Patient is a pleasant 84-year-old male presenting with rib pain and lower left side back pain over the past couple of months.  Over the past month he and his pains been moderate and he rates the pain as severe only when touched.  The pain occurs occasionally and there is no prodromal symptoms are better worse.  Describes pain as a pressure-like sensation with no associated weakness and does not use any assistive devices.  Activities that increase the pain include lying down. Patient does have x-ray of his ribs along with MRI of the lumbar spine currently ordered not yet completed.  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  In regards to medications patient is currently not taking anything specifically for pain.  Past medical history send for reflux, high cholesterol, kidney disease.  Physical therapy evaluate no relief.    I have personally reviewed and/or updated the patient's past medical history, past surgical history, family  history, social history, current medications, allergies, and vital signs today.     Review of Systems   Constitutional:  Positive for unexpected weight change. Negative for chills and fatigue.   HENT:  Negative for ear pain, sore throat and trouble swallowing.    Eyes:  Negative for redness and visual disturbance.   Respiratory:  Negative for cough and shortness of breath.    Cardiovascular:  Negative for chest pain and leg swelling.   Gastrointestinal:  Negative for abdominal pain, diarrhea, nausea and vomiting.   Endocrine: Negative for polydipsia and polyuria.   Genitourinary:  Negative for difficulty urinating.   Musculoskeletal:  Positive for arthralgias, back pain and gait problem. Negative for joint swelling and myalgias.   Skin:  Negative for rash.   Allergic/Immunologic: Negative for food allergies.   Neurological:  Negative for dizziness, numbness and headaches.   Hematological:  Does not bruise/bleed easily.   Psychiatric/Behavioral:  Negative for dysphoric mood and sleep disturbance. The patient is nervous/anxious.        Patient Active Problem List   Diagnosis   • 2-vessel coronary artery disease   • Benign hypertension   • Type 2 diabetes mellitus with stage 3 chronic kidney disease and hypertension (HCC)   • Dyslipidemia   • Moderate obesity   • Dysphagia   • Shortness of breath   • Zenker's diverticulum   • Chronic kidney disease   • Right hand pain   • Carpal tunnel syndrome of right wrist       Past Medical History:   Diagnosis Date   • Abnormal blood chemistry     resolved: 11/9/16   • Abnormal glucose     last assessed: 9/24/14   • Arthritis    • CAD (coronary artery disease)    • Chronic kidney disease     STONES  & CKD   • Coronary atherosclerosis    • Diabetes mellitus (HCC) ?   • DM2 (diabetes mellitus, type 2) ([facility])    • Dyslipidemia    • Facial nerve disorder    • Heart disease ,10, 12.,uesrs sgo,,?   • HTN (hypertension)    • Hyperlipidemia     last assessed: 1/13/17   • Hypertension ?   •  Kidney stones    • Lumbosacral radiculopathy    • Nerve root and plexus disorder    • Obesity    • Osteoarthritis    • Prostate asymmetry    • Pure hypercholesterolemia        Past Surgical History:   Procedure Laterality Date   • CORONARY ANGIOPLASTY      1 STENT    • HAND SURGERY  3/21/24   • [place] NEUROPLASTY &/TRANSPOS MEDIAN NRV CARPAL TUNNE Right 03/21/2024    Procedure: RELEASE CARPAL TUNNEL;  Surgeon: Vivian Borden MD;  Location: [facility]IN OR;  Service: Orthopedics   • [place] UNLISTED PROCEDURE ESOPHAGUS N/A 09/30/2021    Procedure: EGD; DIVERTICULECTOMY ZENKERS ENDOSCOPIC;  Surgeon: Humble Franco MD;  Location:  MAIN OR;  Service: Thoracic   • UPPER GASTROINTESTINAL ENDOSCOPY         Family History   Problem Relation Age of Onset   • Cancer Mother    • Ovarian cancer Mother    • Hypertension Sister    • Lung disease Father         black   • Mental illness Neg Hx    • Substance Abuse Neg Hx        Social History     Occupational History   • Not on file   Tobacco Use   • Smoking status: Never   • Smokeless tobacco: Never   Vaping Use   • Vaping status: Never Used   Substance and Sexual Activity   • Alcohol use: Not Currently   • Drug use: Never   • Sexual activity: Not Currently     Partners: Female     Birth control/protection: Abstinence, Condom Male, Condom Female       Current Outpatient Medications on File Prior to Visit   Medication Sig   • aspirin 81 mg chewable tablet Chew 81 mg daily     • ezetimibe (ZETIA) 10 mg tablet Take 1 tablet (10 mg total) by mouth daily   • famotidine (PEPCID) 40 MG tablet TAKE ONE TABLET BY MOUTH EVERY DAY   • metFORMIN (GLUCOPHAGE) 500 mg tablet TAKE ONE TABLET BY MOUTH TWICE A DAY WITH MEALS (GLUCOPHAGE)   • metoprolol succinate (TOPROL-XL) 25 mg 24 hr tablet Take 1 tablet (25 mg total) by mouth daily   • Multiple Vitamin (multivitamin) tablet Take 1 tablet by mouth daily   • simvastatin (ZOCOR) 40 mg tablet TAKE 1 TABLET BY MOUTH DAILY AT  BEDTIME   • sitaGLIPtin  "(Januvia) 50 mg tablet Take 1 tablet (50 mg total) by mouth daily   • spironolactone-hydrochlorothiazide (ALDACTAZIDE) 25-25 mg per tablet Take 0.5 tablets by mouth every other day   • tamsulosin (FLOMAX) 0.4 mg Take 1 capsule (0.4 mg total) by mouth daily with dinner   • trandolapril (MAVIK) 1 MG tablet TAKE ONE TABLET BY MOUTH EVERY DAY     No current facility-administered medications on file prior to visit.       Allergies   Allergen Reactions   • Ciprofloxacin Hives       Physical Exam    /82   Ht 5' 7\" (1.702 m)   Wt 97.3 kg (214 lb 9.6 oz)   BMI 33.61 kg/m²     Constitutional: normal, well developed, well nourished, alert, in no distress and non-toxic and no overt pain behavior.  Eyes: anicteric  HEENT: grossly intact  Neck: supple, symmetric, trachea midline and no masses   Pulmonary:even and unlabored  Cardiovascular:No edema or pitting edema present  Skin:Normal without rashes or lesions and well hydrated  Psychiatric:Mood and affect appropriate  Neurologic:Cranial Nerves II-XII grossly intact  Musculoskeletal:normal gait. TTP in left thoracic paraspinal muscles with taut bands appreciated and positive jump sign. TTP midline lumbar spine. Pain with lateral bending.     Imaging  "

## 2024-09-03 ENCOUNTER — PROCEDURE VISIT (OUTPATIENT)
Age: 84
End: 2024-09-03
Payer: COMMERCIAL

## 2024-09-03 VITALS — DIASTOLIC BLOOD PRESSURE: 87 MMHG | HEART RATE: 88 BPM | SYSTOLIC BLOOD PRESSURE: 137 MMHG

## 2024-09-03 DIAGNOSIS — M79.18 MYOFASCIAL PAIN SYNDROME: ICD-10-CM

## 2024-09-03 DIAGNOSIS — M54.6 LEFT-SIDED THORACIC BACK PAIN, UNSPECIFIED CHRONICITY: Primary | ICD-10-CM

## 2024-09-03 PROCEDURE — 20553 NJX 1/MLT TRIGGER POINTS 3/>: CPT | Performed by: STUDENT IN AN ORGANIZED HEALTH CARE EDUCATION/TRAINING PROGRAM

## 2024-09-03 RX ORDER — LIDOCAINE HYDROCHLORIDE 10 MG/ML
9 INJECTION, SOLUTION INFILTRATION; PERINEURAL ONCE
Status: COMPLETED | OUTPATIENT
Start: 2024-09-03 | End: 2024-09-03

## 2024-09-03 RX ORDER — METHYLPREDNISOLONE ACETATE 40 MG/ML
40 INJECTION, SUSPENSION INTRA-ARTICULAR; INTRALESIONAL; INTRAMUSCULAR; SOFT TISSUE ONCE
Status: COMPLETED | OUTPATIENT
Start: 2024-09-03 | End: 2024-09-03

## 2024-09-03 RX ADMIN — LIDOCAINE HYDROCHLORIDE 9 ML: 10 INJECTION, SOLUTION INFILTRATION; PERINEURAL at 09:43

## 2024-09-03 RX ADMIN — METHYLPREDNISOLONE ACETATE 40 MG: 40 INJECTION, SUSPENSION INTRA-ARTICULAR; INTRALESIONAL; INTRAMUSCULAR; SOFT TISSUE at 09:44

## 2024-09-03 NOTE — PROGRESS NOTES
" Universal Protocol:  Procedure performed by:  Consent: Verbal consent obtained.  Risks and benefits: risks, benefits and alternatives were discussed  Consent given by: patient  Time out: Immediately prior to procedure a \"time out\" was called to verify the correct patient, procedure, equipment, support staff and site/side marked as required.  Patient understanding: patient states understanding of the procedure being performed  Patient consent: the patient's understanding of the procedure matches consent given  Procedure consent: procedure consent matches procedure scheduled  Relevant documents: relevant documents present and verified  Test results: test results available and properly labeled  Site marked: the operative site was marked  Radiology Images displayed and confirmed. If images not available, report reviewed: imaging studies available  Patient identity confirmed: verbally with patient  Supporting Documentation  Indications: pain   Trigger Point Injections: multiple trigger points: 3 or more muscle groups    Injection site identified by: palpation  Procedure Details  Location(s):    Middle Back: L thoracic paraspinals and L latissimus dorsi     Lower Back: L lumbar paraspinals     Prep: patient was prepped and draped in usual sterile fashion  Needle size: 25 G  Patient tolerance: patient tolerated the procedure well with no immediate complications  Additional procedure details: 9ml of lidocaine 1% and 1ml of Depomedrol 40 mg/ml.           "

## 2024-09-05 ENCOUNTER — TELEPHONE (OUTPATIENT)
Age: 84
End: 2024-09-05

## 2024-09-05 ENCOUNTER — OFFICE VISIT (OUTPATIENT)
Dept: FAMILY MEDICINE CLINIC | Facility: CLINIC | Age: 84
End: 2024-09-05
Payer: COMMERCIAL

## 2024-09-05 VITALS
SYSTOLIC BLOOD PRESSURE: 142 MMHG | OXYGEN SATURATION: 97 % | BODY MASS INDEX: 33.74 KG/M2 | WEIGHT: 215 LBS | RESPIRATION RATE: 18 BRPM | DIASTOLIC BLOOD PRESSURE: 82 MMHG | HEIGHT: 67 IN | HEART RATE: 55 BPM | TEMPERATURE: 97.4 F

## 2024-09-05 DIAGNOSIS — E11.22 TYPE 2 DIABETES MELLITUS WITH STAGE 3 CHRONIC KIDNEY DISEASE AND HYPERTENSION (HCC): Primary | ICD-10-CM

## 2024-09-05 DIAGNOSIS — I12.9 TYPE 2 DIABETES MELLITUS WITH STAGE 3 CHRONIC KIDNEY DISEASE AND HYPERTENSION (HCC): Primary | ICD-10-CM

## 2024-09-05 DIAGNOSIS — M54.16 LUMBAR RADICULOPATHY: ICD-10-CM

## 2024-09-05 DIAGNOSIS — N18.30 TYPE 2 DIABETES MELLITUS WITH STAGE 3 CHRONIC KIDNEY DISEASE AND HYPERTENSION (HCC): Primary | ICD-10-CM

## 2024-09-05 DIAGNOSIS — I10 BENIGN HYPERTENSION: ICD-10-CM

## 2024-09-05 DIAGNOSIS — T14.8XXA ABRASION: ICD-10-CM

## 2024-09-05 PROCEDURE — G2211 COMPLEX E/M VISIT ADD ON: HCPCS | Performed by: FAMILY MEDICINE

## 2024-09-05 PROCEDURE — 99214 OFFICE O/P EST MOD 30 MIN: CPT | Performed by: FAMILY MEDICINE

## 2024-09-05 RX ORDER — MUPIROCIN 20 MG/G
OINTMENT TOPICAL 3 TIMES DAILY
Qty: 30 G | Refills: 0 | Status: SHIPPED | OUTPATIENT
Start: 2024-09-05

## 2024-09-05 NOTE — TELEPHONE ENCOUNTER
Patient called and scheduled soonest available with Dr. Quiroz. He said he saw his PCP today and they requested he make an appt (pcp is in [facility]). They discussed possibility of insulin which patient said was also discussed with Dr. Quiroz. Please let patient know if they need any labs before this appt. Thank you

## 2024-09-05 NOTE — PROGRESS NOTES
Gonzales Sears 1940 male MRN: [id number]    [facility] PRACTICE OFFICE VISIT  [facility] - [facility]      ASSESSMENT/PLAN  Gonzales Sears is a 84 y.o. male presents to the office for    Diagnoses and all orders for this visit:    Type 2 diabetes mellitus with stage 3 chronic kidney disease and hypertension (HCC)  -     Ambulatory Referral to Nephrology; Future    Benign hypertension    Lumbar radiculopathy    Abrasion  -     mupirocin (BACTROBAN) 2 % ointment; Apply topically 3 (three) times a day       Type 2 diabetes discussed in detail with the patient that his A1c is only worsening.  Patient is currently on Januvia 50 mg/metformin.  Given patient's chronic conditions recommend him starting possible trullicity/ farxiga. Waiting to see if Nephrology would be ok.   Abrasion of left arm, bactroban TID  till improved  HTN stable  Lumbar pain refer to PT  and pain management           Future Appointments   Date Time Provider Department Center   9/24/2024 11:00 AM Fredrick Quiroz MD [facility]   10/25/2024  2:30 PM Mary Ulloa MD [facility]   12/19/2024  7:30 AM Mary Ulloa MD [facility]          SUBJECTIVE  CC: Follow-up (3 month follow up)      HPI:  Gonzales Sears is a 84 y.o. male who presents for an acute appointment.  Patient states that he is here also for 3-month follow-up.  Patient appropriately continues to be grieving his loss of his harika wife.  Patient has laceration of the left arm after tripping.  Patient continues to have back pain states that he is meeting with the specialist.  States from a diabetic standpoint he has not been eating as good as he normally does  Review of Systems   Constitutional:  Negative for activity change, appetite change, chills, fatigue and fever.   HENT:  Negative for congestion.    Respiratory:  Negative for cough, chest tightness and shortness of breath.    Cardiovascular:  Negative for  chest pain and leg swelling.   Gastrointestinal:  Negative for abdominal distention, abdominal pain, constipation, diarrhea, nausea and vomiting.   Musculoskeletal:  Positive for back pain and gait problem.   Skin:  Positive for wound.   All other systems reviewed and are negative.      Historical Information   The patient history was reviewed as follows:  Past Medical History:   Diagnosis Date   • Abnormal blood chemistry     resolved: 11/9/16   • Abnormal glucose     last assessed: 9/24/14   • Arthritis    • CAD (coronary artery disease)    • Chronic kidney disease     STONES  & CKD   • Coronary atherosclerosis    • Diabetes mellitus (HCC) ?   • DM2 (diabetes mellitus, type 2) (HCC)    • Dyslipidemia    • Facial nerve disorder    • Heart disease ,10, 12.,uesrs sgo,,?   • HTN (hypertension)    • Hyperlipidemia     last assessed: 1/13/17   • Hypertension ?   • Kidney stones    • Lumbosacral radiculopathy    • Nerve root and plexus disorder    • Obesity    • Osteoarthritis    • Prostate asymmetry    • Pure hypercholesterolemia          Medications:     Current Outpatient Medications:   •  aspirin 81 mg chewable tablet, Chew 81 mg daily  , Disp: , Rfl:   •  ezetimibe (ZETIA) 10 mg tablet, Take 1 tablet (10 mg total) by mouth daily, Disp: 90 tablet, Rfl: 0  •  famotidine (PEPCID) 40 MG tablet, TAKE ONE TABLET BY MOUTH EVERY DAY, Disp: 90 tablet, Rfl: 1  •  metFORMIN (GLUCOPHAGE) 500 mg tablet, TAKE ONE TABLET BY MOUTH TWICE A DAY WITH MEALS (GLUCOPHAGE), Disp: 180 tablet, Rfl: 2  •  metoprolol succinate (TOPROL-XL) 25 mg 24 hr tablet, Take 1 tablet (25 mg total) by mouth daily, Disp: 90 tablet, Rfl: 1  •  Multiple Vitamin (multivitamin) tablet, Take 1 tablet by mouth daily, Disp: , Rfl:   •  mupirocin (BACTROBAN) 2 % ointment, Apply topically 3 (three) times a day, Disp: 30 g, Rfl: 0  •  simvastatin (ZOCOR) 40 mg tablet, TAKE 1 TABLET BY MOUTH DAILY AT  BEDTIME, Disp: 90 tablet, Rfl: 3  •  sitaGLIPtin (Januvia) 50 mg  "tablet, Take 1 tablet (50 mg total) by mouth daily, Disp: 90 tablet, Rfl: 1  •  spironolactone-hydrochlorothiazide (ALDACTAZIDE) 25-25 mg per tablet, Take 0.5 tablets by mouth every other day, Disp: 15 tablet, Rfl: 1  •  tamsulosin (FLOMAX) 0.4 mg, Take 1 capsule (0.4 mg total) by mouth daily with dinner, Disp: 90 capsule, Rfl: 3  •  trandolapril (MAVIK) 1 MG tablet, TAKE ONE TABLET BY MOUTH EVERY DAY, Disp: 90 tablet, Rfl: 1    Allergies   Allergen Reactions   • Ciprofloxacin Hives       OBJECTIVE  Vitals:   Vitals:    09/05/24 0727   BP: 142/82   BP Location: Left arm   Patient Position: Sitting   Cuff Size: Large   Pulse: 55   Resp: 18   Temp: (!) 97.4 °F (36.3 °C)   TempSrc: Temporal   SpO2: 97%   Weight: 97.5 kg (215 lb)   Height: 5' 7\" (1.702 m)         Physical Exam  Vitals reviewed.   Constitutional:       Appearance: He is well-developed.   HENT:      Head: Normocephalic and atraumatic.   Eyes:      Extraocular Movements: EOM normal.      Conjunctiva/sclera: Conjunctivae normal.      Pupils: Pupils are equal, round, and reactive to light.   Cardiovascular:      Rate and Rhythm: Normal rate and regular rhythm.      Heart sounds: Normal heart sounds, S1 normal and S2 normal. No murmur heard.  Pulmonary:      Effort: Pulmonary effort is normal. No respiratory distress.      Breath sounds: Normal breath sounds. No wheezing.   Musculoskeletal:         General: No edema. Normal range of motion.      Cervical back: Normal range of motion and neck supple.   Skin:     General: Skin is warm.      Comments: Laceration of the left arm no active infection at this time.  Cleaned with normal saline.   Neurological:      Mental Status: He is alert and oriented to person, place, and time.   Psychiatric:         Mood and Affect: Mood and affect normal.         Speech: Speech normal.         Behavior: Behavior normal.         Thought Content: Thought content normal.         Judgment: Judgment normal.      "               Mary Flores MD,   [facility]  9/8/2024

## 2024-09-10 ENCOUNTER — TELEPHONE (OUTPATIENT)
Age: 84
End: 2024-09-10

## 2024-09-10 NOTE — TELEPHONE ENCOUNTER
Caller: pt    Doctor: erna    Reason for call: pt is having pain in his L thigh area and has a limp now please advise.    Call back#: [phone]

## 2024-09-10 NOTE — TELEPHONE ENCOUNTER
Patient is calling in regarding both his thighs and butt cheeks which is causing him severe limping and discomfort.  He is not sure if its from the injection he received from spine and pain.  He called Endo who stated that they cannot do anything until he has an MRI and his insurance would not approve it until he has 6 weeks of PT which he has completed.    Please call patient back to discuss further

## 2024-09-10 NOTE — TELEPHONE ENCOUNTER
PCP ordered MRI and it was denied in May due to pt needing to complete PT. Pt states he has gone to PT. Advised he call the PCP to discuss so authorization can be obtained for the MRI they ordered.  Also recommended to discuss his new complaints with the PCP. Verbalized understanding

## 2024-09-10 NOTE — TELEPHONE ENCOUNTER
Jose Ramirez MD  You1 minute ago (1:02 PM)       He ultimately needs the MRI of his lumbar spine which was ordered already. He should follow up with his PCP.

## 2024-09-12 ENCOUNTER — DOCUMENTATION (OUTPATIENT)
Dept: FAMILY MEDICINE CLINIC | Facility: CLINIC | Age: 84
End: 2024-09-12

## 2024-09-12 DIAGNOSIS — M54.16 LUMBAR RADICULOPATHY: Primary | ICD-10-CM

## 2024-09-12 RX ORDER — METHYLPREDNISOLONE 4 MG
TABLET, DOSE PACK ORAL
Qty: 21 EACH | Refills: 0 | Status: SHIPPED | OUTPATIENT
Start: 2024-09-12

## 2024-09-13 ENCOUNTER — APPOINTMENT (OUTPATIENT)
Dept: LAB | Facility: CLINIC | Age: 84
End: 2024-09-13
Payer: COMMERCIAL

## 2024-09-13 DIAGNOSIS — N18.30 TYPE 2 DIABETES MELLITUS WITH STAGE 3 CHRONIC KIDNEY DISEASE AND HYPERTENSION (HCC): ICD-10-CM

## 2024-09-13 DIAGNOSIS — E11.22 TYPE 2 DIABETES MELLITUS WITH STAGE 3 CHRONIC KIDNEY DISEASE AND HYPERTENSION (HCC): ICD-10-CM

## 2024-09-13 DIAGNOSIS — I12.9 TYPE 2 DIABETES MELLITUS WITH STAGE 3 CHRONIC KIDNEY DISEASE AND HYPERTENSION (HCC): ICD-10-CM

## 2024-09-13 LAB
ALBUMIN SERPL BCG-MCNC: 3.9 G/DL (ref 3.5–5)
ALP SERPL-CCNC: 52 U/L (ref 34–104)
ALT SERPL W P-5'-P-CCNC: 22 U/L (ref 7–52)
ANION GAP SERPL CALCULATED.3IONS-SCNC: 9 MMOL/L (ref 4–13)
AST SERPL W P-5'-P-CCNC: 27 U/L (ref 13–39)
BILIRUB SERPL-MCNC: 0.53 MG/DL (ref 0.2–1)
BUN SERPL-MCNC: 30 MG/DL (ref 5–25)
CALCIUM SERPL-MCNC: 8.9 MG/DL (ref 8.4–10.2)
CHLORIDE SERPL-SCNC: 103 MMOL/L (ref 96–108)
CO2 SERPL-SCNC: 25 MMOL/L (ref 21–32)
CREAT SERPL-MCNC: 1.61 MG/DL (ref 0.6–1.3)
CREAT UR-MCNC: 173.7 MG/DL
ERYTHROCYTE [DISTWIDTH] IN BLOOD BY AUTOMATED COUNT: 13.4 % (ref 11.6–15.1)
EST. AVERAGE GLUCOSE BLD GHB EST-MCNC: 160 MG/DL
GFR SERPL CREATININE-BSD FRML MDRD: 39 ML/MIN/1.73SQ M
GLUCOSE P FAST SERPL-MCNC: 101 MG/DL (ref 65–99)
HBA1C MFR BLD: 7.2 %
HCT VFR BLD AUTO: 46.8 % (ref 36.5–49.3)
HGB BLD-MCNC: 15.3 G/DL (ref 12–17)
MCH RBC QN AUTO: 31.7 PG (ref 26.8–34.3)
MCHC RBC AUTO-ENTMCNC: 32.7 G/DL (ref 31.4–37.4)
MCV RBC AUTO: 97 FL (ref 82–98)
MICROALBUMIN UR-MCNC: 11.9 MG/L
MICROALBUMIN/CREAT 24H UR: 7 MG/G CREATININE (ref 0–30)
PHOSPHATE SERPL-MCNC: 3.3 MG/DL (ref 2.3–4.1)
PLATELET # BLD AUTO: 205 THOUSANDS/UL (ref 149–390)
PMV BLD AUTO: 11.8 FL (ref 8.9–12.7)
POTASSIUM SERPL-SCNC: 5 MMOL/L (ref 3.5–5.3)
PROT SERPL-MCNC: 6.5 G/DL (ref 6.4–8.4)
PTH-INTACT SERPL-MCNC: 72.9 PG/ML (ref 12–88)
RBC # BLD AUTO: 4.83 MILLION/UL (ref 3.88–5.62)
SODIUM SERPL-SCNC: 137 MMOL/L (ref 135–147)
WBC # BLD AUTO: 7.44 THOUSAND/UL (ref 4.31–10.16)

## 2024-09-13 PROCEDURE — 82570 ASSAY OF URINE CREATININE: CPT

## 2024-09-13 PROCEDURE — 85027 COMPLETE CBC AUTOMATED: CPT

## 2024-09-13 PROCEDURE — 83036 HEMOGLOBIN GLYCOSYLATED A1C: CPT

## 2024-09-13 PROCEDURE — 84100 ASSAY OF PHOSPHORUS: CPT

## 2024-09-13 PROCEDURE — 83970 ASSAY OF PARATHORMONE: CPT

## 2024-09-13 PROCEDURE — 82043 UR ALBUMIN QUANTITATIVE: CPT

## 2024-09-13 PROCEDURE — 36415 COLL VENOUS BLD VENIPUNCTURE: CPT

## 2024-09-13 PROCEDURE — 80053 COMPREHEN METABOLIC PANEL: CPT

## 2024-09-17 ENCOUNTER — TELEPHONE (OUTPATIENT)
Dept: PAIN MEDICINE | Facility: MEDICAL CENTER | Age: 84
End: 2024-09-17

## 2024-09-19 ENCOUNTER — TELEPHONE (OUTPATIENT)
Age: 84
End: 2024-09-19

## 2024-09-19 NOTE — TELEPHONE ENCOUNTER
Patient Reports    0     %     improvement post injection    Pain Level   8-9  /10  Pain has traveled to the sciatica  His pcp prescribed predisone  He has been using a cane

## 2024-09-19 NOTE — TELEPHONE ENCOUNTER
PT said he did 6 weeks of therapy, and received injections in his back, and now he said Dr Flores did suggest at a prior visit, that an MRI would be next after completeing therapy and injections..PT requesting the MRI order placed, and to please call him to give him a heads up.    Please advise    ThankYou

## 2024-09-23 DIAGNOSIS — I11.9 HYPERTENSIVE HEART DISEASE WITHOUT HEART FAILURE: ICD-10-CM

## 2024-09-24 ENCOUNTER — OFFICE VISIT (OUTPATIENT)
Dept: NEPHROLOGY | Facility: CLINIC | Age: 84
End: 2024-09-24
Payer: COMMERCIAL

## 2024-09-24 VITALS
HEIGHT: 67 IN | DIASTOLIC BLOOD PRESSURE: 70 MMHG | BODY MASS INDEX: 32.96 KG/M2 | WEIGHT: 210 LBS | SYSTOLIC BLOOD PRESSURE: 110 MMHG

## 2024-09-24 DIAGNOSIS — M89.9 CHRONIC KIDNEY DISEASE-MINERAL BONE DISORDER (CKD-MBD) WITH STAGE 3B CHRONIC KIDNEY DISEASE (HCC): ICD-10-CM

## 2024-09-24 DIAGNOSIS — E11.22 TYPE 2 DIABETES MELLITUS WITH STAGE 3 CHRONIC KIDNEY DISEASE AND HYPERTENSION (HCC): Primary | ICD-10-CM

## 2024-09-24 DIAGNOSIS — I12.9 TYPE 2 DIABETES MELLITUS WITH STAGE 3 CHRONIC KIDNEY DISEASE AND HYPERTENSION (HCC): Primary | ICD-10-CM

## 2024-09-24 DIAGNOSIS — N18.30 TYPE 2 DIABETES MELLITUS WITH STAGE 3 CHRONIC KIDNEY DISEASE AND HYPERTENSION (HCC): Primary | ICD-10-CM

## 2024-09-24 DIAGNOSIS — E83.9 CHRONIC KIDNEY DISEASE-MINERAL BONE DISORDER (CKD-MBD) WITH STAGE 3B CHRONIC KIDNEY DISEASE (HCC): ICD-10-CM

## 2024-09-24 DIAGNOSIS — N18.32 CHRONIC KIDNEY DISEASE-MINERAL BONE DISORDER (CKD-MBD) WITH STAGE 3B CHRONIC KIDNEY DISEASE (HCC): ICD-10-CM

## 2024-09-24 PROCEDURE — G2211 COMPLEX E/M VISIT ADD ON: HCPCS | Performed by: INTERNAL MEDICINE

## 2024-09-24 PROCEDURE — 99214 OFFICE O/P EST MOD 30 MIN: CPT | Performed by: INTERNAL MEDICINE

## 2024-09-24 RX ORDER — SPIRONOLACTONE AND HYDROCHLOROTHIAZIDE 25; 25 MG/1; MG/1
TABLET ORAL
Qty: 15 TABLET | Refills: 1 | OUTPATIENT
Start: 2024-09-24

## 2024-09-24 RX ORDER — SPIRONOLACTONE AND HYDROCHLOROTHIAZIDE 25; 25 MG/1; MG/1
0.5 TABLET ORAL EVERY OTHER DAY
Qty: 15 TABLET | Refills: 1 | Status: SHIPPED | OUTPATIENT
Start: 2024-09-24

## 2024-09-24 RX ORDER — DAPAGLIFLOZIN 5 MG/1
5 TABLET, FILM COATED ORAL DAILY
Qty: 30 TABLET | Refills: 3 | Status: SHIPPED | OUTPATIENT
Start: 2024-09-24

## 2024-09-24 NOTE — PATIENT INSTRUCTIONS
1.)  Low 2 g sodium diet    2.)  Monitor weights at home    3.)  Avoid NSAIDs (ibuprofen, Motrin, Advil, Aleve, naproxen)    4.)  Monitor blood pressure at home, call if blood pressure greater than 150/90 persistently    5.) I will plan to discuss all results including blood work, and/or imaging at our next visit, unless there is an urgent indication, in which case I will call you earlier. If you have any questions or concerns about your results, please feel free to call our office.    6.) Start Farxiga 5 mg daily and blood work in 1 month    7.) SGLT2 Inhibitors such as Farxiga , have been shown and studied to reduce the decline of kidney function and reduce cardiovascular outcomes.  We recommend you start this medication.  Please stop this medication during any period of illness, during any perioperative period.  Please maintain good foot care and avoid keto diet.  Maintain adequate hydration.  And watch out for hypoglycemia.

## 2024-09-24 NOTE — ASSESSMENT & PLAN NOTE
Lab Results   Component Value Date    HGBA1C 7.2 (H) 09/13/2024   Chronic Kidney Disease Stage IIIB (G3bA1)  --Imaging:  Renal ultrasound from 2018 showed no evidence of hydronephrosis and no renal mass.  --Urinalysis:  Analysis from July was bland  --Proteinuria: Microalbumin/creatinine ratio is currently normal  --Baseline creatinine: mid to high 1's (1.3-1.7 mg/dL)  --Etiology:  Presumed to be secondary to diabetic kidney disease along with hypertensive nephrosclerosis  --Biopsy Proven:  No  --Status: Renal function currently stable with a creatinine of 1.6 mg/dL.  Blood pressure currently under excellent control.  --Management: We discussed about an SGLT2 inhibitor and the benefits.  Continue current RAAS blockade with trandolapril.  Patient agreeable to start we will plan to start Farxiga 5 mg daily.  --Reducing Cardiovascular Risk Factors:  Simvastatin+ Ezetimibe reduced atherosclerotic events in CKD (SHARP trial); Low dose aspirin safe (if no contraindications exist); smoking is an independent risk factor for Chronic Kidney Disease and progression, strongly recommend smoking cessation     Hypertension  -- Stage I (American College of Cardiology/American Heart Association)  -- with underlying chronic kidney disease  -- Body mass index is 32.89 kg/m².  -- Volume status: Euvolemic  -- Etiology:  Essential hypertension and obesity  -- Secondary Work-Up:  Not clinically indicated  -- Target Goal: < 130/80 (ACC/AHA, CKD with proteinuria, CKD in diabetics) ; if tolerated can target SBP < 120 mmHg in high cardiovascular risk ( Age > 75, CKD, CVD, No Diabetics SPRINT)  -- Lifestyle Modifications: DASH Diet, Weight Loss for ideal body weight, 45 mins of cardiovascular exercise 3 times a week as tolerated, and if no contraindications exist, smoking cessation, limit alcohol use, avoid NSAIDS, monitor blood pressure at home  -- Status: Blood pressure at target and euvolemic  -- Current antihypertensive regiment:  Trandolaprill 1 mg daily, spironolactone-hydrochlorothiazide 12.5-12.5 mg every other day, metoprolol 25 mg daily  -- Changes: Can continue current regiment at this time recommend home blood pressure monitoring weight loss    Diabetes mellitus type 2  -hemoglobin A1c: 7.2 (A1C values may be falsely elevated or decreased in those with CKD, assay method should be certified by National glycohemoglobin standardization Program). Target A1C between 7-8.5 (will need to be individualized for each patient), and would utilize A1C  in conjunction with continuous glucose monitoring (CGM).  It should also be noted that the A1c with more advanced kidney disease would be inaccurate due to decreased red blood cell life along with anemia.  -current medications:  Metformin, Januvia.  Will start Farxiga 5 mg daily can titrate up further and if needed in the future  -proteinuria: Microalbumin/creatinine ratio was normal  -retinopathy:  Not reported  -neuropathy:  Not reported  -please follow with an ophthalmologist and podiatrist every year  -continued self monitoring of blood glucose at home  -Management in CKD Recommendations:   Metformin:  Avoid if creatinine clearance is less than 30 cc/min (concern for lactic acidosis)  Sodium-Glucose Cotransporter-2 (SGLT2) Inhibitors:  May see an acute drop in the eGFR initially when starting the medication but then a stabilization of the renal function, with slower loss of renal function as compared to placebo.  Relative risk of end-stage renal disease, doubling of serum creatinine or death from renal causes were also found to be lower as compared to placebo. (CREDENCE).  DAPA-CKD study, showed that patients with chronic kidney disease regardless of the presence or absence of diabetes the risk of composite of a sustained decline in the estimated GFR of at least 50%, end-stage renal disease or death from renal or cardiovascular causes was significantly lower with Dapagliflozin than with placebo.  EMPEROR-Reduced study also showed beneficial effects.   If no contraindications exist I would recommend this medication added to the diabetic regiment, if further optimal diabetic control is required. If eGFR < 60 cc/min (avoid ertugliflozin); If eGFR < 45 cc/min (caution with or avoid dapagliflozin, emphagliflozin), if eGFR  < 30 cc/min (caution or avoid all including canagliflozin, more for efficacy)  Sulfonylureas:  Preferred short-acting (glipizide, glimepiride, repaglinide), relatively safe in patients with non dialysis CKD  Thiazolidinedones/Alpha-Gluosidase inhibitors/Dipeptidyl peptidase-4 inhibitors:  Generally not considered first-line agents in CKD (limited data in long-term safety and efficacy)  Insulin:  Starting dose may need to be lower than what would ordinarily be used, as there is a decrease metabolism of insulin (no dose adjustment if the GFR > 50 mL/min, dose should be reduced to 75% of baseline if GFR 10-50 mL/min, and 50% baseline if GFR < 10 mL/min)    Orders:    dapagliflozin (Farxiga) 5 MG TABS; Take 1 tablet (5 mg total) by mouth daily    Basic metabolic panel; Future    Albumin / creatinine urine ratio; Future    Comprehensive metabolic panel; Future    Hemoglobin A1C; Future    CBC and Platelet; Future

## 2024-09-24 NOTE — ASSESSMENT & PLAN NOTE
Lab Results   Component Value Date    EGFR 39 09/13/2024    EGFR 37 08/15/2024    EGFR 43 05/29/2024    EGFR 38 (L) 05/29/2024    CREATININE 1.61 (H) 09/13/2024    CREATININE 1.67 (H) 08/15/2024    CREATININE 1.48 (H) 05/29/2024   -- Calcium phosphorus and PTH at target

## 2024-09-24 NOTE — PROGRESS NOTES
Nephrology ambulatory visit  Name: Gonzales Sears      : 1940      MRN: [id number]  Encounter Provider: Fredrick Quiroz MD  Encounter Date: 2024   Encounter department: [facility] [place]    Assessment & Plan  Type 2 diabetes mellitus with stage 3 chronic kidney disease and hypertension (HCC)    Lab Results   Component Value Date    HGBA1C 7.2 (H) 2024   Chronic Kidney Disease Stage IIIB (G3bA1)  --Imaging:  Renal ultrasound from  showed no evidence of hydronephrosis and no renal mass.  --Urinalysis:  Analysis from July was bland  --Proteinuria: Microalbumin/creatinine ratio is currently normal  --Baseline creatinine: mid to high 1's (1.3-1.7 mg/dL)  --Etiology:  Presumed to be secondary to diabetic kidney disease along with hypertensive nephrosclerosis  --Biopsy Proven:  No  --Status: Renal function currently stable with a creatinine of 1.6 mg/dL.  Blood pressure currently under excellent control.  --Management: We discussed about an SGLT2 inhibitor and the benefits.  Continue current RAAS blockade with trandolapril.  Patient agreeable to start we will plan to start Farxiga 5 mg daily.  --Reducing Cardiovascular Risk Factors:  Simvastatin+ Ezetimibe reduced atherosclerotic events in CKD (SHARP trial); Low dose aspirin safe (if no contraindications exist); smoking is an independent risk factor for Chronic Kidney Disease and progression, strongly recommend smoking cessation     Hypertension  -- Stage I (American College of Cardiology/American Heart Association)  -- with underlying chronic kidney disease  -- Body mass index is 32.89 kg/m².  -- Volume status: Euvolemic  -- Etiology:  Essential hypertension and obesity  -- Secondary Work-Up:  Not clinically indicated  -- Target Goal: < 130/80 (ACC/AHA, CKD with proteinuria, CKD in diabetics) ; if tolerated can target SBP < 120 mmHg in high cardiovascular risk ( Age > 75, CKD, CVD, No Diabetics SPRINT)  -- Lifestyle  Modifications: DASH Diet, Weight Loss for ideal body weight, 45 mins of cardiovascular exercise 3 times a week as tolerated, and if no contraindications exist, smoking cessation, limit alcohol use, avoid NSAIDS, monitor blood pressure at home  -- Status: Blood pressure at target and euvolemic  -- Current antihypertensive regiment: Trandolaprill 1 mg daily, spironolactone-hydrochlorothiazide 12.5-12.5 mg every other day, metoprolol 25 mg daily  -- Changes: Can continue current regiment at this time recommend home blood pressure monitoring weight loss    Diabetes mellitus type 2  -hemoglobin A1c: 7.2 (A1C values may be falsely elevated or decreased in those with CKD, assay method should be certified by National glycohemoglobin standardization Program). Target A1C between 7-8.5 (will need to be individualized for each patient), and would utilize A1C  in conjunction with continuous glucose monitoring (CGM).  It should also be noted that the A1c with more advanced kidney disease would be inaccurate due to decreased red blood cell life along with anemia.  -current medications:  Metformin, Januvia.  Will start Farxiga 5 mg daily can titrate up further and if needed in the future  -proteinuria: Microalbumin/creatinine ratio was normal  -retinopathy:  Not reported  -neuropathy:  Not reported  -please follow with an ophthalmologist and podiatrist every year  -continued self monitoring of blood glucose at home  -Management in CKD Recommendations:   Metformin:  Avoid if creatinine clearance is less than 30 cc/min (concern for lactic acidosis)  Sodium-Glucose Cotransporter-2 (SGLT2) Inhibitors:  May see an acute drop in the eGFR initially when starting the medication but then a stabilization of the renal function, with slower loss of renal function as compared to placebo.  Relative risk of end-stage renal disease, doubling of serum creatinine or death from renal causes were also found to be lower as compared to placebo.  (CR[employer]ENCE).  DAPA-CKD study, showed that patients with chronic kidney disease regardless of the presence or absence of diabetes the risk of composite of a sustained decline in the estimated GFR of at least 50%, end-stage renal disease or death from renal or cardiovascular causes was significantly lower with Dapagliflozin than with placebo. EMPEROR-Reduced study also showed beneficial effects.   If no contraindications exist I would recommend this medication added to the diabetic regiment, if further optimal diabetic control is required. If eGFR < 60 cc/min (avoid ertugliflozin); If eGFR < 45 cc/min (caution with or avoid dapagliflozin, emphagliflozin), if eGFR  < 30 cc/min (caution or avoid all including canagliflozin, more for efficacy)  Sulfonylureas:  Preferred short-acting (glipizide, glimepiride, repaglinide), relatively safe in patients with non dialysis CKD  Thiazolidinedones/Alpha-Gluosidase inhibitors/Dipeptidyl peptidase-4 inhibitors:  Generally not considered first-line agents in CKD (limited data in long-term safety and efficacy)  Insulin:  Starting dose may need to be lower than what would ordinarily be used, as there is a decrease metabolism of insulin (no dose adjustment if the GFR > 50 mL/min, dose should be reduced to 75% of baseline if GFR 10-50 mL/min, and 50% baseline if GFR < 10 mL/min)    Orders:    dapagliflozin (Farxiga) 5 MG TABS; Take 1 tablet (5 mg total) by mouth daily    Basic metabolic panel; Future    Albumin / creatinine urine ratio; Future    Comprehensive metabolic panel; Future    Hemoglobin A1C; Future    CBC and Platelet; Future    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 male who presents follow-up of chronic kidney disease stage III and hypertension.  At this time he has no new complaints.  Unfortunately and sadly his wife recently passed away.  His blood work from September 13 was reviewed.  Renal function stable with a creatinine 1.6 mg/dL GFR greater than 30 mL/min.  Sodium and potassium are normal.  He has no evidence of proteinuria as his microalbumin/creatinine ratio was normal.  His hemoglobin A1c is 7.2.  His hemoglobin and platelet are normal.  His PTH is at target.  His magnesium and phosphorus are normal.    We discussed the benefits of SGLT2 inhibitor.  Side effects and risk were discussed.  He is agreeable to start I will start him on Farxiga 5 mg daily.    Complaining of some bruising.  He is only on a very low-dose of 81 mg aspirin.  His platelets are normal.  I asked him to follow-up with his dermatologist and reestablish care.    History obtained from : patient  Review of Systems   Constitutional:  Negative for activity change and fever.   Respiratory:  Negative for cough, chest tightness, shortness of breath and wheezing.    Cardiovascular:  Negative for chest pain and leg swelling.   Gastrointestinal:  Negative for abdominal pain, diarrhea, nausea and vomiting.   Endocrine: Negative for polyuria.   Genitourinary:  Negative for difficulty urinating, dysuria, flank pain, frequency and urgency.   Skin:  Negative for rash.        Bruising   Neurological:  Negative for dizziness, syncope, light-headedness and headaches.     Current Outpatient Medications on File Prior to Visit   Medication Sig Dispense Refill    aspirin 81 mg chewable tablet Chew 81 mg daily        ezetimibe (ZETIA) 10 mg tablet Take 1 tablet (10 mg total) by mouth daily 90 tablet 0    famotidine (PEPCID) 40 MG tablet TAKE ONE TABLET BY MOUTH EVERY DAY 90 tablet 1    metFORMIN (GLUCOPHAGE) 500 mg tablet TAKE ONE  "TABLET BY MOUTH TWICE A DAY WITH MEALS (GLUCOPHAGE) 180 tablet 2    metoprolol succinate (TOPROL-XL) 25 mg 24 hr tablet Take 1 tablet (25 mg total) by mouth daily 90 tablet 1    Multiple Vitamin (multivitamin) tablet Take 1 tablet by mouth daily      mupirocin (BACTROBAN) 2 % ointment Apply topically 3 (three) times a day 30 g 0    simvastatin (ZOCOR) 40 mg tablet TAKE 1 TABLET BY MOUTH DAILY AT  BEDTIME 90 tablet 3    sitaGLIPtin (Januvia) 50 mg tablet Take 1 tablet (50 mg total) by mouth daily 90 tablet 1    spironolactone-hydrochlorothiazide (ALDACTAZIDE) 25-25 mg per tablet Take 0.5 tablets by mouth every other day 15 tablet 1    tamsulosin (FLOMAX) 0.4 mg Take 1 capsule (0.4 mg total) by mouth daily with dinner 90 capsule 3    trandolapril (MAVIK) 1 MG tablet TAKE ONE TABLET BY MOUTH EVERY DAY 90 tablet 1    methylPREDNISolone 4 MG tablet therapy pack Use as directed on package (Patient not taking: Reported on 9/24/2024) 21 each 0     No current facility-administered medications on file prior to visit.          Objective     /70   Ht 5' 7\" (1.702 m)   Wt 95.3 kg (210 lb)   BMI 32.89 kg/m²     Physical Exam  Vitals and nursing note reviewed.   Constitutional:       General: He is not in acute distress.     Appearance: He is well-developed. He is not diaphoretic.   HENT:      Head: Normocephalic and atraumatic.   Eyes:      General: No scleral icterus.     Pupils: Pupils are equal, round, and reactive to light.   Cardiovascular:      Rate and Rhythm: Normal rate and regular rhythm.      Heart sounds: Normal heart sounds. No murmur heard.     No friction rub. No gallop.   Pulmonary:      Effort: Pulmonary effort is normal. No respiratory distress.      Breath sounds: Normal breath sounds. No wheezing or rales.   Chest:      Chest wall: No tenderness.   Abdominal:      General: Bowel sounds are normal. There is no distension.      Palpations: Abdomen is soft.      Tenderness: There is no abdominal " tenderness. There is no rebound.   Musculoskeletal:         General: Normal range of motion.      Cervical back: Normal range of motion and neck supple.   Skin:     Findings: Bruising present. No rash.   Neurological:      Mental Status: He is alert and oriented to person, place, and time.       Administrative Statements   I have spent a total time of 25 minutes in caring for this patient on the day of the visit/encounter including Risks and benefits of tx options, Instructions for management, Importance of tx compliance, Risk factor reductions, Impressions, Counseling / Coordination of care, Documenting in the medical record, Reviewing / ordering tests, medicine, procedures  , and Obtaining or reviewing history  .

## 2024-09-26 ENCOUNTER — OFFICE VISIT (OUTPATIENT)
Dept: FAMILY MEDICINE CLINIC | Facility: CLINIC | Age: 84
End: 2024-09-26
Payer: COMMERCIAL

## 2024-09-26 VITALS
HEART RATE: 66 BPM | RESPIRATION RATE: 16 BRPM | DIASTOLIC BLOOD PRESSURE: 68 MMHG | WEIGHT: 211 LBS | SYSTOLIC BLOOD PRESSURE: 102 MMHG | OXYGEN SATURATION: 97 % | BODY MASS INDEX: 33.12 KG/M2 | TEMPERATURE: 97 F | HEIGHT: 67 IN

## 2024-09-26 DIAGNOSIS — E11.22 TYPE 2 DIABETES MELLITUS WITH STAGE 3 CHRONIC KIDNEY DISEASE AND HYPERTENSION (HCC): ICD-10-CM

## 2024-09-26 DIAGNOSIS — Z23 ENCOUNTER FOR IMMUNIZATION: ICD-10-CM

## 2024-09-26 DIAGNOSIS — I12.9 TYPE 2 DIABETES MELLITUS WITH STAGE 3 CHRONIC KIDNEY DISEASE AND HYPERTENSION (HCC): ICD-10-CM

## 2024-09-26 DIAGNOSIS — F43.21 GRIEVING: ICD-10-CM

## 2024-09-26 DIAGNOSIS — R49.9 CHANGE IN VOICE: Primary | ICD-10-CM

## 2024-09-26 DIAGNOSIS — N18.30 TYPE 2 DIABETES MELLITUS WITH STAGE 3 CHRONIC KIDNEY DISEASE AND HYPERTENSION (HCC): ICD-10-CM

## 2024-09-26 DIAGNOSIS — K22.5 ZENKER'S DIVERTICULUM: ICD-10-CM

## 2024-09-26 DIAGNOSIS — M54.16 LUMBAR RADICULOPATHY: ICD-10-CM

## 2024-09-26 PROCEDURE — 90662 IIV NO PRSV INCREASED AG IM: CPT | Performed by: FAMILY MEDICINE

## 2024-09-26 PROCEDURE — G0008 ADMIN INFLUENZA VIRUS VAC: HCPCS | Performed by: FAMILY MEDICINE

## 2024-09-26 PROCEDURE — 99214 OFFICE O/P EST MOD 30 MIN: CPT | Performed by: FAMILY MEDICINE

## 2024-09-26 RX ORDER — PREDNISONE 5 MG/1
5 TABLET ORAL DAILY
Qty: 5 TABLET | Refills: 0 | Status: SHIPPED | OUTPATIENT
Start: 2024-09-26 | End: 2024-09-26

## 2024-09-26 RX ORDER — METHYLPREDNISOLONE 4 MG
TABLET, DOSE PACK ORAL
Qty: 21 EACH | Refills: 0 | Status: SHIPPED | OUTPATIENT
Start: 2024-09-26

## 2024-09-26 NOTE — PATIENT INSTRUCTIONS
Please take Allegra daily for 2 weeks, with medrol pack  If no improvement then speech therapy , and possible GI evaluation given history of [place]

## 2024-09-26 NOTE — PROGRESS NOTES
Gonzales Sears 1940 male MRN: [id number]    [facility] PRACTICE OFFICE VISIT  [facility]      ASSESSMENT/PLAN  Gonzales Sears is a 84 y.o. 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  Recommend a short course of prednisone to see if this helps with patient's symptoms  Recommend Allegra daily given that allergies might be playing a role in this  Lumbar radiculopathy recommend prednisone shown above.  History of Zenker's recommend close follow-up with GI if no improvement  Type 2 diabetes continue on lifestyle modifications  Continues to be grieving appropriately           Future Appointments   Date Time Provider Department Center   9/26/2024  9:45 AM Mary Ulloa MD [facility]   10/8/2024  8:00 AM Travis Forbes RONI PS [facility]   10/15/2024  6:15 AM [place] MRI [facility]   10/22/2024  8:00 AM Travis Forbes RONI PS [facility]   10/25/2024  2:30 PM Mary Ulloa MD [facility]   11/6/2024  8:00 AM Travis Forbes Psych FP Practice-   11/13/2024  8:00 AM Travis Forbes Psych FP Practice-   11/20/2024  8:00 AM Travis Forbes Psych FP Practice-   11/27/2024  8:00 AM Travis Forbes Psych FP Practice-   12/4/2024  8:00 AM Travis Forbes Psych FP Practice-   12/11/2024  8:00 AM Travis Forbes Psych FP Practice-   12/18/2024  8:00 AM Travis Forbes  Psych [facility] Practice-   12/19/2024  7:30 AM Mary Ulloa MD [facility]   3/31/2025  1:30 PM Fredrick Quiroz MD [facility]          SUBJECTIVE  CC: Hoarse (Pt c/o loss of voice)      HPI:  Gonzales Sears is a 84 y.o. male who presents for an acute appointment.  Presents here for the change in his voice.  NO fevers, chills, cold symptoms, SOB with walking.  But states that he has been having his voice not the same for quite some time now.  Has not really been taking allergy medications.  Patient does have a history of Zenker's just recently had surgery a couple years ago.  Taking his diabetes medication as prescribed without any side effects.  Patient continues to have lower back pain .      Review of Systems   Constitutional:  Negative for activity change, appetite change, chills, fatigue and fever.   HENT:  Negative for congestion.    Respiratory:  Negative for cough, chest tightness and shortness of breath.    Cardiovascular:  Negative for chest pain and leg swelling.   Gastrointestinal:  Negative for abdominal distention, abdominal pain, constipation, diarrhea, nausea and vomiting.   All other systems reviewed and are negative.      Historical Information   The patient history was reviewed as follows:  Past Medical History:   Diagnosis Date    Abnormal blood chemistry     resolved: 11/9/16    Abnormal glucose     last assessed: 9/24/14    Arthritis     CAD (coronary artery disease)     Chronic kidney disease     STONES  & CKD    Coronary atherosclerosis     Diabetes mellitus (HCC) ?    DM2 (diabetes mellitus, type 2) (HCC)     Dyslipidemia     Facial nerve disorder     Heart disease ,10, 12.,uesrs sgo,,?    HTN (hypertension)     Hyperlipidemia     last assessed: 1/13/17    Hypertension ?    Kidney stones     Lumbosacral radiculopathy     Nerve root and plexus disorder     Obesity     Osteoarthritis     Prostate asymmetry     Pure hypercholesterolemia          Medications:     Current  "Outpatient Medications:     aspirin 81 mg chewable tablet, Chew 81 mg daily  , Disp: , Rfl:     dapagliflozin (Farxiga) 5 MG TABS, Take 1 tablet (5 mg total) by mouth daily, Disp: 30 tablet, Rfl: 3    ezetimibe (ZETIA) 10 mg tablet, Take 1 tablet (10 mg total) by mouth daily, Disp: 90 tablet, Rfl: 0    famotidine (PEPCID) 40 MG tablet, TAKE ONE TABLET BY MOUTH EVERY DAY, Disp: 90 tablet, Rfl: 1    metFORMIN (GLUCOPHAGE) 500 mg tablet, TAKE ONE TABLET BY MOUTH TWICE A DAY WITH MEALS (GLUCOPHAGE), Disp: 180 tablet, Rfl: 2    metoprolol succinate (TOPROL-XL) 25 mg 24 hr tablet, Take 1 tablet (25 mg total) by mouth daily, Disp: 90 tablet, Rfl: 1    Multiple Vitamin (multivitamin) tablet, Take 1 tablet by mouth daily, Disp: , Rfl:     mupirocin (BACTROBAN) 2 % ointment, Apply topically 3 (three) times a day, Disp: 30 g, Rfl: 0    simvastatin (ZOCOR) 40 mg tablet, TAKE 1 TABLET BY MOUTH DAILY AT  BEDTIME, Disp: 90 tablet, Rfl: 3    sitaGLIPtin (Januvia) 50 mg tablet, Take 1 tablet (50 mg total) by mouth daily, Disp: 90 tablet, Rfl: 1    spironolactone-hydrochlorothiazide (ALDACTAZIDE) 25-25 mg per tablet, Take 0.5 tablets by mouth every other day, Disp: 15 tablet, Rfl: 1    tamsulosin (FLOMAX) 0.4 mg, Take 1 capsule (0.4 mg total) by mouth daily with dinner, Disp: 90 capsule, Rfl: 3    trandolapril (MAVIK) 1 MG tablet, TAKE ONE TABLET BY MOUTH EVERY DAY, Disp: 90 tablet, Rfl: 1    methylPREDNISolone 4 MG tablet therapy pack, Use as directed on package (Patient not taking: Reported on 9/24/2024), Disp: 21 each, Rfl: 0    Allergies   Allergen Reactions    Ciprofloxacin Hives       OBJECTIVE  Vitals:   Vitals:    09/26/24 0934   BP: 102/68   BP Location: Left arm   Patient Position: Sitting   Cuff Size: Adult   Pulse: 66   Resp: 16   Temp: (!) 97 °F (36.1 °C)   TempSrc: Temporal   SpO2: 97%   Weight: 95.7 kg (211 lb)   Height: 5' 7\" (1.702 m)         Physical Exam  Vitals reviewed.   Constitutional:       Appearance: He is " well-developed.   HENT:      Head: Normocephalic and atraumatic.      Mouth/Throat:      Pharynx: Posterior oropharyngeal erythema present.   Eyes:      Extraocular Movements: EOM normal.      Conjunctiva/sclera: Conjunctivae normal.      Pupils: Pupils are equal, round, and reactive to light.   Cardiovascular:      Rate and Rhythm: Normal rate and regular rhythm.      Heart sounds: Normal heart sounds, S1 normal and S2 normal. No murmur heard.  Pulmonary:      Effort: Pulmonary effort is normal. No respiratory distress.      Breath sounds: Normal breath sounds. No wheezing.   Musculoskeletal:         General: No edema. Normal range of motion.      Cervical back: Normal range of motion and neck supple.      Comments: Discomfort of the lower back and walking unable to get from sitting to standing without.  Pain   Skin:     General: Skin is warm.   Neurological:      Mental Status: He is alert and oriented to person, place, and time.   Psychiatric:         Mood and Affect: Mood and affect normal.         Speech: Speech normal.         Behavior: Behavior normal.         Thought Content: Thought content normal.         Judgment: Judgment normal.                    Mary Flores MD,   [facility]  9/26/2024

## 2024-10-02 DIAGNOSIS — N18.31 TYPE 2 DIABETES MELLITUS WITH STAGE 3A CHRONIC KIDNEY DISEASE, WITHOUT LONG-TERM CURRENT USE OF INSULIN (HCC): ICD-10-CM

## 2024-10-02 DIAGNOSIS — E11.22 TYPE 2 DIABETES MELLITUS WITH STAGE 3A CHRONIC KIDNEY DISEASE, WITHOUT LONG-TERM CURRENT USE OF INSULIN (HCC): ICD-10-CM

## 2024-10-03 ENCOUNTER — TELEPHONE (OUTPATIENT)
Dept: FAMILY MEDICINE CLINIC | Facility: CLINIC | Age: 84
End: 2024-10-03

## 2024-10-03 NOTE — TELEPHONE ENCOUNTER
Patient wanted you to know that he is feeling much better after taking prednisone and allegra.  His voice has not yet gone back to normal.

## 2024-10-07 NOTE — TELEPHONE ENCOUNTER
Called patient, his voice is still raspy. He also wants you to know lumbar MRI has been denied for 2nd time.

## 2024-10-08 ENCOUNTER — SOCIAL WORK (OUTPATIENT)
Age: 84
End: 2024-10-08

## 2024-10-08 DIAGNOSIS — F43.20 ADJUSTMENT DISORDER, UNSPECIFIED TYPE: Primary | ICD-10-CM

## 2024-10-08 PROCEDURE — 90837 PSYTX W PT 60 MINUTES: CPT | Performed by: SOCIAL WORKER

## 2024-10-09 NOTE — TELEPHONE ENCOUNTER
Recommend that the patient canceled the MRI given that it was not approved.  Did talk to him about possibly scheduling with pain management to see if they have better functioning of scheduling and possibly ordering the MRI and helping with his pain.  He decided he wants to wait 2 weeks and discuss more at his appointment

## 2024-10-09 NOTE — TELEPHONE ENCOUNTER
Patient stopped in and wanted you to know that his voice is back to normal. Should he cancel his mri scheduled for Monday?

## 2024-10-11 NOTE — PROGRESS NOTES
Progress Note - Cardiology Office  Gonzales Sears 70 y o  male MRN: [id number]   Encounter: [id number]     1  2-vessel coronary artery disease    2  Benign hypertension    3  Dyslipidemia    4  Moderate obesity    5  Type 2 diabetes mellitus without complication, without long-term current use of insulin ([facility])        Assessment/Plan      1  Coronary artery disease with a remote history of angioplasty of LAD with a drug-eluting stent and known RPDA 100% with grade 3 collaterals  He has no symptoms of angina  He has decently active  His blood test from August 2019 reviewed  He had a normal nuclear in September of 2018  2  Hypertension  Longstanding history of essential hypertension  Well controlled with tender Prill  3  Dyslipidemia  Patient is back on simvastatin 40 milligram daily  Cholesterol profile reviewed from August 2019 acceptable  4  Diabetes mellitus  Patient is on metformin and Januvia follow-up by PMD   Last HbA1c 6 5  He follows up with endocrinology [facility]  5  Obesity  Patient's BMI is around 35  She is working hard to lose weight       6  Premature atrial contractions  Holter monitor shows no evidence of occult atrial fibrillation  Few PACs noted  Episode of probably SVT with aberration noted he has normal LV systolic function       Discussed with patient at length about pathophysiology of coronary artery disease, irregular heartbeat, need to compliant with medications and losing weight at length  Follow-up in 6 months  All these issues discussed with patient's wife at length  Counseling :  A description of the counseling  Spoke to patient but high intensity statins  Lipitor side effects discussed  He is willing to try it  Goals and Barriers  Patient want to lose weight which he gain during winter months  Little under stress due to wife's condition    Patient's ability to self care: Yes  Medication side effect reviewed with patient in detail and all their questions answered to their satisfaction  Archie Bella is a 70y o  year old male who came for follow up  Patient has a past medical history significant for coronary artery disease status post angioplasty in 2008 off large size LAD with a known RPDA, hypertension, diabetes mellitus, dyslipidemia and moderate obesity who came for regular follow-up  Patient's last nuclear was in 2014 which was nonischemic  He denies any chest pain or any shortness of breath  No fever no chills no other significant complaint  7/18/2017Patient came for follow-up for coronary artery disease  A status post angioplasty of his large LAD and has known RPDA occlusion  His sugar is well controlled  He is trying to lose weight  Blood pressures acceptable  Denies any chest pain  No PND no orthopnea no nausea no vomiting no other significant complaint  02/14/2019  Above reviewed  Patient came for follow-up  He is little bit under stress as wife is not doing very well  She is having lot of back issue and arthritis problem along with vision issues  He had a stress test and echo done in September 2018 of which was normal   He has history of coronary artery disease with stenting of his large LAD in 2008 and known to have 100% RPDA occlusion with collaterals  Nuclear now shows no ischemia  He is pretty active no chest pain no shortness of breath no dizziness no lightheadedness no palpitations no other significant complaint  09/18/2019  Above reviewed  Patient came for follow-up  He was noted to have some problems swallowing  He underwent barium swallow 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  He want conservative Rx for now  He also had a fall at home  He slipped from the stair fortunately he did not hit his head but he has lot of injury to his back and left side of the body  He has recovered well this happened in May    He is also under lot of stress due to his wife's back problem  He denies any chest pain or any shortness of breath  History of coronary artery disease with stenting of large LAD in 2008 known to have 100% occluded RPDA with occlusion with collaterals  Holter monitor shows few PACs and short atrial run could be SVT with abrreation versus NSVT but he was asymptomatic  He denies any chest pain no dizziness no nausea no vomiting  Has food sticking problem as mentioned above  He [facility] up [facility]  Review of Systems   Constitutional: Negative for activity change, chills, diaphoresis, fever and unexpected weight change  HENT: Negative for congestion  Eyes: Negative for discharge and redness  Respiratory: Negative for cough, chest tightness, shortness of breath and wheezing  Cardiovascular: Negative  Negative for chest pain, palpitations and leg swelling  History of CAD as mentioned earlier   Gastrointestinal: Negative for abdominal pain, diarrhea and nausea  Pharyngeal diverticulum   Endocrine: Negative  Genitourinary: Negative for decreased urine volume and urgency  Musculoskeletal: Positive for arthralgias and back pain  Negative for gait problem  Skin: Negative for rash and wound  Bruises after fall   Allergic/Immunologic: Negative  Neurological: Negative for dizziness, seizures, syncope, weakness, light-headedness and headaches  Hematological: Negative  Psychiatric/Behavioral: Negative for agitation and confusion  The patient is nervous/anxious          Historical Information   Past Medical History:   Diagnosis Date    Abnormal blood chemistry     resolved: 11/9/16    Abnormal glucose     last assessed: 9/24/14    CAD (coronary artery disease)     Coronary atherosclerosis     DM2 (diabetes mellitus, type 2) (HCC)     Dyslipidemia     Facial nerve disorder     HTN (hypertension)     Hyperlipidemia     last assessed: 1/13/17    Kidney stones     Lumbosacral radiculopathy     Nerve root and plexus disorder     Obesity     Osteoarthritis     Prostate asymmetry     Pure hypercholesterolemia      Past Surgical History:   Procedure Laterality Date    CORONARY ANGIOPLASTY       Social History     Substance and Sexual Activity   Alcohol Use Yes    Comment: occasional     Social History     Substance and Sexual Activity   Drug Use No     Social History     Tobacco Use   Smoking Status Never Smoker   Smokeless Tobacco Never Used     Family History:   Family History   Problem Relation Age of Onset    Cancer Mother     Ovarian cancer Mother     Hypertension Sister     Lung disease Father         black    Mental illness Neg Hx     Substance Abuse Neg Hx        Meds/Allergies     Allergies   Allergen Reactions    Ciprofloxacin Hives       Current Outpatient Medications:     aspirin 81 mg chewable tablet, Chew 81 mg daily, Disp: , Rfl:     cholecalciferol (VITAMIN D3) 1,000 units tablet, Take 1,000 Units by mouth daily, Disp: , Rfl:     metFORMIN (GLUCOPHAGE) 500 mg tablet, Take 500 mg by mouth 2 (two) times a day with meals , Disp: , Rfl:     multivitamin-iron-minerals-folic acid (CENTRUM) chewable tablet, Chew 1 tablet daily, Disp: , Rfl:     omeprazole (PriLOSEC) 20 mg delayed release capsule, Take 1 capsule (20 mg total) by mouth daily, Disp: 30 capsule, Rfl: 0    simvastatin (ZOCOR) 40 mg tablet, Take 1 tablet (40 mg total) by mouth daily at bedtime, Disp: 30 tablet, Rfl: 11    sitaGLIPtin (JANUVIA) 50 mg tablet, Take 1 tablet (50 mg total) by mouth daily, Disp: 90 tablet, Rfl: 1    trandolapril (MAVIK) 1 MG tablet, Take 1 tablet (1 mg total) by mouth daily, Disp: 90 tablet, Rfl: 1    Vitals: Blood pressure 130/80, pulse 61, height 5' 7" (1 702 m), weight 103 kg (226 lb), SpO2 98 %  Body mass index is 35 4 kg/m²      BP Readings from Last 3 Encounters:   09/18/19 130/80   08/08/19 128/60   06/17/19 130/78       Physical Exam:  Physical Exam Constitutional: He is oriented to person, place, and time  He appears well-developed  No distress  HENT:   Head: Normocephalic and atraumatic  Eyes: Pupils are equal, round, and reactive to light  Neck: Normal range of motion  Neck supple  No JVD present  No thyromegaly present  Cardiovascular: Normal rate, regular rhythm and intact distal pulses  Murmur heard  Pulmonary/Chest: Effort normal and breath sounds normal  No respiratory distress  He has no wheezes  He has no rales  Abdominal: Soft  Bowel sounds are normal  He exhibits no distension  There is no tenderness  There is no rebound  Musculoskeletal: Normal range of motion  He exhibits no edema or tenderness  Neurological: He is alert and oriented to person, place, and time  Skin: Skin is warm and dry  He is not diaphoretic  Healing skin abrasions   Psychiatric: He has a normal mood and affect  His behavior is normal  Judgment normal        Diagnostic Studies Review Cardio:  Stress Test: Nuclear stress test in October 2014 shows no ischemia EF 70%  Nuclear stress test done on 09/26/2018 was normal with EF around 70%  Echocardiogram/ROCK: Echo Doppler done in March 2015 shows borderline concentric left hypertrophy EF 55-60%, after reminding dilated, trace MR, trace TR PA pressure 35 mmHg  Echo Doppler done 09/26/2018  Echo Doppler shows normal LV systolic function grade 1 diastolic dysfunction, right ventricle mildly dilated, left atrium mild-to-moderate dilated, mild MR, mild TR PA pressure 30 mm of mercury  No change from old echo  Catheterization: His cardiac catheter patient done in 2008 shows critical stenosis of LAD which was successfully stented with a drug-eluting stent  He had nonobstructive disease in the circumflex and right coronary artery and RPDA is 100% occluded with grade 2 collaterals  Vascular imaging: In October of 2014 abdominal aortogram study shows no aneurysm  Holter monitor    Holter monitor done in March 2019 shows few PACs and PVCs  Most of the PVCs were single  There were 12 beat NSVT though it was irregular so we could not rule out SVT with aberration    ECG Report:   Comparison to prior ECGs: no interval change  7/18/2017  Normal sinus rhythm 1  with no significant ST changes  Heart rate was 68 bpm     Repeat EKG done 08/14/2018 shows normal sinus rhythm heart rate 73 beats per minute  No change from old EKG  Twelve lead EKG in our office done on 02/14/2018 shows normal sinus rhythm heart rate 72 beats per minute few PACs were noted  As compared to old EKG PACs are newly reported  Twelve lead EKG 09/18/2019 shows normal sinus rhythm rare PACs heart rate 71 beats per minute no other significant ST changes  Holter monitor during finding reviewed  Blood test from January 2019 reviewed  Lab Review     Lab Results   Component Value Date     02/17/2017    K 4 5 08/20/2019     (H) 08/20/2019    CO2 25 08/20/2019    BUN 29 (H) 08/20/2019    CREATININE 1 34 (H) 08/20/2019    GLUCOSE 125 (H) 02/17/2017    GLUF 123 (H) 08/20/2019    CALCIUM 9 2 08/20/2019    AST 18 01/02/2019    ALT 36 01/02/2019    ALKPHOS 67 01/02/2019    PROT 6 5 02/17/2017    BILITOT 0 4 02/17/2017    EGFR 51 08/20/2019     Lab Results   Component Value Date    GLUCOSE 125 (H) 02/17/2017    CALCIUM 9 2 08/20/2019     02/17/2017    K 4 5 08/20/2019    CO2 25 08/20/2019     (H) 08/20/2019    BUN 29 (H) 08/20/2019    CREATININE 1 34 (H) 08/20/2019       Lab Results   Component Value Date    HGBA1C 6 5 (H) 08/20/2019     Lipid Profile:   Lab Results   Component Value Date    CHOL 173 02/17/2017    HDL 50 08/20/2019    LDLCALC 86 08/20/2019    TRIG 81 08/20/2019     Lab Results   Component Value Date    CHOL 173 02/17/2017    CHOL 151 11/21/2016       Dr Huan Velazco MD [facility] [place]      "This note has been constructed using a voice recognition system  Therefore there may be syntax, spelling, and/or grammatical errors   Please call if you have any questions  " no

## 2024-10-18 ENCOUNTER — RA CDI HCC (OUTPATIENT)
Dept: OTHER | Facility: HOSPITAL | Age: 84
End: 2024-10-18

## 2024-10-22 ENCOUNTER — SOCIAL WORK (OUTPATIENT)
Age: 84
End: 2024-10-22

## 2024-10-22 DIAGNOSIS — F43.20 ADJUSTMENT DISORDER, UNSPECIFIED TYPE: Primary | ICD-10-CM

## 2024-10-22 PROCEDURE — 90837 PSYTX W PT 60 MINUTES: CPT | Performed by: SOCIAL WORKER

## 2024-10-22 NOTE — PSYCH
"This note was not shared with the patient due to this is a psychotherapy note    Behavioral Health Psychotherapy Progress Note    Psychotherapy Provided: Individual Psychotherapy     1. Adjustment disorder, unspecified type            DATA:   Ernesto is a 84-year old,  male who presented for a follow-up outpatient individual counseling session after an approximate two weeks service gap.   Ernesto has been reaching out to several of his wife's former care takers and practitioners to thank them for providing care for his  wife.   He feels depressed, lonely, and sad about his wife's death.   Ernesto is seeking attention from the [place] of people he interacted with during his wife's lengthy illness.   There was no evidence of a thought disorder or psychosis.   Ernesto denied suicidal ideation, gesture or plan.   The undersigned therapist provided Ernesto with grief counseling.    During this session, this clinician used the following therapeutic modalities: Supportive Psychotherapy and grief counseling    Substance Abuse was not addressed during this session. If the client is diagnosed with a co-occurring substance use disorder, please indicate any changes in the frequency or amount of use: NA. Stage of change for addressing substance use diagnoses: No substance use/Not applicable    ASSESSMENT:  Ernesto Sears presents with a Depressed and sad  mood.    His affect is Normal range and intensity, which is congruent, with his mood and the content of the session. The client has not made progress on their goals.    Ernesto Sears presents with a none risk of suicide, none risk of self-harm, and none risk of harm to others.    For any risk assessment that surpasses a \"low\" rating, a safety plan must be developed.    A safety plan was indicated: no  If yes, describe in detail:  We have discussed their safety plan and Ernesto agrees that if they experience unsafe thoughts that they will reach out to their supports including this " office, the suicide hotline, and emergency services if necessary. Ernesto is aware of non-emergent and emergent mental health resources.    Visit start and stop times:    10/22/24  Start Time: 0802  End Time:  8:59 AM

## 2024-10-22 NOTE — PSYCH
This note was not shared with the patient due to this is a psychotherapy note    Behavioral Health Psychotherapy Progress Note    Psychotherapy Provided: Individual Psychotherapy     1. Adjustment disorder, unspecified type            DATA:   Ernesto is a 84-year old,  male who presented for an initial session with the undersigned therapist after being transferred from his previous [facility] therapist who resigned in 2024.   His most recent session with his former therapist was on 2024.   At that time, Ernesto declined to continue counseling services with his previous therapist through her private practice due to her practice not accepting his health insurance and Ernesto's refusal to pay out-of-pocket on a sliding fee scale.  Ernesto is well [facility] from providing counseling to his wife for several years.  Ernesto's wife  on 2024.   He is requesting to restart therapy with the undersigned therapist through [facility]'s [facility] Behavior[facility] services program.   Prior to today's session, on 2024, Ernesto had an office visit with his primary care physician for an evaluation and treatment of horse voice/acute appointment.   A review of Ernesto's medical record revealed, he has a history of Zenker's and had surgery a couple of years ago.   He takes diabetes medication as prescribed.   Ernesto continues to suffer from lower back pain.   He was prescribed Allegra for two weeks, if no improvement he will be referred for speech therapy and a possible GI evaluation given history of Zenker's.   For today's session with the undersigned therapist, Ernesto is feeling depressed and sad.   He is struggling to cope with stressors connected with his wife's death.  Ernesto has a support system consisting of friends and his wife's former healthcare aides.   He reports no financial stressors as he is retired and collecting a pension and Social Security senior care benefits.   Since he wife  ", Ernesto's income has decreased but he will be able to continue to afford living in his current residence.   The undersigned therapist assisted Ernesto process his feelings about his relationship with his wife's son and her son's paramour.   He reports his wife's son has been supportive and he will be visiting with them soon for the holidays.   The undersigned therapist provided Ernesto with grief counseling.    During this session, this clinician used the following therapeutic modalities:  grief counseling    Substance Abuse was not addressed during this session. If the client is diagnosed with a co-occurring substance use disorder, please indicate any changes in the frequency or amount of use: NA. Stage of change for addressing substance use diagnoses: No substance use/Not applicable    ASSESSMENT:  Ernesto Sears presents with a Depressed and sad  mood.    His affect is Normal range and intensity, which is congruent, with his mood and the content of the session. The client has not made progress on their goals.     Ernesto Sears presents with a none risk of suicide, none risk of self-harm, and none risk of harm to others.    For any risk assessment that surpasses a \"low\" rating, a safety plan must be developed.    A safety plan was indicated: no  If yes, describe in detail:  We have discussed their safety plan and Ernesto agrees that if they experience unsafe thoughts that they will reach out to their supports including this office, the suicide hotline, and emergency services if necessary. Ernesto is aware of non-emergent and emergent mental health resources.    Visit start and stop times:    10/22/24  Start time:  8:05 AM  End time:  9:00 AM  "

## 2024-10-24 DIAGNOSIS — I11.9 HYPERTENSIVE HEART DISEASE WITHOUT HEART FAILURE: ICD-10-CM

## 2024-10-24 RX ORDER — METOPROLOL SUCCINATE 25 MG/1
25 TABLET, EXTENDED RELEASE ORAL DAILY
Qty: 30 TABLET | Refills: 0 | Status: SHIPPED | OUTPATIENT
Start: 2024-10-24

## 2024-10-25 ENCOUNTER — TELEPHONE (OUTPATIENT)
Age: 84
End: 2024-10-25

## 2024-10-25 ENCOUNTER — OFFICE VISIT (OUTPATIENT)
Dept: FAMILY MEDICINE CLINIC | Facility: CLINIC | Age: 84
End: 2024-10-25
Payer: COMMERCIAL

## 2024-10-25 VITALS
HEART RATE: 66 BPM | SYSTOLIC BLOOD PRESSURE: 112 MMHG | BODY MASS INDEX: 33.4 KG/M2 | DIASTOLIC BLOOD PRESSURE: 70 MMHG | HEIGHT: 67 IN | RESPIRATION RATE: 18 BRPM | WEIGHT: 212.8 LBS | OXYGEN SATURATION: 98 % | TEMPERATURE: 97.5 F

## 2024-10-25 DIAGNOSIS — M54.16 LUMBAR RADICULOPATHY: ICD-10-CM

## 2024-10-25 DIAGNOSIS — E11.22 TYPE 2 DIABETES MELLITUS WITH STAGE 3 CHRONIC KIDNEY DISEASE AND HYPERTENSION (HCC): ICD-10-CM

## 2024-10-25 DIAGNOSIS — K22.5 ZENKER'S DIVERTICULUM: ICD-10-CM

## 2024-10-25 DIAGNOSIS — Z00.00 MEDICARE ANNUAL WELLNESS VISIT, SUBSEQUENT: Primary | ICD-10-CM

## 2024-10-25 DIAGNOSIS — N18.30 TYPE 2 DIABETES MELLITUS WITH STAGE 3 CHRONIC KIDNEY DISEASE AND HYPERTENSION (HCC): ICD-10-CM

## 2024-10-25 DIAGNOSIS — R10.11 RUQ PAIN: ICD-10-CM

## 2024-10-25 DIAGNOSIS — I12.9 TYPE 2 DIABETES MELLITUS WITH STAGE 3 CHRONIC KIDNEY DISEASE AND HYPERTENSION (HCC): ICD-10-CM

## 2024-10-25 DIAGNOSIS — R07.81 RIB PAIN ON RIGHT SIDE: ICD-10-CM

## 2024-10-25 DIAGNOSIS — F33.9 DEPRESSION, RECURRENT (HCC): ICD-10-CM

## 2024-10-25 PROCEDURE — G0439 PPPS, SUBSEQ VISIT: HCPCS | Performed by: FAMILY MEDICINE

## 2024-10-25 NOTE — PROGRESS NOTES
Ambulatory Visit  Name: Gonzales Sears      : 1940      MRN: [id number]  Encounter Provider: Mary Ulloa MD  Encounter Date: 10/25/2024   Encounter department: [facility]    Assessment & Plan  Medicare annual wellness visit, subsequent         Type 2 diabetes mellitus with stage 3 chronic kidney disease and hypertension (HCC)  Education given on the patient having to monitor his sugars intake.  If not we will have to introduce more medications  Lab Results   Component Value Date    HGBA1C 7.2 (H) 2024       Orders:    Hemoglobin A1C; Future    Zenker's diverticulum         Rib pain on right side    Orders:    Ambulatory Referral to Physical Therapy; Future    Lumbar radiculopathy    Orders:    Ambulatory Referral to Physical Therapy; Future    Depression, recurrent (HCC)  Depression Screening Follow-up Plan: Patient's depression screening was positive with a PHQ-2 score of 4. Their PHQ-9 score was 9.  Patient was given significant counseling today.  He is grieving appropriately       RUQ pain    Orders:    US right upper quadrant; Future      Depression Screening and Follow-up Plan: Patient's depression screening was positive with a PHQ-2 score of 4. Their PHQ-9 score was 9. Patient assessed for underlying major depression. Brief counseling provided and recommend additional follow-up/re-evaluation next office visit.       Preventive health issues were discussed with patient, and age appropriate screening tests were ordered as noted in patient's After Visit Summary. Personalized health advice and appropriate referrals for health education or preventive services given if needed, as noted in patient's After Visit Summary.    History of Present Illness     HPI   84-year-old male with a past medical history of diabetes also coming in for his physical.  Patient  continues to be grieving the loss of his wife.  Spent most of the time speaking to the patient in detail about  the grieving process.  I feel like patient was looking for closure to make sure that he made the right decision.  Which he did given that I personally knew his wife.  Overall he is very compliant with his medications.  And seeing all his specialist as scheduled  Patient Care Team:  Mary Ulloa MD as PCP - General (Family Medicine)  MD Tarik Alcantara MD (Urology)  Hector Clarke MD (Ophthalmology)  Fredrick Quiroz MD (Nephrology)    Review of Systems   Constitutional:  Negative for activity change, appetite change, chills, fatigue and fever.   HENT:  Negative for congestion.    Respiratory:  Negative for cough, chest tightness and shortness of breath.    Cardiovascular:  Negative for chest pain and leg swelling.   Gastrointestinal:  Positive for abdominal pain. Negative for abdominal distention, constipation, diarrhea, nausea and vomiting.        Continues to have right rib pain   Musculoskeletal:  Positive for back pain.   All other systems reviewed and are negative.    Medical History Reviewed by provider this encounter:       Annual Wellness Visit Questionnaire   Gonzales is here for his Subsequent Wellness visit.     Health Risk Assessment:   Patient rates overall health as good. Patient feels that their physical health rating is slightly better. Patient is satisfied with their life. Eyesight was rated as same. Hearing was rated as same. Patient feels that their emotional and mental health rating is same. Patients states they are sometimes angry. Patient states they are often unusually tired/fatigued. Pain experienced in the last 7 days has been some. Patient's pain rating has been 2/10. Patient states that he has experienced no weight loss or gain in last 6 months. Rib pain       Depression Screening:   PHQ-2 Score: 4  PHQ-9 Score: 9      Fall Risk Screening:   In the past year, patient has experienced: no history of falling in past year      Home Safety:  Patient does not have  trouble with stairs inside or outside of their home. Patient has working smoke alarms and has working carbon monoxide detector. Home safety hazards include: none.     Nutrition:   Current diet is Regular and Limited junk food.     Medications:   Patient is currently taking over-the-counter supplements. OTC medications include: see medication list. Patient is able to manage medications.     Activities of Daily Living (ADLs)/Instrumental Activities of Daily Living (IADLs):   Walk and transfer into and out of bed and chair?: Yes  Dress and groom yourself?: Yes    Bathe or shower yourself?: Yes    Feed yourself? Yes  Do your laundry/housekeeping?: Yes  Manage your money, pay your bills and track your expenses?: Yes  Make your own meals?: Yes    Do your own shopping?: Yes    Previous Hospitalizations:   Any hospitalizations or ED visits within the last 12 months?: No      Advance Care Planning:   Living will: Yes    Durable POA for healthcare: Yes    Advanced directive: Yes      Cognitive Screening:   Provider or family/friend/caregiver concerned regarding cognition?: No    PREVENTIVE SCREENINGS      Cardiovascular Screening:    General: Screening Current      Diabetes Screening:     General: Screening Not Indicated and History Diabetes      Prostate Cancer Screening:    General: Screening Not Indicated      Osteoporosis Screening:    General: Risks and Benefits Discussed      Abdominal Aortic Aneurysm (AAA) Screening:        General: Risks and Benefits Discussed      Lung Cancer Screening:     General: Screening Not Indicated      Hepatitis C Screening:    General: Risks and Benefits Discussed    Screening, Brief Intervention, and Referral to Treatment (SBIRT)    Screening  Typical number of drinks in a day: 0  Typical number of drinks in a week: 0  Interpretation: Low risk drinking behavior.    Single Item Drug Screening:  How often have you used an illegal drug (including marijuana) or a prescription medication for  "non-medical reasons in the past year? never    Single Item Drug Screen Score: 0  Interpretation: Negative screen for possible drug use disorder    Social Determinants of Health     Food Insecurity: No Food Insecurity (10/25/2024)    Hunger Vital Sign     Worried About Running Out of Food in the Last Year: Never true     Ran Out of Food in the Last Year: Never true   Transportation Needs: No Transportation Needs (10/25/2024)    PRAPARE - Transportation     Lack of Transportation (Medical): No     Lack of Transportation (Non-Medical): No   Housing Stability: Low Risk  (10/25/2024)    Housing Stability Vital Sign     Unable to Pay for Housing in the Last Year: No     Number of Times Moved in the Last Year: 0     Homeless in the Last Year: No   Utilities: Not At Risk (10/25/2024)    [facility] Utilities     Threatened with loss of utilities: No     No results found.    Objective     /70 (BP Location: Left arm, Patient Position: Sitting, Cuff Size: Standard)   Pulse 66   Temp 97.5 °F (36.4 °C)   Resp 18   Ht 5' 7\" (1.702 m)   Wt 96.5 kg (212 lb 12.8 oz)   SpO2 98%   BMI 33.33 kg/m²     Physical Exam  Constitutional:       Appearance: He is well-developed.   HENT:      Head: Normocephalic and atraumatic.   Eyes:      Conjunctiva/sclera: Conjunctivae normal.      Pupils: Pupils are equal, round, and reactive to light.   Cardiovascular:      Rate and Rhythm: Normal rate.      Pulses: no weak pulses.           Dorsalis pedis pulses are 2+ on the right side and 2+ on the left side.        Posterior tibial pulses are 2+ on the right side and 2+ on the left side.   Pulmonary:      Effort: Pulmonary effort is normal.   Abdominal:      Tenderness: There is abdominal tenderness.      Comments: Right upper quadrant   Musculoskeletal:      Comments: Continues to have right rib pain   Feet:      Right foot:      Skin integrity: No ulcer, skin breakdown, erythema, warmth, callus or dry skin.      Left foot:      Skin integrity: " No ulcer, skin breakdown, erythema, warmth, callus or dry skin.   Neurological:      Mental Status: He is alert and oriented to person, place, and time.   Psychiatric:         Behavior: Behavior normal.         Thought Content: Thought content normal.         Judgment: Judgment normal.       Diabetic Foot Exam    Patient's shoes and socks removed.    Right Foot/Ankle   Right Foot Inspection  Skin Exam: skin normal and skin intact. No dry skin, no warmth, no callus, no erythema, no maceration, no abnormal color, no pre-ulcer, no ulcer and no callus.     Toe Exam: ROM and strength within normal limits. No swelling    Sensory   Vibration: intact  Proprioception: intact  Monofilament testing: intact    Vascular  Capillary refills: < 3 seconds  The right DP pulse is 2+. The right PT pulse is 2+.     Left Foot/Ankle  Left Foot Inspection  Skin Exam: skin normal and skin intact. No dry skin, no warmth, no erythema, no maceration, normal color, no pre-ulcer, no ulcer and no callus.     Toe Exam: ROM and strength within normal limits. No swelling.     Sensory   Vibration: intact  Proprioception: intact  Monofilament testing: intact    Vascular  Capillary refills: < 3 seconds  The left DP pulse is 2+. The left PT pulse is 2+.     Assign Risk Category  No deformity present  No loss of protective sensation  No weak pulses  Risk: 0

## 2024-10-25 NOTE — ASSESSMENT & PLAN NOTE
Education given on the patient having to monitor his sugars intake.  If not we will have to introduce more medications  Lab Results   Component Value Date    HGBA1C 7.2 (H) 09/13/2024       Orders:    Hemoglobin A1C; Future

## 2024-10-25 NOTE — PATIENT INSTRUCTIONS
Medicare Preventive Visit Patient Instructions  Thank you for completing your Welcome to Medicare Visit or Medicare Annual Wellness Visit today. Your next wellness visit will be due in one year (10/26/2025).  The screening/preventive services that you may require over the next 5-10 years are detailed below. Some tests may not apply to you based off risk factors and/or age. Screening tests ordered at today's visit but not completed yet may show as past due. Also, please note that scanned in results may not display below.  Preventive Screenings:  Service Recommendations Previous Testing/Comments   Colorectal Cancer Screening  Colonoscopy    Fecal Occult Blood Test (FOBT)/Fecal Immunochemical Test (FIT)  Fecal DNA/Cologuard Test  Flexible Sigmoidoscopy Age: 45-75 years old   Colonoscopy: every 10 years (May be performed more frequently if at higher risk)  OR  FOBT/FIT: every 1 year  OR  Cologuard: every 3 years  OR  Sigmoidoscopy: every 5 years  Screening may be recommended earlier than age 45 if at higher risk for colorectal cancer. Also, an individualized decision between you and your healthcare provider will decide whether screening between the ages of 76-85 would be appropriate. Colonoscopy: Not on file  FOBT/FIT: Not on file  Cologuard: Not on file  Sigmoidoscopy: Not on file          Prostate Cancer Screening Individualized decision between patient and health care provider in men between ages of 55-69   Medicare will cover every 12 months beginning on the day after your 50th birthday PSA: 2.7 ng/mL     Screening Not Indicated     Hepatitis C Screening Once for adults born between 1945 and 1965  More frequently in patients at high risk for Hepatitis C Hep C Antibody: Not on file    Risks and Benefits Discussed   Diabetes Screening 1-2 times per year if you're at risk for diabetes or have pre-diabetes Fasting glucose: 101 mg/dL (9/13/2024)  A1C: 7.2 % (9/13/2024)  Screening Not Indicated  History Diabetes    Cholesterol Screening Once every 5 years if you don't have a lipid disorder. May order more often based on risk factors. Lipid panel: 08/15/2024  Screening Current      Other Preventive Screenings Covered by Medicare:  Abdominal Aortic Aneurysm (AAA) Screening: covered once if your at risk. You're considered to be at risk if you have a family history of AAA or a male between the age of 65-75 who smoking at least 100 cigarettes in your lifetime.  Lung Cancer Screening: covers low dose CT scan once per year if you meet all of the following conditions: (1) Age 55-77; (2) No signs or symptoms of lung cancer; (3) Current smoker or have quit smoking within the last 15 years; (4) You have a tobacco smoking history of at least 20 pack years (packs per day x number of years you smoked); (5) You get a written order from a healthcare provider.  Glaucoma Screening: covered annually if you're considered high risk: (1) You have diabetes OR (2) Family history of glaucoma OR (3)  aged 50 and older OR (4)  American aged 65 and older  Osteoporosis Screening: covered every 2 years if you meet one of the following conditions: (1) Have a vertebral abnormality; (2) On glucocorticoid therapy for more than 3 months; (3) Have primary hyperparathyroidism; (4) On osteoporosis medications and need to assess response to drug therapy.  HIV Screening: covered annually if you're between the age of 15-65. Also covered annually if you are younger than 15 and older than 65 with risk factors for HIV infection. For pregnant patients, it is covered up to 3 times per pregnancy.    Immunizations:  Immunization Recommendations   Influenza Vaccine Annual influenza vaccination during flu season is recommended for all persons aged >= 6 months who do not have contraindications   Pneumococcal Vaccine   * Pneumococcal conjugate vaccine = PCV13 (Prevnar 13), PCV15 (Vaxneuvance), PCV20 (Prevnar 20)  * Pneumococcal polysaccharide vaccine =  PPSV23 (Pneumovax) Adults 19-63 yo with certain risk factors or if 65+ yo  If never received any pneumonia vaccine: recommend Prevnar 20 (PCV20)  Give PCV20 if previously received 1 dose of PCV13 or PPSV23   Hepatitis B Vaccine 3 dose series if at intermediate or high risk (ex: diabetes, end stage renal disease, liver disease)   Respiratory syncytial virus (RSV) Vaccine - COVERED BY MEDICARE PART D  * RSVPreF3 (Arexvy) CDC recommends that adults 60 years of age and older may receive a single dose of RSV vaccine using shared clinical decision-making (SCDM)   Tetanus (Td) Vaccine - COST NOT COVERED BY MEDICARE PART B Following completion of primary series, a booster dose should be given every 10 years to maintain immunity against tetanus. Td may also be given as tetanus wound prophylaxis.   Tdap Vaccine - COST NOT COVERED BY MEDICARE PART B Recommended at least once for all adults. For pregnant patients, recommended with each pregnancy.   Shingles Vaccine (Shingrix) - COST NOT COVERED BY MEDICARE PART B  2 shot series recommended in those 19 years and older who have or will have weakened immune systems or those 50 years and older     Health Maintenance Due:  There are no preventive care reminders to display for this patient.  Immunizations Due:      Topic Date Due   • COVID-19 Vaccine (4 - 2023-24 season) 09/01/2024     Advance Directives   What are advance directives?  Advance directives are legal documents that state your wishes and plans for medical care. These plans are made ahead of time in case you lose your ability to make decisions for yourself. Advance directives can apply to any medical decision, such as the treatments you want, and if you want to donate organs.   What are the types of advance directives?  There are many types of advance directives, and each state has rules about how to use them. You may choose a combination of any of the following:  Living will:  This is a written record of the treatment  you want. You can also choose which treatments you do not want, which to limit, and which to stop at a certain time. This includes surgery, medicine, IV fluid, and tube feedings.   Durable power of  for healthcare (DPAHC):  This is a written record that states who you want to make healthcare choices for you when you are unable to make them for yourself. This person, called a proxy, is usually a family member or a friend. You may choose more than 1 proxy.  Do not resuscitate (DNR) order:  A DNR order is used in case your heart stops beating or you stop breathing. It is a request not to have certain forms of treatment, such as CPR. A DNR order may be included in other types of advance directives.  Medical directive:  This covers the care that you want if you are in a coma, near death, or unable to make decisions for yourself. You can list the treatments you want for each condition. Treatment may include pain medicine, surgery, blood transfusions, dialysis, IV or tube feedings, and a ventilator (breathing machine).  Values history:  This document has questions about your views, beliefs, and how you feel and think about life. This information can help others choose the care that you would choose.  Why are advance directives important?  An advance directive helps you control your care. Although spoken wishes may be used, it is better to have your wishes written down. Spoken wishes can be misunderstood, or not followed. Treatments may be given even if you do not want them. An advance directive may make it easier for your family to make difficult choices about your care.   Depression   Depression  is a medical condition that causes feelings of sadness or hopelessness that do not go away. Depression may cause you to lose interest in things you used to enjoy. These feelings may interfere with your daily life.  Call your local emergency number (911 in the [place]) if:   You think about harming yourself or someone  else.  You have done something on purpose to hurt yourself.  The following resources are available at any time to help you, if needed:   National Suicide Prevention Lifeline: 1-801.275.2298 (2-722-901-TALK)   Suicide Hotline: 1-279.201.5371 (8-049-WMAOGNH)   For a list of international numbers: https://save.org/find-help/international-resources/  Treatment for depression may include medicine to relieve depression. Medicine is often used together with therapy. Therapy is a way for you to talk about your feelings and anything that may be causing depression. Therapy can be done alone or in a group. It may also be done with family members or a significant other.  Get regular physical activity.    Create a regular sleep schedule.    Eat a variety of healthy foods.    Do not drink alcohol or use drugs.     Weight Management   Why it is important to manage your weight:  Being overweight increases your risk of health conditions such as heart disease, high blood pressure, type 2 diabetes, and certain types of cancer. It can also increase your risk for osteoarthritis, sleep apnea, and other respiratory problems. Aim for a slow, steady weight loss. Even a small amount of weight loss can lower your risk of health problems.  How to lose weight safely:  A safe and healthy way to lose weight is to eat fewer calories and get regular exercise. You can lose up about 1 pound a week by decreasing the number of calories you eat by 500 calories each day.   Healthy meal plan for weight management:  A healthy meal plan includes a variety of foods, contains fewer calories, and helps you stay healthy. A healthy meal plan includes the following:  Eat whole-grain foods more often.  A healthy meal plan should contain fiber. Fiber is the part of grains, fruits, and vegetables that is not broken down by your body. Whole-grain foods are healthy and provide extra fiber in your diet. Some examples of whole-grain foods are whole-wheat breads and  pastas, oatmeal, brown rice, and bulgur.  Eat a variety of vegetables every day.  Include dark, leafy greens such as spinach, kale, nino greens, and mustard greens. Eat yellow and orange vegetables such as carrots, sweet potatoes, and winter squash.   Eat a variety of fruits every day.  Choose fresh or canned fruit (canned in its own juice or light syrup) instead of juice. Fruit juice has very little or no fiber.  Eat low-fat dairy foods.  Drink fat-free (skim) milk or 1% milk. Eat fat-free yogurt and low-fat cottage cheese. Try low-fat cheeses such as mozzarella and other reduced-fat cheeses.  Choose meat and other protein foods that are low in fat.  Choose beans or other legumes such as split peas or lentils. Choose fish, skinless poultry (chicken or turkey), or lean cuts of red meat (beef or pork). Before you cook meat or poultry, cut off any visible fat.   Use less fat and oil.  Try baking foods instead of frying them. Add less fat, such as margarine, sour cream, regular salad dressing and mayonnaise to foods. Eat fewer high-fat foods. Some examples of high-fat foods include french fries, doughnuts, ice cream, and cakes.  Eat fewer sweets.  Limit foods and drinks that are high in sugar. This includes candy, cookies, regular soda, and sweetened drinks.  Exercise:  Exercise at least 30 minutes per day on most days of the week. Some examples of exercise include walking, biking, dancing, and swimming. You can also fit in more physical activity by taking the stairs instead of the elevator or parking farther away from stores. Ask your healthcare provider about the best exercise plan for you.      © Copyright [employer] 2018 Information is for End User's use only and may not be sold, redistributed or otherwise used for commercial purposes. All illustrations and images included in CareNotes® are the copyrighted property of [employer] or [employer]

## 2024-10-25 NOTE — ASSESSMENT & PLAN NOTE
Depression Screening Follow-up Plan: Patient's depression screening was positive with a PHQ-2 score of 4. Their PHQ-9 score was 9.  Patient was given significant counseling today.  He is grieving appropriately

## 2024-10-25 NOTE — TELEPHONE ENCOUNTER
Patient called and is requesting the order for PT to be faxed to [facility] in [place].  Please fax the order to [phone].  Thank you!

## 2024-10-28 ENCOUNTER — APPOINTMENT (OUTPATIENT)
Dept: LAB | Facility: CLINIC | Age: 84
End: 2024-10-28
Payer: COMMERCIAL

## 2024-10-28 DIAGNOSIS — N18.30 TYPE 2 DIABETES MELLITUS WITH STAGE 3 CHRONIC KIDNEY DISEASE AND HYPERTENSION (HCC): ICD-10-CM

## 2024-10-28 DIAGNOSIS — E11.22 TYPE 2 DIABETES MELLITUS WITH STAGE 3 CHRONIC KIDNEY DISEASE AND HYPERTENSION (HCC): ICD-10-CM

## 2024-10-28 DIAGNOSIS — I12.9 TYPE 2 DIABETES MELLITUS WITH STAGE 3 CHRONIC KIDNEY DISEASE AND HYPERTENSION (HCC): ICD-10-CM

## 2024-10-28 LAB
ANION GAP SERPL CALCULATED.3IONS-SCNC: 7 MMOL/L (ref 4–13)
BUN SERPL-MCNC: 41 MG/DL (ref 5–25)
CALCIUM SERPL-MCNC: 8.9 MG/DL (ref 8.4–10.2)
CHLORIDE SERPL-SCNC: 102 MMOL/L (ref 96–108)
CO2 SERPL-SCNC: 29 MMOL/L (ref 21–32)
CREAT SERPL-MCNC: 1.48 MG/DL (ref 0.6–1.3)
EST. AVERAGE GLUCOSE BLD GHB EST-MCNC: 160 MG/DL
GFR SERPL CREATININE-BSD FRML MDRD: 43 ML/MIN/1.73SQ M
GLUCOSE P FAST SERPL-MCNC: 113 MG/DL (ref 65–99)
HBA1C MFR BLD: 7.2 %
POTASSIUM SERPL-SCNC: 5.2 MMOL/L (ref 3.5–5.3)
SODIUM SERPL-SCNC: 138 MMOL/L (ref 135–147)

## 2024-10-28 PROCEDURE — 83036 HEMOGLOBIN GLYCOSYLATED A1C: CPT

## 2024-10-28 PROCEDURE — 36415 COLL VENOUS BLD VENIPUNCTURE: CPT

## 2024-10-28 PROCEDURE — 80048 BASIC METABOLIC PNL TOTAL CA: CPT

## 2024-10-31 ENCOUNTER — HOSPITAL ENCOUNTER (OUTPATIENT)
Dept: RADIOLOGY | Facility: HOSPITAL | Age: 84
Discharge: HOME | End: 2024-10-31
Attending: FAMILY MEDICINE
Payer: COMMERCIAL

## 2024-10-31 DIAGNOSIS — R10.11 RUQ PAIN: ICD-10-CM

## 2024-10-31 PROCEDURE — 76705 ECHO EXAM OF ABDOMEN: CPT

## 2024-11-04 ENCOUNTER — NURSE TRIAGE (OUTPATIENT)
Age: 84
End: 2024-11-04

## 2024-11-04 DIAGNOSIS — E78.5 DYSLIPIDEMIA: ICD-10-CM

## 2024-11-04 NOTE — TELEPHONE ENCOUNTER
"Reason for Conversation: Inquiring if he can decrease the frequency he takes the baby aspirin; states has multiple areas of bruising on mainly left forearm.      Pain: No    Risk Factors: Hypertension, Diabetic, and Arrhythmia    Recent relevant testing and date of testing: Labs 10-28-24    Medication: baby aspiring 81mg daily    Upcoming Office Visit: No    Last Office Visit: 3-18-24        Reason for Disposition   Minor bruising at site of heparin injection (e.g., heparin, Fragmin, Innohep, Lovenox)     Caused by baby aspirin    Answer Assessment - Initial Assessment Questions  1. APPEARANCE of BRUISE: \"Describe the bruise.\"       Multiple areas on left forearm  2. SIZE: \"How large is the bruise?\"       vary  3. NUMBER: \"How many bruises are there?\"       multiple  4. LOCATION: \"Where is the bruise located?\"       Left forearm mainly  5. ONSET: \"How long ago did the bruise occur?\"       ongoing  6. CAUSE: \"What do you think caused the bruise?\"      Baby aspirin  7. MEDICAL HISTORY: \"Do you have any medical problems that can cause easy bruising or bleeding?\" (e.g., leukemia, liver disease, recent chemotherapy)      denies  8. MEDICINES: \"Do you take any medicines which thin the blood such as: aspirin, apixaban, heparin, ibuprofen (NSAIDS), Plavix, or Coumadin?\"      Baby aspirin daily  9. OTHER SYMPTOMS: \"Do you have any other symptoms?\"  (e.g., weakness, dizziness, pain, fever, nosebleed, blood in urine/stool)      denies    Protocols used: Bruises-Adult-[place]    "

## 2024-11-05 ENCOUNTER — TELEPHONE (OUTPATIENT)
Age: 84
End: 2024-11-05

## 2024-11-05 RX ORDER — EZETIMIBE 10 MG/1
10 TABLET ORAL DAILY
Qty: 90 TABLET | Refills: 0 | Status: SHIPPED | OUTPATIENT
Start: 2024-11-05

## 2024-11-05 NOTE — TELEPHONE ENCOUNTER
"Hello,    The following message was sent via e-mail to the leadership team:     Please advise if you can help facilitate the following overbook request:    Patient Name: Ernesto Sears    Patient MRN: [id number]    Call back #:     [facility] [employer] [facility]    Department:Cardiology    Speciality: General Cardiology    Reason for overbook request: OTHER (PLEASE WRITE REASON IN COMMENT SECTION)    Comments (Write \"N/a\" if no comments): Pt due for f/u in 2024 but first avail with  or Jeromy in July 2025    Requested doctor and location: Dr. Rinaldi or [facility]    Date of current appointment: 7/07/25      Thank you.       "

## 2024-11-05 NOTE — TELEPHONE ENCOUNTER
Patient needs an appointment. Please contact the patient to schedule an appointment. Courtesy refill given

## 2024-11-06 ENCOUNTER — SOCIAL WORK (OUTPATIENT)
Dept: BEHAVIORAL/MENTAL HEALTH CLINIC | Facility: CLINIC | Age: 84
End: 2024-11-06
Payer: COMMERCIAL

## 2024-11-06 ENCOUNTER — TELEPHONE (OUTPATIENT)
Dept: FAMILY MEDICINE CLINIC | Facility: CLINIC | Age: 84
End: 2024-11-06

## 2024-11-06 DIAGNOSIS — F43.20 ADJUSTMENT DISORDER, UNSPECIFIED TYPE: Primary | ICD-10-CM

## 2024-11-06 PROCEDURE — 90837 PSYTX W PT 60 MINUTES: CPT | Performed by: SOCIAL WORKER

## 2024-11-06 NOTE — TELEPHONE ENCOUNTER
----- Message from Mary Ulloa MD sent at 11/5/2024  8:47 PM EST -----  Please advise patient great news. Gallbladder is normal. Therefore this is likely muscle pain. So I do recommend continue PT

## 2024-11-06 NOTE — PSYCH
"This note was not shared with the patient due to this is a psychotherapy note    Behavioral Health Psychotherapy Progress Note    Psychotherapy Provided: Individual Psychotherapy     1. Adjustment disorder, unspecified type            DATA:   Ernesto is a 84-year old,  male who presented for a follow-up outpatient individual counseling session after an approximate two weeks service gap.    Prior to today's session with the undersigned therapist, Ernesto had an office visit with his primary care physician for an annual Medicare visit.    A review of his medical record revealed, Ernesto scored a \"4\" on the PHQ-2 and a \"9\" on the PHQ-9 depression inventory indicating he was suffering from mild depression.    Ernesto reported he was grieving appropriately.    He has a past medical history of diabetes.   For today's session, the undersigned therapist assisted Ernesto process his feelings about his wife's last few days before her death.    He kept hoping she was going to get better and was looking for a miracle.    Ernesto is planning a celebration of life for friends and family.  The undersigned therapist provided Ernesto with grief counseling secondary to his wife's death several months ago.       During this session, this clinician used the following therapeutic modalities: Bereavement Therapy    Substance Abuse was not addressed during this session. If the client is diagnosed with a co-occurring substance use disorder, please indicate any changes in the frequency or amount of use: NA. Stage of change for addressing substance use diagnoses: No substance use/Not applicable    ASSESSMENT:  Ernesto Sears presents with a  sad  mood.    His affect is Normal range and intensity, which is congruent, with his mood and the content of the session. The client has not made progress on their goals.    Ernesto Sears presents with a none risk of suicide, none risk of self-harm, and none risk of harm to others.    For any risk assessment that surpasses " "a \"low\" rating, a safety plan must be developed.    A safety plan was indicated: no  If yes, describe in detail:  We have discussed their safety plan and Ernesto agrees that if they experience unsafe thoughts that they will reach out to their supports including this office, the suicide hotline, and emergency services if necessary. Ernesto is aware of non-emergent and emergent mental health resources.    Visit start and stop times:    11/06/24  Start Time: 0801  End Time:  8:59 AM  "

## 2024-11-07 ENCOUNTER — TELEPHONE (OUTPATIENT)
Dept: PSYCHIATRY | Facility: CLINIC | Age: 84
End: 2024-11-07

## 2024-11-07 NOTE — TELEPHONE ENCOUNTER
Patients voicemail ws full and could not leave message. Patient has appointment on 11/13 and 11/20. Please advise and inform of cancellation at next appointment.

## 2024-11-12 ENCOUNTER — TELEPHONE (OUTPATIENT)
Dept: FAMILY MEDICINE CLINIC | Facility: CLINIC | Age: 84
End: 2024-11-12

## 2024-11-12 NOTE — TELEPHONE ENCOUNTER
Patient states that his insurance will cover his mri if he has neurological testing done.  Is this something you can order?

## 2024-11-13 ENCOUNTER — SOCIAL WORK (OUTPATIENT)
Dept: BEHAVIORAL/MENTAL HEALTH CLINIC | Facility: CLINIC | Age: 84
End: 2024-11-13
Payer: COMMERCIAL

## 2024-11-13 DIAGNOSIS — F43.20 ADJUSTMENT DISORDER, UNSPECIFIED TYPE: Primary | ICD-10-CM

## 2024-11-13 PROCEDURE — 90837 PSYTX W PT 60 MINUTES: CPT | Performed by: SOCIAL WORKER
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
## 2024-11-15 NOTE — TELEPHONE ENCOUNTER
I think the best thing we can do is have him see Ortho surgery or Pain management. Because that's nerve testing

## 2024-11-19 ENCOUNTER — TELEPHONE (OUTPATIENT)
Age: 84
End: 2024-11-19

## 2024-11-19 ENCOUNTER — OFFICE VISIT (OUTPATIENT)
Age: 84
End: 2024-11-19
Payer: COMMERCIAL

## 2024-11-19 VITALS — SYSTOLIC BLOOD PRESSURE: 135 MMHG | HEART RATE: 87 BPM | DIASTOLIC BLOOD PRESSURE: 75 MMHG

## 2024-11-19 DIAGNOSIS — I11.9 HYPERTENSIVE HEART DISEASE WITHOUT HEART FAILURE: ICD-10-CM

## 2024-11-19 DIAGNOSIS — M54.16 LUMBAR RADICULOPATHY: ICD-10-CM

## 2024-11-19 DIAGNOSIS — M54.9 MID BACK PAIN: Primary | ICD-10-CM

## 2024-11-19 PROCEDURE — 99213 OFFICE O/P EST LOW 20 MIN: CPT | Performed by: STUDENT IN AN ORGANIZED HEALTH CARE EDUCATION/TRAINING PROGRAM

## 2024-11-19 NOTE — PROGRESS NOTES
Pain Medicine Follow-Up Note    Assessment:  1. Mid back pain    2. Lumbar radiculopathy      Patient is a pleasant 84-year-old male who presents as a follow-up visit after last being seen on 9/3/2024 where he underwent trigger point injections in the left thoracic paraspinal muscles.  Patient reporting ongoing relief from the trigger point injections but now is having some rib pain that is burning on the left side along with some left buttocks pain.  Patient is done extensive mount of physical therapy for his mid and low back pain since May at [facility] and documentation was submitted today.  Pain is worse in the morning and at night and the pain is occasional.  The quality pain is a sharp pain.    On exam patient continues to have tenderness to palpation of the left thoracic spine along with the left lower back and buttocks.  Given patient has failed over 6 weeks of conservative management with physical therapy at home and help I think is reasonable to schedule patient for MRI of both the thoracic and lumbar spine given his current pain complaints.  Pending MRI results can consider injection therapy in the future.    Plan:  MRI Thoracic and lumbar spine  Continue physical therapy for mid and low back pain   Follow up as needed for repeat TPI  Orders Placed This Encounter   Procedures    MRI thoracic spine wo contrast     Standing Status:   Future     Expected Date:   11/19/2024     Expiration Date:   11/19/2028     Scheduling Instructions:      There is no preparation for this test. Please leave your jewelry and valuables at home, wedding rings are the exception. All patients will be required to change into a hospital gown and pants.  Street clothes are not permitted in the MRI.  Magnetic nail polish must be removed prior to arrival for your test. Please bring your insurance cards, a form of photo ID and a list of your medications with you. Arrive 15 minutes prior to your appointment time in order to  register. Please bring any prior CT or MRI studies of this area that were not performed at a [facility] facility.            To schedule this appointment, please contact Central Scheduling at [phone].            Prior to your appointment, please make sure you complete the MRI Screening Form when you e-Check in for your appointment. This will be available starting 7 days before your appointment in [facility]. You may receive an e-mail with an activation code if you do not have a [employer] account. If you do not have access to a device, we will complete your screening at your appointment.     Reason for Exam:   Thoracic and lumbar pain with failure of six weeks of conservative management     What is the patient's sedation requirement?:   No Sedation     Does the patient need medication for Claustrophobia? If yes, order medication at this point.:   No     Does the patient wear a life vest, have an implanted cardiac device, a stimulation device, a sleep apnea stimulator, or a breast tissue expansion device?:   No     Release to patient through [employer]:   Immediate    MRI lumbar spine wo contrast     Standing Status:   Future     Expected Date:   11/19/2024     Expiration Date:   11/19/2028     Scheduling Instructions:      There is no preparation for this test. Please leave your jewelry and valuables at home, wedding rings are the exception. All patients will be required to change into a hospital gown and pants.  Street clothes are not permitted in the MRI.  Magnetic nail polish must be removed prior to arrival for your test. Please bring your insurance cards, a form of photo ID and a list of your medications with you. Arrive 15 minutes prior to your appointment time in order to register. Please bring any prior CT or MRI studies of this area that were not performed at a [facility] facility.            To schedule this appointment, please contact Central Scheduling at [phone].            Prior to your  appointment, please make sure you complete the MRI Screening Form when you e-Check in for your appointment. This will be available starting 7 days before your appointment in [employer]. You may receive an e-mail with an activation code if you do not have a [employer] account. If you do not have access to a device, we will complete your screening at your appointment.     Reason for Exam:   Thoracic and lumbar pain with failure of six weeks of conservative management     What is the patient's sedation requirement?:   No Sedation     Does the patient need medication for Claustrophobia? If yes, order medication at this point.:   No     Does the patient wear a life vest, have an implanted cardiac device, a stimulation device, a sleep apnea stimulator, or a breast tissue expansion device?:   No     Release to patient through [employer]:   Immediate       No orders of the defined types were placed in this encounter.      My impressions and treatment recommendations were discussed in detail with the patient who verbalized understanding and had no further questions.        Follow-up is planned in six weeks time or sooner as warranted.  Discharge instructions were provided. I personally saw and examined the patient and I agree with the above discussed plan of care.    History of Present Illness:    Gonzales Sears is a 84 y.o. male who presents to [facility] for interval re-evaluation of the above stated pain complaints. The patient has a past medical and chronic pain history as outlined in the assessment section. He was last seen on 9/3/2024.    Patient is a pleasant 84-year-old male who presents as a follow-up visit after last being seen on 9/3/2024 where he underwent trigger point injections in the left thoracic paraspinal muscles.  Patient reporting ongoing relief from the trigger point injections but now is having some rib pain that is burning on the left side along with some left buttocks pain.   Patient is done extensive mount of physical therapy for his mid and low back pain since May at [facility] and documentation was submitted today.  Pain is worse in the morning and at night and the pain is occasional.  The quality pain is a sharp pain.      Other than as stated above, the patient denies any interval changes in medications, medical condition, mental condition, symptoms, or allergies since the last office visit.         Review of Systems:    Review of Systems   Constitutional:  Negative for unexpected weight change.   HENT:  Negative for ear pain.    Eyes:  Negative for visual disturbance.   Respiratory:  Negative for shortness of breath and wheezing.    Gastrointestinal:  Negative for abdominal pain.   Musculoskeletal:  Positive for back pain and gait problem.        Ribs on the right side is very sore, Decreased ROM, joint and muscle pain   Neurological:  Negative for weakness and numbness.   Psychiatric/Behavioral:  Positive for sleep disturbance. Negative for decreased concentration. The patient is nervous/anxious.          Past Medical History:   Diagnosis Date    Abnormal blood chemistry     resolved: 11/9/16    Abnormal glucose     last assessed: 9/24/14    Arthritis     CAD (coronary artery disease)     Chronic kidney disease     STONES  & CKD    Coronary atherosclerosis     Diabetes mellitus (HCC) ?    DM2 (diabetes mellitus, type 2) (HCC)     Dyslipidemia     Facial nerve disorder     Heart disease ,10, 12.,uesrs sgo,,?    HTN (hypertension)     Hyperlipidemia     last assessed: 1/13/17    Hypertension ?    Kidney stones     Lumbosacral radiculopathy     Nerve root and plexus disorder     Obesity     Osteoarthritis     Prostate asymmetry     Pure hypercholesterolemia        Past Surgical History:   Procedure Laterality Date    CORONARY ANGIOPLASTY      1 STENT     HAND SURGERY  3/21/24    VT NEUROPLASTY &/TRANSPOS MEDIAN NRV CARPAL TUNNE Right 03/21/2024    Procedure: RELEASE CARPAL  TUNNEL;  Surgeon: Vivian Borden MD;  Location: [facility];  Service: Orthopedics    MN UNLISTED PROCEDURE ESOPHAGUS N/A 09/30/2021    Procedure: EGD; DIVERTICULECTOMY ZENKERS ENDOSCOPIC;  Surgeon: Humble Franco MD;  Location:  MAIN OR;  Service: Thoracic    UPPER GASTROINTESTINAL ENDOSCOPY         Family History   Problem Relation Age of Onset    Cancer Mother     Ovarian cancer Mother     Hypertension Sister     Lung disease Father         black    Mental illness Neg Hx     Substance Abuse Neg Hx        Social History     Occupational History    Not on file   Tobacco Use    Smoking status: Never    Smokeless tobacco: Never   Vaping Use    Vaping status: Never Used   Substance and Sexual Activity    Alcohol use: Not Currently    Drug use: Never    Sexual activity: Not Currently     Partners: Female     Birth control/protection: Abstinence, Condom Male, Condom Female         Current Outpatient Medications:     aspirin 81 mg chewable tablet, Chew 81 mg daily 2x a week, Disp: , Rfl:     dapagliflozin (Farxiga) 5 MG TABS, Take 1 tablet (5 mg total) by mouth daily, Disp: 30 tablet, Rfl: 3    ezetimibe (ZETIA) 10 mg tablet, Take 1 tablet (10 mg total) by mouth daily, Disp: 90 tablet, Rfl: 0    famotidine (PEPCID) 40 MG tablet, TAKE ONE TABLET BY MOUTH EVERY DAY, Disp: 90 tablet, Rfl: 1    metFORMIN (GLUCOPHAGE) 500 mg tablet, TAKE ONE TABLET BY MOUTH TWICE A DAY WITH MEALS (GENERIC GLUCOPHAGE), Disp: 180 tablet, Rfl: 1    metoprolol succinate (TOPROL-XL) 25 mg 24 hr tablet, TAKE ONE TABLET BY MOUTH EVERY DAY, Disp: 30 tablet, Rfl: 0    Multiple Vitamin (multivitamin) tablet, Take 1 tablet by mouth daily, Disp: , Rfl:     simvastatin (ZOCOR) 40 mg tablet, TAKE 1 TABLET BY MOUTH DAILY AT  BEDTIME, Disp: 90 tablet, Rfl: 3    sitaGLIPtin (Januvia) 50 mg tablet, Take 1 tablet (50 mg total) by mouth daily, Disp: 90 tablet, Rfl: 1    spironolactone-hydrochlorothiazide (ALDACTAZIDE) 25-25 mg per tablet, Take 0.5  tablets by mouth every other day, Disp: 15 tablet, Rfl: 1    tamsulosin (FLOMAX) 0.4 mg, Take 1 capsule (0.4 mg total) by mouth daily with dinner, Disp: 90 capsule, Rfl: 3    trandolapril (MAVIK) 1 MG tablet, TAKE ONE TABLET BY MOUTH EVERY DAY, Disp: 90 tablet, Rfl: 1    methylPREDNISolone 4 MG tablet therapy pack, Use as directed on package (Patient not taking: Reported on 11/19/2024), Disp: 21 each, Rfl: 0    mupirocin (BACTROBAN) 2 % ointment, Apply topically 3 (three) times a day, Disp: 30 g, Rfl: 0    Allergies   Allergen Reactions    Ciprofloxacin Hives       Physical Exam:    /75 (Patient Position: Sitting, Cuff Size: Adult)   Pulse 87     Constitutional:normal, well developed, well nourished, alert, in no distress and non-toxic and no overt pain behavior.  Eyes:anicteric  HEENT:grossly intact  Neck:supple, symmetric, trachea midline and no masses   Pulmonary:even and unlabored  Cardiovascular:No edema or pitting edema present  Skin:Normal without rashes or lesions and well hydrated  Psychiatric:Mood and affect appropriate  Neurologic:Cranial Nerves II-XII grossly intact  Musculoskeletal:antalgic gait. TTP in left thoracic paraspinal muscles and left lower back and buttocks.       Imaging  Bilateral ribs without dedicated chest radiograph. Compared to chest radiograph of 9/26/2018, there is no evidence of rib fracture on either side. No obvious lytic or blastic bony lesions are seen. There is no underlying pneumothorax.     The visualized lung fields are grossly clear. No airspace consolidation or pulmonary edema noted. No effusions.  There appears to be a right internal jugular line in the superior vena cava.     IMPRESSION:     No evidence of left or right rib fracture.  No definite bony lesion or deformity of either rib to indicate prior trauma.  No obvious airspace consolidation or pneumothorax      Narrative & Impression   XR SPINE LUMBAR MINIMUM 4 VIEWS NON INJURY     INDICATION: M54.50: Low back  pain, unspecified  M54.16: Radiculopathy, lumbar region.     COMPARISON: 8/14/2023     FINDINGS:     No acute fracture. Intact pedicles.     Five non-rib-bearing lumbar vertebral bodies.     Normal alignment.     Progressive multilevel degenerative spondylosis     Lower thoracic bridging osteophytes suspicious for DISH     Unremarkable soft tissues. Persistent vascular calcifications     IMPRESSION:  Progressive degenerative changes     No acute osseous abnormality.          MRI thoracic spine wo contrast    (Results Pending)   MRI lumbar spine wo contrast    (Results Pending)         Orders Placed This Encounter   Procedures    MRI thoracic spine wo contrast    MRI lumbar spine wo contrast

## 2024-11-19 NOTE — TELEPHONE ENCOUNTER
"Caller: Gonzales \"Ernesto\"    Doctor: Gentry    Reason for call: Pt had no luck with medical records - they could not find any info on the stent.  They only had records going back 10 years with no info on a stent & he needs to know if he can get an MRI.    Please check to see if there is any info you or Dr Rinaldi can provide the patient & perhaps a Letter that he can provide to the MRI facility.    Call back#: [phone]  "

## 2024-11-19 NOTE — TELEPHONE ENCOUNTER
I called [facility] and spoke with Jessica and provided her the information regarding type of stent patient has as per  - cardiac stents are MRI compatible.

## 2024-11-19 NOTE — TELEPHONE ENCOUNTER
Patient Ernesto [phone] contacting office established patient of Dr. Rinaldi requesting information from 11 - 12 years ago when he had his stent.     Patient requesting the report to be faxed to [facility] in [facility].     Fax #: [phone]    Telephone number for [facility] is [phone]

## 2024-11-20 ENCOUNTER — TELEPHONE (OUTPATIENT)
Dept: PAIN MEDICINE | Facility: CLINIC | Age: 84
End: 2024-11-20

## 2024-11-20 ENCOUNTER — SOCIAL WORK (OUTPATIENT)
Dept: BEHAVIORAL/MENTAL HEALTH CLINIC | Facility: CLINIC | Age: 84
End: 2024-11-20
Payer: COMMERCIAL

## 2024-11-20 DIAGNOSIS — F43.20 ADJUSTMENT DISORDER, UNSPECIFIED TYPE: Primary | ICD-10-CM

## 2024-11-20 PROCEDURE — 90837 PSYTX W PT 60 MINUTES: CPT | Performed by: SOCIAL WORKER

## 2024-11-20 RX ORDER — METOPROLOL SUCCINATE 25 MG/1
25 TABLET, EXTENDED RELEASE ORAL DAILY
Qty: 30 TABLET | Refills: 0 | Status: SHIPPED | OUTPATIENT
Start: 2024-11-20

## 2024-11-20 NOTE — TELEPHONE ENCOUNTER
Caller: [facility]    Doctor: Dav    Reason for call: They needs most recent office notes faxed over     Call back#: [phone]    Best contact [phone]    Done

## 2024-11-20 NOTE — PSYCH
"This note was not shared with the patient due to this is a psychotherapy note    Behavioral Health Psychotherapy Progress Note    Psychotherapy Provided: Individual Psychotherapy     1. Adjustment disorder, unspecified type            DATA:   Ernesto is a 84-year old,  male who presented for a follow-up outpatient individual counseling session.  Prior to today's session, on 2024, Ernesto had an office visit with pain management for the follow-up treatment of mid-back pain.   A review of Ernesto's medical record revealed, he was last seen on September 3, 2024 and received trigger point injections in the left thoracic paraspinal muscles.   He reported ongoing relief from the trigger point injections but was having some rib pain that is burning on the left side along with some left buttocks pain.   He was scheduled for a MRI of both thoracic and lumbar spine given his pain complaints.   Ernesto will continue to receive PT services.    For today's session with the undersigned therapist, Ernesto feels tired because he didn't sleep well last night as it's been an \"emotional week\" for him.    Ernesto is feeling sad and depressed about his  wife.   He denied suicidal ideation, gesture or plan.   There was no evidence of a thought disorder or psychosis.  Ernesto provided detailed historical information regarding both his marriages.   He was  to his first wife at age 23.  They were  for 18 years.   His first wife had multiple extramarital affairs and Ernesto questions if she was a lesbian or bi-sexual.    Ernesto provided historical information about his second marriage to Sanjuanita who recently passed away.   The undersigned therapist provided Ernesto with grief counseling.     During this session, this clinician used the following therapeutic modalities: Bereavement Therapy and Supportive Psychotherapy    Substance Abuse was not addressed during this session. If the client is diagnosed with a co-occurring substance use " "disorder, please indicate any changes in the frequency or amount of use: NA. Stage of change for addressing substance use diagnoses: No substance use/Not applicable    ASSESSMENT:  Ernesto Sears presents with a Depressed and sad  mood.    His affect is Normal range and intensity, which is congruent, with his mood and the content of the session. The client has not made progress on their goals.    Ernesto Sears presents with a none risk of suicide, none risk of self-harm, and none risk of harm to others.    For any risk assessment that surpasses a \"low\" rating, a safety plan must be developed.    A safety plan was indicated: no  If yes, describe in detail:  We have discussed their safety plan and Ernesto agrees that if they experience unsafe thoughts that they will reach out to their supports including this office, the suicide hotline, and emergency services if necessary. Ernesto is aware of non-emergent and emergent mental health resources.    Visit start and stop times:    11/20/24  Start Time: 0806  End Time:  9:00 AM  "

## 2024-11-25 ENCOUNTER — TELEPHONE (OUTPATIENT)
Age: 84
End: 2024-11-25

## 2024-11-25 LAB
LEFT EYE DIABETIC RETINOPATHY: NORMAL
RIGHT EYE DIABETIC RETINOPATHY: NORMAL

## 2024-11-25 NOTE — TELEPHONE ENCOUNTER
Patient called in [facility] he had his diabetic eye exam done with Dr. Petersen and stated his eyes are good and was negative for diabetic retinopathy. Patient stated he did not ask them to send the report over.    Phone number for eye doctors office is [phone] if Dr. Flores would like the report faxed over.    Thank you.

## 2024-11-27 ENCOUNTER — TELEPHONE (OUTPATIENT)
Dept: ADMINISTRATIVE | Facility: OTHER | Age: 84
End: 2024-11-27

## 2024-11-27 NOTE — LETTER
Diabetic Eye Exam Form     Date Requested: 24  Patient: Gonzales Sears  Patient : [id number]   Referring Provider: Mary Ulloa MD      DIABETIC Eye Exam Date _______________________________      Type of Exam MUST be documented for Diabetic Eye Exams. Please CHECK ONE.     Retinal Exam       Dilated Retinal Exam       OCT       Optomap-Iris Exam      Fundus Photography       Left Eye - Please check Retinopathy or No Retinopathy        Exam did show retinopathy    Exam did not show retinopathy       Right Eye - Please check Retinopathy or No Retinopathy       Exam did show retinopathy    Exam did not show retinopathy       Comments __________________________________________________________    Practice Providing Exam ______________________________________________    Exam Performed By (print name) _______________________________________      Provider Signature ___________________________________________________      These reports are needed for  compliance.  Please fax this completed form and a copy of the Diabetic Eye Exam report to our office located at [address] [place] [address] as soon as possible via Fax [phone] attention Mary: Phone [phone]  We thank you for your assistance in treating our mutual patient.

## 2024-11-27 NOTE — TELEPHONE ENCOUNTER
Upon review of the In Basket request and the patient's chart, initial outreach has been made via fax to facility. Please see Contacts section for details.     Thank you  Mary Fan MA

## 2024-11-27 NOTE — TELEPHONE ENCOUNTER
----- Message from Amelia CEDILLO sent at 11/26/2024  8:28 AM EST -----  Regarding: care gap request  11/26/24 8:28 AM    Hello, our patient attached above has had Diabetic Eye Exam completed/performed. Please assist in updating the patient chart by making an External outreach to Dr Petersen facility located in [place]. The date of service is 2024.    Thank you,  Amelia FLANNERY

## 2024-12-02 NOTE — TELEPHONE ENCOUNTER
Upon review of the In Basket request we were able to locate, review, and update the patient chart as requested for Diabetic Eye Exam.    Any additional questions or concerns should be emailed to the Practice Liaisons via the appropriate education email address, please do not reply via In Basket.    Thank you  Mary Fan MA   [facility]

## 2024-12-04 ENCOUNTER — SOCIAL WORK (OUTPATIENT)
Dept: BEHAVIORAL/MENTAL HEALTH CLINIC | Facility: CLINIC | Age: 84
End: 2024-12-04
Payer: COMMERCIAL

## 2024-12-04 DIAGNOSIS — F43.20 ADJUSTMENT DISORDER, UNSPECIFIED TYPE: Primary | ICD-10-CM

## 2024-12-04 PROCEDURE — 90837 PSYTX W PT 60 MINUTES: CPT | Performed by: SOCIAL WORKER

## 2024-12-04 NOTE — PSYCH
This note was not shared with the patient due to this is a psychotherapy note    Behavioral Health Psychotherapy Progress Note    Psychotherapy Provided: Individual Psychotherapy     1. Adjustment disorder, unspecified type            DATA:   Ernesto is a 84-year old,  male who presented for a follow-up outpatient individual counseling session after an approximate two-weeks service gap as the undersigned therapist cancelled last week's session due to being on vacation.   Prior to today's session with the undersigned therapist, Ernesto had two MRI's on his chest and back.   According to Ernesto, the results of the MRI were unremarkable and there was nothing medically to be concerned about.   Ernesto had requested the MRI due to experiencing ribs soreness for months.   Ernesto is now worried about pain and the functionality of his hand.   Ernesto reports experiencing a pleasant Thanksgiving with friends.   This was the first Thanksgiving he celebrated without his  wife. The undersigned therapist assisted Ernesto process his feelings about  his first wife and meeting his second wife who recently passed away.   The undersigned therapist provided Ernesto with grief counseling.    During this session, this clinician used the following therapeutic modalities: Bereavement Therapy and Supportive Psychotherapy    Substance Abuse was not addressed during this session. If the client is diagnosed with a co-occurring substance use disorder, please indicate any changes in the frequency or amount of use: NA. Stage of change for addressing substance use diagnoses: No substance use/Not applicable    ASSESSMENT:  Ernesto Sears presents with a Depressed mood.    His affect is Normal range and intensity, which is congruent, with his mood and the content of the session. The client has not made progress on their goals.    Ernesto Sears presents with a none risk of suicide, none risk of self-harm, and none risk of harm to others.    For any risk  "assessment that surpasses a \"low\" rating, a safety plan must be developed.    A safety plan was indicated: no  If yes, describe in detail:  We have discussed their safety plan and Ernesto agrees that if they experience unsafe thoughts that they will reach out to their supports including this office, the suicide hotline, and emergency services if necessary. Ernesto is aware of non-emergent and emergent mental health resources.    Visit start and stop times:    12/04/24  Start Time: 0803  End Time:  9:00 AM  "

## 2024-12-06 ENCOUNTER — RESULTS FOLLOW-UP (OUTPATIENT)
Dept: OTHER | Facility: HOSPITAL | Age: 84
End: 2024-12-06

## 2024-12-06 NOTE — TELEPHONE ENCOUNTER
----- Message from Jose Ramirez MD sent at 12/6/2024 10:54 AM EST -----  No significant disc herniations on MRI thoracic spine, MRI lumbar spine with multilevel disease with both lumbar facet arthropathy and multiple disc herniations at L3-L4, L4-L5 and L5-S1. Would like to discuss in office next steps to assess symptoms.

## 2024-12-06 NOTE — TELEPHONE ENCOUNTER
Attempted to contact pt Voice mail box is full, unable to leave message.    If pt calls back, please schedule OVS. Thank you

## 2024-12-11 ENCOUNTER — SOCIAL WORK (OUTPATIENT)
Dept: BEHAVIORAL/MENTAL HEALTH CLINIC | Facility: CLINIC | Age: 84
End: 2024-12-11
Payer: COMMERCIAL

## 2024-12-11 DIAGNOSIS — F43.20 ADJUSTMENT DISORDER, UNSPECIFIED TYPE: Primary | ICD-10-CM

## 2024-12-11 PROCEDURE — 90837 PSYTX W PT 60 MINUTES: CPT | Performed by: SOCIAL WORKER

## 2024-12-11 NOTE — PSYCH
"This note was not shared with the patient due to this is a psychotherapy note    Behavioral Health Psychotherapy Progress Note    Psychotherapy Provided: Individual Psychotherapy     1. Adjustment disorder, unspecified type            DATA:   Ernesto is a 84-year old,  male who presented for a follow-up outpatient individual counseling session.   He reports that counseling with the undersigned therapist has been a positive experience as he feels the undersigned therapist has more knowledge than his previous therapist.    Ernesto feels he is getting better as he is coping with his grief better and has experienced less emotional breakdowns.  Ernesto was introspective as he discussed how the death of his wife took away his primary purpose in life.  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  The undersigned therapist assisted Ernesto define a new purpose in his life.    Ernesto admits he was in denial about his severity of his wife's medical condition and often minimized her symptoms.   He continues to \"beat himself\" up emotionally for that.    The undersigned therapist assisted Ernesto process his resentment for his  wife's relationship with her son.   The undersigned therapist provided Ernesto with grief counseling.    During this session, this clinician used the following therapeutic modalities: Bereavement Therapy    Substance Abuse was not addressed during this session. If the client is diagnosed with a co-occurring substance use disorder, please indicate any changes in the frequency or amount of use: NA. Stage of change for addressing substance use diagnoses: No substance use/Not applicable    ASSESSMENT:  Ernesto Sears presents with a Depressed mood.    His affect is Normal range and intensity, which is congruent, with his mood and the content of the session. The client has not made progress on their goals.    Ernesto Sears presents with a none risk of suicide, none risk of self-harm, and none risk of harm to " "others.    For any risk assessment that surpasses a \"low\" rating, a safety plan must be developed.    A safety plan was indicated: no  If yes, describe in detail:  We have discussed their safety plan and Ernesto agrees that if they experience unsafe thoughts that they will reach out to their supports including this office, the suicide hotline, and emergency services if necessary. Ernesto is aware of non-emergent and emergent mental health resources.    Depression Follow-up Plan Completed: No    Visit start and stop times:    12/11/24  Start Time: 0807  End Time:  9:00 AM  "

## 2024-12-17 ENCOUNTER — OFFICE VISIT (OUTPATIENT)
Age: 84
End: 2024-12-17
Payer: COMMERCIAL

## 2024-12-17 VITALS — HEART RATE: 70 BPM | SYSTOLIC BLOOD PRESSURE: 121 MMHG | DIASTOLIC BLOOD PRESSURE: 77 MMHG

## 2024-12-17 DIAGNOSIS — M47.816 LUMBAR SPONDYLOSIS: ICD-10-CM

## 2024-12-17 DIAGNOSIS — M79.18 MYOFASCIAL PAIN SYNDROME: Primary | ICD-10-CM

## 2024-12-17 DIAGNOSIS — M51.26 LUMBAR DISC HERNIATION: ICD-10-CM

## 2024-12-17 DIAGNOSIS — G89.4 CHRONIC PAIN SYNDROME: ICD-10-CM

## 2024-12-17 DIAGNOSIS — M79.641 RIGHT HAND PAIN: ICD-10-CM

## 2024-12-17 PROCEDURE — G2211 COMPLEX E/M VISIT ADD ON: HCPCS | Performed by: STUDENT IN AN ORGANIZED HEALTH CARE EDUCATION/TRAINING PROGRAM

## 2024-12-17 PROCEDURE — 99213 OFFICE O/P EST LOW 20 MIN: CPT | Performed by: STUDENT IN AN ORGANIZED HEALTH CARE EDUCATION/TRAINING PROGRAM

## 2024-12-17 NOTE — PROGRESS NOTES
Pain Medicine Follow-Up Note    Assessment:  1. Myofascial pain syndrome    2. Lumbar disc herniation    3. Right hand pain    4. Lumbar spondylosis    5. Chronic pain syndrome      Patient is a pleasant 84-year-old male who presents as a follow-up visit after last being seen on 11/19/2024 for mid back pain. At that time patient was counseled to continue physical therapy for his mid and low back pain and the follow-up as needed for repeat trigger point injections.  Patient was also complaining of left lower back and buttocks pain so decision was made to get an MRI of the thoracic and lumbar spine.  Patient had no significant disc herniation on MRI of the thoracic spine and his MRI of the lumbar spine showed multilevel disease with both lumbar facet arthropathy and multiple disc herniations at L3-L4, L4-L5 and L5-S1.  Today patient's states symptoms are the same rating his pain 7 out of 10 on numeric rating scale.  The pain is sharp, throbbing, cramping primarily in his right mid back.    Extensive discussion with patient regards to MRI findings.  Patient states his function is not impacted by his pain currently so would like to hold off on any further injection therapy or medication at this time which I think is very reasonable.  Discussed with patient that if symptoms were to worsen to follow-up with our office.  Plan:  Patient to follow up as needed for repeat TPI of thoracic paraspinal muscles if needed  No significant symptoms from lumbar degenerative changes, no interventions at this time.   No orders of the defined types were placed in this encounter.      No orders of the defined types were placed in this encounter.      My impressions and treatment recommendations were discussed in detail with the patient who verbalized understanding and had no further questions.        Follow-up is planned as warranted.  Discharge instructions were provided. I personally saw and examined the patient and I agree with the  above discussed plan of care.    History of Present Illness:    Gonzales Sears is a 84 y.o. male who presents to [facility] for interval re-evaluation of the above stated pain complaints. The patient has a past medical and chronic pain history as outlined in the assessment section. He was last seen on 11/19/2024.    Patient is a pleasant 84-year-old male who presents as a follow-up visit after last being seen on 11/19/2024 for mid back pain. At that time patient was counseled to continue physical therapy for his mid and low back pain and the follow-up as needed for repeat trigger point injections.  Patient was also complaining of left lower back and buttocks pain so decision was made to get an MRI of the thoracic and lumbar spine.  Patient had no significant disc herniation on MRI of the thoracic spine and his MRI of the lumbar spine showed multilevel disease with both lumbar facet arthropathy and multiple disc herniations at L3-L4, L4-L5 and L5-S1.  Today patient's states symptoms are the same rating his pain 7 out of 10 on numeric rating scale.  The pain is sharp, throbbing, cramping primarily in his right mid back.      Other than as stated above, the patient denies any interval changes in medications, medical condition, mental condition, symptoms, or allergies since the last office visit.         Review of Systems:    Review of Systems   Constitutional:  Negative for unexpected weight change.   HENT:  Negative for ear pain.    Eyes:  Negative for visual disturbance.   Respiratory:  Negative for shortness of breath and wheezing.    Gastrointestinal:  Negative for abdominal pain.   Musculoskeletal:  Positive for back pain and gait problem.        Joint and muscle pain   Neurological:  Negative for weakness and numbness.   Psychiatric/Behavioral:  Positive for dysphoric mood and sleep disturbance. Negative for decreased concentration.          Past Medical History:   Diagnosis Date   •  Abnormal blood chemistry     resolved: 11/9/16   • Abnormal glucose     last assessed: 9/24/14   • Arthritis    • CAD (coronary artery disease)    • Chronic kidney disease     STONES  & CKD   • Coronary atherosclerosis    • Diabetes mellitus (HCC) ?   • DM2 (diabetes mellitus, type 2) (HCC)    • Dyslipidemia    • Facial nerve disorder    • Heart disease ,10, 12.,uesrs sgo,,?   • HTN (hypertension)    • Hyperlipidemia     last assessed: 1/13/17   • Hypertension ?   • Kidney stones    • Lumbosacral radiculopathy    • Nerve root and plexus disorder    • Obesity    • Osteoarthritis    • Prostate asymmetry    • Pure hypercholesterolemia        Past Surgical History:   Procedure Laterality Date   • CORONARY ANGIOPLASTY      1 STENT    • HAND SURGERY  3/21/24   • MD NEUROPLASTY &/TRANSPOS MEDIAN NRV CARPAL TUNNE Right 03/21/2024    Procedure: RELEASE CARPAL TUNNEL;  Surgeon: Vivian Borden MD;  Location: [facility];  Service: Orthopedics   • MD UNLISTED PROCEDURE ESOPHAGUS N/A 09/30/2021    Procedure: EGD; DIVERTICULECTOMY ZENKERS ENDOSCOPIC;  Surgeon: Humble Franco MD;  Location:  MAIN OR;  Service: Thoracic   • UPPER GASTROINTESTINAL ENDOSCOPY         Family History   Problem Relation Age of Onset   • Cancer Mother    • Ovarian cancer Mother    • Hypertension Sister    • Lung disease Father         black   • Mental illness Neg Hx    • Substance Abuse Neg Hx        Social History     Occupational History   • Not on file   Tobacco Use   • Smoking status: Never   • Smokeless tobacco: Never   Vaping Use   • Vaping status: Never Used   Substance and Sexual Activity   • Alcohol use: Not Currently   • Drug use: Never   • Sexual activity: Not Currently     Partners: Female     Birth control/protection: Abstinence, Condom Male, Condom Female         Current Outpatient Medications:   •  aspirin 81 mg chewable tablet, Chew 81 mg daily 2x a week, Disp: , Rfl:   •  dapagliflozin (Farxiga) 5 MG TABS, Take 1 tablet (5 mg  total) by mouth daily, Disp: 30 tablet, Rfl: 3  •  ezetimibe (ZETIA) 10 mg tablet, Take 1 tablet (10 mg total) by mouth daily, Disp: 90 tablet, Rfl: 0  •  famotidine (PEPCID) 40 MG tablet, TAKE ONE TABLET BY MOUTH EVERY DAY, Disp: 90 tablet, Rfl: 1  •  metFORMIN (GLUCOPHAGE) 500 mg tablet, TAKE ONE TABLET BY MOUTH TWICE A DAY WITH MEALS (GENERIC GLUCOPHAGE), Disp: 180 tablet, Rfl: 1  •  metoprolol succinate (TOPROL-XL) 25 mg 24 hr tablet, TAKE ONE TABLET BY MOUTH EVERY DAY, Disp: 30 tablet, Rfl: 0  •  Multiple Vitamin (multivitamin) tablet, Take 1 tablet by mouth daily, Disp: , Rfl:   •  simvastatin (ZOCOR) 40 mg tablet, TAKE 1 TABLET BY MOUTH DAILY AT  BEDTIME, Disp: 90 tablet, Rfl: 3  •  sitaGLIPtin (Januvia) 50 mg tablet, Take 1 tablet (50 mg total) by mouth daily, Disp: 90 tablet, Rfl: 1  •  spironolactone-hydrochlorothiazide (ALDACTAZIDE) 25-25 mg per tablet, Take 0.5 tablets by mouth every other day, Disp: 15 tablet, Rfl: 1  •  tamsulosin (FLOMAX) 0.4 mg, Take 1 capsule (0.4 mg total) by mouth daily with dinner, Disp: 90 capsule, Rfl: 3  •  trandolapril (MAVIK) 1 MG tablet, TAKE ONE TABLET BY MOUTH EVERY DAY, Disp: 90 tablet, Rfl: 1  •  methylPREDNISolone 4 MG tablet therapy pack, Use as directed on package (Patient not taking: Reported on 12/17/2024), Disp: 21 each, Rfl: 0  •  mupirocin (BACTROBAN) 2 % ointment, Apply topically 3 (three) times a day, Disp: 30 g, Rfl: 0    Allergies   Allergen Reactions   • Ciprofloxacin Hives       Physical Exam:    /77 (Patient Position: Sitting, Cuff Size: Adult)   Pulse 70     Constitutional:normal, well developed, well nourished, alert, in no distress and non-toxic and no overt pain behavior.  Eyes:anicteric  HEENT:grossly intact  Neck:supple, symmetric, trachea midline and no masses   Pulmonary:even and unlabored  Cardiovascular:No edema or pitting edema present  Skin:Normal without rashes or lesions and well hydrated  Psychiatric:Mood and affect  appropriate  Neurologic:Cranial Nerves II-XII grossly intact  Musculoskeletal:normal gait.       Imaging        No orders to display         No orders of the defined types were placed in this encounter.

## 2024-12-18 ENCOUNTER — SOCIAL WORK (OUTPATIENT)
Dept: BEHAVIORAL/MENTAL HEALTH CLINIC | Facility: CLINIC | Age: 84
End: 2024-12-18
Payer: COMMERCIAL

## 2024-12-18 DIAGNOSIS — F43.20 ADJUSTMENT DISORDER, UNSPECIFIED TYPE: Primary | ICD-10-CM

## 2024-12-18 PROCEDURE — 90834 PSYTX W PT 45 MINUTES: CPT | Performed by: SOCIAL WORKER

## 2024-12-18 NOTE — PSYCH
"This note was not shared with the patient due to this is a psychotherapy note    Behavioral Health Psychotherapy Progress Note    Psychotherapy Provided: Individual Psychotherapy     1. Adjustment disorder, unspecified type            DATA:   Ernesto is a 84-year old,  male who presented for a follow-up outpatient individual counseling session.   Prior to today's session, on November 19, 2024, Ernesto had an office visit with pain medicine for follow-up treatment of mid-back pain.   A review of Ernesto's medical record revealed he was previously diagnosed with myofacial pain syndrome, lumbar disc herniation, right hand pain, lumbar spondylosis, and chronic pain syndrome.   For today's session with the undersigned therapist, at Ernesto's request, the undersigned therapist reviewed poems from [employer].   The undersigned therapist provided Ernesto with grief counseling.    During this session, this clinician used the following therapeutic modalities: Bereavement Therapy    Substance Abuse was not addressed during this session. If the client is diagnosed with a co-occurring substance use disorder, please indicate any changes in the frequency or amount of use: NA. Stage of change for addressing substance use diagnoses: No substance use/Not applicable    ASSESSMENT:  Ernesto Sears presents with a Depressed mood.    His affect is Normal range and intensity, which is congruent, with his mood and the content of the session. The client has not made progress on their goals.    Ernesto Sears presents with a none risk of suicide, none risk of self-harm, and none risk of harm to others.    For any risk assessment that surpasses a \"low\" rating, a safety plan must be developed.    A safety plan was indicated: no  If yes, describe in detail: We have discussed their safety plan and Ernesto agrees that if they experience unsafe thoughts that they will reach out to their supports including this office, the suicide hotline, and emergency " services if necessary. Ernesto is aware of non-emergent and emergent mental health resources.     Depression Follow-up Plan Completed: No    Visit start and stop times:    12/18/24  Start Time: 0802  End Time:  8:50 AM

## 2024-12-26 DIAGNOSIS — I11.9 HYPERTENSIVE HEART DISEASE WITHOUT HEART FAILURE: ICD-10-CM

## 2024-12-26 RX ORDER — METOPROLOL SUCCINATE 25 MG/1
25 TABLET, EXTENDED RELEASE ORAL DAILY
Qty: 90 TABLET | Refills: 1 | Status: SHIPPED | OUTPATIENT
Start: 2024-12-26

## 2025-01-08 ENCOUNTER — SOCIAL WORK (OUTPATIENT)
Dept: BEHAVIORAL/MENTAL HEALTH CLINIC | Facility: CLINIC | Age: 85
End: 2025-01-08
Payer: COMMERCIAL

## 2025-01-08 DIAGNOSIS — F43.20 ADJUSTMENT DISORDER, UNSPECIFIED TYPE: Primary | ICD-10-CM

## 2025-01-08 PROCEDURE — 90837 PSYTX W PT 60 MINUTES: CPT | Performed by: SOCIAL WORKER

## 2025-01-08 NOTE — PSYCH
"This note was not shared with the patient due to this is a psychotherapy note    Behavioral Health Psychotherapy Progress Note    Psychotherapy Provided: Individual Psychotherapy     1. Adjustment disorder, unspecified type          DATA:  Ernesto is a 85-year old,  male who presented for a follow-up outpatient individual counseling session after an approximate three-weeks service gap as two of his previously-scheduled sessions were cancelled due to the office being closed for the  and New Year's holidays.  Ernesto's wife passed away in 2024.   Since then, Ernesto has been struggling to cope with his wife's death.  Ernesto has been going to the gym every day.  He signed up for five senior trips.  The undersigned therapist assisted Ernesto process his feelings about his  wife's conflicted relationship with his sister.   The undersigned therapist provided Ernesto with grief counseling.    During this session, this clinician used the following therapeutic modalities: Bereavement Therapy    Substance Abuse was not addressed during this session. If the client is diagnosed with a co-occurring substance use disorder, please indicate any changes in the frequency or amount of use: NA. Stage of change for addressing substance use diagnoses: No substance use/Not applicable    ASSESSMENT:  Ernesto Sears presents with a  sad  mood.     His affect is Normal range and intensity, which is congruent, with his mood and the content of the session. The client has made progress on their goals.     Ernesto Sears presents with a none risk of suicide, none risk of self-harm, and none risk of harm to others.    For any risk assessment that surpasses a \"low\" rating, a safety plan must be developed.    A safety plan was indicated: no  If yes, describe in detail:  We have discussed their safety plan and Ernesto agrees that if they experience unsafe thoughts that they will reach out to their supports including this office, the suicide " hotline, and emergency services if necessary. Ernesto is aware of non-emergent and emergent mental health resources.    Depression Follow-up Plan Completed: No    Visit start and stop times:    01/08/25  Start Time: 1503  End Time:  3:56 PM

## 2025-01-10 ENCOUNTER — OFFICE VISIT (OUTPATIENT)
Dept: FAMILY MEDICINE CLINIC | Facility: CLINIC | Age: 85
End: 2025-01-10
Payer: COMMERCIAL

## 2025-01-10 VITALS
SYSTOLIC BLOOD PRESSURE: 120 MMHG | OXYGEN SATURATION: 96 % | RESPIRATION RATE: 18 BRPM | HEIGHT: 67 IN | TEMPERATURE: 98.5 F | HEART RATE: 64 BPM | DIASTOLIC BLOOD PRESSURE: 82 MMHG | WEIGHT: 220.4 LBS | BODY MASS INDEX: 34.59 KG/M2

## 2025-01-10 DIAGNOSIS — E11.22 TYPE 2 DIABETES MELLITUS WITH STAGE 3 CHRONIC KIDNEY DISEASE AND HYPERTENSION (HCC): ICD-10-CM

## 2025-01-10 DIAGNOSIS — J04.0 LARYNGITIS: Primary | ICD-10-CM

## 2025-01-10 DIAGNOSIS — R49.9 CHANGE IN VOICE: ICD-10-CM

## 2025-01-10 DIAGNOSIS — N18.30 TYPE 2 DIABETES MELLITUS WITH STAGE 3 CHRONIC KIDNEY DISEASE AND HYPERTENSION (HCC): ICD-10-CM

## 2025-01-10 DIAGNOSIS — M89.9 CHRONIC KIDNEY DISEASE-MINERAL BONE DISORDER (CKD-MBD) WITH STAGE 3B CHRONIC KIDNEY DISEASE (HCC): ICD-10-CM

## 2025-01-10 DIAGNOSIS — J02.9 SORE THROAT: ICD-10-CM

## 2025-01-10 DIAGNOSIS — I12.9 TYPE 2 DIABETES MELLITUS WITH STAGE 3 CHRONIC KIDNEY DISEASE AND HYPERTENSION (HCC): ICD-10-CM

## 2025-01-10 DIAGNOSIS — E83.9 CHRONIC KIDNEY DISEASE-MINERAL BONE DISORDER (CKD-MBD) WITH STAGE 3B CHRONIC KIDNEY DISEASE (HCC): ICD-10-CM

## 2025-01-10 DIAGNOSIS — N18.32 CHRONIC KIDNEY DISEASE-MINERAL BONE DISORDER (CKD-MBD) WITH STAGE 3B CHRONIC KIDNEY DISEASE (HCC): ICD-10-CM

## 2025-01-10 DIAGNOSIS — F33.9 DEPRESSION, RECURRENT (HCC): ICD-10-CM

## 2025-01-10 PROCEDURE — 99214 OFFICE O/P EST MOD 30 MIN: CPT | Performed by: FAMILY MEDICINE

## 2025-01-10 RX ORDER — AZITHROMYCIN 250 MG/1
TABLET, FILM COATED ORAL
Qty: 6 TABLET | Refills: 0 | Status: SHIPPED | OUTPATIENT
Start: 2025-01-10 | End: 2025-01-17

## 2025-01-10 RX ORDER — METHYLPREDNISOLONE 4 MG/1
TABLET ORAL
Qty: 21 EACH | Refills: 0 | Status: SHIPPED | OUTPATIENT
Start: 2025-01-10

## 2025-01-10 NOTE — PROGRESS NOTES
Gonzales Sears 1940 male MRN: [id number]    [facility] PRACTICE OFFICE VISIT  [facility]      ASSESSMENT/PLAN  Gonzales Sears is a 84 y.o. 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  Type 2 diabetes continue to monitor will see him in the next month for an A1c recheck.  Depression is currently stable grieving appropriately.  CKD stable      Depression Screening and Follow-up Plan: Patient's depression screening was positive with a PHQ-9 score of 6.   Patient advised to follow-up with PCP for further management.         Future Appointments   Date Time Provider Department Center   1/22/2025  9:00 AM Travis Forbes [facility]   1/24/2025 10:30 AM Mary Ulloa MD [facility]   1/29/2025  9:00 AM Travis Forbes [facility]   2/14/2025  8:00 AM Travis Forbes [facility]   2/28/2025  2:00 PM Travis Forbes [facility]   3/31/2025  1:30 PM Fredrick Quiroz MD [facility]   7/7/2025  3:40 PM Venkat Rinaldi MD [facility]          SUBJECTIVE  CC: Nasal  Congestion (For 2-3 weeks runny nose, body aches and congestion , 2 days ago sore throat sneezing , no energy or appetite/)      HPI:  Gonzales Sears is a 84 y.o. male who presents for an acute appointment.  Patient unfortunately has had 2 to 3 weeks of runny nose body aches and congestion.  States the last 2 days he has had no energy or appetite.  States that he is only been feeling worse.  States he has no other changes to any of his chronic conditions.        Review of Systems   Constitutional:  Negative for activity change, appetite change, chills, fatigue and fever.   HENT:  Positive for congestion, sinus pressure, sinus pain, sneezing and sore throat.    Respiratory:  Positive for cough. Negative for chest tightness and shortness of breath.    Cardiovascular:  Negative for chest pain and leg swelling.   Gastrointestinal:  Negative for abdominal distention, abdominal pain, constipation, diarrhea, nausea and vomiting.   All other systems reviewed and are negative.      Historical Information   The patient history was reviewed as follows:  Past Medical History:   Diagnosis Date   • Abnormal blood chemistry     resolved: 11/9/16   • Abnormal glucose     last assessed: 9/24/14   • Anxiety ?   • Arthritis    • CAD (coronary artery disease)    • Chronic kidney disease     STONES  & CKD   • Coronary atherosclerosis    • Diabetes mellitus (HCC) ?   • DM2 (diabetes mellitus, type 2) (HCC)    • Dyslipidemia    • Facial nerve disorder    • GERD (gastroesophageal reflux disease)    • Heart disease ,10, 12.,uesrs sgo,,?   • HTN (hypertension)    • Hyperlipidemia     last assessed: 1/13/17   • Hypertension ?   • Kidney stones    • Lumbosacral radiculopathy    • Nerve root and plexus disorder    • Obesity    • Osteoarthritis    • Prostate asymmetry    • Pure hypercholesterolemia          Medications:     Current Outpatient Medications:   •  aspirin 81 mg chewable tablet, Chew 81 mg daily 2x a week, Disp: , Rfl:   •   "azithromycin (ZITHROMAX) 250 mg tablet, Take 2 tablets today then 1 tablet daily x 4 days, Disp: 6 tablet, Rfl: 0  •  dapagliflozin (Farxiga) 5 MG TABS, Take 1 tablet (5 mg total) by mouth daily, Disp: 30 tablet, Rfl: 3  •  ezetimibe (ZETIA) 10 mg tablet, Take 1 tablet (10 mg total) by mouth daily, Disp: 90 tablet, Rfl: 0  •  famotidine (PEPCID) 40 MG tablet, TAKE ONE TABLET BY MOUTH EVERY DAY, Disp: 90 tablet, Rfl: 1  •  metFORMIN (GLUCOPHAGE) 500 mg tablet, TAKE ONE TABLET BY MOUTH TWICE A DAY WITH MEALS (GENERIC GLUCOPHAGE), Disp: 180 tablet, Rfl: 1  •  methylPREDNISolone 4 MG tablet therapy pack, Use as directed on package, Disp: 21 each, Rfl: 0  •  methylPREDNISolone 4 MG tablet therapy pack, Use as directed on package, Disp: 21 each, Rfl: 0  •  metoprolol succinate (TOPROL-XL) 25 mg 24 hr tablet, TAKE ONE TABLET BY MOUTH EVERY DAY, Disp: 90 tablet, Rfl: 1  •  Multiple Vitamin (multivitamin) tablet, Take 1 tablet by mouth daily, Disp: , Rfl:   •  Multiple Vitamins-Minerals (PRESERVISION AREDS 2 PO), Take by mouth, Disp: , Rfl:   •  simvastatin (ZOCOR) 40 mg tablet, TAKE 1 TABLET BY MOUTH DAILY AT  BEDTIME, Disp: 90 tablet, Rfl: 3  •  sitaGLIPtin (Januvia) 50 mg tablet, Take 1 tablet (50 mg total) by mouth daily, Disp: 90 tablet, Rfl: 1  •  spironolactone-hydrochlorothiazide (ALDACTAZIDE) 25-25 mg per tablet, Take 0.5 tablets by mouth every other day, Disp: 15 tablet, Rfl: 1  •  tamsulosin (FLOMAX) 0.4 mg, Take 1 capsule (0.4 mg total) by mouth daily with dinner, Disp: 90 capsule, Rfl: 3  •  trandolapril (MAVIK) 1 MG tablet, TAKE ONE TABLET BY MOUTH EVERY DAY, Disp: 90 tablet, Rfl: 1    Allergies   Allergen Reactions   • Ciprofloxacin Hives       OBJECTIVE  Vitals:   Vitals:    01/10/25 0907   BP: 120/82   BP Location: Left arm   Patient Position: Sitting   Cuff Size: Large   Pulse: 64   Resp: 18   Temp: 98.5 °F (36.9 °C)   TempSrc: Temporal   SpO2: 96%   Weight: 100 kg (220 lb 6.4 oz)   Height: 5' 7\" (1.702 m) "         Physical Exam  Vitals reviewed.   Constitutional:       Appearance: He is well-developed.   HENT:      Head: Normocephalic and atraumatic.   Eyes:      Conjunctiva/sclera: Conjunctivae normal.      Pupils: Pupils are equal, round, and reactive to light.   Cardiovascular:      Rate and Rhythm: Normal rate and regular rhythm.      Heart sounds: Normal heart sounds, S1 normal and S2 normal. No murmur heard.  Pulmonary:      Effort: Pulmonary effort is normal. No respiratory distress.      Breath sounds: Normal breath sounds. No wheezing.   Musculoskeletal:         General: Normal range of motion.      Cervical back: Normal range of motion and neck supple.   Skin:     General: Skin is warm.   Neurological:      Mental Status: He is alert and oriented to person, place, and time.   Psychiatric:         Speech: Speech normal.         Behavior: Behavior normal.         Thought Content: Thought content normal.         Judgment: Judgment normal.                    Mary Ulloa MD,   [facility]  1/14/2025

## 2025-01-12 ENCOUNTER — TELEPHONE (OUTPATIENT)
Dept: OTHER | Facility: OTHER | Age: 85
End: 2025-01-12

## 2025-01-12 NOTE — TELEPHONE ENCOUNTER
Ron called to cancel 1/12 appointment due to illness.  Indicated he will call to reschedule when feeling well.

## 2025-01-15 ENCOUNTER — TELEPHONE (OUTPATIENT)
Age: 85
End: 2025-01-15

## 2025-01-15 NOTE — TELEPHONE ENCOUNTER
Patient called wanting to update Dr Flores that he has finished his course of antibiotics. However, he is still having a cough with a runny nose and phlegm. Patient is wondering what Dr Flores suggests. Please call patient back.

## 2025-01-17 ENCOUNTER — RA CDI HCC (OUTPATIENT)
Dept: OTHER | Facility: HOSPITAL | Age: 85
End: 2025-01-17

## 2025-01-20 DIAGNOSIS — I11.9 HYPERTENSIVE HEART DISEASE WITHOUT HEART FAILURE: ICD-10-CM

## 2025-01-22 ENCOUNTER — TELEPHONE (OUTPATIENT)
Dept: PSYCHIATRY | Facility: CLINIC | Age: 85
End: 2025-01-22

## 2025-01-22 RX ORDER — SPIRONOLACTONE AND HYDROCHLOROTHIAZIDE 25; 25 MG/1; MG/1
TABLET ORAL
Qty: 15 TABLET | Refills: 1 | Status: SHIPPED | OUTPATIENT
Start: 2025-01-22

## 2025-01-24 ENCOUNTER — OFFICE VISIT (OUTPATIENT)
Dept: FAMILY MEDICINE CLINIC | Facility: CLINIC | Age: 85
End: 2025-01-24
Payer: COMMERCIAL

## 2025-01-24 VITALS
SYSTOLIC BLOOD PRESSURE: 140 MMHG | DIASTOLIC BLOOD PRESSURE: 80 MMHG | WEIGHT: 214 LBS | OXYGEN SATURATION: 97 % | BODY MASS INDEX: 33.59 KG/M2 | HEART RATE: 68 BPM | RESPIRATION RATE: 14 BRPM | TEMPERATURE: 96.2 F | HEIGHT: 67 IN

## 2025-01-24 DIAGNOSIS — E11.22 TYPE 2 DIABETES MELLITUS WITH STAGE 3 CHRONIC KIDNEY DISEASE AND HYPERTENSION (HCC): Primary | ICD-10-CM

## 2025-01-24 DIAGNOSIS — I12.9 TYPE 2 DIABETES MELLITUS WITH STAGE 3 CHRONIC KIDNEY DISEASE AND HYPERTENSION (HCC): Primary | ICD-10-CM

## 2025-01-24 DIAGNOSIS — I10 BENIGN HYPERTENSION: ICD-10-CM

## 2025-01-24 DIAGNOSIS — K22.5 ZENKER'S DIVERTICULUM: ICD-10-CM

## 2025-01-24 DIAGNOSIS — N18.30 TYPE 2 DIABETES MELLITUS WITH STAGE 3 CHRONIC KIDNEY DISEASE AND HYPERTENSION (HCC): Primary | ICD-10-CM

## 2025-01-24 DIAGNOSIS — F33.9 DEPRESSION, RECURRENT (HCC): ICD-10-CM

## 2025-01-24 LAB — SL AMB POCT HEMOGLOBIN AIC: 6.6 (ref ?–6.5)

## 2025-01-24 PROCEDURE — 99214 OFFICE O/P EST MOD 30 MIN: CPT | Performed by: FAMILY MEDICINE

## 2025-01-24 PROCEDURE — G2211 COMPLEX E/M VISIT ADD ON: HCPCS | Performed by: FAMILY MEDICINE

## 2025-01-24 PROCEDURE — 83036 HEMOGLOBIN GLYCOSYLATED A1C: CPT | Performed by: FAMILY MEDICINE

## 2025-01-24 NOTE — PROGRESS NOTES
Kendra Sears 1940 male MRN: [id number]    FAMILY PRACTICE OFFICE VISIT  [facility] - [facility]      ASSESSMENT/PLAN  Gonzales Sears is a 85 y.o. male presents to the office for    Diagnoses and all orders for this visit:    Type 2 diabetes mellitus with stage 3 chronic kidney disease and hypertension (HCC)  -     POCT hemoglobin A1c    Depression, recurrent (HCC)    Zenker's diverticulum    Benign hypertension    Type 2 diabetes showing significant improvement.  At this time recommend no changes to be made of his chronic conditions.  From a depression standpoint the continues to have depression with grieving appropriately.  No changes to be made.  Zenker's diverticulum history no acute concerns.  Hypertension currently stable.           Future Appointments   Date Time Provider Department Center   1/29/2025  9:00 AM Travis Forbes [facility]   2/14/2025  8:00 AM Travis Forbes [facility]   2/28/2025  2:00 PM Travis Forbes [facility]   3/31/2025  1:30 PM Fredrick Quiroz MD NEPH [facility]   4/28/2025 12:40 PM Niki Peña MD [facility]   7/7/2025  3:40 PM Venkat Rinaldi MD [facility]          SUBJECTIVE  CC: Diabetes      HPI:.  Gonzales Sears is a 85 y.o. male who presents for an  chronic follow up.  States that prior to the patient having upper respiratory infection he was going to the gym and try to change his diet given his elevated A1c.  Patient states he was try to make lifestyle modifications and will get back to it once he feels little bit better.  States that he is continuing to grieve the loss of his wife.  And has a good community of support.  Denies any difficulty with swallowing    Review of Systems   Constitutional:  Negative for activity change, appetite change, chills, fatigue and fever.   HENT:  Negative for congestion.    Respiratory:  Negative for cough, chest tightness and shortness of  breath.    Cardiovascular:  Negative for chest pain and leg swelling.   Gastrointestinal:  Negative for abdominal distention, abdominal pain, constipation, diarrhea, nausea and vomiting.   All other systems reviewed and are negative.      Historical Information   The patient history was reviewed as follows:  Past Medical History:   Diagnosis Date   • Abnormal blood chemistry     resolved: 11/9/16   • Abnormal glucose     last assessed: 9/24/14   • Anxiety ?   • Arthritis    • CAD (coronary artery disease)    • Chronic kidney disease     STONES  & CKD   • Coronary atherosclerosis    • Diabetes mellitus (HCC) ?   • DM2 (diabetes mellitus, type 2) (HCC)    • Dyslipidemia    • Facial nerve disorder    • GERD (gastroesophageal reflux disease)    • Heart disease ,10, 12.,uesrs sgo,,?   • HTN (hypertension)    • Hyperlipidemia     last assessed: 1/13/17   • Hypertension ?   • Kidney stones    • Lumbosacral radiculopathy    • Nerve root and plexus disorder    • Obesity    • Osteoarthritis    • Prostate asymmetry    • Pure hypercholesterolemia          Medications:     Current Outpatient Medications:   •  aspirin 81 mg chewable tablet, Chew 81 mg daily 2x a week, Disp: , Rfl:   •  dapagliflozin (Farxiga) 5 MG TABS, Take 1 tablet (5 mg total) by mouth daily, Disp: 30 tablet, Rfl: 3  •  ezetimibe (ZETIA) 10 mg tablet, Take 1 tablet (10 mg total) by mouth daily, Disp: 90 tablet, Rfl: 0  •  famotidine (PEPCID) 40 MG tablet, TAKE ONE TABLET BY MOUTH EVERY DAY, Disp: 90 tablet, Rfl: 1  •  metFORMIN (GLUCOPHAGE) 500 mg tablet, TAKE ONE TABLET BY MOUTH TWICE A DAY WITH MEALS (GENERIC GLUCOPHAGE), Disp: 180 tablet, Rfl: 1  •  metoprolol succinate (TOPROL-XL) 25 mg 24 hr tablet, TAKE ONE TABLET BY MOUTH EVERY DAY, Disp: 90 tablet, Rfl: 1  •  Multiple Vitamin (multivitamin) tablet, Take 1 tablet by mouth daily, Disp: , Rfl:   •  Multiple Vitamins-Minerals (PRESERVISION AREDS 2 PO), Take by mouth, Disp: , Rfl:   •  simvastatin (ZOCOR)  "40 mg tablet, TAKE 1 TABLET BY MOUTH DAILY AT  BEDTIME, Disp: 90 tablet, Rfl: 3  •  sitaGLIPtin (Januvia) 50 mg tablet, Take 1 tablet (50 mg total) by mouth daily, Disp: 90 tablet, Rfl: 1  •  spironolactone-hydrochlorothiazide (ALDACTAZIDE) 25-25 mg per tablet, TAKE 1/2 TABLET BY MOUTH EVERY OTHER DAY (GENERIC FOR ALDACTAZIDE), Disp: 15 tablet, Rfl: 1  •  tamsulosin (FLOMAX) 0.4 mg, Take 1 capsule (0.4 mg total) by mouth daily with dinner, Disp: 90 capsule, Rfl: 3  •  trandolapril (MAVIK) 1 MG tablet, TAKE ONE TABLET BY MOUTH EVERY DAY, Disp: 90 tablet, Rfl: 1  •  methylPREDNISolone 4 MG tablet therapy pack, Use as directed on package (Patient not taking: Reported on 1/24/2025), Disp: 21 each, Rfl: 0  •  methylPREDNISolone 4 MG tablet therapy pack, Use as directed on package (Patient not taking: Reported on 1/24/2025), Disp: 21 each, Rfl: 0    Allergies   Allergen Reactions   • Ciprofloxacin Hives       OBJECTIVE  Vitals:   Vitals:    01/24/25 1041   BP: 140/80   BP Location: Left arm   Patient Position: Sitting   Cuff Size: Standard   Pulse: 68   Resp: 14   Temp: (!) 96.2 °F (35.7 °C)   TempSrc: Temporal   SpO2: 97%   Weight: 97.1 kg (214 lb)   Height: 5' 7\" (1.702 m)         Physical Exam  Vitals reviewed.   Constitutional:       Appearance: He is well-developed.   HENT:      Head: Normocephalic and atraumatic.   Eyes:      Conjunctiva/sclera: Conjunctivae normal.      Pupils: Pupils are equal, round, and reactive to light.   Cardiovascular:      Rate and Rhythm: Normal rate and regular rhythm.      Heart sounds: Normal heart sounds, S1 normal and S2 normal. No murmur heard.  Pulmonary:      Effort: Pulmonary effort is normal. No respiratory distress.      Breath sounds: Normal breath sounds. No wheezing.   Musculoskeletal:         General: Normal range of motion.      Cervical back: Normal range of motion and neck supple.   Skin:     General: Skin is warm.   Neurological:      Mental Status: He is alert and " oriented to person, place, and time.   Psychiatric:         Speech: Speech normal.         Behavior: Behavior normal.         Thought Content: Thought content normal.         Judgment: Judgment normal.                    Mary Ulloa MD,   [facility]  1/26/2025

## 2025-01-27 DIAGNOSIS — E11.22 TYPE 2 DIABETES MELLITUS WITH STAGE 3 CHRONIC KIDNEY DISEASE AND HYPERTENSION (HCC): ICD-10-CM

## 2025-01-27 DIAGNOSIS — I12.9 TYPE 2 DIABETES MELLITUS WITH STAGE 3 CHRONIC KIDNEY DISEASE AND HYPERTENSION (HCC): ICD-10-CM

## 2025-01-27 DIAGNOSIS — N18.30 TYPE 2 DIABETES MELLITUS WITH STAGE 3 CHRONIC KIDNEY DISEASE AND HYPERTENSION (HCC): ICD-10-CM

## 2025-01-28 RX ORDER — DAPAGLIFLOZIN 5 MG/1
5 TABLET, FILM COATED ORAL DAILY
Qty: 30 TABLET | Refills: 5 | Status: SHIPPED | OUTPATIENT
Start: 2025-01-28

## 2025-01-31 ENCOUNTER — TELEPHONE (OUTPATIENT)
Age: 85
End: 2025-01-31

## 2025-01-31 NOTE — TELEPHONE ENCOUNTER
Per patient would like to remind PCP not to forget him today States PCP will know what he is talking about Declined to give more info

## 2025-02-01 NOTE — TELEPHONE ENCOUNTER
Spoke to patient. Wanted to inform me he does get lonely at home. But speaking to a friend and cherelle. No f/u needed

## 2025-02-05 ENCOUNTER — SOCIAL WORK (OUTPATIENT)
Dept: BEHAVIORAL/MENTAL HEALTH CLINIC | Facility: CLINIC | Age: 85
End: 2025-02-05
Payer: COMMERCIAL

## 2025-02-05 DIAGNOSIS — F43.20 ADJUSTMENT DISORDER, UNSPECIFIED TYPE: Primary | ICD-10-CM

## 2025-02-05 PROCEDURE — 90837 PSYTX W PT 60 MINUTES: CPT | Performed by: SOCIAL WORKER

## 2025-02-05 NOTE — PSYCH
"This note was not shared with the patient due to this is a psychotherapy note    Behavioral Health Psychotherapy Progress Note    Psychotherapy Provided: Individual Psychotherapy     1. Adjustment disorder, unspecified type            DATA:   Ernesto is a 85-year old,  male who presented for a follow-up outpatient individual counseling session after an approximate 3.5 weeks service gap.   He recently went to a  of a former co-worker.   Ernesto claims attending the  did not trigger increased grief for his wife.    Ernesto's wife, Sanjuanita  on 2025.   The undersigned therapist provided Ernesto with grief counseling.    During this session, this clinician used the following therapeutic modalities: Bereavement Therapy    Substance Abuse was not addressed during this session. If the client is diagnosed with a co-occurring substance use disorder, please indicate any changes in the frequency or amount of use: NA. Stage of change for addressing substance use diagnoses: No substance use/Not applicable    ASSESSMENT:  Ernesto Sears presents with a  sad  mood.    His affect is Normal range and intensity, which is congruent, with his mood and the content of the session. The client has not made progress on their goals.     Ernesto Sears presents with a none risk of suicide, none risk of self-harm, and none risk of harm to others.    For any risk assessment that surpasses a \"low\" rating, a safety plan must be developed.    A safety plan was indicated: no  If yes, describe in detail:  We have discussed their safety plan and Ernesto agrees that if they experience unsafe thoughts that they will reach out to their supports including this office, the suicide hotline, and emergency services if necessary. Ernesto is aware of non-emergent and emergent mental health resources.    Depression Follow-up Plan Completed: No    Visit start and stop times:    25  Start Time: 1102  End Time:  11:58 AM  "

## 2025-02-10 ENCOUNTER — TELEPHONE (OUTPATIENT)
Dept: FAMILY MEDICINE CLINIC | Facility: CLINIC | Age: 85
End: 2025-02-10

## 2025-02-11 NOTE — TELEPHONE ENCOUNTER
[place] will  POLST form later this morning. Thank you.  
Patient dropped off POLST form which he completed, for signature by Dr Abad.   I advised that Dr Abad is not in the office today. Form placed in Dr abad folder.  
Scanned into patient's chart.  No further action required  Malu Trejo    
Signed in and placed up front  
declines

## 2025-02-14 ENCOUNTER — SOCIAL WORK (OUTPATIENT)
Dept: BEHAVIORAL/MENTAL HEALTH CLINIC | Facility: CLINIC | Age: 85
End: 2025-02-14
Payer: COMMERCIAL

## 2025-02-14 DIAGNOSIS — I10 BENIGN ESSENTIAL HTN: ICD-10-CM

## 2025-02-14 DIAGNOSIS — F43.20 ADJUSTMENT DISORDER, UNSPECIFIED TYPE: Primary | ICD-10-CM

## 2025-02-14 PROCEDURE — 90834 PSYTX W PT 45 MINUTES: CPT | Performed by: SOCIAL WORKER

## 2025-02-14 RX ORDER — TRANDOLAPRIL TABLETS 1 MG/1
1 TABLET ORAL DAILY
Qty: 90 TABLET | Refills: 1 | Status: SHIPPED | OUTPATIENT
Start: 2025-02-14

## 2025-02-14 NOTE — PSYCH
This note was not shared with the patient due to this is a psychotherapy note    Behavioral Health Psychotherapy Progress Note    Psychotherapy Provided: Individual Psychotherapy     1. Adjustment disorder, unspecified type            DATA:   Ernetso is a 85-year old,  male who presented for a follow-up outpatient individual counseling session.   Ernesto didn't sleep well last night.   The undersigned therapist assisted Ernesto process his feelings about his relationship with his sister, her health problems, her daughter restricting Ernesto from having contact with his sister due to a dispute over a loan.   According to Ernesto, his sister lives in [place] a couple of hours driving distance from Ernesto's home.   Throughout Ernesto's life he has had a close relationship with his sister as they frequently talk on the phone.   Ernesto's wife, Sanjuanita did not like Ernesto's sister and as such, Sanjuanita banned his sister from visiting him here in [place].   Several years ago, Ernesto loaned his sister money which she never paid back.   A few years ago, Ernesto's sister loaned money to Ernesto in return.   When Ernesto's sister's niece found out about the unpaid loan, she cut off all contact with Ernesto.   Ernesto's attempts to contact his niece to make amends have been rejected by his niece.   Ernesto has started physical therapy at [facility].   He complains that his ribs hurt.   Ernesto denied depression, anxiety or difficulty managing his mood.   Ernesto cried in session when discussing his struggles to cope with his wife's death.    There was no evidence of a thought disorder or psychosis.   Ernesto denied suicidal ideation, gesture or plan.   The undersigned therapist provided Ernesto with grief counseling.   Ernesto will consider planning a celebration of life with friends and family in the Spring.      During this session, this clinician used the following therapeutic modalities: Bereavement Therapy and family systems theory    Substance Abuse was not addressed  "during this session. If the client is diagnosed with a co-occurring substance use disorder, please indicate any changes in the frequency or amount of use: NA. Stage of change for addressing substance use diagnoses: No substance use/Not applicable    ASSESSMENT:  Ernesto Sears presents with a  sad  mood.    His affect is Normal range and intensity, which is congruent, with his mood and the content of the session. The client has not made progress on their goals.     Ernesto Sears presents with a none risk of suicide, none risk of self-harm, and none risk of harm to others.    For any risk assessment that surpasses a \"low\" rating, a safety plan must be developed.    A safety plan was indicated: no  If yes, describe in detail:  We have discussed their safety plan and Ernesto agrees that if they experience unsafe thoughts that they will reach out to their supports including this office, the suicide hotline, and emergency services if necessary. Ernesto is aware of non-emergent and emergent mental health resources.    Depression Follow-up Plan Completed: No    Visit start and stop times:    02/14/25  Start Time: 0806  End Time:  8:57 AM  "

## 2025-02-20 ENCOUNTER — TELEPHONE (OUTPATIENT)
Age: 85
End: 2025-02-20

## 2025-02-20 ENCOUNTER — OFFICE VISIT (OUTPATIENT)
Dept: FAMILY MEDICINE CLINIC | Facility: CLINIC | Age: 85
End: 2025-02-20
Payer: COMMERCIAL

## 2025-02-20 VITALS
DIASTOLIC BLOOD PRESSURE: 80 MMHG | SYSTOLIC BLOOD PRESSURE: 120 MMHG | BODY MASS INDEX: 33.43 KG/M2 | HEIGHT: 67 IN | TEMPERATURE: 98.2 F | HEART RATE: 74 BPM | OXYGEN SATURATION: 99 % | WEIGHT: 213 LBS | RESPIRATION RATE: 14 BRPM

## 2025-02-20 DIAGNOSIS — R07.81 RIB PAIN: Primary | ICD-10-CM

## 2025-02-20 PROCEDURE — 99213 OFFICE O/P EST LOW 20 MIN: CPT | Performed by: FAMILY MEDICINE

## 2025-02-20 PROCEDURE — G2211 COMPLEX E/M VISIT ADD ON: HCPCS | Performed by: FAMILY MEDICINE

## 2025-02-20 NOTE — TELEPHONE ENCOUNTER
Patient called in stating he called an hour ago or so about being seen sooner with Dr. Flores. No documentation of this call in chart. Spoke with Malu who will call patient back after speaking with Primary Care Provider. No further assistance needed at this time.

## 2025-02-24 DIAGNOSIS — E78.5 DYSLIPIDEMIA: ICD-10-CM

## 2025-02-25 NOTE — PROGRESS NOTES
Name: Gonzales Sears      : 1940      MRN: [id number]  Encounter Provider: Mary Ulloa MD  Encounter Date: 2025   Encounter department: [facility]    Assessment & Plan  Rib pain        Continue with supportive care no acute changes to be made today            History of Present Illness     HPI  85-year-old male presenting to the office for a follow-up on chronic conditions.  Also states that he has been having persistent right pain that comes and goes.  States that he has had this for some time now has been doing the exercises with minimal improvement states he does have flares  Review of Systems   Musculoskeletal:  Positive for arthralgias.     Past Medical History:   Diagnosis Date   • Abnormal blood chemistry     resolved: 16   • Abnormal glucose     last assessed: 14   • Anxiety ?   • Arthritis    • CAD (coronary artery disease)    • Chronic kidney disease     STONES  & CKD   • Coronary atherosclerosis    • Diabetes mellitus (HCC) ?   • DM2 (diabetes mellitus, type 2) (HCC)    • Dyslipidemia    • Facial nerve disorder    • GERD (gastroesophageal reflux disease)    • Heart disease ,10, 12.,uesrs sgo,,?   • HTN (hypertension)    • Hyperlipidemia     last assessed: 17   • Hypertension ?   • Kidney stones    • Lumbosacral radiculopathy    • Nerve root and plexus disorder    • Obesity    • Osteoarthritis    • Prostate asymmetry    • Pure hypercholesterolemia      Past Surgical History:   Procedure Laterality Date   • CORONARY ANGIOPLASTY      1 STENT    • HAND SURGERY  3/21/24   • ORTHOPEDIC SURGERY     • WI NEUROPLASTY &/TRANSPOS MEDIAN NRV CARPAL TUNNE Right 2024    Procedure: RELEASE CARPAL TUNNEL;  Surgeon: Vivian Borden MD;  Location: [facility];  Service: Orthopedics   • WI UNLISTED PROCEDURE ESOPHAGUS N/A 2021    Procedure: EGD; DIVERTICULECTOMY ZENKERS ENDOSCOPIC;  Surgeon: Humble Franco MD;  Location:  MAIN OR;  Service:  Thoracic   • UPPER GASTROINTESTINAL ENDOSCOPY       Family History   Problem Relation Age of Onset   • Cancer Mother    • Ovarian cancer Mother    • Arthritis Mother    • Hypertension Sister    • Lung disease Father         black   • Diabetes Paternal Aunt    • Seizures Sister         Recent, last 6 months   • Mental illness Neg Hx    • Substance Abuse Neg Hx      Social History     Tobacco Use   • Smoking status: Never   • Smokeless tobacco: Never   Vaping Use   • Vaping status: Never Used   Substance and Sexual Activity   • Alcohol use: Not Currently   • Drug use: Never   • Sexual activity: Not Currently     Partners: Female     Birth control/protection: Abstinence, Condom Male, Condom Female     Current Outpatient Medications on File Prior to Visit   Medication Sig   • aspirin 81 mg chewable tablet Chew 81 mg daily 2x a week   • dapagliflozin (Farxiga) 5 MG TABS TAKE ONE TABLET BY MOUTH EVERY DAY   • ezetimibe (ZETIA) 10 mg tablet Take 1 tablet (10 mg total) by mouth daily   • famotidine (PEPCID) 40 MG tablet TAKE ONE TABLET BY MOUTH EVERY DAY   • metFORMIN (GLUCOPHAGE) 500 mg tablet TAKE ONE TABLET BY MOUTH TWICE A DAY WITH MEALS (GENERIC GLUCOPHAGE)   • metoprolol succinate (TOPROL-XL) 25 mg 24 hr tablet TAKE ONE TABLET BY MOUTH EVERY DAY   • Multiple Vitamin (multivitamin) tablet Take 1 tablet by mouth daily   • Multiple Vitamins-Minerals (PRESERVISION AREDS 2 PO) Take by mouth   • simvastatin (ZOCOR) 40 mg tablet TAKE 1 TABLET BY MOUTH DAILY AT  BEDTIME   • sitaGLIPtin (Januvia) 50 mg tablet Take 1 tablet (50 mg total) by mouth daily   • spironolactone-hydrochlorothiazide (ALDACTAZIDE) 25-25 mg per tablet TAKE 1/2 TABLET BY MOUTH EVERY OTHER DAY (GENERIC FOR ALDACTAZIDE)   • tamsulosin (FLOMAX) 0.4 mg Take 1 capsule (0.4 mg total) by mouth daily with dinner   • trandolapril (MAVIK) 1 MG tablet TAKE ONE TABLET BY MOUTH EVERY DAY   • methylPREDNISolone 4 MG tablet therapy pack Use as directed on package  "(Patient not taking: Reported on 2/20/2025)   • methylPREDNISolone 4 MG tablet therapy pack Use as directed on package (Patient not taking: Reported on 2/20/2025)     Allergies   Allergen Reactions   • Ciprofloxacin Hives     Immunization History   Administered Date(s) Administered   • COVID-19 MODERNA VACC 0.25 ML IM BOOSTER 12/14/2021   • COVID-19 MODERNA VACC 0.5 ML IM 02/12/2021, 03/12/2021   • Influenza Split High Dose Preservative Free IM 10/10/2013, 09/24/2014, 09/14/2015, 10/25/2016, 10/18/2017, 09/26/2024   • Influenza, high dose seasonal 0.7 mL 10/05/2018, 09/20/2019, 10/06/2020, 10/05/2021, 10/25/2022, 09/22/2023   • Influenza, seasonal, injectable 10/05/2011, 10/12/2012, 11/01/2016   • Pneumococcal Conjugate 13-Valent 09/14/2015   • Pneumococcal Polysaccharide PPV23 10/05/2011   • Zoster Vaccine Recombinant 02/04/2019     Objective   /80 (BP Location: Left arm, Patient Position: Sitting, Cuff Size: Standard)   Pulse 74   Temp 98.2 °F (36.8 °C) (Temporal)   Resp 14   Ht 5' 7\" (1.702 m)   Wt 96.6 kg (213 lb)   SpO2 99%   BMI 33.36 kg/m²     Physical Exam  Constitutional:       Appearance: He is well-developed.   HENT:      Head: Normocephalic and atraumatic.   Eyes:      Conjunctiva/sclera: Conjunctivae normal.      Pupils: Pupils are equal, round, and reactive to light.   Pulmonary:      Effort: Pulmonary effort is normal.   Musculoskeletal:      Comments: Right rib pain   Neurological:      Mental Status: He is alert and oriented to person, place, and time.   Psychiatric:         Behavior: Behavior normal.         Thought Content: Thought content normal.         Judgment: Judgment normal.         "
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
## 2025-02-26 RX ORDER — EZETIMIBE 10 MG/1
10 TABLET ORAL DAILY
Qty: 90 TABLET | Refills: 1 | Status: SHIPPED | OUTPATIENT
Start: 2025-02-26

## 2025-02-27 ENCOUNTER — TELEPHONE (OUTPATIENT)
Age: 85
End: 2025-02-27

## 2025-02-27 NOTE — TELEPHONE ENCOUNTER
Fadumo from [facility] said she had faxed over an order request and lab order and medical report to have Dr. Flores fill out for the patient's genetic testing.   She will refax the info, if needed you can call back at [phone], reference #[id number].  Thank you.

## 2025-03-06 ENCOUNTER — TELEPHONE (OUTPATIENT)
Dept: PSYCHIATRY | Facility: CLINIC | Age: 85
End: 2025-03-06

## 2025-03-12 ENCOUNTER — SOCIAL WORK (OUTPATIENT)
Dept: BEHAVIORAL/MENTAL HEALTH CLINIC | Facility: CLINIC | Age: 85
End: 2025-03-12
Payer: COMMERCIAL

## 2025-03-12 DIAGNOSIS — F43.20 ADJUSTMENT DISORDER, UNSPECIFIED TYPE: Primary | ICD-10-CM

## 2025-03-12 PROCEDURE — 90837 PSYTX W PT 60 MINUTES: CPT | Performed by: SOCIAL WORKER

## 2025-03-12 NOTE — PSYCH
"This note was not shared with the patient due to this is a psychotherapy note    Behavioral Health Psychotherapy Progress Note    Psychotherapy Provided: Individual Psychotherapy     1. Adjustment disorder, unspecified type            DATA:   Ernesto is a 85-year old,  male who presented for a follow-up outpatient individual counseling session after an approximate four-weeks service gap as he was a no show for two previously-scheduled sessions.   Ernesto was recently notified that a friend he worked with for over 30 years  recently.  He's been busy talking to his wife every day.   Ernesto is pleased he lost 15 pounds since he started going to the gym.   Ernesto has been procrastinating contacting [employer] to plan a celebration of life for his  wife.   The undersigned therapist provided Ernesto with grief counseling.       During this session, this clinician used the following therapeutic modalities: Bereavement Therapy    Substance Abuse was not addressed during this session. If the client is diagnosed with a co-occurring substance use disorder, please indicate any changes in the frequency or amount of use: NA. Stage of change for addressing substance use diagnoses: No substance use/Not applicable    ASSESSMENT:  Ernesto Sears presents with a Depressed mood.    His affect is Normal range and intensity, which is congruent, with his mood and the content of the session. The client has not made progress on their goals.     Ernesto Sears presents with a none risk of suicide, none risk of self-harm, and none risk of harm to others.    For any risk assessment that surpasses a \"low\" rating, a safety plan must be developed.    A safety plan was indicated: no  If yes, describe in detail:  We have discussed their safety plan and Ernesto agrees that if they experience unsafe thoughts that they will reach out to their supports including this office, the suicide hotline, and emergency services if necessary. Ernesto is aware of non-emergent " and emergent mental health resources.    Depression Follow-up Plan Completed: No    Visit start and stop times:    03/12/25  Start Time: 1304  End Time:  1:58 PM

## 2025-03-14 DIAGNOSIS — R05.9 COUGH, UNSPECIFIED TYPE: ICD-10-CM

## 2025-03-14 RX ORDER — FAMOTIDINE 40 MG/1
40 TABLET, FILM COATED ORAL DAILY
Qty: 90 TABLET | Refills: 1 | Status: SHIPPED | OUTPATIENT
Start: 2025-03-14

## 2025-03-19 ENCOUNTER — SOCIAL WORK (OUTPATIENT)
Dept: BEHAVIORAL/MENTAL HEALTH CLINIC | Facility: CLINIC | Age: 85
End: 2025-03-19
Payer: COMMERCIAL

## 2025-03-19 DIAGNOSIS — F43.20 ADJUSTMENT DISORDER, UNSPECIFIED TYPE: Primary | ICD-10-CM

## 2025-03-19 PROCEDURE — 90837 PSYTX W PT 60 MINUTES: CPT | Performed by: SOCIAL WORKER

## 2025-03-19 NOTE — PSYCH
This note was not shared with the patient due to this is a psychotherapy note    Behavioral Health Psychotherapy Progress Note    Psychotherapy Provided: Individual Psychotherapy     1. Adjustment disorder, unspecified type            DATA:   Ernesto is a 85-year old,  male who presented for a follow-up outpatient individual counseling session.  For today's session with the undersigned therapist, Ernesto is complaining about a significant increase in his car insurance premium.  He spent a lot of his time last week calling other car insurance companies to get quotes to lower his premium.  The undersigned therapist assisted Ernesto process his feelings about his difficulty living on his own since his wife .  The undersigned therapist discussed alternate living situations such as, moving to an independent living facility.   The undersigned therapist referred Ernesto to the [facility] to increase his socialization.  Ernesto had a long phone conversation with his sister who is thrilled she hasn't experienced any seizures for over six months.  His sister invited Ernesto to come and visit her despite her daughter's opposition to the idea.   Ernesto's niece has been angry with him ever since she found out her mother gave Ernesto $400 that he never paid back.   Recently, Ernesto sent a text message to his niece requesting to visit her mother and received no response.    Ernesto continues to go to the gym and reports loss of appetite and weight.   The undersigned therapist provided Ernesto with grief counseling.    During this session, this clinician used the following therapeutic modalities: Bereavement Therapy and Supportive Psychotherapy    Substance Abuse was not addressed during this session. If the client is diagnosed with a co-occurring substance use disorder, please indicate any changes in the frequency or amount of use: NA. Stage of change for addressing substance use diagnoses: No substance use/Not applicable    ASSESSMENT:  Ernesto MOULTON  "Renu presents with a Depressed mood.    His affect is Normal range and intensity, which is congruent, with his mood and the content of the session. The client has not made progress on their goals.     Ernesto Sears presents with a none risk of suicide, none risk of self-harm, and none risk of harm to others.    For any risk assessment that surpasses a \"low\" rating, a safety plan must be developed.    A safety plan was indicated: no  If yes, describe in detail:   We have discussed their safety plan and Ernesto agrees that if they experience unsafe thoughts that they will reach out to their supports including this office, the suicide hotline, and emergency services if necessary. Ernesto is aware of non-emergent and emergent mental health resources.    Depression Follow-up Plan Completed: No    Visit start and stop times:    03/19/25  Start Time: 1104  End Time:  11:56 AM  "

## 2025-03-20 ENCOUNTER — TELEPHONE (OUTPATIENT)
Age: 85
End: 2025-03-20

## 2025-03-20 NOTE — TELEPHONE ENCOUNTER
You can call [employer].  Unfortunately I do not know how to interpret this therefore I cannot tell the patient's the results.  This is something I have discussed with them in the past that we should not be signing this given that we cannot interpret it

## 2025-03-20 NOTE — TELEPHONE ENCOUNTER
Puja with [employer] calling to confirm receipt of genetic testing that was completed for patient. RN confirmed receipt (Scanned in 3/18/25). Puja also requesting most recent OV note for patient, stating there was a fax sent over stating this. Will be re faxing form today and will f/u next week if no receipt.

## 2025-03-20 NOTE — TELEPHONE ENCOUNTER
No need to sign in, I don't have a license to do this. PLease advise patient, he can call [employer] and review with them.

## 2025-03-20 NOTE — TELEPHONE ENCOUNTER
Called [employer] and spoke with Pinky. She says if provider signs DNA visit form and faxes it back to # on form  [phone], it will give you access id code and phone # to call and speak with genticist to discuss results. It also gives option to go to website to interpret results.

## 2025-03-20 NOTE — TELEPHONE ENCOUNTER
Received a call from [facility] pharmacy reporting a drug- drug interaction between Spironolactone-HcTz 25-25 mg (taking 1/2 tab 12.5-12.5mg) and Trandolapril 1 mg daily. The patient has been taking  both of these medications without issue. Advised the pharmacist of same and ok to dispense, however the provider would be informed.

## 2025-03-21 ENCOUNTER — RA CDI HCC (OUTPATIENT)
Dept: OTHER | Facility: HOSPITAL | Age: 85
End: 2025-03-21

## 2025-03-28 ENCOUNTER — APPOINTMENT (OUTPATIENT)
Dept: LAB | Facility: CLINIC | Age: 85
End: 2025-03-28
Payer: COMMERCIAL

## 2025-03-28 DIAGNOSIS — E11.22 TYPE 2 DIABETES MELLITUS WITH STAGE 3 CHRONIC KIDNEY DISEASE AND HYPERTENSION (HCC): ICD-10-CM

## 2025-03-28 DIAGNOSIS — I12.9 TYPE 2 DIABETES MELLITUS WITH STAGE 3 CHRONIC KIDNEY DISEASE AND HYPERTENSION (HCC): ICD-10-CM

## 2025-03-28 DIAGNOSIS — N18.30 TYPE 2 DIABETES MELLITUS WITH STAGE 3 CHRONIC KIDNEY DISEASE AND HYPERTENSION (HCC): ICD-10-CM

## 2025-03-28 LAB
ALBUMIN SERPL BCG-MCNC: 4 G/DL (ref 3.5–5)
ALP SERPL-CCNC: 51 U/L (ref 34–104)
ALT SERPL W P-5'-P-CCNC: 32 U/L (ref 7–52)
ANION GAP SERPL CALCULATED.3IONS-SCNC: 8 MMOL/L (ref 4–13)
AST SERPL W P-5'-P-CCNC: 26 U/L (ref 13–39)
BILIRUB SERPL-MCNC: 0.56 MG/DL (ref 0.2–1)
BUN SERPL-MCNC: 28 MG/DL (ref 5–25)
CALCIUM SERPL-MCNC: 9.1 MG/DL (ref 8.4–10.2)
CHLORIDE SERPL-SCNC: 103 MMOL/L (ref 96–108)
CO2 SERPL-SCNC: 29 MMOL/L (ref 21–32)
CREAT SERPL-MCNC: 1.77 MG/DL (ref 0.6–1.3)
CREAT UR-MCNC: 152.2 MG/DL
ERYTHROCYTE [DISTWIDTH] IN BLOOD BY AUTOMATED COUNT: 13.6 % (ref 11.6–15.1)
EST. AVERAGE GLUCOSE BLD GHB EST-MCNC: 154 MG/DL
GFR SERPL CREATININE-BSD FRML MDRD: 34 ML/MIN/1.73SQ M
GLUCOSE P FAST SERPL-MCNC: 132 MG/DL (ref 65–99)
HBA1C MFR BLD: 7 %
HCT VFR BLD AUTO: 48.3 % (ref 36.5–49.3)
HGB BLD-MCNC: 15.1 G/DL (ref 12–17)
MCH RBC QN AUTO: 30.9 PG (ref 26.8–34.3)
MCHC RBC AUTO-ENTMCNC: 31.3 G/DL (ref 31.4–37.4)
MCV RBC AUTO: 99 FL (ref 82–98)
MICROALBUMIN UR-MCNC: 27.2 MG/L
MICROALBUMIN/CREAT 24H UR: 18 MG/G CREATININE (ref 0–30)
PLATELET # BLD AUTO: 211 THOUSANDS/UL (ref 149–390)
PMV BLD AUTO: 11 FL (ref 8.9–12.7)
POTASSIUM SERPL-SCNC: 4.9 MMOL/L (ref 3.5–5.3)
PROT SERPL-MCNC: 6.3 G/DL (ref 6.4–8.4)
RBC # BLD AUTO: 4.88 MILLION/UL (ref 3.88–5.62)
SODIUM SERPL-SCNC: 140 MMOL/L (ref 135–147)
WBC # BLD AUTO: 6.68 THOUSAND/UL (ref 4.31–10.16)

## 2025-03-28 PROCEDURE — 82043 UR ALBUMIN QUANTITATIVE: CPT

## 2025-03-28 PROCEDURE — 36415 COLL VENOUS BLD VENIPUNCTURE: CPT

## 2025-03-28 PROCEDURE — 82570 ASSAY OF URINE CREATININE: CPT

## 2025-03-28 PROCEDURE — 80053 COMPREHEN METABOLIC PANEL: CPT

## 2025-03-28 PROCEDURE — 85027 COMPLETE CBC AUTOMATED: CPT

## 2025-03-28 PROCEDURE — 83036 HEMOGLOBIN GLYCOSYLATED A1C: CPT

## 2025-03-31 ENCOUNTER — OFFICE VISIT (OUTPATIENT)
Dept: NEPHROLOGY | Facility: CLINIC | Age: 85
End: 2025-03-31
Payer: COMMERCIAL

## 2025-03-31 VITALS
SYSTOLIC BLOOD PRESSURE: 104 MMHG | HEIGHT: 67 IN | BODY MASS INDEX: 31.39 KG/M2 | OXYGEN SATURATION: 98 % | DIASTOLIC BLOOD PRESSURE: 72 MMHG | HEART RATE: 68 BPM | WEIGHT: 200 LBS

## 2025-03-31 DIAGNOSIS — N18.30 TYPE 2 DIABETES MELLITUS WITH STAGE 3 CHRONIC KIDNEY DISEASE AND HYPERTENSION (HCC): Primary | ICD-10-CM

## 2025-03-31 DIAGNOSIS — I12.9 TYPE 2 DIABETES MELLITUS WITH STAGE 3 CHRONIC KIDNEY DISEASE AND HYPERTENSION (HCC): Primary | ICD-10-CM

## 2025-03-31 DIAGNOSIS — M89.9 CHRONIC KIDNEY DISEASE-MINERAL BONE DISORDER (CKD-MBD) WITH STAGE 3B CHRONIC KIDNEY DISEASE (HCC): ICD-10-CM

## 2025-03-31 DIAGNOSIS — N18.32 CHRONIC KIDNEY DISEASE-MINERAL BONE DISORDER (CKD-MBD) WITH STAGE 3B CHRONIC KIDNEY DISEASE (HCC): ICD-10-CM

## 2025-03-31 DIAGNOSIS — E11.22 TYPE 2 DIABETES MELLITUS WITH STAGE 3 CHRONIC KIDNEY DISEASE AND HYPERTENSION (HCC): Primary | ICD-10-CM

## 2025-03-31 DIAGNOSIS — E83.9 CHRONIC KIDNEY DISEASE-MINERAL BONE DISORDER (CKD-MBD) WITH STAGE 3B CHRONIC KIDNEY DISEASE (HCC): ICD-10-CM

## 2025-03-31 DIAGNOSIS — I25.10 2-VESSEL CORONARY ARTERY DISEASE: ICD-10-CM

## 2025-03-31 PROCEDURE — 99214 OFFICE O/P EST MOD 30 MIN: CPT | Performed by: INTERNAL MEDICINE

## 2025-03-31 NOTE — PROGRESS NOTES
Name: Gonzales Sears      : 1940      MRN: [id number]  Encounter Provider: Fredrick Quiroz MD  Encounter Date: 3/31/2025   Encounter department: [facility]  :  Assessment & Plan  Type 2 diabetes mellitus with stage 3 chronic kidney disease and hypertension (HCC)    Lab Results   Component Value Date    HGBA1C 7.0 (H) 2025     Chronic Kidney Disease Stage IIIB (G3bA1)  --Imaging:  Renal ultrasound from  showed no evidence of hydronephrosis and no renal mass.  --Urinalysis:  Analysis from July was bland  --Proteinuria: Microalbumin/creatinine ratio is currently normal  --Baseline creatinine: mid to high 1's   --Etiology:  Presumed to be secondary to diabetic kidney disease along with hypertensive nephrosclerosis  --Biopsy Proven:  No  --Status: Renal function remained stable creatinine at baseline at 1.77 mg/dL GFR greater than 30 mL/min no evidence of proteinuria.  Blood pressure and diabetes under excellent control.  --Maintained on RAAS blockade with Trandolapril & and SGLT2 inhibitor with Farxiga  --Management: Continue current management especially with goal-directed therapy with good diabetic and blood pressure control.  Continue SGLT2 inhibitor and ACE inhibitor  --Reducing Cardiovascular Risk Factors:  Simvastatin+ Ezetimibe reduced atherosclerotic events in CKD (SHARP trial); Low dose aspirin safe (if no contraindications exist); smoking is an independent risk factor for Chronic Kidney Disease and progression, strongly recommend smoking cessation     Hypertension  -- Stage I (American College of Cardiology/American Heart Association)  -- with underlying chronic kidney disease  -- Body mass index is 31.32 kg/m².  -- Volume status: Euvolemic  -- Etiology:  Essential hypertension and obesity  -- Secondary Work-Up:  Not clinically indicated  -- Target Goal: < 130/80 (ACC/AHA, CKD with proteinuria, CKD in diabetics) ; if tolerated can target SBP < 120 mmHg in high  cardiovascular risk ( Age > 75, CKD, CVD, No Diabetics SPRINT)  -- Lifestyle Modifications: DASH Diet, Weight Loss for ideal body weight, 45 mins of cardiovascular exercise 3 times a week as tolerated, and if no contraindications exist, smoking cessation, limit alcohol use, avoid NSAIDS, monitor blood pressure at home  -- Status: Blood pressure under excellent control and euvolemic.  -- Current antihypertensive regiment: Trandolaprill 1 mg daily, spironolactone-hydrochlorothiazide 12.5-12.5 mg every other day, metoprolol 25 mg daily  -- Changes: Continue current management.  Continue home blood pressure monitoring.  If he starts to develop low blood pressure and symptoms can discontinue the hydrochlorothiazide component     Diabetes mellitus type 2  -hemoglobin A1c: 7 (A1C values may be falsely elevated or decreased in those with CKD, assay method should be certified by National glycohemoglobin standardization Program). Target A1C between 7-8.5 (will need to be individualized for each patient), and would utilize A1C  in conjunction with continuous glucose monitoring (CGM).  It should also be noted that the A1c with more advanced kidney disease would be inaccurate due to decreased red blood cell life along with anemia.  -current medications:  Metformin, Januvia.  Farxiga 5 mg  -proteinuria: Microalbumin/creatinine ratio was normal, no evidence of proteinuria  -retinopathy:  Not reported  -neuropathy:  Not reported  -please follow with an ophthalmologist and podiatrist every year  -continued self monitoring of blood glucose at home  -Management in CKD Recommendations:   Metformin:  Avoid if creatinine clearance is less than 30 cc/min (concern for lactic acidosis)  Sodium-Glucose Cotransporter-2 (SGLT2) Inhibitors:  May see an acute drop in the eGFR initially when starting the medication but then a stabilization of the renal function, with slower loss of renal function as compared to placebo.  Relative risk of  end-stage renal disease, doubling of serum creatinine or death from renal causes were also found to be lower as compared to placebo. (CREDENCE).  DAPA-CKD study, showed that patients with chronic kidney disease regardless of the presence or absence of diabetes the risk of composite of a sustained decline in the estimated GFR of at least 50%, end-stage renal disease or death from renal or cardiovascular causes was significantly lower with Dapagliflozin than with placebo. EMPEROR-Reduced study also showed beneficial effects.   If no contraindications exist I would recommend this medication added to the diabetic regiment, if further optimal diabetic control is required. If eGFR < 60 cc/min (avoid ertugliflozin); If eGFR < 45 cc/min (caution with or avoid dapagliflozin, emphagliflozin), if eGFR  < 30 cc/min (caution or avoid all including canagliflozin, more for efficacy)  Sulfonylureas:  Preferred short-acting (glipizide, glimepiride, repaglinide), relatively safe in patients with non dialysis CKD  Thiazolidinedones/Alpha-Gluosidase inhibitors/Dipeptidyl peptidase-4 inhibitors:  Generally not considered first-line agents in CKD (limited data in long-term safety and efficacy)  Insulin:  Starting dose may need to be lower than what would ordinarily be used, as there is a decrease metabolism of insulin (no dose adjustment if the GFR > 50 mL/min, dose should be reduced to 75% of baseline if GFR 10-50 mL/min, and 50% baseline if GFR < 10 mL/min)    Orders:    Albumin / creatinine urine ratio; Future    Comprehensive metabolic panel; Future    Hemoglobin A1C; Future    PTH, intact; Future    Vitamin D 25 hydroxy; Future    Phosphorus; Future    CBC and Platelet; Future    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)  Lab Results   Component Value Date    EGFR 34 03/28/2025    EGFR 43 10/28/2024    EGFR 39 09/13/2024    CREATININE 1.77 (H) 03/28/2025    CREATININE 1.48 (H)  10/28/2024    CREATININE 1.61 (H) 09/13/2024   -- Check PTH phosphorus and vitamin D 25-hydroxy levels next visit  -- Calcium level is normal    Orders:    Albumin / creatinine urine ratio; Future    Comprehensive metabolic panel; Future    Hemoglobin A1C; Future    PTH, intact; Future    Vitamin D 25 hydroxy; Future    Phosphorus; Future    CBC and Platelet; Future    2-vessel coronary artery disease  -- Asymptomatic no active chest pain.  --Continue current management  --Continue metoprolol and simvastatin and aspirin    Orders:    Albumin / creatinine urine ratio; Future    Comprehensive metabolic panel; Future    Hemoglobin A1C; Future    PTH, intact; Future    Vitamin D 25 hydroxy; Future    Phosphorus; Future    CBC and Platelet; Future        Patient Instructions   1.)  Low 2 g sodium diet    2.)  Monitor weights at home    3.)  Avoid NSAIDs (ibuprofen, Motrin, Advil, Aleve, naproxen)    4.)  Monitor blood pressure at home, call if blood pressure greater than 150/90 persistently    5.) I will plan to discuss all results including blood work, and/or imaging at our next visit, unless there is an urgent indication, in which case I will call you earlier. If you have any questions or concerns about your results, please feel free to call our office.    6.) SGLT2 Inhibitors such as Farxiga , have been shown and studied to reduce the decline of kidney function and reduce cardiovascular outcomes.  We recommend you continue this medication.  Please stop this medication during any period of illness, during any perioperative period.  Please maintain good foot care and avoid keto diet.  Maintain adequate hydration.  And watch out for hypoglycemia.        It was a pleasure evaluating your patient in the office today. Thank you for allowing our team to participate in the care of  Gnozales Sears. Please do not hesitate to contact our team if further issues/questions shall arise in the interim.     History of Present  Illness   HPI  Gonzales Sears is a 85 y.o. male who presents for follow-up of chronic kidney disease stage III and hypertension.  At her last visit I had started him on an SGLT2 inhibitor with Farxiga 5 mg daily.  He reports he has been tolerating that well with no issues.    We reviewed his most recent blood work from March 28 which included CBC hemoglobin A1c CMP and albumin/creatinine ratio.  Renal function stable and at baseline with a creatinine 1.7 mg/dL.  Electrolytes including sodium potassium are normal.  Acid-base status is normal.  Hemoglobin is normal along with normal platelets no white count.  Hemoglobin A1c is at target at 7.    We reviewed all of his medications.    He lost his wife back in August of last year.  We are discussing about how to cope and maintaining his good relationship with his friends 2 of which have passed away recently.    History obtained from: patient  Pertinent Medical History         Review of Systems   Constitutional:  Negative for activity change and fever.   Respiratory:  Negative for cough, chest tightness, shortness of breath and wheezing.    Cardiovascular:  Negative for chest pain and leg swelling.   Gastrointestinal:  Negative for abdominal pain, diarrhea, nausea and vomiting.   Endocrine: Negative for polyuria.   Genitourinary:  Negative for difficulty urinating, dysuria, flank pain, frequency and urgency.   Skin:  Negative for rash.   Neurological:  Negative for dizziness, syncope, light-headedness and headaches.     Medical History Reviewed by provider this encounter:     .  Current Outpatient Medications on File Prior to Visit   Medication Sig Dispense Refill    aspirin 81 mg chewable tablet Chew 81 mg daily 2x a week      dapagliflozin (Farxiga) 5 MG TABS TAKE ONE TABLET BY MOUTH EVERY DAY 30 tablet 5    ezetimibe (ZETIA) 10 mg tablet Take 1 tablet (10 mg total) by mouth daily 90 tablet 1    famotidine (PEPCID) 40 MG tablet TAKE ONE TABLET BY MOUTH EVERY DAY 90  tablet 1    metFORMIN (GLUCOPHAGE) 500 mg tablet TAKE ONE TABLET BY MOUTH TWICE A DAY WITH MEALS (GENERIC GLUCOPHAGE) 180 tablet 1    metoprolol succinate (TOPROL-XL) 25 mg 24 hr tablet TAKE ONE TABLET BY MOUTH EVERY DAY 90 tablet 1    Multiple Vitamin (multivitamin) tablet Take 1 tablet by mouth daily      Multiple Vitamins-Minerals (PRESERVISION AREDS 2 PO) Take by mouth      simvastatin (ZOCOR) 40 mg tablet TAKE 1 TABLET BY MOUTH DAILY AT  BEDTIME 90 tablet 3    sitaGLIPtin (Januvia) 50 mg tablet Take 1 tablet (50 mg total) by mouth daily 90 tablet 1    spironolactone-hydrochlorothiazide (ALDACTAZIDE) 25-25 mg per tablet TAKE 1/2 TABLET BY MOUTH EVERY OTHER DAY (GENERIC FOR ALDACTAZIDE) 15 tablet 1    tamsulosin (FLOMAX) 0.4 mg Take 1 capsule (0.4 mg total) by mouth daily with dinner 90 capsule 3    trandolapril (MAVIK) 1 MG tablet TAKE ONE TABLET BY MOUTH EVERY DAY 90 tablet 1    methylPREDNISolone 4 MG tablet therapy pack Use as directed on package (Patient not taking: Reported on 1/24/2025) 21 each 0    methylPREDNISolone 4 MG tablet therapy pack Use as directed on package (Patient not taking: Reported on 1/24/2025) 21 each 0     No current facility-administered medications on file prior to visit.         Current Outpatient Medications on File Prior to Visit   Medication Sig Dispense Refill    aspirin 81 mg chewable tablet Chew 81 mg daily 2x a week      dapagliflozin (Farxiga) 5 MG TABS TAKE ONE TABLET BY MOUTH EVERY DAY 30 tablet 5    ezetimibe (ZETIA) 10 mg tablet Take 1 tablet (10 mg total) by mouth daily 90 tablet 1    famotidine (PEPCID) 40 MG tablet TAKE ONE TABLET BY MOUTH EVERY DAY 90 tablet 1    metFORMIN (GLUCOPHAGE) 500 mg tablet TAKE ONE TABLET BY MOUTH TWICE A DAY WITH MEALS (GENERIC GLUCOPHAGE) 180 tablet 1    metoprolol succinate (TOPROL-XL) 25 mg 24 hr tablet TAKE ONE TABLET BY MOUTH EVERY DAY 90 tablet 1    Multiple Vitamin (multivitamin) tablet Take 1 tablet by mouth daily      Multiple  "Vitamins-Minerals (PRESERVISION AREDS 2 PO) Take by mouth      simvastatin (ZOCOR) 40 mg tablet TAKE 1 TABLET BY MOUTH DAILY AT  BEDTIME 90 tablet 3    sitaGLIPtin (Januvia) 50 mg tablet Take 1 tablet (50 mg total) by mouth daily 90 tablet 1    spironolactone-hydrochlorothiazide (ALDACTAZIDE) 25-25 mg per tablet TAKE 1/2 TABLET BY MOUTH EVERY OTHER DAY (GENERIC FOR ALDACTAZIDE) 15 tablet 1    tamsulosin (FLOMAX) 0.4 mg Take 1 capsule (0.4 mg total) by mouth daily with dinner 90 capsule 3    trandolapril (MAVIK) 1 MG tablet TAKE ONE TABLET BY MOUTH EVERY DAY 90 tablet 1    methylPREDNISolone 4 MG tablet therapy pack Use as directed on package (Patient not taking: Reported on 1/24/2025) 21 each 0    methylPREDNISolone 4 MG tablet therapy pack Use as directed on package (Patient not taking: Reported on 1/24/2025) 21 each 0     No current facility-administered medications on file prior to visit.     Objective   /72 (BP Location: Left arm, Patient Position: Sitting, Cuff Size: Standard)   Pulse 68   Ht 5' 7\" (1.702 m)   Wt 90.7 kg (200 lb)   SpO2 98%   BMI 31.32 kg/m²      Physical Exam  Vitals and nursing note reviewed.   Constitutional:       General: He is not in acute distress.     Appearance: He is well-developed. He is obese. He is not diaphoretic.   HENT:      Head: Normocephalic and atraumatic.   Eyes:      General: No scleral icterus.     Pupils: Pupils are equal, round, and reactive to light.   Cardiovascular:      Rate and Rhythm: Normal rate and regular rhythm.      Heart sounds: Normal heart sounds. No murmur heard.     No friction rub. No gallop.   Pulmonary:      Effort: Pulmonary effort is normal. No respiratory distress.      Breath sounds: Normal breath sounds. No wheezing or rales.   Chest:      Chest wall: No tenderness.   Abdominal:      General: Bowel sounds are normal. There is no distension.      Palpations: Abdomen is soft.      Tenderness: There is no abdominal tenderness. There is no " rebound.   Musculoskeletal:         General: Normal range of motion.      Cervical back: Normal range of motion and neck supple.   Skin:     Findings: No rash.   Neurological:      Mental Status: He is alert and oriented to person, place, and time.           Laboratory Results:  Results from last 7 days   Lab Units 03/28/25  0734   WBC Thousand/uL 6.68   HEMOGLOBIN g/dL 15.1   HEMATOCRIT % 48.3   PLATELETS Thousands/uL 211   POTASSIUM mmol/L 4.9   CHLORIDE mmol/L 103   CO2 mmol/L 29   BUN mg/dL 28*   CREATININE mg/dL 1.77*   CALCIUM mg/dL 9.1       Results for orders placed or performed in visit on 03/28/25   Albumin / creatinine urine ratio   Result Value Ref Range    Creatinine, Ur 152.2 Reference range not established. mg/dL    Albumin,U,Random 27.2 (H) <20.0 mg/L    Albumin Creat Ratio 18 0 - 30 mg/g creatinine   Comprehensive metabolic panel   Result Value Ref Range    Sodium 140 135 - 147 mmol/L    Potassium 4.9 3.5 - 5.3 mmol/L    Chloride 103 96 - 108 mmol/L    CO2 29 21 - 32 mmol/L    ANION GAP 8 4 - 13 mmol/L    BUN 28 (H) 5 - 25 mg/dL    Creatinine 1.77 (H) 0.60 - 1.30 mg/dL    Glucose, Fasting 132 (H) 65 - 99 mg/dL    Calcium 9.1 8.4 - 10.2 mg/dL    AST 26 13 - 39 U/L    ALT 32 7 - 52 U/L    Alkaline Phosphatase 51 34 - 104 U/L    Total Protein 6.3 (L) 6.4 - 8.4 g/dL    Albumin 4.0 3.5 - 5.0 g/dL    Total Bilirubin 0.56 0.20 - 1.00 mg/dL    eGFR 34 ml/min/1.73sq m   Hemoglobin A1C   Result Value Ref Range    Hemoglobin A1C 7.0 (H) Normal 4.0-5.6%; PreDiabetic 5.7-6.4%; Diabetic >=6.5%; Glycemic control for adults with diabetes <7.0% %     mg/dl   CBC and Platelet   Result Value Ref Range    WBC 6.68 4.31 - 10.16 Thousand/uL    RBC 4.88 3.88 - 5.62 Million/uL    Hemoglobin 15.1 12.0 - 17.0 g/dL    Hematocrit 48.3 36.5 - 49.3 %    MCV 99 (H) 82 - 98 fL    MCH 30.9 26.8 - 34.3 pg    MCHC 31.3 (L) 31.4 - 37.4 g/dL    RDW 13.6 11.6 - 15.1 %    Platelets 211 149 - 390 Thousands/uL    MPV 11.0 8.9 - 12.7  fL     *Note: Due to a large number of results and/or encounters for the requested time period, some results have not been displayed. A complete set of results can be found in Results Review.       Administrative Statements   I have spent a total time of 25 minutes in caring for this patient on the day of the visit/encounter including Counseling / Coordination of care, Documenting in the medical record, Reviewing/placing orders in the medical record (including tests, medications, and/or procedures), and Obtaining or reviewing history  .

## 2025-03-31 NOTE — PATIENT INSTRUCTIONS
0122: patient's daughter stated her speech was \"not right\"  Went to bedside to assess and she was found to have garbled speech. This changed from previous assessment at 7:30  08:00- code stroke called  0802- RRT at bedside  0809-CT   0816 CT finished  08:18 Teleneuro on screen , [facility] [[email or web address]] performed- see flowsheet.  She requested CTA, no CTP  08:59- back to room 1.)  Low 2 g sodium diet    2.)  Monitor weights at home    3.)  Avoid NSAIDs (ibuprofen, Motrin, Advil, Aleve, naproxen)    4.)  Monitor blood pressure at home, call if blood pressure greater than 150/90 persistently    5.) I will plan to discuss all results including blood work, and/or imaging at our next visit, unless there is an urgent indication, in which case I will call you earlier. If you have any questions or concerns about your results, please feel free to call our office.    6.) SGLT2 Inhibitors such as Farxiga , have been shown and studied to reduce the decline of kidney function and reduce cardiovascular outcomes.  We recommend you continue this medication.  Please stop this medication during any period of illness, during any perioperative period.  Please maintain good foot care and avoid keto diet.  Maintain adequate hydration.  And watch out for hypoglycemia.

## 2025-03-31 NOTE — ASSESSMENT & PLAN NOTE
Lab Results   Component Value Date    EGFR 34 03/28/2025    EGFR 43 10/28/2024    EGFR 39 09/13/2024    CREATININE 1.77 (H) 03/28/2025    CREATININE 1.48 (H) 10/28/2024    CREATININE 1.61 (H) 09/13/2024   -- Check PTH phosphorus and vitamin D 25-hydroxy levels next visit  -- Calcium level is normal    Orders:    Albumin / creatinine urine ratio; Future    Comprehensive metabolic panel; Future    Hemoglobin A1C; Future    PTH, intact; Future    Vitamin D 25 hydroxy; Future    Phosphorus; Future    CBC and Platelet; Future

## 2025-03-31 NOTE — ASSESSMENT & PLAN NOTE
-- Asymptomatic no active chest pain.  --Continue current management  --Continue metoprolol and simvastatin and aspirin    Orders:    Albumin / creatinine urine ratio; Future    Comprehensive metabolic panel; Future    Hemoglobin A1C; Future    PTH, intact; Future    Vitamin D 25 hydroxy; Future    Phosphorus; Future    CBC and Platelet; Future

## 2025-03-31 NOTE — ASSESSMENT & PLAN NOTE
Lab Results   Component Value Date    HGBA1C 7.0 (H) 03/28/2025     Chronic Kidney Disease Stage IIIB (G3bA1)  --Imaging:  Renal ultrasound from 2018 showed no evidence of hydronephrosis and no renal mass.  --Urinalysis:  Analysis from July was bland  --Proteinuria: Microalbumin/creatinine ratio is currently normal  --Baseline creatinine: mid to high 1's   --Etiology:  Presumed to be secondary to diabetic kidney disease along with hypertensive nephrosclerosis  --Biopsy Proven:  No  --Status: Renal function remained stable creatinine at baseline at 1.77 mg/dL GFR greater than 30 mL/min no evidence of proteinuria.  Blood pressure and diabetes under excellent control.  --Maintained on RAAS blockade with Trandolapril & and SGLT2 inhibitor with Farxiga  --Management: Continue current management especially with goal-directed therapy with good diabetic and blood pressure control.  Continue SGLT2 inhibitor and ACE inhibitor  --Reducing Cardiovascular Risk Factors:  Simvastatin+ Ezetimibe reduced atherosclerotic events in CKD (SHARP trial); Low dose aspirin safe (if no contraindications exist); smoking is an independent risk factor for Chronic Kidney Disease and progression, strongly recommend smoking cessation     Hypertension  -- Stage I (American College of Cardiology/American Heart Association)  -- with underlying chronic kidney disease  -- Body mass index is 31.32 kg/m².  -- Volume status: Euvolemic  -- Etiology:  Essential hypertension and obesity  -- Secondary Work-Up:  Not clinically indicated  -- Target Goal: < 130/80 (ACC/AHA, CKD with proteinuria, CKD in diabetics) ; if tolerated can target SBP < 120 mmHg in high cardiovascular risk ( Age > 75, CKD, CVD, No Diabetics SPRINT)  -- Lifestyle Modifications: DASH Diet, Weight Loss for ideal body weight, 45 mins of cardiovascular exercise 3 times a week as tolerated, and if no contraindications exist, smoking cessation, limit alcohol use, avoid NSAIDS, monitor blood  pressure at home  -- Status: Blood pressure under excellent control and euvolemic.  -- Current antihypertensive regiment: Trandolaprill 1 mg daily, spironolactone-hydrochlorothiazide 12.5-12.5 mg every other day, metoprolol 25 mg daily  -- Changes: Continue current management.  Continue home blood pressure monitoring.  If he starts to develop low blood pressure and symptoms can discontinue the hydrochlorothiazide component     Diabetes mellitus type 2  -hemoglobin A1c: 7 (A1C values may be falsely elevated or decreased in those with CKD, assay method should be certified by National glycohemoglobin standardization Program). Target A1C between 7-8.5 (will need to be individualized for each patient), and would utilize A1C  in conjunction with continuous glucose monitoring (CGM).  It should also be noted that the A1c with more advanced kidney disease would be inaccurate due to decreased red blood cell life along with anemia.  -current medications:  Metformin, Januvia.  Farxiga 5 mg  -proteinuria: Microalbumin/creatinine ratio was normal, no evidence of proteinuria  -retinopathy:  Not reported  -neuropathy:  Not reported  -please follow with an ophthalmologist and podiatrist every year  -continued self monitoring of blood glucose at home  -Management in CKD Recommendations:   Metformin:  Avoid if creatinine clearance is less than 30 cc/min (concern for lactic acidosis)  Sodium-Glucose Cotransporter-2 (SGLT2) Inhibitors:  May see an acute drop in the eGFR initially when starting the medication but then a stabilization of the renal function, with slower loss of renal function as compared to placebo.  Relative risk of end-stage renal disease, doubling of serum creatinine or death from renal causes were also found to be lower as compared to placebo. ([facility]).  DAPA-CKD study, showed that patients with chronic kidney disease regardless of the presence or absence of diabetes the risk of composite of a sustained decline in  the estimated GFR of at least 50%, end-stage renal disease or death from renal or cardiovascular causes was significantly lower with Dapagliflozin than with placebo. EMPEROR-Reduced study also showed beneficial effects.   If no contraindications exist I would recommend this medication added to the diabetic regiment, if further optimal diabetic control is required. If eGFR < 60 cc/min (avoid ertugliflozin); If eGFR < 45 cc/min (caution with or avoid dapagliflozin, emphagliflozin), if eGFR  < 30 cc/min (caution or avoid all including canagliflozin, more for efficacy)  Sulfonylureas:  Preferred short-acting (glipizide, glimepiride, repaglinide), relatively safe in patients with non dialysis CKD  Thiazolidinedones/Alpha-Gluosidase inhibitors/Dipeptidyl peptidase-4 inhibitors:  Generally not considered first-line agents in CKD (limited data in long-term safety and efficacy)  Insulin:  Starting dose may need to be lower than what would ordinarily be used, as there is a decrease metabolism of insulin (no dose adjustment if the GFR > 50 mL/min, dose should be reduced to 75% of baseline if GFR 10-50 mL/min, and 50% baseline if GFR < 10 mL/min)    Orders:    Albumin / creatinine urine ratio; Future    Comprehensive metabolic panel; Future    Hemoglobin A1C; Future    PTH, intact; Future    Vitamin D 25 hydroxy; Future    Phosphorus; Future    CBC and Platelet; Future

## 2025-04-07 ENCOUNTER — OFFICE VISIT (OUTPATIENT)
Dept: FAMILY MEDICINE CLINIC | Facility: CLINIC | Age: 85
End: 2025-04-07
Payer: COMMERCIAL

## 2025-04-07 VITALS
BODY MASS INDEX: 31.39 KG/M2 | SYSTOLIC BLOOD PRESSURE: 118 MMHG | HEART RATE: 64 BPM | WEIGHT: 200 LBS | HEIGHT: 67 IN | DIASTOLIC BLOOD PRESSURE: 70 MMHG | OXYGEN SATURATION: 97 % | RESPIRATION RATE: 12 BRPM | TEMPERATURE: 96.9 F

## 2025-04-07 DIAGNOSIS — I10 BENIGN HYPERTENSION: ICD-10-CM

## 2025-04-07 DIAGNOSIS — I12.9 TYPE 2 DIABETES MELLITUS WITH STAGE 3 CHRONIC KIDNEY DISEASE AND HYPERTENSION (HCC): Primary | ICD-10-CM

## 2025-04-07 DIAGNOSIS — F33.9 DEPRESSION, RECURRENT (HCC): ICD-10-CM

## 2025-04-07 DIAGNOSIS — N18.30 TYPE 2 DIABETES MELLITUS WITH STAGE 3 CHRONIC KIDNEY DISEASE AND HYPERTENSION (HCC): Primary | ICD-10-CM

## 2025-04-07 DIAGNOSIS — E11.22 TYPE 2 DIABETES MELLITUS WITH STAGE 3 CHRONIC KIDNEY DISEASE AND HYPERTENSION (HCC): Primary | ICD-10-CM

## 2025-04-07 DIAGNOSIS — Z12.5 SCREENING PSA (PROSTATE SPECIFIC ANTIGEN): ICD-10-CM

## 2025-04-07 PROCEDURE — G2211 COMPLEX E/M VISIT ADD ON: HCPCS | Performed by: FAMILY MEDICINE

## 2025-04-07 PROCEDURE — 99214 OFFICE O/P EST MOD 30 MIN: CPT | Performed by: FAMILY MEDICINE

## 2025-04-07 NOTE — PROGRESS NOTES
Name: Gonzales Sears      : 1940      MRN: [id number]  Encounter Provider: Mary Ulloa MD  Encounter Date: 2025   Encounter department: [facility]    Assessment & Plan  Type 2 diabetes mellitus with stage 3 chronic kidney disease and hypertension (HCC)  Stable  Stress might be the cause of the numbers  13lb down with [employer]  Recommend repeating labs in 3 months  Lab Results   Component Value Date    HGBA1C 7.0 (H) 2025       Orders:  •  Hemoglobin A1C; Future  •  Comprehensive metabolic panel; Future  •  Albumin / creatinine urine ratio; Future  •  Lipid panel; Future    Benign hypertension  Currently stable on medications.  Orders:  •  Lipid panel; Future    Screening PSA (prostate specific antigen)    Orders:  •  PSA, Total Screen; Future    Depression, recurrent (HCC)  Remains to be stable but slightly uncontrolled recommend patient to be continuing to see therapist  Genetic testing unfortunately not licensed to review this patient agreeable and aware            History of Present Illness     HPI  85-year-old male presenting to the office for an evaluation.  Patient states that he has been noticing that he has been a little bit more lonely here than normal and looking for some companionship.  Patient states that he does not know yet what to do with his late wife's close and wants to hold onto a little bit longer.  Has gotten rid of some things but overall is coping appropriately.  Patient is taking all his medications appropriately.  Has taken it seriously about his elevated A1c and is now at the gym and has lost so far 18 pounds    Review of Systems   Constitutional:  Negative for activity change, appetite change, chills, fatigue and fever.   HENT:  Negative for congestion.    Respiratory:  Negative for cough, chest tightness and shortness of breath.    Cardiovascular:  Negative for chest pain and leg swelling.   Gastrointestinal:  Negative for abdominal  distention, abdominal pain, constipation, diarrhea, nausea and vomiting.   All other systems reviewed and are negative.    Past Medical History:   Diagnosis Date   • Abnormal blood chemistry     resolved: 11/9/16   • Abnormal glucose     last assessed: 9/24/14   • Anxiety ?   • Arthritis    • CAD (coronary artery disease)    • Chronic kidney disease ?    STONES  & CKD   • Chronic pain disorder     Right hand   • Coronary atherosclerosis    • Diabetes mellitus (HCC) ?   • DM2 (diabetes mellitus, type 2) (HCC)    • Dyslipidemia    • Facial nerve disorder    • GERD (gastroesophageal reflux disease)    • Heart disease    • HTN (hypertension)    • Hyperlipidemia     last assessed: 1/13/17   • Hypertension ?   • Kidney stones    • Lumbosacral radiculopathy    • Nerve root and plexus disorder    • Obesity    • Osteoarthritis    • Prostate asymmetry    • Pure hypercholesterolemia    • Sleep difficulties      Past Surgical History:   Procedure Laterality Date   • APPENDECTOMY     • CORONARY ANGIOPLASTY      1 STENT    • DENTAL SURGERY     • EYE SURGERY     • HAND SURGERY  3/21/24   • HERNIA REPAIR     • ORTHOPEDIC SURGERY     • NC NEUROPLASTY &/TRANSPOS MEDIAN NRV CARPAL TUNNE Right 03/21/2024    Procedure: RELEASE CARPAL TUNNEL;  Surgeon: Vivian Borden MD;  Location: [facility];  Service: Orthopedics   • NC UNLISTED PROCEDURE ESOPHAGUS N/A 09/30/2021    Procedure: EGD; DIVERTICULECTOMY ZENKERS ENDOSCOPIC;  Surgeon: Humble Franco MD;  Location:  MAIN OR;  Service: Thoracic   • UPPER GASTROINTESTINAL ENDOSCOPY       Family History   Problem Relation Age of Onset   • Cancer Mother    • Ovarian cancer Mother    • Arthritis Mother    • Hypertension Sister    • Lung disease Father         black   • Diabetes Paternal Aunt    • Seizures Sister         Recent, last 6 months   • Mental illness Neg Hx    • Substance Abuse Neg Hx      Social History     Tobacco Use   • Smoking status: Never   • Smokeless tobacco: Never    Vaping Use   • Vaping status: Never Used   Substance and Sexual Activity   • Alcohol use: Not Currently   • Drug use: Never   • Sexual activity: Not Currently     Partners: Female     Birth control/protection: Abstinence, Condom Male, Condom Female     Current Outpatient Medications on File Prior to Visit   Medication Sig   • aspirin 81 mg chewable tablet Chew 81 mg daily 2x a week   • dapagliflozin (Farxiga) 5 MG TABS TAKE ONE TABLET BY MOUTH EVERY DAY   • ezetimibe (ZETIA) 10 mg tablet Take 1 tablet (10 mg total) by mouth daily   • famotidine (PEPCID) 40 MG tablet TAKE ONE TABLET BY MOUTH EVERY DAY   • metFORMIN (GLUCOPHAGE) 500 mg tablet TAKE ONE TABLET BY MOUTH TWICE A DAY WITH MEALS (GENERIC GLUCOPHAGE)   • metoprolol succinate (TOPROL-XL) 25 mg 24 hr tablet TAKE ONE TABLET BY MOUTH EVERY DAY   • Multiple Vitamin (multivitamin) tablet Take 1 tablet by mouth daily   • Multiple Vitamins-Minerals (PRESERVISION AREDS 2 PO) Take by mouth   • simvastatin (ZOCOR) 40 mg tablet TAKE 1 TABLET BY MOUTH DAILY AT  BEDTIME   • sitaGLIPtin (Januvia) 50 mg tablet Take 1 tablet (50 mg total) by mouth daily   • spironolactone-hydrochlorothiazide (ALDACTAZIDE) 25-25 mg per tablet TAKE 1/2 TABLET BY MOUTH EVERY OTHER DAY (GENERIC FOR ALDACTAZIDE)   • tamsulosin (FLOMAX) 0.4 mg Take 1 capsule (0.4 mg total) by mouth daily with dinner   • trandolapril (MAVIK) 1 MG tablet TAKE ONE TABLET BY MOUTH EVERY DAY   • methylPREDNISolone 4 MG tablet therapy pack Use as directed on package (Patient not taking: Reported on 1/24/2025)   • methylPREDNISolone 4 MG tablet therapy pack Use as directed on package (Patient not taking: Reported on 1/24/2025)     Allergies   Allergen Reactions   • Ciprofloxacin Hives     Immunization History   Administered Date(s) Administered   • COVID-19 MODERNA VACC 0.25 ML IM BOOSTER 12/14/2021   • COVID-19 MODERNA VACC 0.5 ML IM 02/12/2021, 03/12/2021   • Influenza Split High Dose Preservative Free IM 10/10/2013,  "09/24/2014, 09/14/2015, 10/25/2016, 10/18/2017, 09/26/2024   • Influenza, high dose seasonal 0.7 mL 10/05/2018, 09/20/2019, 10/06/2020, 10/05/2021, 10/25/2022, 09/22/2023   • Influenza, seasonal, injectable 10/05/2011, 10/12/2012, 11/01/2016   • Pneumococcal Conjugate 13-Valent 09/14/2015   • Pneumococcal Polysaccharide PPV23 10/05/2011   • Zoster Vaccine Recombinant 02/04/2019     Objective   /70 (BP Location: Left arm, Patient Position: Sitting, Cuff Size: Large)   Pulse 64   Temp (!) 96.9 °F (36.1 °C) (Temporal)   Resp 12   Ht 5' 7\" (1.702 m)   Wt 90.7 kg (200 lb)   SpO2 97%   BMI 31.32 kg/m²     Physical Exam  Constitutional:       Appearance: He is well-developed.   HENT:      Head: Normocephalic and atraumatic.   Eyes:      Conjunctiva/sclera: Conjunctivae normal.      Pupils: Pupils are equal, round, and reactive to light.   Pulmonary:      Effort: Pulmonary effort is normal.   Neurological:      Mental Status: He is alert and oriented to person, place, and time.   Psychiatric:         Behavior: Behavior normal.         Thought Content: Thought content normal.         Judgment: Judgment normal.         "

## 2025-04-07 NOTE — ASSESSMENT & PLAN NOTE
Remains to be stable but slightly uncontrolled recommend patient to be continuing to see therapist  Genetic testing unfortunately not licensed to review this patient agreeable and aware

## 2025-04-07 NOTE — ASSESSMENT & PLAN NOTE
Stable  Stress might be the cause of the numbers  13lb down with [employer]  Recommend repeating labs in 3 months  Lab Results   Component Value Date    HGBA1C 7.0 (H) 03/28/2025       Orders:  •  Hemoglobin A1C; Future  •  Comprehensive metabolic panel; Future  •  Albumin / creatinine urine ratio; Future  •  Lipid panel; Future

## 2025-04-09 ENCOUNTER — TELEPHONE (OUTPATIENT)
Age: 85
End: 2025-04-09

## 2025-04-09 ENCOUNTER — SOCIAL WORK (OUTPATIENT)
Dept: BEHAVIORAL/MENTAL HEALTH CLINIC | Facility: CLINIC | Age: 85
End: 2025-04-09
Payer: COMMERCIAL

## 2025-04-09 DIAGNOSIS — F43.20 ADJUSTMENT DISORDER, UNSPECIFIED TYPE: Primary | ICD-10-CM

## 2025-04-09 PROCEDURE — 90837 PSYTX W PT 60 MINUTES: CPT | Performed by: SOCIAL WORKER

## 2025-04-09 NOTE — PSYCH
This note was not shared with the patient due to this is a psychotherapy note    Behavioral Health Psychotherapy Progress Note    Psychotherapy Provided: Individual Psychotherapy     1. Adjustment disorder, unspecified type          DATA:  Ernesto is a 85-year old,  male who presented for a follow-up outpatient individual counseling session after an approximate three-weeks service gap.  Prior to today's session with the undersigned therapist, on March 31, 2025, Ernesto had a follow-up office visit with nephrology for treatment of chronic kidney disease stage III and hypertension.   A review of Ernesto's medical record revealed, at Ernesto's previous office visit with nephrology, he was started him on an SGLT2 inhibitor with Farxiga 5 mg daily. Ernesto reported he has been tolerating that well with no issues.  His most recent blood work from March 28, 2025 which included CBC hemoglobin A1c CMP and albumin/creatinine ratio was reviewed.  His renal function was stable and at baseline with a creatinine 1.7 mg/dL.  His electrolytes including sodium potassium were normal.  His acid-base status is normal.  His hemoglobin is normal along with normal platelets no white count.  His hemoglobin A1c is at target at 7. He lost his wife back in August of last year.  It was discussed how to cope and maintaining his good relationship with his friends, two of which have passed away recently.  In today's session with the undersigned therapist, Ernesto mentioned he has not been sleeping, and has recently been prescribed melatonin by his primary care physician. Ernesto says he stays awake until 2:00 am to 3:00 am. Ernesto continues to mourn his wife.  He feels stressed about his relationship with his sister and his sister's daughter's attempts to sabotage his relationship with his sister.  Ernesto is struggling to manage financial stressors. Ernesto is concerned about short-term memory impairment.  He complains that he repeats himself in conversations. Ernesto feels  "stressed and sad about his sister's dementia is worsening.  Ernesto denied he is depressed and became upset when informed he was being treated by both his primary care physician and the undersigned therapist for depression.  The undersigned therapist provided Ernesto with grief counseling.    During this session, this clinician used the following therapeutic modalities: Cognitive Behavioral Therapy and Supportive Psychotherapy    Substance Abuse was not addressed during this session. If the client is diagnosed with a co-occurring substance use disorder, please indicate any changes in the frequency or amount of use: N/A. Stage of change for addressing substance use diagnoses: No substance use/Not applicable    ASSESSMENT:  Ernesto Sears presents with a Depressed mood.    His affect is Normal range and intensity, which is congruent, with his mood and the content of the session. The client has not made progress on their goals.    Ernesto Sears presents with a none risk of suicide, none risk of self-harm, and none risk of harm to others.    For any risk assessment that surpasses a \"low\" rating, a safety plan must be developed.    A safety plan was indicated: no  If yes, describe in detail: We have discussed their safety plan and Ernesto agrees that if they experience unsafe thoughts that they will reach out to their supports including this office, the suicide hotline, and emergency services if necessary. Ernesto is aware of non-emergent and emergent mental health resources.    Depression Follow-up Plan Completed: No    Visit start and stop times:    04/09/25  Start Time: 1103  End Time:  12:00 PM  "

## 2025-04-09 NOTE — TELEPHONE ENCOUNTER
The patient called  he said he received a call from this number but he did not get to the phone fast enough  I was unable to find any message for him  I was unable to reach the office  please call him back  thank you

## 2025-04-09 NOTE — TELEPHONE ENCOUNTER
Tried to contact patient to confirm his appointment with Travis Forbes today.  Please transfer to office.

## 2025-04-28 ENCOUNTER — OFFICE VISIT (OUTPATIENT)
Age: 85
End: 2025-04-28
Payer: COMMERCIAL

## 2025-04-28 VITALS — BODY MASS INDEX: 31.39 KG/M2 | WEIGHT: 200 LBS | HEIGHT: 67 IN

## 2025-04-28 DIAGNOSIS — L81.4 LENTIGO: ICD-10-CM

## 2025-04-28 DIAGNOSIS — Z85.828 HISTORY OF BASAL CELL CARCINOMA: Primary | ICD-10-CM

## 2025-04-28 DIAGNOSIS — D18.01 CHERRY ANGIOMA: ICD-10-CM

## 2025-04-28 DIAGNOSIS — L57.0 KERATOSIS, ACTINIC: ICD-10-CM

## 2025-04-28 DIAGNOSIS — D22.9 MULTIPLE MELANOCYTIC NEVI: ICD-10-CM

## 2025-04-28 DIAGNOSIS — B07.9 VERRUCA VULGARIS: ICD-10-CM

## 2025-04-28 DIAGNOSIS — L82.1 SEBORRHEIC KERATOSIS: ICD-10-CM

## 2025-04-28 PROCEDURE — 17003 DESTRUCT PREMALG LES 2-14: CPT | Performed by: DERMATOLOGY

## 2025-04-28 PROCEDURE — 17110 DESTRUCTION B9 LES UP TO 14: CPT | Performed by: DERMATOLOGY

## 2025-04-28 PROCEDURE — 99214 OFFICE O/P EST MOD 30 MIN: CPT | Performed by: DERMATOLOGY

## 2025-04-28 PROCEDURE — 17000 DESTRUCT PREMALG LESION: CPT | Performed by: DERMATOLOGY

## 2025-04-28 NOTE — PROGRESS NOTES
"[facility] Note     Patient Name: Gonzales Sears  Encounter Date: 4/28/2025       Have you been cared for by a [facility] Dermatologist in the last 3 years and, if so, which description applies to you? Yes. I have been here within the last 3 years, and my medical history has NOT changed since that time. I am not of child-bearing potential.     REVIEW OF SYSTEMS:  Have you recently had or currently have any of the following? No changes in my recent health.   PAST MEDICAL HISTORY:  Have you personally ever had or currently have any of the following?  If \"YES,\" then please provide more detail. No changes in my medical history.   HISTORY OF IMMUNOSUPPRESSION: Do you have a history of any of the following:  Systemic Immunosuppression such as Diabetes, Biologic or Immunotherapy, Chemotherapy, Organ Transplantation, Bone Marrow Transplantation or Prednisone?  No     Answering \"YES\" requires the addition of the dotphrase \"IMMUNOSUPPRESSED\" as the first diagnosis of the patient's visit.   FAMILY HISTORY:  Any \"first degree relatives\" (parent, brother, sister, or child) with the following?    No changes in my family's known health.   PATIENT EXPERIENCE:    Do you want the Dermatologist to perform a COMPLETE skin exam today including a clinical examination under the \"bra and underwear\" areas?  Yes  If necessary, do we have your permission to call and leave a detailed message on your Preferred Phone number that includes your specific medical information?  Yes      Allergies   Allergen Reactions   • Ciprofloxacin Hives      Current Outpatient Medications:   •  aspirin 81 mg chewable tablet, Chew 81 mg daily 2x a week, Disp: , Rfl:   •  dapagliflozin (Farxiga) 5 MG TABS, TAKE ONE TABLET BY MOUTH EVERY DAY, Disp: 30 tablet, Rfl: 5  •  ezetimibe (ZETIA) 10 mg tablet, Take 1 tablet (10 mg total) by mouth daily, Disp: 90 tablet, Rfl: 1  •  famotidine (PEPCID) 40 MG tablet, TAKE ONE TABLET BY MOUTH EVERY DAY, " Disp: 90 tablet, Rfl: 1  •  metFORMIN (GLUCOPHAGE) 500 mg tablet, TAKE ONE TABLET BY MOUTH TWICE A DAY WITH MEALS (GENERIC GLUCOPHAGE), Disp: 180 tablet, Rfl: 1  •  metoprolol succinate (TOPROL-XL) 25 mg 24 hr tablet, TAKE ONE TABLET BY MOUTH EVERY DAY, Disp: 90 tablet, Rfl: 1  •  Multiple Vitamin (multivitamin) tablet, Take 1 tablet by mouth daily, Disp: , Rfl:   •  Multiple Vitamins-Minerals (PRESERVISION AREDS 2 PO), Take by mouth, Disp: , Rfl:   •  simvastatin (ZOCOR) 40 mg tablet, TAKE 1 TABLET BY MOUTH DAILY AT  BEDTIME, Disp: 90 tablet, Rfl: 3  •  sitaGLIPtin (Januvia) 50 mg tablet, Take 1 tablet (50 mg total) by mouth daily, Disp: 90 tablet, Rfl: 1  •  spironolactone-hydrochlorothiazide (ALDACTAZIDE) 25-25 mg per tablet, TAKE 1/2 TABLET BY MOUTH EVERY OTHER DAY (GENERIC FOR ALDACTAZIDE), Disp: 15 tablet, Rfl: 1  •  tamsulosin (FLOMAX) 0.4 mg, Take 1 capsule (0.4 mg total) by mouth daily with dinner, Disp: 90 capsule, Rfl: 3  •  trandolapril (MAVIK) 1 MG tablet, TAKE ONE TABLET BY MOUTH EVERY DAY, Disp: 90 tablet, Rfl: 1  •  methylPREDNISolone 4 MG tablet therapy pack, Use as directed on package (Patient not taking: Reported on 1/24/2025), Disp: 21 each, Rfl: 0  •  methylPREDNISolone 4 MG tablet therapy pack, Use as directed on package (Patient not taking: Reported on 1/24/2025), Disp: 21 each, Rfl: 0              Whom besides the patient is providing clinical information about today's encounter?   NO ADDITIONAL HISTORIAN (patient alone provided history)    Physical Exam and Assessment/Plan by Diagnosis:        HISTORY OF BASAL CELL CARCINOMA     Physical Exam:  Anatomic Location Affected:  ears  Morphological Description of scar:  Well healed scar  Suspected Recurrence: No  Pertinent Positives:  Pertinent Negatives:        Additional History of Present Condition:  History of basal cell carcinoma with no sign of recurrence     Assessment and Plan:  Based on a thorough discussion of this condition and the  management approach to it (including a comprehensive discussion of the known risks, side effects and potential benefits of treatment), the patient (family) agrees to implement the following specific plan:  When outside we recommend using a wide brim hat, sunglasses, long sleeve and pants, sunscreen with SPF 30+ with reapplication every 2 hours, or SPF specific clothing   Recommend yearly skin exam     How can basal cell carcinoma be prevented?  The most important way to prevent BCC is to avoid sunburn. This is especially important in childhood and early life. Fair skinned individuals and those with a personal or family history of BCC should protect their skin from sun exposure daily, year-round and lifelong.  Stay indoors or under the shade in the middle of the day   Wear covering clothing   Apply high protection factor SPF50+ broad-spectrum sunscreens generously to exposed skin if outdoors   Avoid indoor tanning (sun beds, solaria)  Oral nicotinamide (vitamin B3) in a dose of 500 mg twice daily may reduce the number and severity of BCCs.     What is the outlook for basal cell carcinoma?  Most BCCs are cured by treatment. Cure is most likely if treatment is undertaken when the lesion is small.  About 50% of people with BCC develop a second one within 3 years of the first. They are also at increased risk of other skin cancers, especially melanoma. Regular self-skin examinations and long-term annual skin checks by an experienced health professional are recommended.    ACTINIC KERATOSIS    Physical Exam:  Anatomic Location Affected:  right arm and left arm   Morphological Description:  Scaly pink papules  Pertinent Positives:  Pertinent Negatives:      Assessment and Plan:  Based on a thorough discussion of this condition and the management approach to it (including a comprehensive discussion of the known risks, side effects and potential benefits of treatment), the patient (family) agrees to implement the following  "specific plan:  When outside we recommend using a wide brim hat, sunglasses, long sleeve and pants, sunscreen with SPF 30+ with reapplication every 2 hours, or SPF specific clothing   liquid nitrogen to treat areas. Consent obtained. Expect area to blister, crust, and then fall off within 2 weeks. Please use vaseline.          Recommend re-evaluation of treated areas in 6 months                                 PROCEDURE:  DESTRUCTION OF PRE-MALIGNANT LESIONS  After a thorough discussion of treatment options and risk/benefits/alternatives (including but not limited to local pain, scarring, dyspigmentation, blistering, and possible superinfection), verbal and written consent were obtained and the aforementioned lesions were treated on with cryotherapy using liquid nitrogen x 1 cycle for 5-10 seconds.    TOTAL NUMBER of 10 pre-malignant lesions were treated today on the ANATOMIC LOCATION: right arm and left arm .     The patient tolerated the procedure well, and after-care instructions were provided.        VERUCCA VULGARIS (\"COMMON WART\")    Physical Exam:  Anatomic Location: right ear  Morphologic Description:  verrucous papule  Pertinent Positives:  Pertinent Negatives:    Additional History of Present Condition:  present on exam     Plan:  Discussed this condition - while contagious - is very common and nearly harmless.  It is caused by an infection with human papillomavirus (HPV).  It is most common in school-aged children; however, warts may occur at any age.  They are also seen in greater frequency in the setting of other dermatitis (due to a defective skin barrier) and immunosuppression (e.g., from medications or HIV infection).  Warts are spread by direct skin-to-skin contact or auto-inoculation if a wart is scratched or picked.  The incubation period may be 12+ months depending on the amount of virus inoculated.  Treatments usually make the wart smaller and less uncomfortable and many times leads to total " "resolution. In children - even without treatment - most warts (up to 90%) may resolve within 2 years.  Warts may be more persistent in adults.  CRYOTHERAPY.  Patient/family opts to treat the warts with liquid nitrogen.  Usually this is done at 2- to 4-week intervals.  It destroys the outer layer of skin and causes peeling.  It is uncomfortable and may result in blistering for several days or longer.  It may also result in skin color changes (lightening or darkening of the skin) and even numbness if performed over a superficial nerve such as the side of a finger.  It may also result in permanent nail injury/dystrophy if the nail matrix is damaged during freezing.  Success rates are around 70% after 3 to 4 months of regular freezing sessions.  SEE PROCEDURE NOTE BELOW.       PROCEDURES PERFORMED TODAY ASSOCIATED WITH THIS CONDITION:            PROCEDURE:  DESTRUCTION OF BENIGN LESIONS WITH CRYOTHERAPY  After a thorough discussion of treatment options and risk/benefits/alternatives (including but not limited to local pain, scarring, dyspigmentation, blistering, and possible superinfection), verbal and written consent were obtained and the aforementioned lesions were treated on with cryotherapy using liquid nitrogen x 3 cycle for 5-10 seconds.    TOTAL NUMBER of 1 lesions were treated today on the ANATOMIC LOCATION:  right ear.    The patient tolerated the procedure well, and after-care instructions were provided.         Medical Complexity:    SELF-LIMITED OR MINOR PROBLEM.  Problem runs a definite and prescribed course, is transient in nature, and is not likely to permanently alter health status.        MULTIPLE MELANOCYTIC NEVI (\"Moles\")    Physical Exam:  Anatomic Location Affected:   Mostly on sun-exposed areas of the trunk and extremities  Morphological Description:  Scattered, 1-4mm round to ovoid, symmetrical-appearing, even bordered, skin colored to dark brown macules/papules, mostly in sun-exposed " "areas  Pertinent Positives:  Pertinent Negatives:    Additional History of Present Condition:  present on exam     Assessment and Plan:  Based on a thorough discussion of this condition and the management approach to it (including a comprehensive discussion of the known risks, side effects and potential benefits of treatment), the patient (family) agrees to implement the following specific plan:  When outside we recommend using a wide brim hat, sunglasses, long sleeve and pants, sunscreen with SPF 30+ with reapplication every 2 hours, or SPF specific clothing   Benign, reassured  Annual skin check     Melanocytic Nevi  Melanocytic nevi (\"moles\") are tan or brown, raised or flat areas of the skin which have an increased number of melanocytes. Melanocytes are the cells in our body which make pigment and account for skin color.    Some moles are present at birth (I.e., \"congenital nevi\"), while others come up later in life (i.e., \"acquired nevi\").  The sun can stimulate the body to make more moles.  Sunburns are not the only thing that triggers more moles.  Chronic sun exposure can do it too.     Clinically distinguishing a healthy mole from melanoma may be difficult, even for experienced dermatologists. The \"ABCDE's\" of moles have been suggested as a means of helping to alert a person to a suspicious mole and the possible increased risk of melanoma.  The suggestions for raising alert are as follows:    Asymmetry: Healthy moles tend to be symmetric, while melanomas are often asymmetric.  Asymmetry means if you draw a line through the mole, the two halves do not match in color, size, shape, or surface texture. Asymmetry can be a result of rapid enlargement of a mole, the development of a raised area on a previously flat lesion, scaling, ulceration, bleeding or scabbing within the mole.  Any mole that starts to demonstrate \"asymmetry\" should be examined promptly by a board certified dermatologist.     Border: Healthy " "moles tend to have discrete, even borders.  The border of a melanoma often blends into the normal skin and does not sharply delineate the mole from normal skin.  Any mole that starts to demonstrate \"uneven borders\" should be examined promptly by a board certified dermatologist.     Color: Healthy moles tend to be one color throughout.  Melanomas tend to be made up of different colors ranging from dark black, blue, white, or red.  Any mole that demonstrates a color change should be examined promptly by a board certified dermatologist.     Diameter: Healthy moles tend to be smaller than 0.6 cm in size; an exception are \"congenital nevi\" that can be larger.  Melanomas tend to grow and can often be greater than 0.6 cm (1/4 of an inch, or the size of a pencil eraser). This is only a guideline, and many normal moles may be larger than 0.6 cm without being unhealthy.  Any mole that starts to change in size (small to bigger or bigger to smaller) should be examined promptly by a board certified dermatologist.     Evolving: Healthy moles tend to \"stay the same.\"  Melanomas may often show signs of change or evolution such as a change in size, shape, color, or elevation.  Any mole that starts to itch, bleed, crust, burn, hurt, or ulcerate or demonstrate a change or evolution should be examined promptly by a board certified dermatologist.        LENTIGO    Physical Exam:  Anatomic Location Affected:  trunk, arms  Morphological Description:  Light brown macules  Pertinent Positives:  Pertinent Negatives:    Additional History of Present Condition:  present on exam     Assessment and Plan:  Based on a thorough discussion of this condition and the management approach to it (including a comprehensive discussion of the known risks, side effects and potential benefits of treatment), the patient (family) agrees to implement the following specific plan:  When outside we recommend using a wide brim hat, sunglasses, long sleeve and pants, " sunscreen with SPF 30+ with reapplication every 2 hours, or SPF specific clothing       What is a lentigo?  A lentigo is a pigmented flat or slightly raised lesion with a clearly defined edge. Unlike an ephelis (freckle), it does not fade in the winter months. There are several kinds of lentigo.  The name lentigo originally referred to its appearance resembling a small lentil. The plural of lentigo is lentigines, although “lentigos” is also in common use.    Who gets lentigines?  Lentigines can affect males and females of all ages and races. Solar lentigines are especially prevalent in fair skinned adults. Lentigines associated with syndromes are present at birth or arise during childhood.    What causes lentigines?  Common forms of lentigo are due to exposure to ultraviolet radiation:  Sun damage including sunburn   Indoor tanning   Phototherapy, especially photochemotherapy (PUVA)    Ionizing radiation, eg radiation therapy, can also cause lentigines.  Several familial syndromes associated with widespread lentigines originate from mutations in Max-MAP kinase, mTOR signaling and PTEN pathways.    What is the treatment for lentigines?  Most lentigines are left alone. Attempts to lighten them may not be successful. The following approaches are used:  SPF 50+ broad-spectrum sunscreen   Hydroquinone bleaching cream   Alpha hydroxy acids   Vitamin C   Retinoids   Azelaic acid   Chemical peels  Individual lesions can be permanently removed using:  Cryotherapy   Intense pulsed light   Pigment lasers    How can lentigines be prevented?  Lentigines associated with exposure ultraviolet radiation can be prevented by very careful sun protection. Clothing is more successful at preventing new lentigines than are sunscreens.    What is the outlook for lentigines?  Lentigines usually persist. They may increase in number with age and sun exposure. Some in sun-protected sites may fade and disappear.    EDWARDS ANGIOMAS    Physical  "Exam:  Anatomic Location Affected:  trunk  Morphological Description:  Scattered cherry red, 1-4 mm papules.  Pertinent Positives:  Pertinent Negatives:    Additional History of Present Condition:  present on exam     Assessment and Plan:  Based on a thorough discussion of this condition and the management approach to it (including a comprehensive discussion of the known risks, side effects and potential benefits of treatment), the patient (family) agrees to implement the following specific plan:  Monitor for changes  Benign, reassured      Assessment and Plan:    Cherry angioma, also known as [place] spots, are benign vascular skin lesions. A \"cherry angioma\" is a firm red, blue or purple papule, 0.1-1 cm in diameter. When thrombosed, they can appear black in colour until evaluated with a dermatoscope when the red or purple colour is more easily seen. Cherry angioma may develop on any part of the body but most often appear on the scalp, face, lips and trunk.  An angioma is due to proliferating endothelial cells; these are the cells that line the inside of a blood vessel.    Angiomas can arise in early life or later in life; the most common type of angioma is a cherry angioma.  Cherry angiomas are very common in males and females of any age or race. They are more noticeable in white skin than in skin of colour. They markedly increase in number from about the age of 40. There may be a family history of similar lesions. Eruptive cherry angiomas have been rarely reported to be associated with internal malignancy. The cause of angiomas is unknown. Genetic analysis of cherry angiomas has shown that they frequently carry specific somatic missense mutations in the GNAQ and GNA11 (Q209H) genes, which are involved in other vascular and melanocytic proliferations.      SEBORRHEIC KERATOSIS; NON-INFLAMED    Physical Exam:  Anatomic Location Affected:  trunk  Morphological Description:  Flat and raised, waxy, smooth " "to warty textured, yellow to brownish-grey to dark brown to blackish, discrete, \"stuck-on\" appearing papules.  Pertinent Positives:  Pertinent Negatives:    Additional History of Present Condition:  present on exam     Assessment and Plan:  Based on a thorough discussion of this condition and the management approach to it (including a comprehensive discussion of the known risks, side effects and potential benefits of treatment), the patient (family) agrees to implement the following specific plan:  Monitor for changes  Benign, reassured      Seborrheic Keratosis  A seborrheic keratosis is a harmless warty spot that appears during adult life as a common sign of skin aging.  Seborrheic keratoses can arise on any area of skin, covered or uncovered, with the usual exception of the palms and soles. They do not arise from mucous membranes. Seborrheic keratoses can have highly variable appearance.      Seborrheic keratoses are extremely common. It has been estimated that over 90% of adults over the age of 60 years have one or more of them. They occur in males and females of all races, typically beginning to erupt in the 30s or 40s. They are uncommon under the age of 20 years.  The precise cause of seborrhoeic keratoses is not known.  Seborrhoeic keratoses are considered degenerative in nature. As time goes by, seborrheic keratoses tend to become more numerous. Some people inherit a tendency to develop a very large number of them; some people may have hundreds of them.      There is no easy way to remove multiple lesions on a single occasion.  Unless a specific lesion is \"inflamed\" and is causing pain or stinging/burning or is bleeding, most insurance companies do not authorize treatment.      Scribe Attestation    I,:   am acting as a scribe while in the presence of the attending physician.:       I,:   personally performed the services described in this documentation    as scribed in my presence.:          "

## 2025-04-30 ENCOUNTER — SOCIAL WORK (OUTPATIENT)
Dept: BEHAVIORAL/MENTAL HEALTH CLINIC | Facility: CLINIC | Age: 85
End: 2025-04-30
Payer: COMMERCIAL

## 2025-04-30 DIAGNOSIS — F43.20 ADJUSTMENT DISORDER, UNSPECIFIED TYPE: Primary | ICD-10-CM

## 2025-04-30 PROCEDURE — 90837 PSYTX W PT 60 MINUTES: CPT | Performed by: SOCIAL WORKER

## 2025-04-30 NOTE — PSYCH
This note was not shared with the patient due to this is a psychotherapy note    Behavioral Health Psychotherapy Progress Note    Psychotherapy Provided: Individual Psychotherapy     1. Adjustment disorder, unspecified type            DATA:   Ernesto is a 85-year old,  male who presented for a follow-up outpatient individual counseling session after an approximate three-weeks service gap as Ernesto was a no show for a previously-scheduled session about with the undersigned therapist about two weeks ago.   For today's session with the undersigned therapist, Ernesto continues to mourn his wife, Sanjuanita.   His wife  in 2024.   Ernesto has started the process of getting rid of Sanjuanita's clothes.   Ernesto continues to lose weight because he isn't eating as much.   He claims he is eating healthier and wants to lose weight.   Ernesto admits to having a poor appetite.   He has three more physical therapy appointments.   Ernesto describes his mood as experiencing fleeting sadness.   He often feels lonely.  Ernesto denies depression.   He struggles managing his emotions at night when he is less active.   Ernesto is attempting to increase his activity by mowing the grass.   The undersigned therapist assisted Ernesto with identifying other social and recreational activities to increase his productivity and as an effective mood strategy.   The undersigned therapist provided Ernesto with grief counseling.    During this session, this clinician used the following therapeutic modalities: Bereavement Therapy and Supportive Psychotherapy    Substance Abuse was not addressed during this session. If the client is diagnosed with a co-occurring substance use disorder, please indicate any changes in the frequency or amount of use: NA. Stage of change for addressing substance use diagnoses: No substance use/Not applicable    ASSESSMENT:  Ernesto Sears presents with a Depressed mood.    His affect is Normal range and intensity, which is congruent, with his mood  "and the content of the session. The client has made progress on their goals.    Ernesto Sears presents with a none risk of suicide, none risk of self-harm, and none risk of harm to others.    For any risk assessment that surpasses a \"low\" rating, a safety plan must be developed.    A safety plan was indicated: no  If yes, describe in detail:  We have discussed their safety plan and Ernesto agrees that if they experience unsafe thoughts that they will reach out to their supports including this office, the suicide hotline, and emergency services if necessary. Ernesto is aware of non-emergent and emergent mental health resources.    Depression Follow-up Plan Completed: No    Visit start and stop times:    04/30/25  Start Time: 1103  End Time:  11:58 AM  "

## 2025-05-15 ENCOUNTER — TELEPHONE (OUTPATIENT)
Age: 85
End: 2025-05-15

## 2025-05-15 NOTE — TELEPHONE ENCOUNTER
Patient called stating someone called him from this number. Writer looked in patient's chart and there are no encounter notes for today. Writer informed patient it might of been a call to confirm his appointment and if anyone need to speak with him, they will call back. Pt expressed his understanding.

## 2025-05-21 ENCOUNTER — SOCIAL WORK (OUTPATIENT)
Dept: BEHAVIORAL/MENTAL HEALTH CLINIC | Facility: CLINIC | Age: 85
End: 2025-05-21
Payer: COMMERCIAL

## 2025-05-21 DIAGNOSIS — F43.20 ADJUSTMENT DISORDER, UNSPECIFIED TYPE: Primary | ICD-10-CM

## 2025-05-21 PROCEDURE — 90837 PSYTX W PT 60 MINUTES: CPT | Performed by: SOCIAL WORKER

## 2025-05-21 NOTE — PSYCH
"This note was not shared with the patient due to this is a psychotherapy note    Behavioral Health Psychotherapy Progress Note    Psychotherapy Provided: Individual Psychotherapy     1. Adjustment disorder, unspecified type            DATA:   Ernesto is a 85-year old,  male who presented for a follow-up outpatient individual counseling session after an approximate three-weeks service gap.   For today's session with the undersigned therapist, Ernesto started to clean out his  wife's things by focusing on her under garments and socks.   The undersigned therapist assisted Ernesto process his feelings about his wife's history of suicidal ideation and mental health issues.   He reports when memories and photos pop up on his Iphone he gets upset.   Ernesto denied depression, anxiety or difficulty managing his mood.   There was no evidence of a thought disorder or psychosis.   Ernesto denied suicidal ideation, gesture or plan.   The undersigned therapist provided Ernesto with grief counseling.    During this session, this clinician used the following therapeutic modalities: Bereavement Therapy    Substance Abuse was not addressed during this session. If the client is diagnosed with a co-occurring substance use disorder, please indicate any changes in the frequency or amount of use: NA. Stage of change for addressing substance use diagnoses: No substance use/Not applicable    ASSESSMENT:  Ernesto Sears presents with a sad mood.    His affect is Normal range and intensity, which is congruent, with his mood and the content of the session. The client has made progress on their goals.     Ernesto Sears presents with a none risk of suicide, none risk of self-harm, and none risk of harm to others.    For any risk assessment that surpasses a \"low\" rating, a safety plan must be developed.    A safety plan was indicated: no  If yes, describe in detail:  We have discussed their safety plan and Ernesto agrees that if they experience unsafe " thoughts that they will reach out to their supports including this office, the suicide hotline, and emergency services if necessary. Ernesto is aware of non-emergent and emergent mental health resources.    Depression Follow-up Plan Completed: No    Visit start and stop times:    05/21/25  Start Time: 1105  End Time:  12:00 PM

## 2025-06-18 ENCOUNTER — RA CDI HCC (OUTPATIENT)
Dept: OTHER | Facility: HOSPITAL | Age: 85
End: 2025-06-18

## 2025-06-23 DIAGNOSIS — E11.22 TYPE 2 DIABETES MELLITUS WITH STAGE 3A CHRONIC KIDNEY DISEASE, WITHOUT LONG-TERM CURRENT USE OF INSULIN (HCC): ICD-10-CM

## 2025-06-23 DIAGNOSIS — N18.31 TYPE 2 DIABETES MELLITUS WITH STAGE 3A CHRONIC KIDNEY DISEASE, WITHOUT LONG-TERM CURRENT USE OF INSULIN (HCC): ICD-10-CM

## 2025-06-24 DIAGNOSIS — E11.9 TYPE 2 DIABETES MELLITUS WITHOUT COMPLICATION, WITHOUT LONG-TERM CURRENT USE OF INSULIN (HCC): ICD-10-CM

## 2025-06-24 DIAGNOSIS — E78.5 DYSLIPIDEMIA: ICD-10-CM

## 2025-06-24 DIAGNOSIS — I25.10 2-VESSEL CORONARY ARTERY DISEASE: ICD-10-CM

## 2025-06-24 DIAGNOSIS — I11.9 HYPERTENSIVE HEART DISEASE WITHOUT HEART FAILURE: ICD-10-CM

## 2025-06-25 RX ORDER — SPIRONOLACTONE AND HYDROCHLOROTHIAZIDE 25; 25 MG/1; MG/1
TABLET ORAL
Qty: 15 TABLET | Refills: 1 | Status: SHIPPED | OUTPATIENT
Start: 2025-06-25

## 2025-06-25 RX ORDER — EZETIMIBE 10 MG/1
10 TABLET ORAL DAILY
Qty: 90 TABLET | Refills: 1 | Status: SHIPPED | OUTPATIENT
Start: 2025-06-25

## 2025-06-25 RX ORDER — EZETIMIBE 10 MG/1
10 TABLET ORAL DAILY
Qty: 90 TABLET | Refills: 0 | Status: SHIPPED | OUTPATIENT
Start: 2025-06-25 | End: 2025-06-26

## 2025-06-25 RX ORDER — SIMVASTATIN 40 MG
40 TABLET ORAL
Qty: 90 TABLET | Refills: 3 | Status: SHIPPED | OUTPATIENT
Start: 2025-06-25

## 2025-06-26 ENCOUNTER — APPOINTMENT (OUTPATIENT)
Dept: LAB | Facility: CLINIC | Age: 85
End: 2025-06-26
Attending: FAMILY MEDICINE
Payer: COMMERCIAL

## 2025-06-26 ENCOUNTER — RESULTS FOLLOW-UP (OUTPATIENT)
Dept: FAMILY MEDICINE CLINIC | Facility: CLINIC | Age: 85
End: 2025-06-26

## 2025-06-26 ENCOUNTER — TELEPHONE (OUTPATIENT)
Dept: PSYCHIATRY | Facility: CLINIC | Age: 85
End: 2025-06-26

## 2025-06-26 ENCOUNTER — OFFICE VISIT (OUTPATIENT)
Dept: FAMILY MEDICINE CLINIC | Facility: CLINIC | Age: 85
End: 2025-06-26
Payer: COMMERCIAL

## 2025-06-26 VITALS
SYSTOLIC BLOOD PRESSURE: 118 MMHG | WEIGHT: 195.2 LBS | OXYGEN SATURATION: 97 % | RESPIRATION RATE: 18 BRPM | DIASTOLIC BLOOD PRESSURE: 80 MMHG | TEMPERATURE: 97.1 F | HEART RATE: 58 BPM | HEIGHT: 67 IN | BODY MASS INDEX: 30.64 KG/M2

## 2025-06-26 DIAGNOSIS — Z12.5 SCREENING PSA (PROSTATE SPECIFIC ANTIGEN): ICD-10-CM

## 2025-06-26 DIAGNOSIS — I10 BENIGN HYPERTENSION: ICD-10-CM

## 2025-06-26 DIAGNOSIS — N18.30 TYPE 2 DIABETES MELLITUS WITH STAGE 3 CHRONIC KIDNEY DISEASE AND HYPERTENSION (HCC): Primary | ICD-10-CM

## 2025-06-26 DIAGNOSIS — I12.9 TYPE 2 DIABETES MELLITUS WITH STAGE 3 CHRONIC KIDNEY DISEASE AND HYPERTENSION (HCC): ICD-10-CM

## 2025-06-26 DIAGNOSIS — I12.9 TYPE 2 DIABETES MELLITUS WITH STAGE 3 CHRONIC KIDNEY DISEASE AND HYPERTENSION (HCC): Primary | ICD-10-CM

## 2025-06-26 DIAGNOSIS — F32.A MILD DEPRESSION: ICD-10-CM

## 2025-06-26 DIAGNOSIS — N18.30 TYPE 2 DIABETES MELLITUS WITH STAGE 3 CHRONIC KIDNEY DISEASE AND HYPERTENSION (HCC): ICD-10-CM

## 2025-06-26 DIAGNOSIS — E11.22 TYPE 2 DIABETES MELLITUS WITH STAGE 3 CHRONIC KIDNEY DISEASE AND HYPERTENSION (HCC): Primary | ICD-10-CM

## 2025-06-26 DIAGNOSIS — E11.22 TYPE 2 DIABETES MELLITUS WITH STAGE 3 CHRONIC KIDNEY DISEASE AND HYPERTENSION (HCC): ICD-10-CM

## 2025-06-26 DIAGNOSIS — I10 BENIGN ESSENTIAL HTN: ICD-10-CM

## 2025-06-26 PROBLEM — C44.91 CARCINOMA, BASAL CELL, SKIN: Status: ACTIVE | Noted: 2017-02-16

## 2025-06-26 PROBLEM — R06.02 SHORTNESS OF BREATH: Status: RESOLVED | Noted: 2018-09-12 | Resolved: 2025-06-26

## 2025-06-26 PROBLEM — E66.9 MODERATE OBESITY: Status: RESOLVED | Noted: 2017-01-13 | Resolved: 2025-06-26

## 2025-06-26 PROBLEM — C44.92 SQUAMOUS CELL CARCINOMA OF SKIN: Status: ACTIVE | Noted: 2017-02-16

## 2025-06-26 LAB
ALBUMIN SERPL BCG-MCNC: 4 G/DL (ref 3.5–5)
ALP SERPL-CCNC: 62 U/L (ref 34–104)
ALT SERPL W P-5'-P-CCNC: 17 U/L (ref 7–52)
ANION GAP SERPL CALCULATED.3IONS-SCNC: 7 MMOL/L (ref 4–13)
AST SERPL W P-5'-P-CCNC: 17 U/L (ref 13–39)
BILIRUB SERPL-MCNC: 0.5 MG/DL (ref 0.2–1)
BUN SERPL-MCNC: 40 MG/DL (ref 5–25)
CALCIUM SERPL-MCNC: 9.1 MG/DL (ref 8.4–10.2)
CHLORIDE SERPL-SCNC: 103 MMOL/L (ref 96–108)
CHOLEST SERPL-MCNC: 204 MG/DL (ref ?–200)
CO2 SERPL-SCNC: 27 MMOL/L (ref 21–32)
CREAT SERPL-MCNC: 1.86 MG/DL (ref 0.6–1.3)
CREAT UR-MCNC: 133.5 MG/DL
EST. AVERAGE GLUCOSE BLD GHB EST-MCNC: 146 MG/DL
GFR SERPL CREATININE-BSD FRML MDRD: 32 ML/MIN/1.73SQ M
GLUCOSE P FAST SERPL-MCNC: 135 MG/DL (ref 65–99)
HBA1C MFR BLD: 6.7 %
HDLC SERPL-MCNC: 49 MG/DL
LDLC SERPL CALC-MCNC: 137 MG/DL (ref 0–100)
MICROALBUMIN UR-MCNC: 31.2 MG/L
MICROALBUMIN/CREAT 24H UR: 23 MG/G CREATININE (ref 0–30)
NONHDLC SERPL-MCNC: 155 MG/DL
POTASSIUM SERPL-SCNC: 4.6 MMOL/L (ref 3.5–5.3)
PROT SERPL-MCNC: 6.9 G/DL (ref 6.4–8.4)
PSA SERPL-MCNC: 5.64 NG/ML (ref 0–4)
SL AMB POCT HEMOGLOBIN AIC: 6.2 (ref ?–6.5)
SODIUM SERPL-SCNC: 137 MMOL/L (ref 135–147)
TRIGL SERPL-MCNC: 89 MG/DL (ref ?–150)

## 2025-06-26 PROCEDURE — G0103 PSA SCREENING: HCPCS

## 2025-06-26 PROCEDURE — G2211 COMPLEX E/M VISIT ADD ON: HCPCS | Performed by: FAMILY MEDICINE

## 2025-06-26 PROCEDURE — 82043 UR ALBUMIN QUANTITATIVE: CPT

## 2025-06-26 PROCEDURE — 83036 HEMOGLOBIN GLYCOSYLATED A1C: CPT | Performed by: FAMILY MEDICINE

## 2025-06-26 PROCEDURE — 83036 HEMOGLOBIN GLYCOSYLATED A1C: CPT

## 2025-06-26 PROCEDURE — 80053 COMPREHEN METABOLIC PANEL: CPT

## 2025-06-26 PROCEDURE — 80061 LIPID PANEL: CPT

## 2025-06-26 PROCEDURE — 99214 OFFICE O/P EST MOD 30 MIN: CPT | Performed by: FAMILY MEDICINE

## 2025-06-26 PROCEDURE — 36415 COLL VENOUS BLD VENIPUNCTURE: CPT

## 2025-06-26 PROCEDURE — 82570 ASSAY OF URINE CREATININE: CPT

## 2025-06-26 NOTE — PROGRESS NOTES
Name: Gonzales Sears      : 1940      MRN: [id number]  Encounter Provider: Mary Ulloa MD  Encounter Date: 2025   Encounter department: [facility]    Assessment & Plan  Type 2 diabetes mellitus with stage 3 chronic kidney disease and hypertension (HCC)    Lab Results   Component Value Date    HGBA1C 6.7 (H) 2025   STOP Metformin given GFR nearing 30 and A1c improvement  He is doing AMAZING at his diet   No changes to be made except for the metformin    Orders:  •  POCT hemoglobin A1c    Mild depression  Doing very well.  No acute concerns today         Benign essential HTN  BP is currently stable            History of Present Illness     85-year-old male presenting to the office.  States that he has been going through his wife's things.  Patient states that he has been doing a little bit better since her last appointment.  Continues to be working really hard on lifestyle modifications.  Patient has been seen by the specialist    Diabetes  Pertinent negatives for diabetes include no chest pain and no fatigue.     Review of Systems   Constitutional:  Negative for activity change, appetite change, chills, fatigue and fever.   HENT:  Negative for congestion.    Respiratory:  Negative for cough, chest tightness and shortness of breath.    Cardiovascular:  Negative for chest pain and leg swelling.   Gastrointestinal:  Negative for abdominal distention, abdominal pain, constipation, diarrhea, nausea and vomiting.   All other systems reviewed and are negative.    Past Medical History[1]  Past Surgical History[2]  Family History[3]  Social History[4]  Medications[5]  Allergies   Allergen Reactions   • Ciprofloxacin Hives     Immunization History   Administered Date(s) Administered   • COVID-19 MODERNA VACC 0.25 ML IM BOOSTER 2021   • COVID-19 MODERNA VACC 0.5 ML IM 2021, 2021   • Influenza Split High Dose Preservative Free IM 10/10/2013, 2014,  "09/14/2015, 10/25/2016, 10/18/2017, 09/26/2024   • Influenza, high dose seasonal 0.7 mL 10/05/2018, 09/20/2019, 10/06/2020, 10/05/2021, 10/25/2022, 09/22/2023   • Influenza, seasonal, injectable 10/05/2011, 10/12/2012, 11/01/2016   • Pneumococcal Conjugate 13-Valent 09/14/2015   • Pneumococcal Polysaccharide PPV23 10/05/2011   • Zoster Vaccine Recombinant 02/04/2019     Objective   /80 (BP Location: Left arm, Patient Position: Sitting, Cuff Size: Standard)   Pulse 58   Temp (!) 97.1 °F (36.2 °C) (Temporal)   Resp 18   Ht 5' 7\" (1.702 m)   Wt 88.5 kg (195 lb 3.2 oz)   SpO2 97%   BMI 30.57 kg/m²     Physical Exam  Vitals reviewed.   Constitutional:       Appearance: He is well-developed.   HENT:      Head: Normocephalic and atraumatic.      Right Ear: Tympanic membrane, ear canal and external ear normal.      Left Ear: Tympanic membrane, ear canal and external ear normal.      Nose: Nose normal.      Mouth/Throat:      Mouth: Mucous membranes are moist.     Eyes:      Conjunctiva/sclera: Conjunctivae normal.      Pupils: Pupils are equal, round, and reactive to light.       Cardiovascular:      Rate and Rhythm: Normal rate and regular rhythm.      Heart sounds: Normal heart sounds.   Pulmonary:      Effort: Pulmonary effort is normal.      Breath sounds: Normal breath sounds. No wheezing.   Abdominal:      General: Bowel sounds are normal. There is no distension.      Palpations: Abdomen is soft. There is no mass.      Tenderness: There is no abdominal tenderness.   Genitourinary:     Penis: Normal.      Musculoskeletal:         General: No tenderness. Normal range of motion.      Cervical back: Normal range of motion and neck supple.     Skin:     General: Skin is warm.      Capillary Refill: Capillary refill takes less than 2 seconds.     Neurological:      Mental Status: He is alert and oriented to person, place, and time.      Cranial Nerves: No cranial nerve deficit.      Sensory: No sensory deficit. "      Motor: No abnormal muscle tone.      Coordination: Coordination normal.      Deep Tendon Reflexes: Reflexes normal.     Psychiatric:         Behavior: Behavior normal.         Thought Content: Thought content normal.         Judgment: Judgment normal.                [1]  Past Medical History:  Diagnosis Date   • Abnormal blood chemistry     resolved: 11/9/16   • Abnormal glucose     last assessed: 9/24/14   • Anxiety ?   • Arthritis    • CAD (coronary artery disease)    • Chronic kidney disease ?    STONES  & CKD   • Chronic pain disorder     Right hand   • Coronary atherosclerosis    • Diabetes mellitus (HCC) ?   • DM2 (diabetes mellitus, type 2) (HCC)    • Dyslipidemia    • Facial nerve disorder    • GERD (gastroesophageal reflux disease)    • Heart disease    • HTN (hypertension)    • Hyperlipidemia     last assessed: 1/13/17   • Hypertension ?   • Kidney stones    • Lumbosacral radiculopathy    • Nerve root and plexus disorder    • Obesity    • Osteoarthritis    • Prostate asymmetry    • Pure hypercholesterolemia    • Sleep difficulties    [2]  Past Surgical History:  Procedure Laterality Date   • APPENDECTOMY     • CORONARY ANGIOPLASTY      1 STENT    • DENTAL SURGERY     • EYE SURGERY     • HAND SURGERY  3/21/24   • HERNIA REPAIR     • ORTHOPEDIC SURGERY     • LA NEUROPLASTY &/TRANSPOS MEDIAN NRV CARPAL TUNNE Right 03/21/2024    Procedure: RELEASE CARPAL TUNNEL;  Surgeon: Vivian Borden MD;  Location: [facility];  Service: Orthopedics   • LA UNLISTED PROCEDURE ESOPHAGUS N/A 09/30/2021    Procedure: EGD; DIVERTICULECTOMY ZENKERS ENDOSCOPIC;  Surgeon: Humble Franco MD;  Location:  MAIN OR;  Service: Thoracic   • UPPER GASTROINTESTINAL ENDOSCOPY     [3]  Family History  Problem Relation Name Age of Onset   • Cancer Mother Alicia Sears    • Ovarian cancer Mother Alicai Sears    • Arthritis Mother Alicia Sears    • Hypertension Sister Rosamaria Gerard    • Lung disease Father          black    • Diabetes Paternal Aunt Yanet HEALYSanta HILLMAN    • Seizures Sister Rosamaria Van         Recent, last 6 months   • Mental illness Neg Hx     • Substance Abuse Neg Hx     [4]  Social History  Tobacco Use   • Smoking status: Never   • Smokeless tobacco: Never   Vaping Use   • Vaping status: Never Used   Substance and Sexual Activity   • Alcohol use: Not Currently   • Drug use: Never   • Sexual activity: Not Currently     Partners: Female     Birth control/protection: Abstinence, Condom Male, Condom Female   [5]  Current Outpatient Medications on File Prior to Visit   Medication Sig   • aspirin 81 mg chewable tablet Chew 81 mg in the morning. 2x a week.   • dapagliflozin (Farxiga) 5 MG TABS TAKE ONE TABLET BY MOUTH EVERY DAY   • ezetimibe (ZETIA) 10 mg tablet Take 1 tablet (10 mg total) by mouth daily   • famotidine (PEPCID) 40 MG tablet TAKE ONE TABLET BY MOUTH EVERY DAY   • metoprolol succinate (TOPROL-XL) 25 mg 24 hr tablet TAKE ONE TABLET BY MOUTH EVERY DAY   • Multiple Vitamin (multivitamin) tablet Take 1 tablet by mouth in the morning.   • Multiple Vitamins-Minerals (PRESERVISION AREDS 2 PO) Take by mouth   • simvastatin (ZOCOR) 40 mg tablet Take 1 tablet (40 mg total) by mouth daily at bedtime   • sitaGLIPtin (Januvia) 50 mg tablet Take 1 tablet (50 mg total) by mouth daily   • spironolactone-hydrochlorothiazide (ALDACTAZIDE) 25-25 mg per tablet TAKE 1/2 TABLET BY MOUTH EVERY OTHER DAY (GENERIC FOR ALDACTAZIDE)   • tamsulosin (FLOMAX) 0.4 mg Take 1 capsule (0.4 mg total) by mouth daily with dinner   • trandolapril (MAVIK) 1 MG tablet TAKE ONE TABLET BY MOUTH EVERY DAY

## 2025-06-26 NOTE — TELEPHONE ENCOUNTER
Writer L/M informing pt there is going to be a location change for provider and needs to return call for more information.

## 2025-06-26 NOTE — ASSESSMENT & PLAN NOTE
Lab Results   Component Value Date    HGBA1C 6.7 (H) 06/26/2025   STOP Metformin given GFR nearing 30 and A1c improvement  He is doing AMAZING at his diet   No changes to be made except for the metformin    Orders:  •  POCT hemoglobin A1c

## 2025-06-27 NOTE — TELEPHONE ENCOUNTER
FYI: attempted to contact office staff and they were not available at this time. Therefore, pt was given results as per PCP.

## 2025-07-03 DIAGNOSIS — E11.9 TYPE 2 DIABETES MELLITUS WITHOUT COMPLICATION, WITHOUT LONG-TERM CURRENT USE OF INSULIN (HCC): ICD-10-CM

## 2025-07-03 NOTE — TELEPHONE ENCOUNTER
Reason for call:   [x] Refill   [] Prior Auth  [] Other:     Office:   [x] PCP/Provider - Mark Ulloa  [] Specialty/Provider -     Medication:   Januvia 50 mg, 1 qd, 90    Pharmacy:   [facility]    [facility]   Does the patient have enough for 3 days?   [] Yes   [] No - Send as HP to POD    Mail Away Pharmacy   Does the patient have enough for 10 days?   [x] Yes   [] No - Send as HP to POD

## 2025-07-14 DIAGNOSIS — I11.9 HYPERTENSIVE HEART DISEASE WITHOUT HEART FAILURE: ICD-10-CM

## 2025-07-16 RX ORDER — METOPROLOL SUCCINATE 25 MG/1
25 TABLET, EXTENDED RELEASE ORAL DAILY
Qty: 90 TABLET | Refills: 1 | Status: SHIPPED | OUTPATIENT
Start: 2025-07-16

## 2025-08-20 ENCOUNTER — TELEPHONE (OUTPATIENT)
Dept: DERMATOLOGY | Facility: CLINIC | Age: 85
End: 2025-08-20

## (undated) DEVICE — COVIDIEN ENDO GIA PURPLE (MED) RELOAD 60MM

## (undated) DEVICE — GLOVE SRG BIOGEL 6.5

## (undated) DEVICE — TIBURON HAND DRAPE: Brand: CONVERTORS

## (undated) DEVICE — FIRST STEP BEDSIDE KIT - STAND-UP POUCH, ENDOSCOPIC CLEANING PAD - 1 POUCH: Brand: FIRST STEP BEDSIDE KIT - STAND-UP POUCH, ENDOSCOPIC CLEANING PAD

## (undated) DEVICE — HEAVY DUTY TABLE COVER: Brand: CONVERTORS

## (undated) DEVICE — BASIC DOUBLE BASIN 2-LF: Brand: MEDLINE INDUSTRIES, INC.

## (undated) DEVICE — OCCLUSIVE GAUZE STRIP,3% BISMUTH TRIBROMOPHENATE IN PETROLATUM BLEND: Brand: XEROFORM

## (undated) DEVICE — BIPOLAR CORD DISP

## (undated) DEVICE — HIGH PERFORMANCE GUIDEWIRE: Brand: JAGWIRE

## (undated) DEVICE — Device

## (undated) DEVICE — CUFF TOURNIQUET 18 X 4 IN QUICK CONNECT DISP 1 BLADDER

## (undated) DEVICE — ACE WRAP 4 IN UNSTERILE

## (undated) DEVICE — CAST PADDING 4 IN SYNTHETIC NON-STRL

## (undated) DEVICE — SUT MONOCRYL 3-0 SH 27 IN Y416H

## (undated) DEVICE — ANTI-FOG SOLUTION WITH FOAM PAD: Brand: DEVON

## (undated) DEVICE — PACK GENERAL LF

## (undated) DEVICE — Device: Brand: DEFENDO AIR/WATER/SUCTION AND BIOPSY VALVE

## (undated) DEVICE — 3000CC GUARDIAN II: Brand: GUARDIAN

## (undated) DEVICE — ENDO STITCH DEVICE 10 MM

## (undated) DEVICE — SPONGE SCRUB 4 PCT CHLORHEXIDINE

## (undated) DEVICE — SUT ETHILON 4-0 PS-2 18 IN 1667G

## (undated) DEVICE — DISPOSABLE EQUIPMENT COVER: Brand: LARGE TOWEL DRAPE

## (undated) DEVICE — ENDO STITCH 0 SURGIDAC 48 IN

## (undated) DEVICE — BANDAGE, ESMARK LF STR 4"X9'(20/CS): Brand: CYPRESS

## (undated) DEVICE — AIR AND WATER TUBING/CAP SET FOR OLYMPUS® SCOPES: Brand: ERBE

## (undated) DEVICE — CHLORAPREP HI-LITE 26ML ORANGE

## (undated) DEVICE — GLOVE INDICATOR PI UNDERGLOVE SZ 6.5 BLUE

## (undated) DEVICE — INTENDED FOR TISSUE SEPARATION, AND OTHER PROCEDURES THAT REQUIRE A SHARP SURGICAL BLADE TO PUNCTURE OR CUT.: Brand: BARD-PARKER ® CARBON RIB-BACK BLADES

## (undated) DEVICE — PADDING CAST 4 IN  COTTON STRL

## (undated) DEVICE — STRETCH BANDAGE: Brand: CURITY

## (undated) DEVICE — ASTOUND STANDARD SURGICAL GOWN, XL: Brand: CONVERTORS

## (undated) DEVICE — TOWEL SET X-RAY

## (undated) DEVICE — MAYO STAND COVER: Brand: CONVERTORS

## (undated) DEVICE — STAPLER GIA HANDLE STAND 4MM

## (undated) DEVICE — DRAPE SHEET THREE QUARTER

## (undated) DEVICE — 2000CC GUARDIAN II: Brand: GUARDIAN